# Patient Record
Sex: FEMALE | Race: OTHER | HISPANIC OR LATINO | URBAN - METROPOLITAN AREA
[De-identification: names, ages, dates, MRNs, and addresses within clinical notes are randomized per-mention and may not be internally consistent; named-entity substitution may affect disease eponyms.]

---

## 2021-09-04 VITALS
TEMPERATURE: 98 F | DIASTOLIC BLOOD PRESSURE: 70 MMHG | OXYGEN SATURATION: 99 % | RESPIRATION RATE: 18 BRPM | HEART RATE: 81 BPM | WEIGHT: 153 LBS | HEIGHT: 64 IN | SYSTOLIC BLOOD PRESSURE: 155 MMHG

## 2021-09-04 PROCEDURE — 99292 CRITICAL CARE ADDL 30 MIN: CPT | Mod: CS,25

## 2021-09-04 PROCEDURE — 99291 CRITICAL CARE FIRST HOUR: CPT | Mod: CS,25

## 2021-09-04 PROCEDURE — 31500 INSERT EMERGENCY AIRWAY: CPT

## 2021-09-04 NOTE — ED ADULT TRIAGE NOTE - OTHER COMPLAINTS
[facility] called 911 as per NH patient was more confused than usual. Receiving Ceftriaxone IV for possible infection. Upon arrival to ED, A+O x 2, Sinhala speaking

## 2021-09-05 ENCOUNTER — INPATIENT (INPATIENT)
Facility: HOSPITAL | Age: 86
LOS: 9 days | Discharge: NURSING, REHAB OR LONG-TERM CARE | DRG: 208 | End: 2021-09-15
Attending: STUDENT IN AN ORGANIZED HEALTH CARE EDUCATION/TRAINING PROGRAM | Admitting: STUDENT IN AN ORGANIZED HEALTH CARE EDUCATION/TRAINING PROGRAM
Payer: MEDICARE

## 2021-09-05 LAB
A1C WITH ESTIMATED AVERAGE GLUCOSE RESULT: 5.6 % — SIGNIFICANT CHANGE UP (ref 4–5.6)
ALBUMIN SERPL ELPH-MCNC: 2.8 G/DL — LOW (ref 3.3–5)
ALBUMIN SERPL ELPH-MCNC: 3.3 G/DL — SIGNIFICANT CHANGE UP (ref 3.3–5)
ALP SERPL-CCNC: 82 U/L — SIGNIFICANT CHANGE UP (ref 40–120)
ALP SERPL-CCNC: 92 U/L — SIGNIFICANT CHANGE UP (ref 40–120)
ALT FLD-CCNC: 10 U/L — SIGNIFICANT CHANGE UP (ref 10–45)
ALT FLD-CCNC: 12 U/L — SIGNIFICANT CHANGE UP (ref 10–45)
ANION GAP SERPL CALC-SCNC: 6 MMOL/L — SIGNIFICANT CHANGE UP (ref 5–17)
ANION GAP SERPL CALC-SCNC: 7 MMOL/L — SIGNIFICANT CHANGE UP (ref 5–17)
ANION GAP SERPL CALC-SCNC: 7 MMOL/L — SIGNIFICANT CHANGE UP (ref 5–17)
ANION GAP SERPL CALC-SCNC: 8 MMOL/L — SIGNIFICANT CHANGE UP (ref 5–17)
APPEARANCE UR: CLEAR — SIGNIFICANT CHANGE UP
APTT BLD: 32.6 SEC — SIGNIFICANT CHANGE UP (ref 27.5–35.5)
AST SERPL-CCNC: 15 U/L — SIGNIFICANT CHANGE UP (ref 10–40)
AST SERPL-CCNC: 16 U/L — SIGNIFICANT CHANGE UP (ref 10–40)
BACTERIA # UR AUTO: PRESENT /HPF
BASE EXCESS BLDV CALC-SCNC: 6.3 MMOL/L — HIGH (ref -2–3)
BASOPHILS # BLD AUTO: 0.02 K/UL — SIGNIFICANT CHANGE UP (ref 0–0.2)
BASOPHILS NFR BLD AUTO: 0.3 % — SIGNIFICANT CHANGE UP (ref 0–2)
BILIRUB SERPL-MCNC: 0.2 MG/DL — SIGNIFICANT CHANGE UP (ref 0.2–1.2)
BILIRUB SERPL-MCNC: 0.3 MG/DL — SIGNIFICANT CHANGE UP (ref 0.2–1.2)
BILIRUB UR-MCNC: NEGATIVE — SIGNIFICANT CHANGE UP
BLD GP AB SCN SERPL QL: NEGATIVE — SIGNIFICANT CHANGE UP
BUN SERPL-MCNC: 15 MG/DL — SIGNIFICANT CHANGE UP (ref 7–23)
BUN SERPL-MCNC: 17 MG/DL — SIGNIFICANT CHANGE UP (ref 7–23)
CA-I SERPL-SCNC: 1.28 MMOL/L — SIGNIFICANT CHANGE UP (ref 1.15–1.33)
CALCIUM SERPL-MCNC: 8.8 MG/DL — SIGNIFICANT CHANGE UP (ref 8.4–10.5)
CALCIUM SERPL-MCNC: 8.9 MG/DL — SIGNIFICANT CHANGE UP (ref 8.4–10.5)
CALCIUM SERPL-MCNC: 9 MG/DL — SIGNIFICANT CHANGE UP (ref 8.4–10.5)
CALCIUM SERPL-MCNC: 9.3 MG/DL — SIGNIFICANT CHANGE UP (ref 8.4–10.5)
CHLORIDE SERPL-SCNC: 75 MMOL/L — LOW (ref 96–108)
CHLORIDE SERPL-SCNC: 81 MMOL/L — LOW (ref 96–108)
CHLORIDE SERPL-SCNC: 82 MMOL/L — LOW (ref 96–108)
CHLORIDE SERPL-SCNC: 83 MMOL/L — LOW (ref 96–108)
CO2 BLDV-SCNC: 39.5 MMOL/L — HIGH (ref 22–26)
CO2 SERPL-SCNC: 32 MMOL/L — HIGH (ref 22–31)
CO2 SERPL-SCNC: 34 MMOL/L — HIGH (ref 22–31)
COLOR SPEC: YELLOW — SIGNIFICANT CHANGE UP
CREAT ?TM UR-MCNC: 51 MG/DL — SIGNIFICANT CHANGE UP
CREAT ?TM UR-MCNC: 67 MG/DL — SIGNIFICANT CHANGE UP
CREAT SERPL-MCNC: 0.46 MG/DL — LOW (ref 0.5–1.3)
CREAT SERPL-MCNC: 0.46 MG/DL — LOW (ref 0.5–1.3)
CREAT SERPL-MCNC: 0.49 MG/DL — LOW (ref 0.5–1.3)
CREAT SERPL-MCNC: 0.61 MG/DL — SIGNIFICANT CHANGE UP (ref 0.5–1.3)
DIFF PNL FLD: ABNORMAL
EOSINOPHIL # BLD AUTO: 0.01 K/UL — SIGNIFICANT CHANGE UP (ref 0–0.5)
EOSINOPHIL NFR BLD AUTO: 0.2 % — SIGNIFICANT CHANGE UP (ref 0–6)
EPI CELLS # UR: SIGNIFICANT CHANGE UP /HPF (ref 0–5)
ESTIMATED AVERAGE GLUCOSE: 114 MG/DL — SIGNIFICANT CHANGE UP (ref 68–114)
FERRITIN SERPL-MCNC: 60 NG/ML — SIGNIFICANT CHANGE UP (ref 15–150)
FOLATE SERPL-MCNC: >20 NG/ML — SIGNIFICANT CHANGE UP
GAS PNL BLDA: SIGNIFICANT CHANGE UP
GAS PNL BLDA: SIGNIFICANT CHANGE UP
GAS PNL BLDV: 115 MMOL/L — CRITICAL LOW (ref 136–145)
GAS PNL BLDV: SIGNIFICANT CHANGE UP
GLUCOSE BLDC GLUCOMTR-MCNC: 123 MG/DL — HIGH (ref 70–99)
GLUCOSE BLDC GLUCOMTR-MCNC: 124 MG/DL — HIGH (ref 70–99)
GLUCOSE BLDC GLUCOMTR-MCNC: 129 MG/DL — HIGH (ref 70–99)
GLUCOSE BLDC GLUCOMTR-MCNC: 98 MG/DL — SIGNIFICANT CHANGE UP (ref 70–99)
GLUCOSE SERPL-MCNC: 116 MG/DL — HIGH (ref 70–99)
GLUCOSE SERPL-MCNC: 121 MG/DL — HIGH (ref 70–99)
GLUCOSE SERPL-MCNC: 127 MG/DL — HIGH (ref 70–99)
GLUCOSE SERPL-MCNC: 134 MG/DL — HIGH (ref 70–99)
GLUCOSE UR QL: NEGATIVE — SIGNIFICANT CHANGE UP
HCO3 BLDV-SCNC: 37 MMOL/L — HIGH (ref 22–29)
HCT VFR BLD CALC: 26.9 % — LOW (ref 34.5–45)
HCT VFR BLD CALC: 29.8 % — LOW (ref 34.5–45)
HGB BLD-MCNC: 10 G/DL — LOW (ref 11.5–15.5)
HGB BLD-MCNC: 8.7 G/DL — LOW (ref 11.5–15.5)
IMM GRANULOCYTES NFR BLD AUTO: 0.6 % — SIGNIFICANT CHANGE UP (ref 0–1.5)
INR BLD: 0.94 — SIGNIFICANT CHANGE UP (ref 0.88–1.16)
IRON SATN MFR SERPL: 20 % — SIGNIFICANT CHANGE UP (ref 14–50)
IRON SATN MFR SERPL: 41 UG/DL — SIGNIFICANT CHANGE UP (ref 30–160)
KETONES UR-MCNC: NEGATIVE — SIGNIFICANT CHANGE UP
LACTATE SERPL-SCNC: 0.7 MMOL/L — SIGNIFICANT CHANGE UP (ref 0.5–2)
LEGIONELLA AG UR QL: NEGATIVE — SIGNIFICANT CHANGE UP
LEUKOCYTE ESTERASE UR-ACNC: ABNORMAL
LYMPHOCYTES # BLD AUTO: 0.88 K/UL — LOW (ref 1–3.3)
LYMPHOCYTES # BLD AUTO: 13.7 % — SIGNIFICANT CHANGE UP (ref 13–44)
MAGNESIUM SERPL-MCNC: 1.5 MG/DL — LOW (ref 1.6–2.6)
MAGNESIUM SERPL-MCNC: 1.5 MG/DL — LOW (ref 1.6–2.6)
MAGNESIUM SERPL-MCNC: 1.6 MG/DL — SIGNIFICANT CHANGE UP (ref 1.6–2.6)
MCHC RBC-ENTMCNC: 28.2 PG — SIGNIFICANT CHANGE UP (ref 27–34)
MCHC RBC-ENTMCNC: 29.3 PG — SIGNIFICANT CHANGE UP (ref 27–34)
MCHC RBC-ENTMCNC: 32.3 GM/DL — SIGNIFICANT CHANGE UP (ref 32–36)
MCHC RBC-ENTMCNC: 33.6 GM/DL — SIGNIFICANT CHANGE UP (ref 32–36)
MCV RBC AUTO: 87.3 FL — SIGNIFICANT CHANGE UP (ref 80–100)
MCV RBC AUTO: 87.4 FL — SIGNIFICANT CHANGE UP (ref 80–100)
MONOCYTES # BLD AUTO: 0.91 K/UL — HIGH (ref 0–0.9)
MONOCYTES NFR BLD AUTO: 14.2 % — HIGH (ref 2–14)
MRSA PCR RESULT.: NEGATIVE — SIGNIFICANT CHANGE UP
NEUTROPHILS # BLD AUTO: 4.55 K/UL — SIGNIFICANT CHANGE UP (ref 1.8–7.4)
NEUTROPHILS NFR BLD AUTO: 71 % — SIGNIFICANT CHANGE UP (ref 43–77)
NITRITE UR-MCNC: NEGATIVE — SIGNIFICANT CHANGE UP
NRBC # BLD: 0 /100 WBCS — SIGNIFICANT CHANGE UP (ref 0–0)
NRBC # BLD: 0 /100 WBCS — SIGNIFICANT CHANGE UP (ref 0–0)
OSMOLALITY SERPL: 258 MOSM/KG — LOW (ref 280–301)
PCO2 BLDV: 86 MMHG — HIGH (ref 39–42)
PH BLDV: 7.24 — LOW (ref 7.32–7.43)
PH UR: 6 — SIGNIFICANT CHANGE UP (ref 5–8)
PHOSPHATE SERPL-MCNC: 2.6 MG/DL — SIGNIFICANT CHANGE UP (ref 2.5–4.5)
PHOSPHATE SERPL-MCNC: 3.7 MG/DL — SIGNIFICANT CHANGE UP (ref 2.5–4.5)
PLATELET # BLD AUTO: 250 K/UL — SIGNIFICANT CHANGE UP (ref 150–400)
PLATELET # BLD AUTO: 299 K/UL — SIGNIFICANT CHANGE UP (ref 150–400)
PO2 BLDV: 59 MMHG — HIGH (ref 25–45)
POTASSIUM BLDV-SCNC: 5.6 MMOL/L — HIGH (ref 3.5–5.1)
POTASSIUM SERPL-MCNC: 4.4 MMOL/L — SIGNIFICANT CHANGE UP (ref 3.5–5.3)
POTASSIUM SERPL-MCNC: 5.1 MMOL/L — SIGNIFICANT CHANGE UP (ref 3.5–5.3)
POTASSIUM SERPL-MCNC: 5.2 MMOL/L — SIGNIFICANT CHANGE UP (ref 3.5–5.3)
POTASSIUM SERPL-MCNC: 5.3 MMOL/L — SIGNIFICANT CHANGE UP (ref 3.5–5.3)
POTASSIUM SERPL-SCNC: 4.4 MMOL/L — SIGNIFICANT CHANGE UP (ref 3.5–5.3)
POTASSIUM SERPL-SCNC: 5.1 MMOL/L — SIGNIFICANT CHANGE UP (ref 3.5–5.3)
POTASSIUM SERPL-SCNC: 5.2 MMOL/L — SIGNIFICANT CHANGE UP (ref 3.5–5.3)
POTASSIUM SERPL-SCNC: 5.3 MMOL/L — SIGNIFICANT CHANGE UP (ref 3.5–5.3)
PROCALCITONIN SERPL-MCNC: 0.07 NG/ML — SIGNIFICANT CHANGE UP (ref 0.02–0.1)
PROT SERPL-MCNC: 6.9 G/DL — SIGNIFICANT CHANGE UP (ref 6–8.3)
PROT SERPL-MCNC: 7.9 G/DL — SIGNIFICANT CHANGE UP (ref 6–8.3)
PROT UR-MCNC: 100 MG/DL
PROTHROM AB SERPL-ACNC: 11.3 SEC — SIGNIFICANT CHANGE UP (ref 10.6–13.6)
RBC # BLD: 3.08 M/UL — LOW (ref 3.8–5.2)
RBC # BLD: 3.41 M/UL — LOW (ref 3.8–5.2)
RBC # FLD: 13.2 % — SIGNIFICANT CHANGE UP (ref 10.3–14.5)
RBC # FLD: 13.5 % — SIGNIFICANT CHANGE UP (ref 10.3–14.5)
RBC CASTS # UR COMP ASSIST: < 5 /HPF — SIGNIFICANT CHANGE UP
RH IG SCN BLD-IMP: POSITIVE — SIGNIFICANT CHANGE UP
S AUREUS DNA NOSE QL NAA+PROBE: POSITIVE
S PNEUM AG UR QL: NEGATIVE — SIGNIFICANT CHANGE UP
SAO2 % BLDV: 89.9 % — HIGH (ref 67–88)
SARS-COV-2 RNA SPEC QL NAA+PROBE: NEGATIVE — SIGNIFICANT CHANGE UP
SODIUM SERPL-SCNC: 115 MMOL/L — CRITICAL LOW (ref 135–145)
SODIUM SERPL-SCNC: 120 MMOL/L — CRITICAL LOW (ref 135–145)
SODIUM SERPL-SCNC: 121 MMOL/L — LOW (ref 135–145)
SODIUM SERPL-SCNC: 123 MMOL/L — LOW (ref 135–145)
SODIUM UR-SCNC: 23 MMOL/L — SIGNIFICANT CHANGE UP
SODIUM UR-SCNC: 33 MMOL/L — SIGNIFICANT CHANGE UP
SP GR SPEC: >=1.03 — SIGNIFICANT CHANGE UP (ref 1–1.03)
TIBC SERPL-MCNC: 201 UG/DL — LOW (ref 220–430)
TSH SERPL-MCNC: 0.65 UIU/ML — SIGNIFICANT CHANGE UP (ref 0.27–4.2)
TSH SERPL-MCNC: 0.79 UIU/ML — SIGNIFICANT CHANGE UP (ref 0.27–4.2)
UIBC SERPL-MCNC: 160 UG/DL — SIGNIFICANT CHANGE UP (ref 110–370)
UROBILINOGEN FLD QL: 0.2 E.U./DL — SIGNIFICANT CHANGE UP
VIT B12 SERPL-MCNC: 458 PG/ML — SIGNIFICANT CHANGE UP (ref 232–1245)
WBC # BLD: 5.28 K/UL — SIGNIFICANT CHANGE UP (ref 3.8–10.5)
WBC # BLD: 6.41 K/UL — SIGNIFICANT CHANGE UP (ref 3.8–10.5)
WBC # FLD AUTO: 5.28 K/UL — SIGNIFICANT CHANGE UP (ref 3.8–10.5)
WBC # FLD AUTO: 6.41 K/UL — SIGNIFICANT CHANGE UP (ref 3.8–10.5)
WBC UR QL: ABNORMAL /HPF

## 2021-09-05 PROCEDURE — 71250 CT THORAX DX C-: CPT | Mod: 26,MA

## 2021-09-05 PROCEDURE — 70450 CT HEAD/BRAIN W/O DYE: CPT | Mod: 26,MA

## 2021-09-05 PROCEDURE — 99291 CRITICAL CARE FIRST HOUR: CPT

## 2021-09-05 PROCEDURE — 71045 X-RAY EXAM CHEST 1 VIEW: CPT | Mod: 26,77

## 2021-09-05 PROCEDURE — 71045 X-RAY EXAM CHEST 1 VIEW: CPT | Mod: 26

## 2021-09-05 PROCEDURE — 99292 CRITICAL CARE ADDL 30 MIN: CPT

## 2021-09-05 RX ORDER — IPRATROPIUM/ALBUTEROL SULFATE 18-103MCG
3 AEROSOL WITH ADAPTER (GRAM) INHALATION EVERY 6 HOURS
Refills: 0 | Status: DISCONTINUED | OUTPATIENT
Start: 2021-09-05 | End: 2021-09-15

## 2021-09-05 RX ORDER — SODIUM CHLORIDE 9 MG/ML
1000 INJECTION, SOLUTION INTRAVENOUS
Refills: 0 | Status: DISCONTINUED | OUTPATIENT
Start: 2021-09-05 | End: 2021-09-15

## 2021-09-05 RX ORDER — FENTANYL CITRATE 50 UG/ML
50 INJECTION INTRAVENOUS ONCE
Refills: 0 | Status: DISCONTINUED | OUTPATIENT
Start: 2021-09-05 | End: 2021-09-05

## 2021-09-05 RX ORDER — NOREPINEPHRINE BITARTRATE/D5W 8 MG/250ML
0.05 PLASTIC BAG, INJECTION (ML) INTRAVENOUS
Qty: 8 | Refills: 0 | Status: DISCONTINUED | OUTPATIENT
Start: 2021-09-05 | End: 2021-09-05

## 2021-09-05 RX ORDER — MIDAZOLAM HYDROCHLORIDE 1 MG/ML
1 INJECTION, SOLUTION INTRAMUSCULAR; INTRAVENOUS ONCE
Refills: 0 | Status: DISCONTINUED | OUTPATIENT
Start: 2021-09-05 | End: 2021-09-05

## 2021-09-05 RX ORDER — MIDAZOLAM HYDROCHLORIDE 1 MG/ML
2 INJECTION, SOLUTION INTRAMUSCULAR; INTRAVENOUS ONCE
Refills: 0 | Status: DISCONTINUED | OUTPATIENT
Start: 2021-09-05 | End: 2021-09-05

## 2021-09-05 RX ORDER — IPRATROPIUM/ALBUTEROL SULFATE 18-103MCG
3 AEROSOL WITH ADAPTER (GRAM) INHALATION ONCE
Refills: 0 | Status: COMPLETED | OUTPATIENT
Start: 2021-09-05 | End: 2021-09-05

## 2021-09-05 RX ORDER — MAGNESIUM SULFATE 500 MG/ML
4 VIAL (ML) INJECTION ONCE
Refills: 0 | Status: COMPLETED | OUTPATIENT
Start: 2021-09-05 | End: 2021-09-05

## 2021-09-05 RX ORDER — MAGNESIUM SULFATE 500 MG/ML
1 VIAL (ML) INJECTION ONCE
Refills: 0 | Status: COMPLETED | OUTPATIENT
Start: 2021-09-05 | End: 2021-09-05

## 2021-09-05 RX ORDER — INSULIN LISPRO 100/ML
VIAL (ML) SUBCUTANEOUS
Refills: 0 | Status: DISCONTINUED | OUTPATIENT
Start: 2021-09-05 | End: 2021-09-15

## 2021-09-05 RX ORDER — ETOMIDATE 2 MG/ML
20 INJECTION INTRAVENOUS ONCE
Refills: 0 | Status: COMPLETED | OUTPATIENT
Start: 2021-09-05 | End: 2021-09-05

## 2021-09-05 RX ORDER — DEXTROSE 50 % IN WATER 50 %
25 SYRINGE (ML) INTRAVENOUS ONCE
Refills: 0 | Status: DISCONTINUED | OUTPATIENT
Start: 2021-09-05 | End: 2021-09-15

## 2021-09-05 RX ORDER — CHLORHEXIDINE GLUCONATE 213 G/1000ML
15 SOLUTION TOPICAL EVERY 12 HOURS
Refills: 0 | Status: DISCONTINUED | OUTPATIENT
Start: 2021-09-05 | End: 2021-09-06

## 2021-09-05 RX ORDER — ENOXAPARIN SODIUM 100 MG/ML
40 INJECTION SUBCUTANEOUS EVERY 24 HOURS
Refills: 0 | Status: DISCONTINUED | OUTPATIENT
Start: 2021-09-05 | End: 2021-09-15

## 2021-09-05 RX ORDER — PANTOPRAZOLE SODIUM 20 MG/1
40 TABLET, DELAYED RELEASE ORAL DAILY
Refills: 0 | Status: DISCONTINUED | OUTPATIENT
Start: 2021-09-05 | End: 2021-09-07

## 2021-09-05 RX ORDER — GLUCAGON INJECTION, SOLUTION 0.5 MG/.1ML
1 INJECTION, SOLUTION SUBCUTANEOUS ONCE
Refills: 0 | Status: DISCONTINUED | OUTPATIENT
Start: 2021-09-05 | End: 2021-09-15

## 2021-09-05 RX ORDER — SODIUM CHLORIDE 9 MG/ML
1000 INJECTION INTRAMUSCULAR; INTRAVENOUS; SUBCUTANEOUS ONCE
Refills: 0 | Status: COMPLETED | OUTPATIENT
Start: 2021-09-05 | End: 2021-09-05

## 2021-09-05 RX ORDER — MIRTAZAPINE 45 MG/1
15 TABLET, ORALLY DISINTEGRATING ORAL DAILY
Refills: 0 | Status: DISCONTINUED | OUTPATIENT
Start: 2021-09-05 | End: 2021-09-15

## 2021-09-05 RX ORDER — FENTANYL CITRATE 50 UG/ML
0.5 INJECTION INTRAVENOUS
Qty: 2500 | Refills: 0 | Status: DISCONTINUED | OUTPATIENT
Start: 2021-09-05 | End: 2021-09-07

## 2021-09-05 RX ORDER — PIPERACILLIN AND TAZOBACTAM 4; .5 G/20ML; G/20ML
3.38 INJECTION, POWDER, LYOPHILIZED, FOR SOLUTION INTRAVENOUS ONCE
Refills: 0 | Status: DISCONTINUED | OUTPATIENT
Start: 2021-09-05 | End: 2021-09-05

## 2021-09-05 RX ORDER — SODIUM CHLORIDE 9 MG/ML
2200 INJECTION INTRAMUSCULAR; INTRAVENOUS; SUBCUTANEOUS ONCE
Refills: 0 | Status: DISCONTINUED | OUTPATIENT
Start: 2021-09-05 | End: 2021-09-05

## 2021-09-05 RX ORDER — FUROSEMIDE 40 MG
20 TABLET ORAL ONCE
Refills: 0 | Status: COMPLETED | OUTPATIENT
Start: 2021-09-05 | End: 2021-09-05

## 2021-09-05 RX ORDER — DEXMEDETOMIDINE HYDROCHLORIDE IN 0.9% SODIUM CHLORIDE 4 UG/ML
0.2 INJECTION INTRAVENOUS
Qty: 200 | Refills: 0 | Status: DISCONTINUED | OUTPATIENT
Start: 2021-09-05 | End: 2021-09-07

## 2021-09-05 RX ORDER — ACETAMINOPHEN 500 MG
650 TABLET ORAL ONCE
Refills: 0 | Status: COMPLETED | OUTPATIENT
Start: 2021-09-05 | End: 2021-09-05

## 2021-09-05 RX ORDER — CHLORHEXIDINE GLUCONATE 213 G/1000ML
1 SOLUTION TOPICAL
Refills: 0 | Status: DISCONTINUED | OUTPATIENT
Start: 2021-09-05 | End: 2021-09-15

## 2021-09-05 RX ORDER — AZITHROMYCIN 500 MG/1
500 TABLET, FILM COATED ORAL ONCE
Refills: 0 | Status: COMPLETED | OUTPATIENT
Start: 2021-09-05 | End: 2021-09-05

## 2021-09-05 RX ORDER — HEPARIN SODIUM 5000 [USP'U]/ML
5000 INJECTION INTRAVENOUS; SUBCUTANEOUS EVERY 8 HOURS
Refills: 0 | Status: DISCONTINUED | OUTPATIENT
Start: 2021-09-05 | End: 2021-09-05

## 2021-09-05 RX ORDER — DEXTROSE 50 % IN WATER 50 %
12.5 SYRINGE (ML) INTRAVENOUS ONCE
Refills: 0 | Status: DISCONTINUED | OUTPATIENT
Start: 2021-09-05 | End: 2021-09-15

## 2021-09-05 RX ORDER — DEXTROSE 50 % IN WATER 50 %
15 SYRINGE (ML) INTRAVENOUS ONCE
Refills: 0 | Status: DISCONTINUED | OUTPATIENT
Start: 2021-09-05 | End: 2021-09-15

## 2021-09-05 RX ORDER — CEFTRIAXONE 500 MG/1
1000 INJECTION, POWDER, FOR SOLUTION INTRAMUSCULAR; INTRAVENOUS EVERY 24 HOURS
Refills: 0 | Status: DISCONTINUED | OUTPATIENT
Start: 2021-09-05 | End: 2021-09-06

## 2021-09-05 RX ORDER — ATORVASTATIN CALCIUM 80 MG/1
20 TABLET, FILM COATED ORAL AT BEDTIME
Refills: 0 | Status: DISCONTINUED | OUTPATIENT
Start: 2021-09-05 | End: 2021-09-15

## 2021-09-05 RX ADMIN — CHLORHEXIDINE GLUCONATE 15 MILLILITER(S): 213 SOLUTION TOPICAL at 17:34

## 2021-09-05 RX ADMIN — Medication 3 MILLILITER(S): at 09:48

## 2021-09-05 RX ADMIN — Medication 6.51 MICROGRAM(S)/KG/MIN: at 04:05

## 2021-09-05 RX ADMIN — PANTOPRAZOLE SODIUM 40 MILLIGRAM(S): 20 TABLET, DELAYED RELEASE ORAL at 12:51

## 2021-09-05 RX ADMIN — CEFTRIAXONE 100 MILLIGRAM(S): 500 INJECTION, POWDER, FOR SOLUTION INTRAMUSCULAR; INTRAVENOUS at 05:16

## 2021-09-05 RX ADMIN — Medication 20 MILLIGRAM(S): at 18:14

## 2021-09-05 RX ADMIN — HEPARIN SODIUM 5000 UNIT(S): 5000 INJECTION INTRAVENOUS; SUBCUTANEOUS at 05:17

## 2021-09-05 RX ADMIN — DEXMEDETOMIDINE HYDROCHLORIDE IN 0.9% SODIUM CHLORIDE 3.47 MICROGRAM(S)/KG/HR: 4 INJECTION INTRAVENOUS at 23:36

## 2021-09-05 RX ADMIN — CHLORHEXIDINE GLUCONATE 15 MILLILITER(S): 213 SOLUTION TOPICAL at 05:17

## 2021-09-05 RX ADMIN — DEXMEDETOMIDINE HYDROCHLORIDE IN 0.9% SODIUM CHLORIDE 3.47 MICROGRAM(S)/KG/HR: 4 INJECTION INTRAVENOUS at 03:58

## 2021-09-05 RX ADMIN — ENOXAPARIN SODIUM 40 MILLIGRAM(S): 100 INJECTION SUBCUTANEOUS at 22:01

## 2021-09-05 RX ADMIN — ETOMIDATE 20 MILLIGRAM(S): 2 INJECTION INTRAVENOUS at 03:14

## 2021-09-05 RX ADMIN — Medication 650 MILLIGRAM(S): at 17:34

## 2021-09-05 RX ADMIN — DEXMEDETOMIDINE HYDROCHLORIDE IN 0.9% SODIUM CHLORIDE 3.47 MICROGRAM(S)/KG/HR: 4 INJECTION INTRAVENOUS at 10:33

## 2021-09-05 RX ADMIN — MIDAZOLAM HYDROCHLORIDE 1 MILLIGRAM(S): 1 INJECTION, SOLUTION INTRAMUSCULAR; INTRAVENOUS at 03:28

## 2021-09-05 RX ADMIN — FENTANYL CITRATE 50 MICROGRAM(S): 50 INJECTION INTRAVENOUS at 03:45

## 2021-09-05 RX ADMIN — Medication 100 GRAM(S): at 10:23

## 2021-09-05 RX ADMIN — DEXMEDETOMIDINE HYDROCHLORIDE IN 0.9% SODIUM CHLORIDE 3.47 MICROGRAM(S)/KG/HR: 4 INJECTION INTRAVENOUS at 17:34

## 2021-09-05 RX ADMIN — FENTANYL CITRATE 3.47 MICROGRAM(S)/KG/HR: 50 INJECTION INTRAVENOUS at 05:18

## 2021-09-05 RX ADMIN — FENTANYL CITRATE 50 MICROGRAM(S): 50 INJECTION INTRAVENOUS at 03:16

## 2021-09-05 RX ADMIN — Medication 3 MILLILITER(S): at 15:41

## 2021-09-05 RX ADMIN — SODIUM CHLORIDE 1000 MILLILITER(S): 9 INJECTION INTRAMUSCULAR; INTRAVENOUS; SUBCUTANEOUS at 01:06

## 2021-09-05 RX ADMIN — Medication 3 MILLILITER(S): at 22:31

## 2021-09-05 RX ADMIN — Medication 3 MILLILITER(S): at 02:57

## 2021-09-05 RX ADMIN — Medication 40 MILLIGRAM(S): at 05:17

## 2021-09-05 RX ADMIN — AZITHROMYCIN 255 MILLIGRAM(S): 500 TABLET, FILM COATED ORAL at 04:19

## 2021-09-05 RX ADMIN — Medication 100 GRAM(S): at 12:50

## 2021-09-05 RX ADMIN — Medication 20 MILLIGRAM(S): at 12:51

## 2021-09-05 RX ADMIN — MIRTAZAPINE 15 MILLIGRAM(S): 45 TABLET, ORALLY DISINTEGRATING ORAL at 17:35

## 2021-09-05 RX ADMIN — ATORVASTATIN CALCIUM 20 MILLIGRAM(S): 80 TABLET, FILM COATED ORAL at 21:26

## 2021-09-05 RX ADMIN — MIDAZOLAM HYDROCHLORIDE 1 MILLIGRAM(S): 1 INJECTION, SOLUTION INTRAMUSCULAR; INTRAVENOUS at 03:34

## 2021-09-05 RX ADMIN — MIDAZOLAM HYDROCHLORIDE 2 MILLIGRAM(S): 1 INJECTION, SOLUTION INTRAMUSCULAR; INTRAVENOUS at 03:30

## 2021-09-05 RX ADMIN — Medication 100 GRAM(S): at 01:10

## 2021-09-05 NOTE — ED ADULT NURSE NOTE - OBJECTIVE STATEMENT
Received via stretcher BIBEMS from [facility] with chief complaints of AMS. Patient on Ceftriaxone IV for pleural effusion per document from NH.     AOX3, speaking full sentences.  +PERRLA.  Patient denies chest pain, shortness of breath, difficulty breathing and any form of distress not noted. Resps even and nonlabored. Moves all extremities. No obvious trauma/injury/deformity noted. Patient oriented to ED area. All needs attended. POC reviewed. Placed on continuos cardiac monitoring. Fall risk precautions maintained. Purposeful proactive hourly rounding in progress.

## 2021-09-05 NOTE — CONSULT NOTE ADULT - SUBJECTIVE AND OBJECTIVE BOX
ICU Consult Note    HPI:      REVIEW OF SYSTEMS // REPLACE    PAST MEDICAL & SURGICAL HISTORY:      FAMILY HISTORY:      SOCIAL HISTORY:  Smoker:  EtOH:  Drug Use:  Living situation:    Home Medications:      MEDICATIONS  (STANDING):    MEDICATIONS  (PRN):      Allergies    iodine containing compounds (Unknown)  lisinopril (Unknown)    Intolerances        PHYSICAL EXAM // REPLACE    LABS:                        10.0   6.41  )-----------( 299      ( 05 Sep 2021 00:30 )             29.8     09-05    115<LL>  |  75<L>  |  15  ----------------------------<  127<H>  5.3   |  34<H>  |  0.46<L>    Ca    9.3      05 Sep 2021 00:30  Mg     1.5     09-05    TPro  7.9  /  Alb  3.3  /  TBili  0.2  /  DBili  x   /  AST  16  /  ALT  12  /  AlkPhos  92  09-05    PT/INR - ( 05 Sep 2021 00:30 )   PT: 11.3 sec;   INR: 0.94          PTT - ( 05 Sep 2021 00:30 )  PTT:32.6 sec      Lactate, Blood: 0.7 mmol/L (09-05-21 @ 00:30)            Urinalysis Basic - ( 05 Sep 2021 01:34 )    Color: Yellow / Appearance: Clear / SG: >=1.030 / pH: x  Gluc: x / Ketone: NEGATIVE  / Bili: Negative / Urobili: 0.2 E.U./dL   Blood: x / Protein: 100 mg/dL / Nitrite: NEGATIVE   Leuk Esterase: Moderate / RBC: < 5 /HPF / WBC Many /HPF   Sq Epi: x / Non Sq Epi: 0-5 /HPF / Bacteria: Present /HPF            EKG:    RADIOLOGY & ADDITIONAL TESTS:   ICU Consult Note    HPI:  Pt is a 89 yo Czech speaking F with PMH bedbound, DNR, HTN, HLD, and OA who p/f NH with AMS x1d. Had been on CTX x1d outpt for PNA. Started becoming hypoxic and less verbal, so was sent to ER. Limited history d/t pt mental status. Per NH, no other changes to daily routine. No changes to appetite, BMs, or urination.     On arrival, T 98, HR 81, /70, RR 18 O2sat 99% 10L NRB--88% 4L--99% BIPAP 40%. Labs with WBC 6.41, 71% neutrophils, Hb 10, MCV 87%, Na 115, Cl 75, lactate neg, TSH neg, Mg 1.5, VBG 7.24/86/59/37. COVID neg. UA +LE, blood, WBCs. CT chest with cardiogenic pulmonary edema, small-mod BL pleural effusions with BL atelectasis vs. PNA. CTH neg. Pt given Mg 1g, NS 1L. ICU consulted for hypoNa.    On ICU eval, pt somnolent and unarousable. ABG collected with 7.18/98/86/37. Family contacted (ivon Bowers [phone]) requesting trial of intubation and confirmed DNR status. Pt intubated with etomidate 20mg, fentanyl 50mg, versed 1mg x2 and 2mg. Biting on tube and given fentanyl 50mg and started on precedex gtt, fentanyl gtt, peripheral levo gtt for MAP 60 after sedation initiated. Urine legionella and strep neg. MRSA swab sent. Started on azithromycin and CTX.    PAST MEDICAL & SURGICAL HISTORY:  HTN  HLD  OA    FAMILY HISTORY:  Unknown    SOCIAL HISTORY:  Smoker: unknown  EtOH: unknown  Drug Use: unknown  Living situation: [facility]    Home Medications:  Unknown    MEDICATIONS  (STANDING):    MEDICATIONS  (PRN):      Allergies  iodine containing compounds (Unknown)  lisinopril (Unknown)    Intolerances    VITAL SIGNS:  T(C): 36.8 (09-05-21 @ 00:22), Max: 36.8 (09-05-21 @ 00:22)  T(F): 98.2 (09-05-21 @ 00:22), Max: 98.2 (09-05-21 @ 00:22)  HR: 73 (09-05-21 @ 04:15) (59 - 81)  BP: 90/57 (09-05-21 @ 03:59) (90/57 - 164/73)  BP(mean): --  RR: 13 (09-05-21 @ 04:15) (12 - 36)  SpO2: 100% (09-05-21 @ 04:15) (84% - 100%)  Wt(kg): --    PHYSICAL EXAM:  Constitutional: elderly F, unarousable, on BIPAP with dentures in place  Head: NC/AT  Eyes: PERRL, anicteric sclera  ENT: no nasal discharge; dry MM  Neck: supple; no JVD  Respiratory: CTA BL, on BIPAP 10/5 O2sat >90%, no acc mm use  Cardiac: +S1/S2; RRR; no M/R/G  Gastrointestinal: soft, NT/ND; no rebound or guarding; +BSx4  Extremities: WWP, no clubbing or cyanosis; no peripheral edema  Musculoskeletal: NROM x4; no joint swelling, tenderness or erythema  Vascular: 2+ radial, DP/PT pulses B/L  Dermatologic: skin warm, dry and intact; no rashes, wounds, or scars  Neurologic: AOx0, no facial asymmetry, does not wake to stimulus, does not withdraw to pain, does not respond to sternal rub    LABS:                        10.0   6.41  )-----------( 299      ( 05 Sep 2021 00:30 )             29.8     09-05    115<LL>  |  75<L>  |  15  ----------------------------<  127<H>  5.3   |  34<H>  |  0.46<L>    Ca    9.3      05 Sep 2021 00:30  Mg     1.5     09-05    TPro  7.9  /  Alb  3.3  /  TBili  0.2  /  DBili  x   /  AST  16  /  ALT  12  /  AlkPhos  92  09-05    PT/INR - ( 05 Sep 2021 00:30 )   PT: 11.3 sec;   INR: 0.94          PTT - ( 05 Sep 2021 00:30 )  PTT:32.6 sec      Lactate, Blood: 0.7 mmol/L (09-05-21 @ 00:30)    Urinalysis Basic - ( 05 Sep 2021 01:34 )    Color: Yellow / Appearance: Clear / SG: >=1.030 / pH: x  Gluc: x / Ketone: NEGATIVE  / Bili: Negative / Urobili: 0.2 E.U./dL   Blood: x / Protein: 100 mg/dL / Nitrite: NEGATIVE   Leuk Esterase: Moderate / RBC: < 5 /HPF / WBC Many /HPF   Sq Epi: x / Non Sq Epi: 0-5 /HPF / Bacteria: Present /HPF    EKG:    RADIOLOGY & ADDITIONAL TESTS:

## 2021-09-05 NOTE — PROGRESS NOTE ADULT - SUBJECTIVE AND OBJECTIVE BOX
INTERVAL HPI/OVERNIGHT EVENTS:  89F, Japanese speaking, NHR, bedbound, DNR, PMHx HTN, HLD, and OA. Presents to ED from NH with AMS x1d. She was found altered at NH yesterday and started empirically on Ceftriaxone. Today pt noted to be Started hypoxic and less verbal, so was brought to ER. In the ED pt afebrile, HR 81, /70, O2 sat 99% on 10L NRB. She was placed on 4L NC with O2 sat 88%. Labs notable for WBC 6.4, Na 115, Cl 75, Mg 1.5. VBG 7.24/59/37. CT chest significant for pulmonary edema, small- moderate b/l pleural effusions with b/l atelectasis vs PNA. In the ED pt received 1L NS for hyponatremia, Mg 1g x 1. ICU consulted for hyponatremia. On evaluation by ICU team pt somnolent and unarousable. Repeat VBG  7.18/98/86, family requesting trial of intubation but pt remains DNR. Pt intubated, required Levo gtt briefly after initiation of sedation. Received Ceftriaxone/Azithro, steroids. Tx MICU for further workup and management.     SUBJECTIVE: Patient seen and examined at bedside. She is intubated and sedated with fent/precedex, moving all extremities spontaneously.      OBJECTIVE:    VITAL SIGNS:  ICU Vital Signs Last 24 Hrs  T(C): 37.3 (05 Sep 2021 13:00), Max: 37.3 (05 Sep 2021 08:57)  T(F): 99.2 (05 Sep 2021 13:00), Max: 99.2 (05 Sep 2021 13:00)  HR: 62 (05 Sep 2021 13:00) (57 - 81)  BP: 144/66 (05 Sep 2021 13:00) (90/57 - 164/73)  BP(mean): 95 (05 Sep 2021 13:00) (77 - 106)  ABP: --  ABP(mean): --  RR: 12 (05 Sep 2021 13:00) (12 - 36)  SpO2: 100% (05 Sep 2021 13:00) (84% - 100%)    Mode: AC/ CMV (Assist Control/ Continuous Mandatory Ventilation), RR (machine): 12, TV (machine): 400, FiO2: 50, PEEP: 5, ITime: 1, MAP: 7, PIP: 19    09-04 @ 07:01 - 09-05 @ 07:00  --------------------------------------------------------  IN: 79.7 mL / OUT: 275 mL / NET: -195.3 mL    09-05 @ 07:01 - 09-05 @ 14:00  --------------------------------------------------------  IN: 50.4 mL / OUT: 2020 mL / NET: -1969.6 mL      CAPILLARY BLOOD GLUCOSE      POCT Blood Glucose.: 129 mg/dL (05 Sep 2021 11:33)      PHYSICAL EXAM:    General: Elderly female laying comfortably in bed, NAD  Neuro: Arouses to noxious stimuli and when suctioned, does not follow commands, spontaneously moves all extremities, no focal deficits   HEENT: NC/AT; PERRL, clear conjunctiva  Neck: supple, trachea midline, +ETT  Respiratory: Coarse breath sounds b/l, decreased breath sounds b/l bases, no w/r/r, secretions purulent and bloody  Cardiovascular: +S1/S2; RRR  Abdomen: soft, NT/ND; +BS x4, small umbilical hernia  Extremities: WWP, 2+ peripheral pulses b/l; no LE edema  Skin: normal color and turgor; no rash    MEDICATIONS:  MEDICATIONS  (STANDING):  albuterol/ipratropium for Nebulization 3 milliLiter(s) Nebulizer every 6 hours  cefTRIAXone   IVPB 1000 milliGRAM(s) IV Intermittent every 24 hours  chlorhexidine 0.12% Liquid 15 milliLiter(s) Oral Mucosa every 12 hours  chlorhexidine 4% Liquid 1 Application(s) Topical <User Schedule>  dexMEDEtomidine Infusion 0.2 MICROgram(s)/kG/Hr (3.47 mL/Hr) IV Continuous <Continuous>  dextrose 40% Gel 15 Gram(s) Oral once  dextrose 5%. 1000 milliLiter(s) (50 mL/Hr) IV Continuous <Continuous>  dextrose 5%. 1000 milliLiter(s) (100 mL/Hr) IV Continuous <Continuous>  dextrose 50% Injectable 25 Gram(s) IV Push once  dextrose 50% Injectable 12.5 Gram(s) IV Push once  dextrose 50% Injectable 25 Gram(s) IV Push once  enoxaparin Injectable 40 milliGRAM(s) SubCutaneous every 24 hours  fentaNYL   Infusion. 0.5 MICROgram(s)/kG/Hr (3.47 mL/Hr) IV Continuous <Continuous>  glucagon  Injectable 1 milliGRAM(s) IntraMuscular once  insulin lispro (ADMELOG) corrective regimen sliding scale   SubCutaneous three times a day before meals  pantoprazole  Injectable 40 milliGRAM(s) IV Push daily    MEDICATIONS  (PRN):      ALLERGIES:  Allergies    iodine containing compounds (Unknown)  lisinopril (Unknown)    Intolerances        LABS:                        8.7    5.28  )-----------( 250      ( 05 Sep 2021 05:30 )             26.9     09-05    121<L>  |  82<L>  |  15  ----------------------------<  121<H>  5.2   |  32<H>  |  0.46<L>    Ca    8.9      05 Sep 2021 11:07  Phos  2.6     09-05  Mg     1.5     09-05    TPro  6.9  /  Alb  2.8<L>  /  TBili  0.3  /  DBili  x   /  AST  15  /  ALT  10  /  AlkPhos  82  09-05    PT/INR - ( 05 Sep 2021 00:30 )   PT: 11.3 sec;   INR: 0.94          PTT - ( 05 Sep 2021 00:30 )  PTT:32.6 sec  Urinalysis Basic - ( 05 Sep 2021 01:34 )    Color: Yellow / Appearance: Clear / SG: >=1.030 / pH: x  Gluc: x / Ketone: NEGATIVE  / Bili: Negative / Urobili: 0.2 E.U./dL   Blood: x / Protein: 100 mg/dL / Nitrite: NEGATIVE   Leuk Esterase: Moderate / RBC: < 5 /HPF / WBC Many /HPF   Sq Epi: x / Non Sq Epi: 0-5 /HPF / Bacteria: Present /HPF        RADIOLOGY & ADDITIONAL TESTS: Reviewed.

## 2021-09-05 NOTE — ED PROVIDER NOTE - CARE PLAN
1 Principal Discharge DX:	Altered mental status  Secondary Diagnosis:	Hyponatremia   Principal Discharge DX:	Altered mental status  Secondary Diagnosis:	Hyponatremia  Secondary Diagnosis:	Acute respiratory failure with hypoxia

## 2021-09-05 NOTE — PROGRESS NOTE ADULT - ATTENDING COMMENTS
Acute hypoxic resp failure (on ventilatory support) with UTI with acute encephalopathy.  Plan:  - Sedation vacation and CPAP trial  - Ceftriaxone for UTI  - Extubate if encephalopathy resolves.  - Rest as above.

## 2021-09-05 NOTE — CONSULT NOTE ADULT - ASSESSMENT
Cardiovascular:    Pulmonary:    Neurology:    Gastrointestinal:    Genitourinary:    Endocrine:    ID:    Renal:    Heme:    Dispo: to be discussed with attending and fellow   Pt is a 89 yo Khmer speaking F with PMH bedbound, DNR, HTN, HLD, and OA who p/f NH with AMS x1d. Found to be hyponatremic and hypercarbic, for which ICU is consulted.    Cardiovascular:  #HTN    #HLD    #R/O congestive heart failure    Pulmonary:  #Acute hypercarbic respiratory failure    #R/O PNA     #R/O COPD exacerbation    #BL pleural effusions    Neurology:  #Acute toxic metabolic encephalopathy    Gastrointestinal:  - no acute issues    Genitourinary:  #UTI    Endocrine:  - no acute issues    ID:  #Sepsis    Renal:  #Hyponatremia    #Hypochloremia    Heme:  #Normocytic anemia    Dispo: Admit to MICU. Discussed with intensivist and ER attending.   Pt is a 89 yo Mexican speaking F with PMH bedbound, DNR, HTN, HLD, and OA who p/f NH with AMS x1d. Found to be hyponatremic and hypercarbic, for which ICU is consulted.    Cardiovascular:  #HTN  - pt with hx HTN, unknown meds  - on arrival, /70  - now hypotensive in setting of sedation  - hold anti-HTN; will need med rec  - peripheral levophed gtt for MAP<65 (likely 2/2 iatrogenic sedation)    #HLD  - pt with hx HLD, unknown meds  - med rec    #R/O congestive heart failure  - unknown if any hx of CHF  - CT chest with cardiogenic pulmonary edema, small-mod BL pleural effusions  - f/u TTE to evaluate EF    Pulmonary:  #Acute hypercarbic respiratory failure    #R/O PNA     #R/O COPD exacerbation    #BL pleural effusions    Neurology:  #Acute toxic metabolic encephalopathy    Gastrointestinal:  - no acute issues    Genitourinary:  #UTI  - UA +LE, f/u UCx  - c/w CTX 1g q24h (s/p 1g CTX at NH 9/4)  - bahena placed in ER -- monitor I&O    Endocrine:  - no acute issues    ID:  #UTI  - as above  - not meeting SIRS   - lactate neg    Renal:  #Hyponatremia  - on arrival, Na 115  - serum osm 258 with urine Na 23 -- after 1L NS in ER  - euvolemic on exam with pleural effusions as above c/f CHF  - likely SIADH  - fluid restriction  - trend BMP q4h    Heme:  #Normocytic anemia    Dispo: Admit to MICU. Discussed with intensivist and ER attending.   Pt is a 89 yo Nigerian speaking F with PMH bedbound, DNR, HTN, HLD, and OA who p/f NH with AMS x1d. Found to be hyponatremic and hypercarbic, for which ICU is consulted.    Cardiovascular:  #HTN  - pt with hx HTN, unknown meds  - on arrival, /70  - now hypotensive in setting of sedation  - hold anti-HTN; will need med rec  - peripheral levophed gtt for MAP<65 (likely 2/2 iatrogenic sedation)    #HLD  - pt with hx HLD, unknown meds  - med rec    #R/O congestive heart failure  - unknown if any hx of CHF  - CT chest with cardiogenic pulmonary edema, small-mod BL pleural effusions  - f/u TTE to evaluate EF    Pulmonary:  #Acute hypercarbic respiratory failure    #R/O PNA   - CT chest with cardiogenic pulmonary edema    #R/O COPD exacerbation  - CT chest with mild emphysematous changes  - start methylpred 40mg daily x5d  - start azithromycin 500mg x3d (anti-inflammatory)  - duonebs q6h    #BL pleural effusions  - CT chest as above  - c/f CHF -- f/u TTE  - pulm consult to consider therapeutic tap    Neurology:  #Acute toxic metabolic encephalopathy  - pt p/w 1d AMS  - found to have Na 115  - VBG 7.24/86/59/37 -- ABG 7.18/98/86/37 on BIPAP  - UA+  - CTH neg  - likely in the setting of hypoNa vs. hypercarbia vs. infection  - management as above  - monitor mental status     Gastrointestinal:  - no acute issues    Genitourinary:  #UTI  - UA +LE, f/u UCx  - c/w CTX 1g q24h (s/p 1g CTX at NH 9/4)  - bahena placed in ER -- monitor I&O    Endocrine:  - no acute issues    ID:  #UTI  - as above  - not meeting SIRS   - lactate neg    Renal:  #Hyponatremia  - on arrival, Na 115  - serum osm 258 with urine Na 23 -- after 1L NS in ER  - euvolemic on exam with pleural effusions as above c/f CHF  - likely SIADH  - fluid restriction  - trend BMP q4h    Heme:  #Normocytic anemia  - on arrival, Hb 10 with MCV 87  - no active s/s bleeding, unknown baseline Hb  - f/u iron studies, folate, B12  - keep active T&S, transfuse Hb<7    Dispo: Admit to MICU. Discussed with intensivist and ER attending.   Pt is a 89 yo [place] citizen speaking F with PMH bedbound, DNR, HTN, HLD, and OA who p/f NH with AMS x1d. Found to be hyponatremic and hypercarbic, for which ICU is consulted.    Cardiovascular:  #HTN  - pt with hx HTN, unknown meds  - on arrival, /70  - now hypotensive in setting of sedation  - hold anti-HTN; will need med rec  - peripheral levophed gtt for MAP<65 (likely 2/2 iatrogenic sedation)    #HLD  - pt with hx HLD, unknown meds  - med rec    #R/O congestive heart failure  - unknown if any hx of CHF  - CT chest with cardiogenic pulmonary edema, small-mod BL pleural effusions  - f/u TTE to evaluate EF    Pulmonary:  #Acute hypercarbic respiratory failure  - on arrival, altered and requiring 10L NRB for O2sat >90% transitioned to BIPAP  - VBG 7.24/86/59/37 -- ABG 7.18/98/86/37 on BIPAP  - given somnolence, intubated and sedated for hypercarbia  - f/u ABG post-intubation  - wean sedation and CPAP trial in AM    #R/O PNA   - CT chest with cardiogenic pulmonary edema, small-mod BL pleural effusions, BL atelectasis vs. PNA  - more likely atelectasis as procal neg  - urine legionella/strep negative  - f/u MRSA swab    #R/O COPD exacerbation  - CT chest with mild emphysematous changes  - start methylpred 40mg daily x5d  - start azithromycin 500mg x3d (anti-inflammatory)  - duonebs q6h    #BL pleural effusions  - CT chest as above  - c/f CHF -- f/u TTE  - pulm consult to consider therapeutic tap    Neurology:  #Acute toxic metabolic encephalopathy  - pt p/w 1d AMS  - found to have Na 115  - VBG 7.24/86/59/37 -- ABG 7.18/98/86/37 on BIPAP  - UA+  - CTH neg  - likely in the setting of hypoNa vs. hypercarbia vs. infection  - management as above  - monitor mental status     Gastrointestinal:  - no acute issues    Genitourinary:  #UTI  - UA +LE, f/u UCx  - c/w CTX 1g q24h (s/p 1g CTX at NH 9/4)  - bahena placed in ER -- monitor I&O    Endocrine:  - no acute issues    ID:  #UTI  - as above  - not meeting SIRS   - lactate neg    Renal:  #Hyponatremia  - on arrival, Na 115  - serum osm 258 with urine Na 23 -- after 1L NS in ER  - euvolemic on exam with pleural effusions as above c/f CHF  - likely SIADH  - fluid restriction  - trend BMP q4h    Heme:  #Normocytic anemia  - on arrival, Hb 10 with MCV 87  - no active s/s bleeding, unknown baseline Hb  - f/u iron studies, folate, B12  - keep active T&S, transfuse Hb<7    Dispo: Admit to MICU. Discussed with intensivist and ER attending.

## 2021-09-05 NOTE — ED ADULT NURSE REASSESSMENT NOTE - NS ED NURSE REASSESS COMMENT FT1
Incontinent care done. Moisture barrier cream applied. Primafit in place. Turned and repositioned q 2 and PRN.

## 2021-09-05 NOTE — ED PROVIDER NOTE - OBJECTIVE STATEMENT
89 year old DNR female with history of HLD, HTN, Osteoarthritis, COVID, ? recently diagnosed PNA (abnormal CXR and on Rocephin per NH paperwork) presents to ED via EMS transport from NH secondary to concern for hypoxia and AMS today.  Per EMS report, patient was not acting herself.  On arrival to ED, patient is noted to be alert and oriented x 3 when spoken to in Turkmen.  She is saturating in 80s on room air, without significant improvement on n/c, and placed on bipap on arrival to ED.  Patient is a poor historian and additional history is limited.

## 2021-09-05 NOTE — ED ADULT NURSE REASSESSMENT NOTE - NS ED NURSE REASSESS COMMENT FT1
pt intubated. 7.5size, 23@ lip, 50 O2, .400 VT, 5PEEP, 1.0Ti. 20mg of etomidate given prior to intubation and 50mcg of fentanyl post intubation. pt tolerated well. vital signs stable. lung sounds auscultated bilaterally, chest xray collected to confirm location

## 2021-09-05 NOTE — H&P ADULT - HISTORY OF PRESENT ILLNESS
Pt is a 89 yo Maldivian speaking F with PMH bedbound, DNR, HTN, HLD, and OA who p/f NH with AMS x1d. Had been on CTX x1d outpt for PNA. Started becoming hypoxic and less verbal, so was sent to ER. Limited history d/t pt mental status. Per NH, no other changes to daily routine. No changes to appetite, BMs, or urination.     On arrival, T 98, HR 81, /70, RR 18 O2sat 99% 10L NRB--88% 4L--99% BIPAP 40%. Labs with WBC 6.41, 71% neutrophils, Hb 10, MCV 87%, Na 115, Cl 75, lactate neg, TSH neg, Mg 1.5, VBG 7.24/86/59/37. COVID neg. UA +LE, blood, WBCs. CT chest with cardiogenic pulmonary edema, small-mod BL pleural effusions with BL atelectasis vs. PNA. CTH neg. Pt given Mg 1g, NS 1L. ICU consulted for hypoNa.    On ICU eval, pt somnolent and unarousable. ABG collected with 7.18/98/86/37. Family contacted (ivon Bowers [phone]) requesting trial of intubation and confirmed DNR status. Pt intubated with etomidate 20mg, fentanyl 50mg, versed 1mg x2 and 2mg. Biting on tube and given fentanyl 50mg and started on precedex gtt, fentanyl gtt, peripheral levo gtt for MAP 60 after sedation initiated. Urine legionella and strep neg. MRSA swab sent. Started on azithromycin, CTX, steroids. Admitted to MICU for further observation and management.

## 2021-09-05 NOTE — ED ADULT NURSE NOTE - NSIMPLEMENTINTERV_GEN_ALL_ED
Implemented All Universal Safety Interventions:  [place] to call system. Call bell, personal items and telephone within reach. Instruct patient to call for assistance. Room bathroom lighting operational. Non-slip footwear when patient is off stretcher. Physically safe environment: no spills, clutter or unnecessary equipment. Stretcher in lowest position, wheels locked, appropriate side rails in place.

## 2021-09-05 NOTE — ED PROVIDER NOTE - PHYSICAL EXAMINATION
VITAL SIGNS: I have reviewed nursing notes and confirm.  CONSTITUTIONAL: Non toxic; in no acute distress.   SKIN:  Warm and dry, no acute rash.   HEAD:  Normocephalic, atraumatic.  EYES: EOM intact; conjunctiva and sclera clear.  ENT: No nasal discharge; airway clear.   NECK: Supple; non tender.  CARD: S1, S2 normal; no murmurs, gallops, or rubs. Regular rate and rhythm.   RESP:  Coarse breath sounds, bilaterally.  No retractions.    ABD: Normal bowel sounds; soft; non-distended; non-tender on palpation throughout; no guarding/rebound.  EXT: No clubbing, cyanosis or edema. 2+ pulses to b/l ue/le.  NEURO: Alert, oriented, grossly unremarkable.  Following simple commands.

## 2021-09-05 NOTE — ED PROVIDER NOTE - CLINICAL SUMMARY MEDICAL DECISION MAKING FREE TEXT BOX
Patient in ED w concern for AMS today at nursing home today.  Patient awake, alert on arrival to ED.  On Rocephin, azithro added to cover for atypical resp sources given chest imaging.  Also concern for UTI, already being treated with rocephin.  MICU team consulted given concern for hyponatremia, hypoxia, etc.  Of not, I spoke with patient's sister (DPOA) and niece who are initially unsure of whether or not they want her intubated. Patient in ED w concern for AMS today at nursing home today.  Patient awake, alert on arrival to ED.  On Rocephin, azithro added to cover for atypical resp sources given chest imaging.  Also concern for UTI, already being treated with rocephin.  MICU team consulted given concern for hyponatremia, hypoxia, etc.  Of not, I spoke with patient's sister (DPRADHA) and niece who are initially unsure of whether or not they want her intubated.  Though, following time to consider, they are again contacted as patient's 2nd blood gas is not showing improvement and decision is made to place patient on ventilator.  [facility] and patient's sister ok with intubation, though do not want CPR at this time.  Patient is intubated in ED and admitted to ICU for continued medical treatment.

## 2021-09-05 NOTE — ED ADULT NURSE NOTE - OTHER COMPLAINTS
[facility] as per NH patient was more confused than usual. Receiving Ceftriaxone IV for possible infection. Upon arrival to ED, A+O x 2, Nepali speaking

## 2021-09-05 NOTE — ED ADULT NURSE REASSESSMENT NOTE - NS ED NURSE REASSESS COMMENT FT1
pt biting down on the tube, ICU team contacted for sedation. verbal order of 5mcg of fentanyl IV push order confirmed by read back method. pt connected to cardic monitor, will continue to assess

## 2021-09-05 NOTE — PROGRESS NOTE ADULT - ASSESSMENT
89F presents with AMS, hypercarbic respiratory failure in setting of ?UTI, volume overload. Found with hyponatremia 115. Intubated in ED for airway protection in setting of AMS and hypercarbic respiratory failure.     Neuro: AMS 2nd to acute toxic metabolic encephalopathy, r/o UTI  -CTH negative for acute pathology   -Sedation with precedex gtt, titrate to RASS 0 to -1  -DC fentanyl gtt  -Restart home Mirtazepine 15mg nightly      CV: PMHx HTN, HLD, sepsis r/o UTI  -Tele monitoring, maintain MAP >65  -Official echo  -Consider restarting home Norvasc (10mg), Ramipril (25mg) tmrw    Resp: Acute hypoxic/hypercarbic respiratory failure 2nd to pulmonary edema, b/l pleural effusions   -Trial of CPAP today  -Maintain O2 sat >92%, supplemental O2 PRN  -DC steroids     Abd: No active issues   -Place NGT and start TFs  -Protonix 40mg daily     Renal: Hypernatremia, r/o SIADH  -Lasix 20 x 1, repeat chem this afternoon   -Close monitoring of electrolytes, replete as needed  -Strict I/Os  -Fluid restrict with BMP q6h   -Will DC bahena this evening      Endo/Metabolic: No active issues   -Monitor FS, target FS <180  -Continue insulin sliding scale    ID: AMS/ r/o UTI  -Continue Ceftriaxone 1g q24h (9/4)  -DC Azithromycin   -f/u pan culture (bcx x 2, Ucx, Rcx)    Dispo: To remain in ICU with trial of intubation. DNR. Sheri Bowers updated [phone].

## 2021-09-05 NOTE — CONSULT NOTE ADULT - ATTENDING COMMENTS
Pt is a 89 yo Cuban speaking F with PMH bedbound, DNR, HTN, HLD, and OA who p/f NH with AMS x1d. Had been on CTX x1d outpt for PNA. Started becoming hypoxic and less verbal, so was sent to ER. Limited history d/t pt mental status. Per NH, no other changes to daily routine. No changes to appetite, BMs, or urination.     patient was hypoxic in the ED and initial blood gas showed significant CO2 retention. ICU was called to evaluate the patient for treatment and disposition. upon our evaluation we found the patient on bipap obtunded saturating around 97%. we lucas a stat ABG and while it was being processed I called the nieces including Doretha who is the stated HCP. The seriousness of her aunts medical condition was explained in detail and how if her aunt was failing bipap then the next step would be intubation and mechanical ventilation. Doretha stated that she understands that her aunt is old and has many comorbidities but she wants to give her a trial of intubation with no long term mechanical ventilation or feeding tubes and no CPR.     ABG came back with worse acidosis and worsening PCO2.  The patient was intubated in the setting of mixed hypercapnic/hypoxic respiratory failure likely secondary to COPD exacerbation.     Additionally the patient was found to be hyponatremic to 115. according to the ED the patient was A&O x3 during the ED visit so its less likely that the hyponatreia was contributing to her encephalopathy. patient was given a L of NS by the ED. Patient appeared to be relatively euvolemic on exam. serum and urine studies seem to point towards this being a SIADH picture and we will refrain from giving any more volume at this time now that the repeat sodium is 120.     We will treat as AECOPD with CAP coverage with ceftriaxone and azithromycin. mrsa swab, urine strep and legionella. pan culture.  continue lung protective mechanical ventilation. avoid overcorrecting the PCO2. patient is a chronic retainer based on serum bicarb. aim for a PCO@ around 60 with a PH >7.3. assess for SBT daily with sedation vacation. GI and DVT ppx. if not a candidate for early extubation she will need a OGT to start feeds.  Palliative care consult would be appropriate Pt is a 89 yo Libyan speaking F with PMH bedbound, DNR, HTN, HLD, and OA who p/f NH with AMS x1d. Had been on CTX x1d outpt for PNA. Started becoming hypoxic and less verbal, so was sent to ER. Limited history d/t pt mental status. Per NH, no other changes to daily routine. No changes to appetite, BMs, or urination.     patient was hypoxic in the ED and initial blood gas showed significant CO2 retention. ICU was called to evaluate the patient for treatment and disposition. upon our evaluation we found the patient on bipap obtunded saturating around 97%. we lucas a stat ABG and while it was being processed I called the nieces including Doretha who is the stated HCP. The seriousness of her aunts medical condition was explained in detail and how if her aunt was failing bipap then the next step would be intubation and mechanical ventilation. Doretha stated that she understands that her aunt is old and has many comorbidities but she wants to give her a trial of intubation with no long term mechanical ventilation or feeding tubes and no CPR.     ABG came back with worse acidosis and worsening PCO2.  The patient was intubated in the setting of mixed hypercapnic/hypoxic respiratory failure likely secondary to COPD exacerbation.     Additionally the patient was found to be hyponatremic to 115. according to the ED the patient was A&O x3 during the ED visit so its less likely that the hyponatreia was contributing to her encephalopathy. patient was given a L of NS by the ED. Patient appeared to be relatively euvolemic on exam. serum and urine studies seem to point towards this being a SIADH picture and we will refrain from giving any more volume at this time now that the repeat sodium is 120.     We will treat as AECOPD with CAP coverage with ceftriaxone and azithromycin. mrsa swab, urine strep and legionella. pan culture.  continue lung protective mechanical ventilation. avoid overcorrecting the PCO2. steroids and nebs. patient is a chronic retainer based on serum bicarb. aim for a PCO@ around 60 with a PH >7.3. assess for SBT daily with sedation vacation. GI and DVT ppx. if not a candidate for early extubation she will need a OGT to start feeds.  Palliative care consult would be appropriate

## 2021-09-05 NOTE — ED ADULT NURSE REASSESSMENT NOTE - NS ED NURSE REASSESS COMMENT FT1
pt is not responsive as before. ABG performed, pH is decreasing. ICU and ED attending deciding to intubate the pt

## 2021-09-05 NOTE — H&P ADULT - NSHPLABSRESULTS_GEN_ALL_CORE
LABS:                        8.7    5.28  )-----------( 250      ( 05 Sep 2021 05:30 )             26.9     09-05    120<LL>  |  81<L>  |  15  ----------------------------<  134<H>  5.1   |  32<H>  |  0.49<L>    Ca    8.8      05 Sep 2021 05:32  Phos  3.7     09-05  Mg     1.6     09-05    TPro  6.9  /  Alb  2.8<L>  /  TBili  0.3  /  DBili  x   /  AST  15  /  ALT  10  /  AlkPhos  82  09-05    PT/INR - ( 05 Sep 2021 00:30 )   PT: 11.3 sec;   INR: 0.94          PTT - ( 05 Sep 2021 00:30 )  PTT:32.6 sec  Urinalysis Basic - ( 05 Sep 2021 01:34 )    Color: Yellow / Appearance: Clear / SG: >=1.030 / pH: x  Gluc: x / Ketone: NEGATIVE  / Bili: Negative / Urobili: 0.2 E.U./dL   Blood: x / Protein: 100 mg/dL / Nitrite: NEGATIVE   Leuk Esterase: Moderate / RBC: < 5 /HPF / WBC Many /HPF   Sq Epi: x / Non Sq Epi: 0-5 /HPF / Bacteria: Present /HPF      CAPILLARY BLOOD GLUCOSE      POCT Blood Glucose.: 132 mg/dL (04 Sep 2021 23:51)      RADIOLOGY & ADDITIONAL TESTS: Reviewed. none

## 2021-09-05 NOTE — H&P ADULT - NSHPPHYSICALEXAM_GEN_ALL_CORE
VITAL SIGNS:  T(C): 36.8 (09-05-21 @ 00:22), Max: 36.8 (09-05-21 @ 00:22)  T(F): 98.2 (09-05-21 @ 00:22), Max: 98.2 (09-05-21 @ 00:22)  HR: 73 (09-05-21 @ 04:15) (59 - 81)  BP: 90/57 (09-05-21 @ 03:59) (90/57 - 164/73)  BP(mean): --  RR: 13 (09-05-21 @ 04:15) (12 - 36)  SpO2: 100% (09-05-21 @ 04:15) (84% - 100%)  Wt(kg): --    PHYSICAL EXAM:  Constitutional: elderly F, unarousable, on BIPAP with dentures in place  Head: NC/AT  Eyes: PERRL, anicteric sclera  ENT: no nasal discharge; dry MM  Neck: supple; no JVD  Respiratory: CTA BL, on BIPAP 10/5 O2sat >90%, no acc mm use  Cardiac: +S1/S2; RRR; no M/R/G  Gastrointestinal: soft, NT/ND; no rebound or guarding; +BSx4  Extremities: WWP, no clubbing or cyanosis; no peripheral edema  Musculoskeletal: NROM x4; no joint swelling, tenderness or erythema  Vascular: 2+ radial, DP/PT pulses B/L  Dermatologic: skin warm, dry and intact; no rashes, wounds, or scars  Neurologic: AOx0, no facial asymmetry, does not wake to stimulus, does not withdraw to pain, does not respond to sternal rub

## 2021-09-05 NOTE — H&P ADULT - ASSESSMENT
Pt is a 89 yo Fijian speaking F with PMH bedbound, DNR, HTN, HLD, and OA who p/f NH with AMS x1d. Found to be hyponatremic and hypercarbic, intubated and sedated. Admitted to MICU for further observation and management.    Cardiovascular:  #HTN  - pt with hx HTN, unknown meds  - on arrival, /70  - now hypotensive in setting of sedation  - hold anti-HTN; will need med rec  - peripheral levophed gtt for MAP<65 (likely 2/2 iatrogenic sedation)    #HLD  - pt with hx HLD, unknown meds  - med rec    #R/O congestive heart failure  - unknown if any hx of CHF  - CT chest with cardiogenic pulmonary edema, small-mod BL pleural effusions  - f/u TTE to evaluate EF    Pulmonary:  #Acute hypercarbic respiratory failure  - on arrival, altered and requiring 10L NRB for O2sat >90% transitioned to BIPAP  - VBG 7.24/86/59/37 -- ABG 7.18/98/86/37 on BIPAP  - given somnolence, intubated and sedated for hypercarbia  - f/u ABG post-intubation  - wean sedation and CPAP trial in AM    #R/O PNA   - CT chest with cardiogenic pulmonary edema, small-mod BL pleural effusions, BL atelectasis vs. PNA  - more likely atelectasis as procal neg  - urine legionella/strep negative  - f/u MRSA swab    #R/O COPD exacerbation  - CT chest with mild emphysematous changes  - start methylpred 40mg daily x5d  - start azithromycin 500mg x3d (anti-inflammatory)  - duonebs q6h    #BL pleural effusions  - CT chest as above  - c/f CHF -- f/u TTE  - pulm consult to consider therapeutic tap    Neurology:  #Acute toxic metabolic encephalopathy  - pt p/w 1d AMS  - found to have Na 115  - VBG 7.24/86/59/37 -- ABG 7.18/98/86/37 on BIPAP  - UA+  - CTH neg  - likely in the setting of hypoNa vs. hypercarbia vs. infection  - management as above  - monitor mental status     Gastrointestinal:  - no acute issues    Genitourinary:  #UTI  - UA +LE, f/u UCx  - c/w CTX 1g q24h (s/p 1g CTX at NH 9/4)  - bahena placed in ER -- monitor I&O    Endocrine:  - no acute issues    ID:  #UTI  - as above  - not meeting SIRS   - lactate neg    Renal:  #Hyponatremia  - on arrival, Na 115  - serum osm 258 with urine Na 23 -- after 1L NS in ER  - euvolemic on exam with pleural effusions as above c/f CHF  - likely SIADH  - fluid restriction  - trend BMP q4h    Heme:  #Normocytic anemia  - on arrival, Hb 10 with MCV 87  - no active s/s bleeding, unknown baseline Hb  - f/u iron studies, folate, B12  - keep active T&S, transfuse Hb<7    F: s/p 1L NS in ER, fluid restrict  E: replete K<4, Mg<2  N: NPO for now  D: hep sq 5000 TID  G: protonix 40mg daily while intubated    Code: DNR, trial of intubation  Dispo: MICU

## 2021-09-05 NOTE — ED PROCEDURE NOTE - CPROC ED INFORMED CONSENT1
consent from patient's sister and niece obtained/Benefits, risks, and possible complications of procedure explained to patient/caregiver who verbalized understanding and gave verbal consent.

## 2021-09-06 LAB
ALBUMIN SERPL ELPH-MCNC: 2.6 G/DL — LOW (ref 3.3–5)
ALP SERPL-CCNC: 77 U/L — SIGNIFICANT CHANGE UP (ref 40–120)
ALT FLD-CCNC: 11 U/L — SIGNIFICANT CHANGE UP (ref 10–45)
ANION GAP SERPL CALC-SCNC: 9 MMOL/L — SIGNIFICANT CHANGE UP (ref 5–17)
AST SERPL-CCNC: 14 U/L — SIGNIFICANT CHANGE UP (ref 10–40)
BASOPHILS # BLD AUTO: 0.01 K/UL — SIGNIFICANT CHANGE UP (ref 0–0.2)
BASOPHILS NFR BLD AUTO: 0.2 % — SIGNIFICANT CHANGE UP (ref 0–2)
BILIRUB SERPL-MCNC: 0.3 MG/DL — SIGNIFICANT CHANGE UP (ref 0.2–1.2)
BUN SERPL-MCNC: 19 MG/DL — SIGNIFICANT CHANGE UP (ref 7–23)
CALCIUM SERPL-MCNC: 9.2 MG/DL — SIGNIFICANT CHANGE UP (ref 8.4–10.5)
CHLORIDE SERPL-SCNC: 84 MMOL/L — LOW (ref 96–108)
CO2 SERPL-SCNC: 33 MMOL/L — HIGH (ref 22–31)
COVID-19 SPIKE DOMAIN AB INTERP: POSITIVE
COVID-19 SPIKE DOMAIN ANTIBODY RESULT: >250 U/ML — HIGH
CREAT SERPL-MCNC: 0.67 MG/DL — SIGNIFICANT CHANGE UP (ref 0.5–1.3)
EOSINOPHIL # BLD AUTO: 0 K/UL — SIGNIFICANT CHANGE UP (ref 0–0.5)
EOSINOPHIL NFR BLD AUTO: 0 % — SIGNIFICANT CHANGE UP (ref 0–6)
GLUCOSE BLDC GLUCOMTR-MCNC: 134 MG/DL — HIGH (ref 70–99)
GLUCOSE BLDC GLUCOMTR-MCNC: 139 MG/DL — HIGH (ref 70–99)
GLUCOSE BLDC GLUCOMTR-MCNC: 149 MG/DL — HIGH (ref 70–99)
GLUCOSE SERPL-MCNC: 139 MG/DL — HIGH (ref 70–99)
GRAM STN FLD: SIGNIFICANT CHANGE UP
HCT VFR BLD CALC: 27.6 % — LOW (ref 34.5–45)
HGB BLD-MCNC: 9.2 G/DL — LOW (ref 11.5–15.5)
IMM GRANULOCYTES NFR BLD AUTO: 0.3 % — SIGNIFICANT CHANGE UP (ref 0–1.5)
LYMPHOCYTES # BLD AUTO: 0.92 K/UL — LOW (ref 1–3.3)
LYMPHOCYTES # BLD AUTO: 16 % — SIGNIFICANT CHANGE UP (ref 13–44)
MAGNESIUM SERPL-MCNC: 3.2 MG/DL — HIGH (ref 1.6–2.6)
MCHC RBC-ENTMCNC: 28 PG — SIGNIFICANT CHANGE UP (ref 27–34)
MCHC RBC-ENTMCNC: 33.3 GM/DL — SIGNIFICANT CHANGE UP (ref 32–36)
MCV RBC AUTO: 84.1 FL — SIGNIFICANT CHANGE UP (ref 80–100)
MONOCYTES # BLD AUTO: 0.76 K/UL — SIGNIFICANT CHANGE UP (ref 0–0.9)
MONOCYTES NFR BLD AUTO: 13.2 % — SIGNIFICANT CHANGE UP (ref 2–14)
NEUTROPHILS # BLD AUTO: 4.04 K/UL — SIGNIFICANT CHANGE UP (ref 1.8–7.4)
NEUTROPHILS NFR BLD AUTO: 70.3 % — SIGNIFICANT CHANGE UP (ref 43–77)
NRBC # BLD: 0 /100 WBCS — SIGNIFICANT CHANGE UP (ref 0–0)
PHOSPHATE SERPL-MCNC: 3.2 MG/DL — SIGNIFICANT CHANGE UP (ref 2.5–4.5)
PLATELET # BLD AUTO: 254 K/UL — SIGNIFICANT CHANGE UP (ref 150–400)
POTASSIUM SERPL-MCNC: 3.7 MMOL/L — SIGNIFICANT CHANGE UP (ref 3.5–5.3)
POTASSIUM SERPL-SCNC: 3.7 MMOL/L — SIGNIFICANT CHANGE UP (ref 3.5–5.3)
PROT SERPL-MCNC: 6.6 G/DL — SIGNIFICANT CHANGE UP (ref 6–8.3)
RBC # BLD: 3.28 M/UL — LOW (ref 3.8–5.2)
RBC # FLD: 13.7 % — SIGNIFICANT CHANGE UP (ref 10.3–14.5)
SARS-COV-2 IGG+IGM SERPL QL IA: >250 U/ML — HIGH
SARS-COV-2 IGG+IGM SERPL QL IA: POSITIVE
SODIUM SERPL-SCNC: 126 MMOL/L — LOW (ref 135–145)
SPECIMEN SOURCE: SIGNIFICANT CHANGE UP
WBC # BLD: 5.75 K/UL — SIGNIFICANT CHANGE UP (ref 3.8–10.5)
WBC # FLD AUTO: 5.75 K/UL — SIGNIFICANT CHANGE UP (ref 3.8–10.5)

## 2021-09-06 PROCEDURE — 99291 CRITICAL CARE FIRST HOUR: CPT

## 2021-09-06 PROCEDURE — 93010 ELECTROCARDIOGRAM REPORT: CPT

## 2021-09-06 RX ORDER — POTASSIUM CHLORIDE 20 MEQ
40 PACKET (EA) ORAL ONCE
Refills: 0 | Status: COMPLETED | OUTPATIENT
Start: 2021-09-06 | End: 2021-09-06

## 2021-09-06 RX ORDER — AMPICILLIN TRIHYDRATE 250 MG
1 CAPSULE ORAL EVERY 6 HOURS
Refills: 0 | Status: COMPLETED | OUTPATIENT
Start: 2021-09-06 | End: 2021-09-09

## 2021-09-06 RX ORDER — FENTANYL CITRATE 50 UG/ML
50 INJECTION INTRAVENOUS ONCE
Refills: 0 | Status: DISCONTINUED | OUTPATIENT
Start: 2021-09-06 | End: 2021-09-07

## 2021-09-06 RX ORDER — SODIUM CHLORIDE 9 MG/ML
500 INJECTION, SOLUTION INTRAVENOUS ONCE
Refills: 0 | Status: COMPLETED | OUTPATIENT
Start: 2021-09-06 | End: 2021-09-06

## 2021-09-06 RX ADMIN — Medication 3 MILLILITER(S): at 15:40

## 2021-09-06 RX ADMIN — SODIUM CHLORIDE 250 MILLILITER(S): 9 INJECTION, SOLUTION INTRAVENOUS at 13:15

## 2021-09-06 RX ADMIN — Medication 108 GRAM(S): at 17:18

## 2021-09-06 RX ADMIN — CHLORHEXIDINE GLUCONATE 15 MILLILITER(S): 213 SOLUTION TOPICAL at 05:26

## 2021-09-06 RX ADMIN — PANTOPRAZOLE SODIUM 40 MILLIGRAM(S): 20 TABLET, DELAYED RELEASE ORAL at 12:16

## 2021-09-06 RX ADMIN — Medication 3 MILLILITER(S): at 21:04

## 2021-09-06 RX ADMIN — Medication 3 MILLILITER(S): at 10:48

## 2021-09-06 RX ADMIN — Medication 40 MILLIEQUIVALENT(S): at 06:09

## 2021-09-06 RX ADMIN — ATORVASTATIN CALCIUM 20 MILLIGRAM(S): 80 TABLET, FILM COATED ORAL at 21:11

## 2021-09-06 RX ADMIN — MIRTAZAPINE 15 MILLIGRAM(S): 45 TABLET, ORALLY DISINTEGRATING ORAL at 12:15

## 2021-09-06 RX ADMIN — CEFTRIAXONE 100 MILLIGRAM(S): 500 INJECTION, POWDER, FOR SOLUTION INTRAMUSCULAR; INTRAVENOUS at 05:26

## 2021-09-06 RX ADMIN — Medication 108 GRAM(S): at 22:13

## 2021-09-06 RX ADMIN — ENOXAPARIN SODIUM 40 MILLIGRAM(S): 100 INJECTION SUBCUTANEOUS at 21:11

## 2021-09-06 RX ADMIN — Medication 3 MILLILITER(S): at 05:08

## 2021-09-06 NOTE — PROGRESS NOTE ADULT - ATTENDING COMMENTS
Acute hypoxic resp failure (on ventilatory support) with UTI with acute encephalopathy (improved)  Plan:  - Plan for extubation. Encephalopathy improved.   - Ceftriaxone for UTI  - Rest as above.

## 2021-09-06 NOTE — PROGRESS NOTE ADULT - SUBJECTIVE AND OBJECTIVE BOX
INTERVAL HPI/OVERNIGHT EVENTS: precedex was increased by 1 mcg due to agitation by trying to pull tube.     SUBJECTIVE: Patient seen and examined at bedside. She is intubated and sedated with fent/precedex, moving all extremities spontaneously.      OBJECTIVE:  ICU Vital Signs Last 24 Hrs  T(C): 36.1 (06 Sep 2021 09:18), Max: 37.8 (05 Sep 2021 16:38)  T(F): 97 (06 Sep 2021 09:18), Max: 100 (05 Sep 2021 16:38)  HR: 64 (06 Sep 2021 12:00) (45 - 64)  BP: 120/58 (06 Sep 2021 12:00) (82/45 - 144/66)  BP(mean): 83 (06 Sep 2021 12:00) (61 - 95)  ABP: --  ABP(mean): --  RR: 28 (06 Sep 2021 12:00) (12 - 33)  SpO2: 100% (06 Sep 2021 12:00) (98% - 100%)    Mode: AC/ CMV (Assist Control/ Continuous Mandatory Ventilation)  RR (machine): 12  TV (machine): 400  FiO2: 40  PEEP: 5  ITime: 1  MAP: 7  PIP: 17    I&O's Summary    05 Sep 2021 07:01  -  06 Sep 2021 07:00  --------------------------------------------------------  IN: 584 mL / OUT: 3430 mL / NET: -2846 mL    06 Sep 2021 07:01  -  06 Sep 2021 12:33  --------------------------------------------------------  IN: 15.6 mL / OUT: 45 mL / NET: -29.4 mL      PHYSICAL EXAM:  General: Elderly female laying comfortably in bed, NAD  Neuro: Arouses to noxious stimuli and when suctioned, does not follow commands, spontaneously moves all extremities, no focal deficits   HEENT: NC/AT; PERRL, clear conjunctiva  Neck: supple, trachea midline  Respiratory: Coarse breath sounds b/l, decreased breath sounds b/l bases, no w/r/r, secretions purulent and bloody  Cardiovascular: +S1/S2; RRR  Abdomen: soft, NT/ND; +BS x4, small umbilical hernia  Extremities: WWP, 2+ peripheral pulses b/l; no LE edema  Skin: normal color and turgor; no rash    MEDICATIONS  (STANDING):  albuterol/ipratropium for Nebulization 3 milliLiter(s) Nebulizer every 6 hours  atorvastatin 20 milliGRAM(s) Oral at bedtime  cefTRIAXone   IVPB 1000 milliGRAM(s) IV Intermittent every 24 hours  chlorhexidine 4% Liquid 1 Application(s) Topical <User Schedule>  dexMEDEtomidine Infusion 0.2 MICROgram(s)/kG/Hr (3.47 mL/Hr) IV Continuous <Continuous>  dextrose 40% Gel 15 Gram(s) Oral once  dextrose 5%. 1000 milliLiter(s) (50 mL/Hr) IV Continuous <Continuous>  dextrose 5%. 1000 milliLiter(s) (100 mL/Hr) IV Continuous <Continuous>  dextrose 50% Injectable 25 Gram(s) IV Push once  dextrose 50% Injectable 12.5 Gram(s) IV Push once  dextrose 50% Injectable 25 Gram(s) IV Push once  enoxaparin Injectable 40 milliGRAM(s) SubCutaneous every 24 hours  fentaNYL   Infusion. 0.5 MICROgram(s)/kG/Hr (3.47 mL/Hr) IV Continuous <Continuous>  glucagon  Injectable 1 milliGRAM(s) IntraMuscular once  insulin lispro (ADMELOG) corrective regimen sliding scale   SubCutaneous three times a day before meals  mirtazapine 15 milliGRAM(s) Oral daily  pantoprazole  Injectable 40 milliGRAM(s) IV Push daily    MEDICATIONS  (PRN):  fentaNYL    Injectable 50 MICROGram(s) IV Push once PRN Severe Pain (7 - 10)      ALLERGIES:  Allergies    iodine containing compounds (Unknown)  lisinopril (Unknown)    Intolerances        LABS:                                   9.2    5.75  )-----------( 254      ( 06 Sep 2021 04:45 )             27.6   09-06    126<L>  |  84<L>  |  19  ----------------------------<  139<H>  3.7   |  33<H>  |  0.67    Ca    9.2      06 Sep 2021 04:44  Phos  3.2     09-06  Mg     3.2     09-06    TPro  6.6  /  Alb  2.6<L>  /  TBili  0.3  /  DBili  x   /  AST  14  /  ALT  11  /  AlkPhos  77  09-06      RADIOLOGY & ADDITIONAL TESTS: Reviewed.

## 2021-09-06 NOTE — PROGRESS NOTE ADULT - ASSESSMENT
89F presents with AMS, hypercarbic respiratory failure in setting of ?UTI, volume overload. Found with hyponatremia 115. Intubated in ED for airway protection in setting of AMS and hypercarbic respiratory failure.     NEUROLOGY  # AMS 2nd to acute toxic metabolic encephalopathy, r/o UTI  -CTH negative for acute pathology   -Sedation with precedex gtt, titrate to RASS 0 to -1  -Restart home Mirtazepine 15mg nightly      CARDIOVASCULAR   #PMHx HTN, HLD, sepsis r/o UTI  -Tele monitoring, maintain MAP >65  -Official echo  -Consider restarting home Norvasc (10mg), Ramipril (25mg) tmrw if BP is hypertensive   - Restarted atorvastatin home dose     RESPIRATORY  #Acute hypoxic/hypercarbic respiratory failure 2nd to pulmonary edema, b/l pleural effusions   -Trial of CPAP today, possibly extubate if tolerates CPAP well  -Maintain O2 sat >92%, supplemental O2 PRN    ABDOMEN  #No active issues  - If pt gets extubated, may have to insert NG tube; bedside dysphagia, if fails then order speech and swallow  - Protonix 40mg daily     RENAL  Hypernatremia, r/o SIADH  -Hold diuresis for today  -Close monitoring of electrolytes, replete as needed  -Strict I/Os  -Fluid restrict with BMP q6h     Endo/Metabolic: No active issues   -Monitor FS, target FS <180  -Continue insulin sliding scale    ID: AMS/ r/o UTI  -Continue Ceftriaxone 1g q24h (9/4)  -DC Azithromycin   -f/u pan culture (bcx x 2, Ucx, Rcx)      FLUIDS/ELECTROLYTES/NUTRITION  F: none  E: monitor, replete to K>4 and Mg>2  D: NPO via NGT  PROPHYLAXIS  -DVT: lovenox  -GI: PPI   DISPO: CCU  CODE STATUS: Full    89F presents with AMS, hypercarbic respiratory failure in setting of ?UTI, volume overload. Found with hyponatremia 115. Intubated in ED for airway protection in setting of AMS and hypercarbic respiratory failure.     NEUROLOGY  # AMS 2nd to acute toxic metabolic encephalopathy, r/o UTI  -CTH negative for acute pathology   -Sedation with precedex gtt, titrate to RASS 0 to -1  -Restart home Mirtazepine 15mg nightly      CARDIOVASCULAR   #PMHx HTN, HLD, sepsis r/o UTI  -Tele monitoring, maintain MAP >65  -Official echo  -Consider restarting home Norvasc (10mg), Ramipril (25mg) tmrw if BP is hypertensive   - Restarted atorvastatin home dose     RESPIRATORY  #Acute hypoxic/hypercarbic respiratory failure 2nd to pulmonary edema, b/l pleural effusions   -Trial of CPAP today, possibly extubate if tolerates CPAP well  -Maintain O2 sat >92%, supplemental O2 PRN    ABDOMEN  #No active issues  - If pt gets extubated, may have to insert NG tube; bedside dysphagia, if fails then order speech and swallow  - Protonix 40mg daily     RENAL  Hyponatremia, r/o SIADH  -Hold diuresis for today  -Close monitoring of electrolytes, replete as needed  -Strict I/Os  -Fluid restrict with BMP q6h     Endo/Metabolic: No active issues   -Monitor FS, target FS <180  -Continue insulin sliding scale    ID: AMS/ r/o UTI  -Continue Ceftriaxone 1g q24h (9/4)  -DC Azithromycin   -f/u pan culture (bcx x 2, Ucx, Rcx)      FLUIDS/ELECTROLYTES/NUTRITION  F: none  E: monitor, replete to K>4 and Mg>2  D: NPO via NGT  PROPHYLAXIS  -DVT: lovenox  -GI: PPI   DISPO: CCU  CODE STATUS: Full

## 2021-09-07 LAB
ALBUMIN SERPL ELPH-MCNC: 2.6 G/DL — LOW (ref 3.3–5)
ALBUMIN SERPL ELPH-MCNC: 2.7 G/DL — LOW (ref 3.3–5)
ALP SERPL-CCNC: 82 U/L — SIGNIFICANT CHANGE UP (ref 40–120)
ALP SERPL-CCNC: SIGNIFICANT CHANGE UP (ref 40–120)
ALT FLD-CCNC: 13 U/L — SIGNIFICANT CHANGE UP (ref 10–45)
ALT FLD-CCNC: SIGNIFICANT CHANGE UP (ref 10–45)
ANION GAP SERPL CALC-SCNC: 5 MMOL/L — SIGNIFICANT CHANGE UP (ref 5–17)
ANION GAP SERPL CALC-SCNC: 8 MMOL/L — SIGNIFICANT CHANGE UP (ref 5–17)
AST SERPL-CCNC: 17 U/L — SIGNIFICANT CHANGE UP (ref 10–40)
AST SERPL-CCNC: SIGNIFICANT CHANGE UP (ref 10–40)
BASOPHILS # BLD AUTO: 0.02 K/UL — SIGNIFICANT CHANGE UP (ref 0–0.2)
BASOPHILS NFR BLD AUTO: 0.3 % — SIGNIFICANT CHANGE UP (ref 0–2)
BILIRUB SERPL-MCNC: 0.2 MG/DL — SIGNIFICANT CHANGE UP (ref 0.2–1.2)
BILIRUB SERPL-MCNC: 0.2 MG/DL — SIGNIFICANT CHANGE UP (ref 0.2–1.2)
BUN SERPL-MCNC: 22 MG/DL — SIGNIFICANT CHANGE UP (ref 7–23)
BUN SERPL-MCNC: 24 MG/DL — HIGH (ref 7–23)
CALCIUM SERPL-MCNC: 8.7 MG/DL — SIGNIFICANT CHANGE UP (ref 8.4–10.5)
CALCIUM SERPL-MCNC: 9 MG/DL — SIGNIFICANT CHANGE UP (ref 8.4–10.5)
CHLORIDE SERPL-SCNC: 86 MMOL/L — LOW (ref 96–108)
CHLORIDE SERPL-SCNC: 87 MMOL/L — LOW (ref 96–108)
CO2 SERPL-SCNC: 32 MMOL/L — HIGH (ref 22–31)
CO2 SERPL-SCNC: 35 MMOL/L — HIGH (ref 22–31)
CREAT SERPL-MCNC: 0.53 MG/DL — SIGNIFICANT CHANGE UP (ref 0.5–1.3)
CREAT SERPL-MCNC: 0.64 MG/DL — SIGNIFICANT CHANGE UP (ref 0.5–1.3)
CULTURE RESULTS: SIGNIFICANT CHANGE UP
CULTURE RESULTS: SIGNIFICANT CHANGE UP
EOSINOPHIL # BLD AUTO: 0.04 K/UL — SIGNIFICANT CHANGE UP (ref 0–0.5)
EOSINOPHIL NFR BLD AUTO: 0.5 % — SIGNIFICANT CHANGE UP (ref 0–6)
GLUCOSE BLDC GLUCOMTR-MCNC: 106 MG/DL — HIGH (ref 70–99)
GLUCOSE BLDC GLUCOMTR-MCNC: 119 MG/DL — HIGH (ref 70–99)
GLUCOSE BLDC GLUCOMTR-MCNC: 161 MG/DL — HIGH (ref 70–99)
GLUCOSE BLDC GLUCOMTR-MCNC: 96 MG/DL — SIGNIFICANT CHANGE UP (ref 70–99)
GLUCOSE SERPL-MCNC: 112 MG/DL — HIGH (ref 70–99)
GLUCOSE SERPL-MCNC: 115 MG/DL — HIGH (ref 70–99)
HCT VFR BLD CALC: 29.2 % — LOW (ref 34.5–45)
HGB BLD-MCNC: 9.2 G/DL — LOW (ref 11.5–15.5)
IMM GRANULOCYTES NFR BLD AUTO: 0.3 % — SIGNIFICANT CHANGE UP (ref 0–1.5)
LYMPHOCYTES # BLD AUTO: 2 K/UL — SIGNIFICANT CHANGE UP (ref 1–3.3)
LYMPHOCYTES # BLD AUTO: 25.1 % — SIGNIFICANT CHANGE UP (ref 13–44)
MAGNESIUM SERPL-MCNC: 2.3 MG/DL — SIGNIFICANT CHANGE UP (ref 1.6–2.6)
MCHC RBC-ENTMCNC: 28.3 PG — SIGNIFICANT CHANGE UP (ref 27–34)
MCHC RBC-ENTMCNC: 31.5 GM/DL — LOW (ref 32–36)
MCV RBC AUTO: 89.8 FL — SIGNIFICANT CHANGE UP (ref 80–100)
MONOCYTES # BLD AUTO: 1.1 K/UL — HIGH (ref 0–0.9)
MONOCYTES NFR BLD AUTO: 13.8 % — SIGNIFICANT CHANGE UP (ref 2–14)
NEUTROPHILS # BLD AUTO: 4.78 K/UL — SIGNIFICANT CHANGE UP (ref 1.8–7.4)
NEUTROPHILS NFR BLD AUTO: 60 % — SIGNIFICANT CHANGE UP (ref 43–77)
NRBC # BLD: 0 /100 WBCS — SIGNIFICANT CHANGE UP (ref 0–0)
PHOSPHATE SERPL-MCNC: 5.1 MG/DL — HIGH (ref 2.5–4.5)
PLATELET # BLD AUTO: 231 K/UL — SIGNIFICANT CHANGE UP (ref 150–400)
POTASSIUM SERPL-MCNC: 4.3 MMOL/L — SIGNIFICANT CHANGE UP (ref 3.5–5.3)
POTASSIUM SERPL-MCNC: SIGNIFICANT CHANGE UP (ref 3.5–5.3)
POTASSIUM SERPL-SCNC: 4.3 MMOL/L — SIGNIFICANT CHANGE UP (ref 3.5–5.3)
POTASSIUM SERPL-SCNC: SIGNIFICANT CHANGE UP (ref 3.5–5.3)
PROT SERPL-MCNC: 7.1 G/DL — SIGNIFICANT CHANGE UP (ref 6–8.3)
PROT SERPL-MCNC: 7.5 G/DL — SIGNIFICANT CHANGE UP (ref 6–8.3)
RBC # BLD: 3.25 M/UL — LOW (ref 3.8–5.2)
RBC # FLD: 14.6 % — HIGH (ref 10.3–14.5)
SODIUM SERPL-SCNC: 126 MMOL/L — LOW (ref 135–145)
SODIUM SERPL-SCNC: 127 MMOL/L — LOW (ref 135–145)
SPECIMEN SOURCE: SIGNIFICANT CHANGE UP
SPECIMEN SOURCE: SIGNIFICANT CHANGE UP
WBC # BLD: 7.96 K/UL — SIGNIFICANT CHANGE UP (ref 3.8–10.5)
WBC # FLD AUTO: 7.96 K/UL — SIGNIFICANT CHANGE UP (ref 3.8–10.5)

## 2021-09-07 PROCEDURE — 71045 X-RAY EXAM CHEST 1 VIEW: CPT | Mod: 26

## 2021-09-07 PROCEDURE — 93306 TTE W/DOPPLER COMPLETE: CPT | Mod: 26

## 2021-09-07 PROCEDURE — 99291 CRITICAL CARE FIRST HOUR: CPT

## 2021-09-07 RX ORDER — RAMIPRIL 5 MG
25 CAPSULE ORAL
Qty: 0 | Refills: 0 | DISCHARGE

## 2021-09-07 RX ORDER — SENNA PLUS 8.6 MG/1
1 TABLET ORAL
Qty: 0 | Refills: 0 | DISCHARGE

## 2021-09-07 RX ORDER — FENTANYL CITRATE 50 UG/ML
50 INJECTION INTRAVENOUS ONCE
Refills: 0 | Status: DISCONTINUED | OUTPATIENT
Start: 2021-09-07 | End: 2021-09-07

## 2021-09-07 RX ORDER — AMLODIPINE BESYLATE 2.5 MG/1
10 TABLET ORAL DAILY
Refills: 0 | Status: DISCONTINUED | OUTPATIENT
Start: 2021-09-07 | End: 2021-09-15

## 2021-09-07 RX ORDER — ASPIRIN/CALCIUM CARB/MAGNESIUM 324 MG
0 TABLET ORAL
Qty: 0 | Refills: 0 | DISCHARGE

## 2021-09-07 RX ADMIN — Medication 3 MILLILITER(S): at 04:58

## 2021-09-07 RX ADMIN — Medication 3 MILLILITER(S): at 09:18

## 2021-09-07 RX ADMIN — Medication 3 MILLILITER(S): at 16:47

## 2021-09-07 RX ADMIN — MIRTAZAPINE 15 MILLIGRAM(S): 45 TABLET, ORALLY DISINTEGRATING ORAL at 11:19

## 2021-09-07 RX ADMIN — Medication 108 GRAM(S): at 16:47

## 2021-09-07 RX ADMIN — ATORVASTATIN CALCIUM 20 MILLIGRAM(S): 80 TABLET, FILM COATED ORAL at 21:34

## 2021-09-07 RX ADMIN — CHLORHEXIDINE GLUCONATE 1 APPLICATION(S): 213 SOLUTION TOPICAL at 05:44

## 2021-09-07 RX ADMIN — Medication 108 GRAM(S): at 05:20

## 2021-09-07 RX ADMIN — Medication 108 GRAM(S): at 10:57

## 2021-09-07 RX ADMIN — ENOXAPARIN SODIUM 40 MILLIGRAM(S): 100 INJECTION SUBCUTANEOUS at 21:34

## 2021-09-07 RX ADMIN — Medication 3 MILLILITER(S): at 21:33

## 2021-09-07 RX ADMIN — AMLODIPINE BESYLATE 10 MILLIGRAM(S): 2.5 TABLET ORAL at 17:50

## 2021-09-07 RX ADMIN — Medication 2: at 13:15

## 2021-09-07 NOTE — PROGRESS NOTE ADULT - ASSESSMENT
89 year old F presents with AMS, hypercarbic respiratory failure in setting of UTI, volume overload. Found with hyponatremia 115. Intubated in ED for airway protection in setting of AMS and hypercarbic respiratory failure.     NEUROLOGY  #AMS 2/2 to hyponatremia and UTI   - CTH negative for acute pathology   - Pt is alert and oriented x2  - Restart home Mirtazepine 15mg nightly      CARDIOVASCULAR   #PMHx HTN & HLD  -Tele monitoring, maintain MAP >65  - F/u on official TTE   - Consider restarting home Norvasc (10mg), Ramipril (25mg) tmrw if BP is hypertensive   - Restarted atorvastatin home dose     RESPIRATORY  #Acute hypoxic/hypercarbic respiratory failure 2nd to pulmonary edema, b/l pleural effusions   - Resolved, pt was extubated 9/6 and currently on RA   - Maintain O2 sat >92%, supplemental O2 PRN  - CXR ordered to see if diuresis is necessary, increased sputum and help w/ electrolytes     ABDOMEN  #No active issues  - Pt has a NG tube  - Protonix 40mg daily   - F/u on speech and swallow study to advance feeds    RENAL  Hyponatremia, r/o SIADH  - Consider diuresis based on CXR   - Close monitoring of electrolytes, replete as needed  - F/u urine lytes   - Strict I/Os  - Fluid restrict with BMP q6h     Endo/Metabolic: No active issues   - Monitor FS, target FS <180  - Continue insulin sliding scale    ID:   #UTI  -Urine cx contaminated, will reorder   -f/u pan culture blood cx x 2 - neg, respiratory cx - neg      FLUIDS/ELECTROLYTES/NUTRITION  F: none  E: monitor, replete to K>4 and Mg>2  D: NPO via NGT  PROPHYLAXIS  -DVT: lovenox  -GI: PPI   DISPO: MICU  CODE STATUS: DNR   89 year old F presents with AMS, hypercarbic respiratory failure in setting of UTI, volume overload. Found with hyponatremia 115. Intubated in ED for airway protection in setting of AMS and hypercarbic respiratory failure.     NEUROLOGY  #AMS 2/2 to hyponatremia and UTI   - CTH negative for acute pathology   - Pt is alert and oriented x2  - Restart home Mirtazepine 15mg nightly      CARDIOVASCULAR   #PMHx HTN & HLD  -Tele monitoring, maintain MAP >65  - F/u on official TTE   - Consider restarting home Norvasc (10mg), Ramipril (25mg) tmrw if BP is hypertensive   - Restarted atorvastatin home dose     RESPIRATORY  #Acute hypoxic/hypercarbic respiratory failure 2nd to pulmonary edema, b/l pleural effusions   - Resolved, pt was extubated 9/6 and currently on RA   - Maintain O2 sat >92%, supplemental O2 PRN  - CXR ordered to see if diuresis is necessary, increased sputum and help w/ electrolytes     ABDOMEN  #No active issues  - Pt has a NG tube  - Protonix 40mg daily   - F/u on speech and swallow study to advance feeds    RENAL  Hyponatremia, r/o SIADH  - Consider diuresis based on CXR   - Close monitoring of electrolytes, replete as needed  - F/u urine lytes   - Strict I/Os  - Fluid restrict with BMP q6h     Endo/Metabolic: No active issues   - Monitor FS, target FS <180  - Continue insulin sliding scale    ID:   #UTI  -Urine cx contaminated, will reorder   -f/u pan culture blood cx x 2 - neg, respiratory cx - neg      FLUIDS/ELECTROLYTES/NUTRITION  F: none  E: monitor, replete to K>4 and Mg>2  D: NPO via NGT  PROPHYLAXIS  -DVT: lovenox  -GI: PPI   DISPO: tele  CODE STATUS: DNR

## 2021-09-07 NOTE — PROGRESS NOTE ADULT - SUBJECTIVE AND OBJECTIVE BOX
HOSPITAL COURSE  Pt is a 89 yo [place]ore[place] speaking F who is bedbound with PMHx of DNR, HTN, HLD, and OA who presenting from NH with AMS for 1 day. She was on ceftriaxone for PNA for 1 day. She became hypoxic and less verbal, so was sent to ER. Per NH, no other changes to daily routine. No changes to appetite, BMs, or urination. On arrival, T 98, HR 81, /70, RR 18 O2sat 99% 10L NRB--88% 4L--99% BIPAP 40%. Labs with WBC 6.41, 71% neutrophils,Na 115, Cl 75, lactate neg, UA +LE, blood, WBCs. CT chest with cardiogenic pulmonary edema, small-mod BL pleural effusions with BL atelectasis vs. PNA. CTH neg. Pt given Mg 1g, NS 1L. ICU consulted for hypoNa. Family (niece) requested trial of intubation and confirmed DNR status. She was placed on 1 day of CPAP and was extubated on 9/7. She has been hemodynamically stable and was transferred to [facility].    INTERVAL HPI/OVERNIGHT EVENTS: no acute events     SUBJECTIVE: Patient seen and examined at bedside. She was on RA and was able to answer some questions.     OBJECTIVE:  ICU Vital Signs Last 24 Hrs  T(C): 36.4 (07 Sep 2021 09:00), Max: 37.6 (06 Sep 2021 16:51)  T(F): 97.6 (07 Sep 2021 09:00), Max: 99.6 (06 Sep 2021 16:51)  HR: 76 (07 Sep 2021 10:00) (61 - 77)  BP: 134/59 (07 Sep 2021 10:00) (101/50 - 137/64)  BP(mean): 85 (07 Sep 2021 10:00) (72 - 92)  ABP: --  ABP(mean): --  RR: 41 (07 Sep 2021 10:00) (15 - 44)  SpO2: 98% (07 Sep 2021 10:00) (89% - 100%) on RA     I&O's Summary    06 Sep 2021 07:01  -  07 Sep 2021 07:00  --------------------------------------------------------  IN: 1457.6 mL / OUT: 665 mL / NET: 792.6 mL    07 Sep 2021 07:01  -  07 Sep 2021 11:12  --------------------------------------------------------  IN: 41 mL / OUT: 0 mL / NET: 41 mL      PHYSICAL EXAM:  General: Elderly female laying comfortably in bed, NAD  Neuro: Arouses to when stimulated by noise, follows commands, spontaneously moves all extremities, no focal deficits   HEENT: NC/AT; PERRL, clear conjunctiva, dentures   Neck: supple, trachea midline  Respiratory: Coarse breath sounds b/l, no w/r/r  Cardiovascular: +S1/S2; RRR  Abdomen: soft, NT/ND; +BS x4, small umbilical hernia  Extremities: WWP, 2+ peripheral pulses b/l; no LE edema  Skin: normal color and turgor; no rash    MEDICATIONS  (STANDING):  albuterol/ipratropium for Nebulization 3 milliLiter(s) Nebulizer every 6 hours  ampicillin  IVPB 1 Gram(s) IV Intermittent every 6 hours  atorvastatin 20 milliGRAM(s) Oral at bedtime  chlorhexidine 4% Liquid 1 Application(s) Topical <User Schedule>  dextrose 40% Gel 15 Gram(s) Oral once  dextrose 5%. 1000 milliLiter(s) (50 mL/Hr) IV Continuous <Continuous>  dextrose 5%. 1000 milliLiter(s) (100 mL/Hr) IV Continuous <Continuous>  dextrose 50% Injectable 25 Gram(s) IV Push once  dextrose 50% Injectable 12.5 Gram(s) IV Push once  dextrose 50% Injectable 25 Gram(s) IV Push once  enoxaparin Injectable 40 milliGRAM(s) SubCutaneous every 24 hours  glucagon  Injectable 1 milliGRAM(s) IntraMuscular once  insulin lispro (ADMELOG) corrective regimen sliding scale   SubCutaneous every 6 hours  mirtazapine 15 milliGRAM(s) Oral daily    MEDICATIONS  (PRN):      ALLERGIES:  Allergies    iodine containing compounds (Unknown)  lisinopril (Unknown)    Intolerances      LABS:                                   9.2    7.96  )-----------( 231      ( 07 Sep 2021 05:45 )             29.2   09-07    127<L>  |  87<L>  |  22  ----------------------------<  115<H>  4.3   |  35<H>  |  0.64    Ca    9.0      07 Sep 2021 07:03  Phos  5.1     09-07  Mg     2.3     09-07    TPro  7.1  /  Alb  2.6<L>  /  TBili  0.2  /  DBili  x   /  AST  17  /  ALT  13  /  AlkPhos  82  09-07      RADIOLOGY & ADDITIONAL TESTS: Reviewed.

## 2021-09-07 NOTE — SWALLOW BEDSIDE ASSESSMENT ADULT - SLP PERTINENT HISTORY OF CURRENT PROBLEM
89 yo F bedbound at baseline with PMH of HTN, HLD, OA admitted from NH with AMS and hypercarbic resp failure with intubation on 9/5 and extubation on 9/6/21.  Pt generally stable since extubation, satting well with NC for O2 supplementation.  Pt requiring suctioning q3-4 hours.

## 2021-09-07 NOTE — SWALLOW BEDSIDE ASSESSMENT ADULT - SLP GENERAL OBSERVATIONS
Pt awake and alert, pleasant, c/o some pain in the lower back.  (+) NC for O2 supplementation.  (+) mild increased WOB.  Pt followed commands and responded to Qs appropriately in [place]ian.

## 2021-09-07 NOTE — DIETITIAN INITIAL EVALUATION ADULT. - ADD RECOMMEND
Recommend NPO Pending SLP. Consider keeping NGT in place until pt able to pass SLP. With passing results, recommend regular vs low sodium diet with PO consistency per SLP Recs. Should pt Fail SLP and TF indicated and consistent with GOC, cont with order for  jevity 1.5 via NGT, 41ml/hr x24hrs provides ~984ml, 1476kcal, 63gm prot, 748ml water. Recommend to d/c LPS order. Monitor for tolerance, Gi distress. Pain per team. Monitor for GOC- Honor at all times. RD to remain available for additional nutrition interventions as needed.

## 2021-09-07 NOTE — PROGRESS NOTE ADULT - SUBJECTIVE AND OBJECTIVE BOX
********************************[facility]*****  HOSPITAL COURSE: Pt is a 89 yo Finnish speaking F who is bedbound with PMHx of DNR, HTN, HLD, and OA who presenting from NH with AMS for 1 day. She was on ceftriaxone for PNA for 1 day. She became hypoxic and less verbal, so was sent to ER. Per NH, no other changes to daily routine. No changes to appetite, BMs, or urination. On arrival, T 98, HR 81, /70, RR 18 O2sat 99% 10L NRB--88% 4L--99% BIPAP 40%. Labs with WBC 6.41, 71% neutrophils,Na 115, Cl 75, lactate neg, UA +LE, blood, WBCs. CT chest with cardiogenic pulmonary edema, small-mod BL pleural effusions with BL atelectasis vs. PNA. CTH neg. Pt given Mg 1g, NS 1L. ICU consulted for hypoNa. Family (niece) requested trial of intubation and confirmed DNR status. She was placed on 1 day of CPAP and was extubated on 9/7. She has been hemodynamically stable and was transferred to [facility].    INTERVAL EVENTS: Patient seen and examined at bedside. AAOx3, no acute complaints. Patient just notes diffuse pain on being moved. NG tube in place.     Vital Signs Last 12 Hrs  T(F): 97.8 (09-07-21 @ 12:00), Max: 98 (09-07-21 @ 05:00)  HR: 73 (09-07-21 @ 14:00) (70 - 78)  BP: 132/60 (09-07-21 @ 14:00) (111/56 - 137/64)  BP(mean): 87 (09-07-21 @ 14:00) (80 - 92)  RR: 46 (09-07-21 @ 14:00) (36 - 46)  SpO2: 99% (09-07-21 @ 14:00) (93% - 100%)  I&O's Summary    06 Sep 2021 07:01  -  07 Sep 2021 07:00  --------------------------------------------------------  IN: 1457.6 mL / OUT: 665 mL / NET: 792.6 mL    07 Sep 2021 07:01  -  07 Sep 2021 15:38  --------------------------------------------------------  IN: 387 mL / OUT: 0 mL / NET: 387 mL    PHYSICAL EXAM:  General: Elderly female laying comfortably in bed, NAD  Neuro: AAOx3, follows commands, 3-4/5 motor and sensation intact.  HEENT: NC/AT; PERRL, clear conjunctiva, dentures   Neck: supple, trachea midline  Respiratory: Coarse breath sounds b/l, no w/r/r  Cardiovascular: +S1/S2; RRR  Abdomen: soft, NT/ND; +BS x4, small umbilical hernia  Extremities: WWP, 2+ peripheral pulses b/l; no LE edema  Skin: normal color and turgor; no rashes    LABS:                        9.2    7.96  )-----------( 231      ( 07 Sep 2021 05:45 )             29.2     09-07    127<L>  |  87<L>  |  22  ----------------------------<  115<H>  4.3   |  35<H>  |  0.64    Ca    9.0      07 Sep 2021 07:03  Phos  5.1     09-07  Mg     2.3     09-07    TPro  7.1  /  Alb  2.6<L>  /  TBili  0.2  /  DBili  x   /  AST  17  /  ALT  13  /  AlkPhos  82  09-07    RADIOLOGY & ADDITIONAL TESTS:    MEDICATIONS  (STANDING):  albuterol/ipratropium for Nebulization 3 milliLiter(s) Nebulizer every 6 hours  ampicillin  IVPB 1 Gram(s) IV Intermittent every 6 hours  atorvastatin 20 milliGRAM(s) Oral at bedtime  chlorhexidine 4% Liquid 1 Application(s) Topical <User Schedule>  dextrose 40% Gel 15 Gram(s) Oral once  dextrose 5%. 1000 milliLiter(s) (50 mL/Hr) IV Continuous <Continuous>  dextrose 5%. 1000 milliLiter(s) (100 mL/Hr) IV Continuous <Continuous>  dextrose 50% Injectable 25 Gram(s) IV Push once  dextrose 50% Injectable 12.5 Gram(s) IV Push once  dextrose 50% Injectable 25 Gram(s) IV Push once  enoxaparin Injectable 40 milliGRAM(s) SubCutaneous every 24 hours  glucagon  Injectable 1 milliGRAM(s) IntraMuscular once  insulin lispro (ADMELOG) corrective regimen sliding scale   SubCutaneous every 6 hours  mirtazapine 15 milliGRAM(s) Oral daily    MEDICATIONS  (PRN):   [facility]*****  HOSPITAL COURSE: Pt is a 89 yo Monegasque speaking F who is bedbound with PMHx of DNR, HTN, HLD, and OA who presenting from NH with AMS for 1 day. She was on ceftriaxone for PNA for 1 day. She became hypoxic and less verbal, so was sent to ER. Per NH, no other changes to daily routine. No changes to appetite, BMs, or urination. On arrival, T 98, HR 81, /70, RR 18 O2sat 99% 10L NRB--88% 4L--99% BIPAP 40%. Labs with WBC 6.41, 71% neutrophils,Na 115, Cl 75, lactate neg, UA +LE, blood, WBCs. CT chest with cardiogenic pulmonary edema, small-mod BL pleural effusions with BL atelectasis vs. PNA. CTH neg. Pt given Mg 1g, NS 1L. ICU consulted for hypoNa. Family (niece) requested trial of intubation and confirmed DNR status. She was placed on 1 day of CPAP and was extubated on 9/7. She has been hemodynamically stable and was transferred to [facility].    INTERVAL EVENTS: Patient seen and examined at bedside. AAOx3, no acute complaints. Patient just notes diffuse pain on being moved. NG tube in place.     Vital Signs Last 12 Hrs  T(F): 97.8 (09-07-21 @ 12:00), Max: 98 (09-07-21 @ 05:00)  HR: 73 (09-07-21 @ 14:00) (70 - 78)  BP: 132/60 (09-07-21 @ 14:00) (111/56 - 137/64)  BP(mean): 87 (09-07-21 @ 14:00) (80 - 92)  RR: 46 (09-07-21 @ 14:00) (36 - 46)  SpO2: 99% (09-07-21 @ 14:00) (93% - 100%)  I&O's Summary    06 Sep 2021 07:01  -  07 Sep 2021 07:00  --------------------------------------------------------  IN: 1457.6 mL / OUT: 665 mL / NET: 792.6 mL    07 Sep 2021 07:01  -  07 Sep 2021 15:38  --------------------------------------------------------  IN: 387 mL / OUT: 0 mL / NET: 387 mL    PHYSICAL EXAM:  General: Elderly female laying comfortably in bed, NAD  Neuro: AAOx3, follows commands, 3-4/5 motor and sensation intact.  HEENT: NC/AT; PERRL, clear conjunctiva, dentures   Neck: supple, trachea midline  Respiratory: Coarse breath sounds b/l, no w/r/r  Cardiovascular: +S1/S2; RRR  Abdomen: soft, NT/ND; +BS x4, small umbilical hernia  Extremities: WWP, 2+ peripheral pulses b/l; no LE edema  Skin: +Stage 2 sacral decubitus ulcer dressing c/d/i, +healed ulcer on upper thoracic, normal color and turgor; no rashes    LABS:                        9.2    7.96  )-----------( 231      ( 07 Sep 2021 05:45 )             29.2     09-07    127<L>  |  87<L>  |  22  ----------------------------<  115<H>  4.3   |  35<H>  |  0.64    Ca    9.0      07 Sep 2021 07:03  Phos  5.1     09-07  Mg     2.3     09-07    TPro  7.1  /  Alb  2.6<L>  /  TBili  0.2  /  DBili  x   /  AST  17  /  ALT  13  /  AlkPhos  82  09-07    RADIOLOGY & ADDITIONAL TESTS:    MEDICATIONS  (STANDING):  albuterol/ipratropium for Nebulization 3 milliLiter(s) Nebulizer every 6 hours  ampicillin  IVPB 1 Gram(s) IV Intermittent every 6 hours  atorvastatin 20 milliGRAM(s) Oral at bedtime  chlorhexidine 4% Liquid 1 Application(s) Topical <User Schedule>  dextrose 40% Gel 15 Gram(s) Oral once  dextrose 5%. 1000 milliLiter(s) (50 mL/Hr) IV Continuous <Continuous>  dextrose 5%. 1000 milliLiter(s) (100 mL/Hr) IV Continuous <Continuous>  dextrose 50% Injectable 25 Gram(s) IV Push once  dextrose 50% Injectable 12.5 Gram(s) IV Push once  dextrose 50% Injectable 25 Gram(s) IV Push once  enoxaparin Injectable 40 milliGRAM(s) SubCutaneous every 24 hours  glucagon  Injectable 1 milliGRAM(s) IntraMuscular once  insulin lispro (ADMELOG) corrective regimen sliding scale   SubCutaneous every 6 hours  mirtazapine 15 milliGRAM(s) Oral daily    MEDICATIONS  (PRN):

## 2021-09-07 NOTE — DIETITIAN INITIAL EVALUATION ADULT. - OTHER CALCULATIONS
%LFW=755; IBW used to calculate energy needs due to pt's current body weight exceeding 120% of IBW; adjusted for age, possible CHF/COPD(?), Fluids per team

## 2021-09-07 NOTE — PROGRESS NOTE ADULT - ASSESSMENT
89 year old F presents with AMS, admitted for hypercarbic respiratory failure in setting of UTI and hyponatremia, intubated for airway protection now s/p extubation 9/6, transferred from MICU to [facility] for further management.    NEUROLOGY  #AMS 2/2 to hyponatremia and UTI - RESOLVING  - CTH negative for acute pathology   - Pt is alert and oriented x2-3 (self, place, president)  -C/w home Mirtazepine 15mg nightly      CARDIOVASCULAR   #HTN  -Tele monitoring, maintain MAP >65  -TTE: 65-70%, Aortic valve is probably tricuspid with focal thickening of the non coronary cusp. The aortic valve appears sclerotic/mildly stenotic.  - Consider restarting home Norvasc (10mg), Ramipril (25mg) tmrw if BP is hypertensive     #HLD  C/w Atorva 20    RESPIRATORY  #Acute hypoxic/hypercarbic respiratory failure 2nd to pulmonary edema, b/l pleural effusions   - Resolved, pt was extubated 9/6 and currently on RA   - Maintain O2 sat >92%, supplemental O2 PRN  - CXR ordered to see if diuresis is necessary, increased sputum and help w/ electrolytes     ABDOMEN  #No active issues  - Pt has a NG tube, jevity feeds at 20ml/hr  - Protonix 40mg daily   - S+S eval pending, recommend NPO until then    RENAL  #Hyponatremia, r/o SIADH  - Consider diuresis based on CXR   - Close monitoring of electrolytes, replete as needed  - F/u urine lytes   - Strict I/Os  - Fluid restrict with BMP q6h     Endo/Metabolic: No active issues   - Monitor FS, target FS <180  - Continue insulin sliding scale    ID:   #[id number]  -Urine cx contaminated, will reorder   -f/u pan culture blood cx x 2 - neg, respiratory cx - neg    FLUIDS/ELECTROLYTES/NUTRITION  F: none  E: monitor, replete to K>4 and Mg>2  D: NPO via NGT  PROPHYLAXIS  -DVT: lovenox  -GI: PPI   DISPO: [facility]  CODE STATUS: DNR   89 year old F presents with AMS, admitted for hypercarbic respiratory failure in setting of UTI and hyponatremia, intubated for airway protection now s/p extubation 9/6, transferred from MICU to [facility] for further management.    NEUROLOGY  #AMS 2/2 to hyponatremia and UTI - RESOLVING  - CTH negative for acute pathology   - Pt is alert and oriented x2-3 (self, place, president)  -C/w home Mirtazepine 15mg nightly      CARDIOVASCULAR   #HTN  -Tele monitoring, maintain MAP >65  -TTE: 65-70%, Aortic valve is probably tricuspid with focal thickening of the non coronary cusp. The aortic valve appears sclerotic/mildly stenotic.  - Consider restarting home Norvasc (10mg), Ramipril (2.5mg) tmrw if BP is hypertensive     #HLD  C/w Atorva 20    RESPIRATORY  #Acute hypoxic/hypercarbic respiratory failure 2nd to pulmonary edema, b/l pleural effusions   - Resolved, pt was extubated 9/6 and currently on RA   - Maintain O2 sat >92%, supplemental O2 PRN  - CXR ordered to see if diuresis is necessary, increased sputum and help w/ electrolytes     ABDOMEN  #No active issues  - Pt has a NG tube, jevity feeds at 20ml/hr  - Protonix 40mg daily   - S+S eval pending, recommend NPO until then    RENAL  #Hyponatremia, r/o SIADH  - Consider diuresis based on CXR   - Close monitoring of electrolytes, replete as needed  - F/u urine lytes   - Strict I/Os  - Fluid restrict with BMP q6h     Endo/Metabolic: No active issues   - Monitor FS, target FS <180  - Continue insulin sliding scale    ID:   #[id number]  -Urine cx contaminated, will reorder   -f/u pan culture blood cx x 2 - neg, respiratory cx - neg    FLUIDS/ELECTROLYTES/NUTRITION  F: none  E: monitor, replete to K>4 and Mg>2  D: NPO via NGT  PROPHYLAXIS  -DVT: lovenox  -GI: PPI   DISPO: [facility]  CODE STATUS: DNR

## 2021-09-07 NOTE — SWALLOW BEDSIDE ASSESSMENT ADULT - CONSISTENCIES ADMINISTERED
ice chips x 2, 2 oz water via tsp and straw ( no cup drinking 2/2 pt on wrist restriants), puree via tsp x 3, and moist cracker x 2

## 2021-09-08 DIAGNOSIS — Z29.9 ENCOUNTER FOR PROPHYLACTIC MEASURES, UNSPECIFIED: ICD-10-CM

## 2021-09-08 DIAGNOSIS — E87.1 HYPO-OSMOLALITY AND HYPONATREMIA: ICD-10-CM

## 2021-09-08 DIAGNOSIS — N39.0 URINARY TRACT INFECTION, SITE NOT SPECIFIED: ICD-10-CM

## 2021-09-08 DIAGNOSIS — J96.01 ACUTE RESPIRATORY FAILURE WITH HYPOXIA: ICD-10-CM

## 2021-09-08 DIAGNOSIS — I10 ESSENTIAL (PRIMARY) HYPERTENSION: ICD-10-CM

## 2021-09-08 DIAGNOSIS — E78.5 HYPERLIPIDEMIA, UNSPECIFIED: ICD-10-CM

## 2021-09-08 DIAGNOSIS — G93.41 METABOLIC ENCEPHALOPATHY: ICD-10-CM

## 2021-09-08 DIAGNOSIS — J96.02 ACUTE RESPIRATORY FAILURE WITH HYPERCAPNIA: ICD-10-CM

## 2021-09-08 LAB
ALBUMIN SERPL ELPH-MCNC: 2.7 G/DL — LOW (ref 3.3–5)
ALP SERPL-CCNC: 73 U/L — SIGNIFICANT CHANGE UP (ref 40–120)
ALT FLD-CCNC: 10 U/L — SIGNIFICANT CHANGE UP (ref 10–45)
ANION GAP SERPL CALC-SCNC: 8 MMOL/L — SIGNIFICANT CHANGE UP (ref 5–17)
AST SERPL-CCNC: 14 U/L — SIGNIFICANT CHANGE UP (ref 10–40)
BILIRUB SERPL-MCNC: 0.2 MG/DL — SIGNIFICANT CHANGE UP (ref 0.2–1.2)
BUN SERPL-MCNC: 19 MG/DL — SIGNIFICANT CHANGE UP (ref 7–23)
CALCIUM SERPL-MCNC: 9 MG/DL — SIGNIFICANT CHANGE UP (ref 8.4–10.5)
CHLORIDE SERPL-SCNC: 94 MMOL/L — LOW (ref 96–108)
CO2 SERPL-SCNC: 33 MMOL/L — HIGH (ref 22–31)
CREAT SERPL-MCNC: 0.57 MG/DL — SIGNIFICANT CHANGE UP (ref 0.5–1.3)
GLUCOSE BLDC GLUCOMTR-MCNC: 102 MG/DL — HIGH (ref 70–99)
GLUCOSE BLDC GLUCOMTR-MCNC: 103 MG/DL — HIGH (ref 70–99)
GLUCOSE BLDC GLUCOMTR-MCNC: 160 MG/DL — HIGH (ref 70–99)
GLUCOSE BLDC GLUCOMTR-MCNC: 89 MG/DL — SIGNIFICANT CHANGE UP (ref 70–99)
GLUCOSE SERPL-MCNC: 103 MG/DL — HIGH (ref 70–99)
HCT VFR BLD CALC: 26.5 % — LOW (ref 34.5–45)
HCT VFR BLD CALC: 28.3 % — LOW (ref 34.5–45)
HGB BLD-MCNC: 8 G/DL — LOW (ref 11.5–15.5)
HGB BLD-MCNC: 8.2 G/DL — LOW (ref 11.5–15.5)
MAGNESIUM SERPL-MCNC: 2 MG/DL — SIGNIFICANT CHANGE UP (ref 1.6–2.6)
MCHC RBC-ENTMCNC: 28 PG — SIGNIFICANT CHANGE UP (ref 27–34)
MCHC RBC-ENTMCNC: 28.2 PG — SIGNIFICANT CHANGE UP (ref 27–34)
MCHC RBC-ENTMCNC: 29 GM/DL — LOW (ref 32–36)
MCHC RBC-ENTMCNC: 30.2 GM/DL — LOW (ref 32–36)
MCV RBC AUTO: 92.7 FL — SIGNIFICANT CHANGE UP (ref 80–100)
MCV RBC AUTO: 97.3 FL — SIGNIFICANT CHANGE UP (ref 80–100)
NRBC # BLD: 0 /100 WBCS — SIGNIFICANT CHANGE UP (ref 0–0)
NRBC # BLD: 0 /100 WBCS — SIGNIFICANT CHANGE UP (ref 0–0)
PHOSPHATE SERPL-MCNC: 4 MG/DL — SIGNIFICANT CHANGE UP (ref 2.5–4.5)
PLATELET # BLD AUTO: 198 K/UL — SIGNIFICANT CHANGE UP (ref 150–400)
PLATELET # BLD AUTO: 216 K/UL — SIGNIFICANT CHANGE UP (ref 150–400)
POTASSIUM SERPL-MCNC: 4.6 MMOL/L — SIGNIFICANT CHANGE UP (ref 3.5–5.3)
POTASSIUM SERPL-SCNC: 4.6 MMOL/L — SIGNIFICANT CHANGE UP (ref 3.5–5.3)
PROT SERPL-MCNC: 6.9 G/DL — SIGNIFICANT CHANGE UP (ref 6–8.3)
RBC # BLD: 2.86 M/UL — LOW (ref 3.8–5.2)
RBC # BLD: 2.91 M/UL — LOW (ref 3.8–5.2)
RBC # FLD: 14.7 % — HIGH (ref 10.3–14.5)
RBC # FLD: 14.8 % — HIGH (ref 10.3–14.5)
SODIUM SERPL-SCNC: 135 MMOL/L — SIGNIFICANT CHANGE UP (ref 135–145)
WBC # BLD: 4.39 K/UL — SIGNIFICANT CHANGE UP (ref 3.8–10.5)
WBC # BLD: 4.92 K/UL — SIGNIFICANT CHANGE UP (ref 3.8–10.5)
WBC # FLD AUTO: 4.39 K/UL — SIGNIFICANT CHANGE UP (ref 3.8–10.5)
WBC # FLD AUTO: 4.92 K/UL — SIGNIFICANT CHANGE UP (ref 3.8–10.5)

## 2021-09-08 PROCEDURE — 99233 SBSQ HOSP IP/OBS HIGH 50: CPT | Mod: GC

## 2021-09-08 PROCEDURE — 99232 SBSQ HOSP IP/OBS MODERATE 35: CPT | Mod: GC

## 2021-09-08 PROCEDURE — 71045 X-RAY EXAM CHEST 1 VIEW: CPT | Mod: 26

## 2021-09-08 RX ORDER — NYSTATIN CREAM 100000 [USP'U]/G
1 CREAM TOPICAL
Refills: 0 | Status: DISCONTINUED | OUTPATIENT
Start: 2021-09-08 | End: 2021-09-15

## 2021-09-08 RX ADMIN — Medication 108 GRAM(S): at 07:15

## 2021-09-08 RX ADMIN — MIRTAZAPINE 15 MILLIGRAM(S): 45 TABLET, ORALLY DISINTEGRATING ORAL at 12:23

## 2021-09-08 RX ADMIN — Medication 2: at 12:23

## 2021-09-08 RX ADMIN — NYSTATIN CREAM 1 APPLICATION(S): 100000 CREAM TOPICAL at 05:31

## 2021-09-08 RX ADMIN — AMLODIPINE BESYLATE 10 MILLIGRAM(S): 2.5 TABLET ORAL at 09:33

## 2021-09-08 RX ADMIN — Medication 3 MILLILITER(S): at 09:30

## 2021-09-08 RX ADMIN — Medication 3 MILLILITER(S): at 05:31

## 2021-09-08 RX ADMIN — Medication 3 MILLILITER(S): at 21:29

## 2021-09-08 RX ADMIN — ENOXAPARIN SODIUM 40 MILLIGRAM(S): 100 INJECTION SUBCUTANEOUS at 21:29

## 2021-09-08 RX ADMIN — Medication 108 GRAM(S): at 12:22

## 2021-09-08 RX ADMIN — ATORVASTATIN CALCIUM 20 MILLIGRAM(S): 80 TABLET, FILM COATED ORAL at 21:29

## 2021-09-08 RX ADMIN — Medication 108 GRAM(S): at 19:55

## 2021-09-08 RX ADMIN — Medication 108 GRAM(S): at 00:17

## 2021-09-08 RX ADMIN — NYSTATIN CREAM 1 APPLICATION(S): 100000 CREAM TOPICAL at 16:54

## 2021-09-08 RX ADMIN — Medication 3 MILLILITER(S): at 16:56

## 2021-09-08 NOTE — PROGRESS NOTE ADULT - PROBLEM SELECTOR PLAN 1
2/2 to hyponatremia and UTI. AMS resolved. Now AAOx2-3.  - CTH negative for acute pathology   - Pt is alert and oriented x2-3 (self, year, president)  - C/w home Mirtazepine 15mg nightly  - Bedbound at baseline  - Lives in [facility] 2/2 to hyponatremia and UTI. AMS resolved. Now AAOx2-3.  - CTH negative for acute pathology   - Pt is alert and oriented x2-3 (self, year, president)  - C/w home Mirtazepine 15mg nightly  - Bedbound at baseline, lives at [facility]

## 2021-09-08 NOTE — PROGRESS NOTE ADULT - SUBJECTIVE AND OBJECTIVE BOX
Transfer Note: [facility] to [facility]    Hospital Course    Pt is a 89 yo Yakut speaking F who is bedbound with PMHx of DNR, HTN, HLD, and OA who presenting from NH with AMS for 1 day. She was on ceftriaxone for PNA for 1 day. She became hypoxic and less verbal, so was sent to ER. Per NH, no other changes to daily routine. No changes to appetite, BMs, or urination. On arrival, T 98, HR 81, /70, RR 18 O2sat 99% 10L NRB--88% 4L--99% BIPAP 40%. Labs with WBC 6.41, 71% neutrophils,Na 115, Cl 75, lactate neg, UA +LE, blood, WBCs. CT chest with cardiogenic pulmonary edema, small-mod BL pleural effusions with BL atelectasis vs. PNA. CTH neg. Pt given Mg 1g, NS 1L. ICU consulted for hypoNa. Family (niece) requested trial of intubation and confirmed DNR status. She was placed on 1 day of CPAP and was extubated on 9/7. Patient was given 500cc bolus of LR given decreased UOP. Patient ABx was switched from ceftraixone to ampicilin given E. Faecalis in the urine. Patient echo demonstrated normal LV function, normal RV function, mild-to-moderate TR, pulm HTN (PASP 64). Patient restarted home amlodipine 10mg. Patient NG tube was removed on 9/7. Patient tolerating PO intake. Repeat CBC was order today to determine if Hb is stable for transfer. Patient stable for transfer to regional medical floor.      SUBJECTIVE / INTERVAL HPI: Patient seen and examined at bedside. AAOx2-3. No complaints. No headaches, dizziness, CP, SOB, abd pain, n/v/d/c.    VITAL SIGNS:  Vital Signs Last 24 Hrs  T(C): 37.2 (08 Sep 2021 13:43), Max: 37.6 (08 Sep 2021 10:28)  T(F): 98.9 (08 Sep 2021 13:43), Max: 99.6 (08 Sep 2021 10:28)  HR: 68 (08 Sep 2021 17:00) (68 - 82)  BP: 102/51 (08 Sep 2021 17:00) (101/69 - 115/55)  BP(mean): 73 (08 Sep 2021 17:00) (73 - 83)  RR: 17 (08 Sep 2021 17:00) (16 - 19)  SpO2: 100% (08 Sep 2021 17:00) (95% - 100%)  I&O's Summary    07 Sep 2021 07:01  -  08 Sep 2021 07:00  --------------------------------------------------------  IN: 387 mL / OUT: 500 mL / NET: -113 mL        PHYSICAL EXAM:    General: Elderly female laying comfortably in bed, NAD  Neuro: AAOx3, follows commands, 4/5 motor and sensation intact.  HEENT: NC/AT; PERRL, clear conjunctiva, dentures   Neck: supple, trachea midline  Respiratory: Coarse breath sounds b/l, no w/r/r  Cardiovascular: +S1/S2; RRR  Abdomen: soft, NT/ND; +BS x4, small umbilical hernia  Extremities: WWP, 2+ peripheral pulses b/l; no LE edema  Skin: +Stage 2 sacral decubitus ulcer dressing c/d/i, +healed ulcer on upper thoracic, normal color and turgor; no rashes    MEDICATIONS:  MEDICATIONS  (STANDING):  albuterol/ipratropium for Nebulization 3 milliLiter(s) Nebulizer every 6 hours  amLODIPine   Tablet 10 milliGRAM(s) Oral daily  ampicillin  IVPB 1 Gram(s) IV Intermittent every 6 hours  atorvastatin 20 milliGRAM(s) Oral at bedtime  chlorhexidine 4% Liquid 1 Application(s) Topical <User Schedule>  dextrose 40% Gel 15 Gram(s) Oral once  dextrose 5%. 1000 milliLiter(s) (50 mL/Hr) IV Continuous <Continuous>  dextrose 5%. 1000 milliLiter(s) (100 mL/Hr) IV Continuous <Continuous>  dextrose 50% Injectable 25 Gram(s) IV Push once  dextrose 50% Injectable 12.5 Gram(s) IV Push once  dextrose 50% Injectable 25 Gram(s) IV Push once  enoxaparin Injectable 40 milliGRAM(s) SubCutaneous every 24 hours  glucagon  Injectable 1 milliGRAM(s) IntraMuscular once  insulin lispro (ADMELOG) corrective regimen sliding scale   SubCutaneous every 6 hours  mirtazapine 15 milliGRAM(s) Oral daily  nystatin Cream 1 Application(s) Topical two times a day    MEDICATIONS  (PRN):      ALLERGIES:  Allergies    iodine containing compounds (Unknown)  lisinopril (Unknown)    Intolerances        LABS:                        8.0    4.92  )-----------( 216      ( 08 Sep 2021 13:08 )             26.5     09-08    135  |  94<L>  |  19  ----------------------------<  103<H>  4.6   |  33<H>  |  0.57    Ca    9.0      08 Sep 2021 07:26  Phos  4.0     09-08  Mg     2.0     09-08    TPro  6.9  /  Alb  2.7<L>  /  TBili  0.2  /  DBili  x   /  AST  14  /  ALT  10  /  AlkPhos  73  09-08        CAPILLARY BLOOD GLUCOSE      POCT Blood Glucose.: 89 mg/dL (08 Sep 2021 16:50)      RADIOLOGY & ADDITIONAL TESTS: Reviewed.     Transfer Note: [facility] to [facility]    Hospital Course    Pt is a 89 yo Divehi speaking F who is bedbound with PMHx of DNR, HTN, HLD, and OA who presenting from NH with AMS for 1 day. She was on ceftriaxone for PNA for 1 day. She became hypoxic and less verbal, so was sent to ER. Per NH, no other changes to daily routine. No changes to appetite, BMs, or urination. On arrival, T 98, HR 81, /70, RR 18 O2sat 99% 10L NRB--88% 4L--99% BIPAP 40%. Labs with WBC 6.41, 71% neutrophils,Na 115, Cl 75, lactate neg, UA +LE, blood, WBCs. CT chest with cardiogenic pulmonary edema, small-mod BL pleural effusions with BL atelectasis vs. PNA. CTH neg. Pt given Mg 1g, NS 1L. ICU consulted for hypoNa. Family (niece) requested trial of intubation and confirmed DNR status. Treated with IV lasix 40 x2 for fluid overload of unclear origin. She was placed on 1 day of CPAP and was extubated on 9/7. Patient was given 500cc bolus of LR given decreased UOP. Patient ABx was switched from ceftriaxone to ampicilin given E. Faecalis in the urine. Patient echo demonstrated normal LV function, normal RV function, mild-to-moderate TR, pulm HTN (PASP 64). Patient restarted home amlodipine 10mg. Patient NG tube was removed on 9/7. Patient tolerating PO intake. Repeat CBC was order today to determine if Hb is stable for transfer. Patient stable for transfer to regional medical floor.      SUBJECTIVE / INTERVAL HPI: Patient seen and examined at bedside. AAOx2-3. No complaints. No headaches, dizziness, CP, SOB, abd pain, n/v/d/c.    VITAL SIGNS:  Vital Signs Last 24 Hrs  T(C): 37.2 (08 Sep 2021 13:43), Max: 37.6 (08 Sep 2021 10:28)  T(F): 98.9 (08 Sep 2021 13:43), Max: 99.6 (08 Sep 2021 10:28)  HR: 68 (08 Sep 2021 17:00) (68 - 82)  BP: 102/51 (08 Sep 2021 17:00) (101/69 - 115/55)  BP(mean): 73 (08 Sep 2021 17:00) (73 - 83)  RR: 17 (08 Sep 2021 17:00) (16 - 19)  SpO2: 100% (08 Sep 2021 17:00) (95% - 100%)  I&O's Summary    07 Sep 2021 07:01  -  08 Sep 2021 07:00  --------------------------------------------------------  IN: 387 mL / OUT: 500 mL / NET: -113 mL        PHYSICAL EXAM:    General: Elderly female laying comfortably in bed, NAD  Neuro: AAOx3, follows commands, 4/5 motor and sensation intact.  HEENT: NC/AT; PERRL, clear conjunctiva, dentures   Neck: supple, trachea midline  Respiratory: Coarse breath sounds b/l, no w/r/r  Cardiovascular: +S1/S2; RRR  Abdomen: soft, NT/ND; +BS x4, small umbilical hernia  Extremities: WWP, 2+ peripheral pulses b/l; no LE edema  Skin: +Stage 2 sacral decubitus ulcer dressing c/d/i, +healed ulcer on upper thoracic, normal color and turgor; no rashes    MEDICATIONS:  MEDICATIONS  (STANDING):  albuterol/ipratropium for Nebulization 3 milliLiter(s) Nebulizer every 6 hours  amLODIPine   Tablet 10 milliGRAM(s) Oral daily  ampicillin  IVPB 1 Gram(s) IV Intermittent every 6 hours  atorvastatin 20 milliGRAM(s) Oral at bedtime  chlorhexidine 4% Liquid 1 Application(s) Topical <User Schedule>  dextrose 40% Gel 15 Gram(s) Oral once  dextrose 5%. 1000 milliLiter(s) (50 mL/Hr) IV Continuous <Continuous>  dextrose 5%. 1000 milliLiter(s) (100 mL/Hr) IV Continuous <Continuous>  dextrose 50% Injectable 25 Gram(s) IV Push once  dextrose 50% Injectable 12.5 Gram(s) IV Push once  dextrose 50% Injectable 25 Gram(s) IV Push once  enoxaparin Injectable 40 milliGRAM(s) SubCutaneous every 24 hours  glucagon  Injectable 1 milliGRAM(s) IntraMuscular once  insulin lispro (ADMELOG) corrective regimen sliding scale   SubCutaneous every 6 hours  mirtazapine 15 milliGRAM(s) Oral daily  nystatin Cream 1 Application(s) Topical two times a day    MEDICATIONS  (PRN):      ALLERGIES:  Allergies    iodine containing compounds (Unknown)  lisinopril (Unknown)    Intolerances        LABS:                        8.0    4.92  )-----------( 216      ( 08 Sep 2021 13:08 )             26.5     09-08    135  |  94<L>  |  19  ----------------------------<  103<H>  4.6   |  33<H>  |  0.57    Ca    9.0      08 Sep 2021 07:26  Phos  4.0     09-08  Mg     2.0     09-08    TPro  6.9  /  Alb  2.7<L>  /  TBili  0.2  /  DBili  x   /  AST  14  /  ALT  10  /  AlkPhos  73  09-08        CAPILLARY BLOOD GLUCOSE      POCT Blood Glucose.: 89 mg/dL (08 Sep 2021 16:50)      RADIOLOGY & ADDITIONAL TESTS: Reviewed.

## 2021-09-08 NOTE — PROGRESS NOTE ADULT - PROBLEM SELECTOR PLAN 6
RESOLVED  -Urine cx contaminated, will reorder   -f/u pan culture blood cx x 2 - neg, respiratory cx - neg - UCx initially showed VRE, but then determined to be contaminated  - Patient started on ampicillin for E. faecalis, and pt improving  - C/w ampicillin 1g q6 through 9/9

## 2021-09-08 NOTE — PROGRESS NOTE ADULT - ATTENDING COMMENTS
89F Estonian-speaking, bedbound, DNR w/ trial intubation w/ PMHx HTN, HLD, and OA who p/f NH with AMS x1d w/ hypercapnic/hypoxic resp failure s/p intubation- now extubated to RA; S/d to [facility] as mental status improved.      #ME: 2/2 hyponatremia and UTI- now resolved, currently AO x2-3. Hypervolemic hyponatremia resolved. c/w UTI managmnet. Monitor mental/resp status    #AHRF: 2/2 fluid overload, etiology unclear. Maybe 2/2 diastolic dysfunction Echo w/ normal EF, unable to analyze diastolic function; w/ mild-mod tricuspic/mitral regurg. Pt with also w/ evidence of chronic hypercapnea, possibly 2/2 OHS/KASSIDY. Overall improved s/p diuresis. Monitor fluid status, I/Os, resp status. 89F Hebrew-speaking, bedbound, DNR w/ trial intubation w/ PMHx HTN, HLD, and OA who p/f NH with AMS x1d w/ hypercapnic/hypoxic resp failure s/p intubation- now extubated to RA; S/d to [facility] as mental status improved.      #ME: 2/2 hyponatremia and UTI- now resolved, currently AO x2-3. Hypervolemic hyponatremia resolved. c/w UTI management. Monitor mental/resp status    #AHRF: 2/2 fluid overload, etiology unclear. Maybe 2/2 diastolic dysfunction Echo w/ normal EF, unable to analyze diastolic function; w/ mild-mod tricuspid/mitral regurg. Pt with also w/ evidence of chronic hypercapnia, possibly 2/2 OHS/KASSIDY. Overall improved s/p diuresis. Monitor fluid status, I/Os, resp status.

## 2021-09-08 NOTE — PROGRESS NOTE ADULT - ASSESSMENT
89 year old F presents with AMS, admitted for hypercarbic respiratory failure in setting of UTI and hyponatremia, intubated for airway protection now s/p extubation 9/6, transferred from [facility]U to [facility] for further management.    NEUROLOGY  #AMS 2/2 to hyponatremia and UTI - RESOLVING  - CTH negative for acute pathology   - Pt is alert and oriented x2-3 (self, place, president)  -C/w home Mirtazepine 15mg nightly      CARDIOVASCULAR   #HTN  -TTE: 65-70%, Aortic valve is probably tricuspid with focal thickening of the non coronary cusp. The aortic valve appears sclerotic/mildly stenotic.  - Consider restarting home Norvasc (10mg), Ramipril (2.5mg) tmrw if BP is hypertensive     #HLD  C/w Atorva 20    RESPIRATORY  #Acute hypoxic/hypercarbic respiratory failure 2nd to pulmonary edema, b/l pleural effusions   - Resolved, pt was extubated 9/6 and currently on RA   - Maintain O2 sat >92%, supplemental O2 PRN  - CXR ordered to see if diuresis is necessary, increased sputum and help w/ electrolytes     RENAL  #Hyponatremia, r/o SIADH  - Consider diuresis based on CXR   - Close monitoring of electrolytes, replete as needed  - F/u urine lytes   - Strict I/Os  - Fluid restrict with BMP q6h     Endo/Metabolic: No active issues   - Monitor FS, target FS <180  - Continue insulin sliding scale    ID:   #UTI  -Urine cx contaminated, will reorder   -f/u pan culture blood cx x 2 - neg, respiratory cx - neg    FLUIDS/ELECTROLYTES/NUTRITION  F: none  E: monitor, replete to K>4 and Mg>2  D: Normal  PROPHYLAXIS  -DVT: Lovenox  -GI: PPI   DISPO: [facility]  CODE STATUS: DNR   89 year old F presents with AMS, admitted for hypercarbic respiratory failure in setting of UTI and hyponatremia.    NEUROLOGY  #AMS 2/2 to hyponatremia and UTI. AMS resolved.  - CTH negative for acute pathology   - Pt is alert and oriented x2-3 (self, place, president)  - C/w home Mirtazepine 15mg nightly      CARDIOVASCULAR   #HTN  -TTE: 65-70%, Aortic valve is probably tricuspid with focal thickening of the non coronary cusp. The aortic valve appears sclerotic/mildly stenotic.  - Restarted home amlodipine 10mg    #HLD  C/w Atorva 20    RESPIRATORY  #Acute hypoxic/hypercarbic respiratory failure 2nd to pulmonary edema, b/l pleural effusions   - Resolved, pt was extubated 9/6 and currently on RA   - Maintain O2 sat >92%, supplemental O2 PRN  - CXR ordered to see if diuresis is necessary, increased sputum and help w/ electrolytes     RENAL  #Hyponatremia, r/o SIADH  - Consider diuresis based on CXR   - Close monitoring of electrolytes, replete as needed  - F/u urine lytes   - Strict I/Os  - Fluid restrict with BMP q6h     Endo/Metabolic: No active issues   - Monitor FS, target FS <180  - Continue insulin sliding scale    ID:   #UTI  -Urine cx contaminated, will reorder   -f/u pan culture blood cx x 2 - neg, respiratory cx - neg    FLUIDS/ELECTROLYTES/NUTRITION  F: none  E: monitor, replete to K>4 and Mg>2  D: Normal  PROPHYLAXIS  -DVT: Lovenox  -GI: PPI   DISPO: [facility]  CODE STATUS: DNR

## 2021-09-08 NOTE — PROGRESS NOTE ADULT - SUBJECTIVE AND OBJECTIVE BOX
Incomplete Hospital Course        SUBJECTIVE/OVERNIGHT EVENTS: No acute overnight events. Pt seen in AM at bedside, resting comfortably in bed, and does not appear to be in any acute distress. When asked, pt denies any recent or active fever, chills, nausea, vomiting, headache, acute sob, chest pain, abdominal pain, genitourinary sx, extremity pain or swelling.    VITAL SIGNS:  Vital Signs Last 24 Hrs  T(C): 37.6 (08 Sep 2021 10:28), Max: 37.6 (08 Sep 2021 10:28)  T(F): 99.6 (08 Sep 2021 10:28), Max: 99.6 (08 Sep 2021 10:28)  HR: 68 (08 Sep 2021 08:30) (68 - 82)  BP: 110/53 (08 Sep 2021 08:30) (101/69 - 164/72)  BP(mean): 77 (08 Sep 2021 08:30) (77 - 97)  RR: 16 (08 Sep 2021 08:30) (16 - 46)  SpO2: 99% (08 Sep 2021 08:30) (95% - 99%)    PHYSICAL EXAM:  General: NAD; speaking in full sentences  HEENT: NC/AT; PERRL; EOMI; MMM  Neck: supple; no JVD  Cardiac: RRR; +S1/S2  Pulm: CTA B/L; no W/R/R  GI: soft, NT/ND, +BS  Extremities: WWP; no edema, clubbing or cyanosis  Vasc: 2+ radial, DP pulses B/L  Neuro: AAOx3; no focal deficits    MEDICATIONS:  MEDICATIONS  (STANDING):  albuterol/ipratropium for Nebulization 3 milliLiter(s) Nebulizer every 6 hours  amLODIPine   Tablet 10 milliGRAM(s) Oral daily  ampicillin  IVPB 1 Gram(s) IV Intermittent every 6 hours  atorvastatin 20 milliGRAM(s) Oral at bedtime  chlorhexidine 4% Liquid 1 Application(s) Topical <User Schedule>  dextrose 40% Gel 15 Gram(s) Oral once  dextrose 5%. 1000 milliLiter(s) (50 mL/Hr) IV Continuous <Continuous>  dextrose 5%. 1000 milliLiter(s) (100 mL/Hr) IV Continuous <Continuous>  dextrose 50% Injectable 25 Gram(s) IV Push once  dextrose 50% Injectable 12.5 Gram(s) IV Push once  dextrose 50% Injectable 25 Gram(s) IV Push once  enoxaparin Injectable 40 milliGRAM(s) SubCutaneous every 24 hours  glucagon  Injectable 1 milliGRAM(s) IntraMuscular once  insulin lispro (ADMELOG) corrective regimen sliding scale   SubCutaneous every 6 hours  mirtazapine 15 milliGRAM(s) Oral daily  nystatin Cream 1 Application(s) Topical two times a day    MEDICATIONS  (PRN):      ALLERGIES:  Allergies    iodine containing compounds (Unknown)  lisinopril (Unknown)    Intolerances        LABS:                        8.2    4.39  )-----------( 198      ( 08 Sep 2021 07:26 )             28.3     09-08    135  |  94<L>  |  19  ----------------------------<  103<H>  4.6   |  33<H>  |  0.57    Ca    9.0      08 Sep 2021 07:26  Phos  4.0     09-08  Mg     2.0     09-08    TPro  6.9  /  Alb  2.7<L>  /  TBili  0.2  /  DBili  x   /  AST  14  /  ALT  10  /  AlkPhos  73  09-08        RADIOLOGY & ADDITIONAL TESTS: Reviewed. Hospital Course    Pt is a 89 yo Amharic speaking F who is bedbound with PMHx of DNR, HTN, HLD, and OA who presenting from NH with AMS for 1 day. She was on ceftriaxone for PNA for 1 day. She became hypoxic and less verbal, so was sent to ER. Per NH, no other changes to daily routine. No changes to appetite, BMs, or urination. On arrival, T 98, HR 81, /70, RR 18 O2sat 99% 10L NRB--88% 4L--99% BIPAP 40%. Labs with WBC 6.41, 71% neutrophils,Na 115, Cl 75, lactate neg, UA +LE, blood, WBCs. CT chest with cardiogenic pulmonary edema, small-mod BL pleural effusions with BL atelectasis vs. PNA. CTH neg. Pt given Mg 1g, NS 1L. ICU consulted for hypoNa. Family (niece) requested trial of intubation and confirmed DNR status. She was placed on 1 day of CPAP and was extubated on 9/7. Patient was given 500cc bolus of LR given decreased UOP. Patient ABx was switched from ceftraixone to ampicilin given E. Faecalis in the urine. Patient echo demonstrated normal LV function, normal RV function, mild-to-moderate TR, pulm HTN (PASP 64). Patient restarted home amlodipine 10mg. Patient NG tube was removed on 9/7. Patient tolerating PO intake. Repeat CBC was order today to determine if Hb is stable for transfer. Patient stable for transfer to regional medical floor    SUBJECTIVE/OVERNIGHT EVENTS: No acute overnight events. Pt seen in AM at bedside, resting comfortably in bed, and does not appear to be in any acute distress. When asked, pt denies any recent or active fever, chills, nausea, vomiting, headache, acute sob, chest pain, abdominal pain, genitourinary sx, extremity pain or swelling.    VITAL SIGNS:  Vital Signs Last 24 Hrs  T(C): 37.6 (08 Sep 2021 10:28), Max: 37.6 (08 Sep 2021 10:28)  T(F): 99.6 (08 Sep 2021 10:28), Max: 99.6 (08 Sep 2021 10:28)  HR: 68 (08 Sep 2021 08:30) (68 - 82)  BP: 110/53 (08 Sep 2021 08:30) (101/69 - 164/72)  BP(mean): 77 (08 Sep 2021 08:30) (77 - 97)  RR: 16 (08 Sep 2021 08:30) (16 - 46)  SpO2: 99% (08 Sep 2021 08:30) (95% - 99%)    PHYSICAL EXAM:  General: NAD; speaking in full sentences  HEENT: NC/AT; PERRL; EOMI; MMM  Neck: supple; no JVD  Cardiac: RRR; +S1/S2  Pulm: CTA B/L; no W/R/R  GI: soft, NT/ND, +BS  Extremities: WWP; no edema, clubbing or cyanosis  Vasc: 2+ radial, DP pulses B/L  Neuro: AAOx3; no focal deficits    MEDICATIONS:  MEDICATIONS  (STANDING):  albuterol/ipratropium for Nebulization 3 milliLiter(s) Nebulizer every 6 hours  amLODIPine   Tablet 10 milliGRAM(s) Oral daily  ampicillin  IVPB 1 Gram(s) IV Intermittent every 6 hours  atorvastatin 20 milliGRAM(s) Oral at bedtime  chlorhexidine 4% Liquid 1 Application(s) Topical <User Schedule>  dextrose 40% Gel 15 Gram(s) Oral once  dextrose 5%. 1000 milliLiter(s) (50 mL/Hr) IV Continuous <Continuous>  dextrose 5%. 1000 milliLiter(s) (100 mL/Hr) IV Continuous <Continuous>  dextrose 50% Injectable 25 Gram(s) IV Push once  dextrose 50% Injectable 12.5 Gram(s) IV Push once  dextrose 50% Injectable 25 Gram(s) IV Push once  enoxaparin Injectable 40 milliGRAM(s) SubCutaneous every 24 hours  glucagon  Injectable 1 milliGRAM(s) IntraMuscular once  insulin lispro (ADMELOG) corrective regimen sliding scale   SubCutaneous every 6 hours  mirtazapine 15 milliGRAM(s) Oral daily  nystatin Cream 1 Application(s) Topical two times a day    MEDICATIONS  (PRN):      ALLERGIES:  Allergies    iodine containing compounds (Unknown)  lisinopril (Unknown)    Intolerances        LABS:                        8.2    4.39  )-----------( 198      ( 08 Sep 2021 07:26 )             28.3     09-08    135  |  94<L>  |  19  ----------------------------<  103<H>  4.6   |  33<H>  |  0.57    Ca    9.0      08 Sep 2021 07:26  Phos  4.0     09-08  Mg     2.0     09-08    TPro  6.9  /  Alb  2.7<L>  /  TBili  0.2  /  DBili  x   /  AST  14  /  ALT  10  /  AlkPhos  73  09-08        RADIOLOGY & ADDITIONAL TESTS: Reviewed.

## 2021-09-08 NOTE — PROGRESS NOTE ADULT - ASSESSMENT
89F Sami-speaking, bedbound, DNR w/ trial intubation w/ PMHx HTN, HLD, and OA who p/f NH with AMS x1d. Found to be hyponatremic and hypercarbic, intubated and sedated. Now s/p extubation.

## 2021-09-08 NOTE — PROGRESS NOTE ADULT - PROBLEM SELECTOR PLAN 3
- C/w home atorvastatin 20 - C/w home amlodipine 10mg  - TTE: 65-70%, Aortic valve is probably tricuspid with focal thickening of the non coronary cusp. The aortic valve appears sclerotic/mildly stenotic.

## 2021-09-08 NOTE — PROGRESS NOTE ADULT - PROBLEM SELECTOR PLAN 5
Likely due to SIADH  - Now normal Na level  - Close monitoring of electrolytes, replete as needed  - F/u urine lytes   - Strict I/Os  - Fluid restrict with BMP q6h Likely 2/2 hypervolemic hyponatremia  - Improved with diuresis and fluid restriction  - Now normal Na level  - F/u 10pm BMP  - F/u urine lytes  - Encourage water intake

## 2021-09-08 NOTE — PROGRESS NOTE ADULT - PROBLEM SELECTOR PLAN 4
2nd to pulmonary edema, b/l pleural effusions   - Resolved, pt was extubated 9/6 and currently on RA   - Maintain O2 sat >92%, supplemental O2 PRN  - CXR ordered to see if diuresis is necessary, increased sputum and help w/ electrolytes - C/w home atorvastatin 20

## 2021-09-08 NOTE — PROGRESS NOTE ADULT - ATTENDING COMMENTS
Patient seen and examined with house-staff during bedside rounds.  Resident note read, including vitals, physical findings, laboratory data, and radiological reports.   Revisions included below.  Direct personal management at bed side and extensive interpretation of the data.  Plan was outlined and discussed in details with the housestaff.  Decision making of high complexity  Action taken for acute disease activity to reflect the level of care provided:  - medication reconciliation  - review laboratory data  The patient is clinically stable.  Patient is awake alert and communicating.  The patient is tolerating p.o. with no evidence of aspiration.  Start advancing diet.  Out of bed in chair.  Physical therapy.  ERIS Liriano.  The patient is afebrile on antibiotic.  Hyponatremia resolved.  Sepsis picture resolved.    Toxic metabolic encephalopathy resolved.  The patient does not have history of COPD and there is no evidence of acute exacerbation of COPD.    Hemoglobin decreased anticoagulated took obliteration no evidence of acute blood loss.  We will repeat the CBC and if stable will transfer the patient to regular floor.  Start physical therapy

## 2021-09-08 NOTE — PROGRESS NOTE ADULT - PROBLEM SELECTOR PLAN 2
- C/w home amlodipine 10mg  - TTE: 65-70%, Aortic valve is probably tricuspid with focal thickening of the non coronary cusp. The aortic valve appears sclerotic/mildly stenotic. 2/2 pulmonary edema and b/l pleural effusions of unclear origin. S/p extubation 9/6.    - Received diuresis lasix 40 IV x2, pulmonary edema improved  - Currently euvolemic, on RA  - Patient still retaining CO2, with elevated bicarb  - Unclear cause of overload: No history of smoking, HF, COPD, asthma

## 2021-09-09 DIAGNOSIS — N30.00 ACUTE CYSTITIS WITHOUT HEMATURIA: ICD-10-CM

## 2021-09-09 DIAGNOSIS — B37.89 OTHER SITES OF CANDIDIASIS: ICD-10-CM

## 2021-09-09 DIAGNOSIS — D64.9 ANEMIA, UNSPECIFIED: ICD-10-CM

## 2021-09-09 DIAGNOSIS — I10 ESSENTIAL (PRIMARY) HYPERTENSION: ICD-10-CM

## 2021-09-09 LAB
ANION GAP SERPL CALC-SCNC: 6 MMOL/L — SIGNIFICANT CHANGE UP (ref 5–17)
ANION GAP SERPL CALC-SCNC: 7 MMOL/L — SIGNIFICANT CHANGE UP (ref 5–17)
BASOPHILS # BLD AUTO: 0.02 K/UL — SIGNIFICANT CHANGE UP (ref 0–0.2)
BASOPHILS NFR BLD AUTO: 0.5 % — SIGNIFICANT CHANGE UP (ref 0–2)
BUN SERPL-MCNC: 20 MG/DL — SIGNIFICANT CHANGE UP (ref 7–23)
BUN SERPL-MCNC: 22 MG/DL — SIGNIFICANT CHANGE UP (ref 7–23)
CALCIUM SERPL-MCNC: 9 MG/DL — SIGNIFICANT CHANGE UP (ref 8.4–10.5)
CALCIUM SERPL-MCNC: 9.3 MG/DL — SIGNIFICANT CHANGE UP (ref 8.4–10.5)
CHLORIDE SERPL-SCNC: 96 MMOL/L — SIGNIFICANT CHANGE UP (ref 96–108)
CHLORIDE SERPL-SCNC: 96 MMOL/L — SIGNIFICANT CHANGE UP (ref 96–108)
CO2 SERPL-SCNC: 35 MMOL/L — HIGH (ref 22–31)
CO2 SERPL-SCNC: 36 MMOL/L — HIGH (ref 22–31)
CREAT ?TM UR-MCNC: 95 MG/DL — SIGNIFICANT CHANGE UP
CREAT SERPL-MCNC: 0.55 MG/DL — SIGNIFICANT CHANGE UP (ref 0.5–1.3)
CREAT SERPL-MCNC: 0.59 MG/DL — SIGNIFICANT CHANGE UP (ref 0.5–1.3)
EOSINOPHIL # BLD AUTO: 0.06 K/UL — SIGNIFICANT CHANGE UP (ref 0–0.5)
EOSINOPHIL NFR BLD AUTO: 1.4 % — SIGNIFICANT CHANGE UP (ref 0–6)
GLUCOSE BLDC GLUCOMTR-MCNC: 109 MG/DL — HIGH (ref 70–99)
GLUCOSE BLDC GLUCOMTR-MCNC: 168 MG/DL — HIGH (ref 70–99)
GLUCOSE BLDC GLUCOMTR-MCNC: 81 MG/DL — SIGNIFICANT CHANGE UP (ref 70–99)
GLUCOSE BLDC GLUCOMTR-MCNC: 92 MG/DL — SIGNIFICANT CHANGE UP (ref 70–99)
GLUCOSE SERPL-MCNC: 96 MG/DL — SIGNIFICANT CHANGE UP (ref 70–99)
GLUCOSE SERPL-MCNC: 98 MG/DL — SIGNIFICANT CHANGE UP (ref 70–99)
HCT VFR BLD CALC: 27 % — LOW (ref 34.5–45)
HGB BLD-MCNC: 8.1 G/DL — LOW (ref 11.5–15.5)
IMM GRANULOCYTES NFR BLD AUTO: 0 % — SIGNIFICANT CHANGE UP (ref 0–1.5)
LYMPHOCYTES # BLD AUTO: 1.17 K/UL — SIGNIFICANT CHANGE UP (ref 1–3.3)
LYMPHOCYTES # BLD AUTO: 27.5 % — SIGNIFICANT CHANGE UP (ref 13–44)
MAGNESIUM SERPL-MCNC: 2 MG/DL — SIGNIFICANT CHANGE UP (ref 1.6–2.6)
MCHC RBC-ENTMCNC: 28.8 PG — SIGNIFICANT CHANGE UP (ref 27–34)
MCHC RBC-ENTMCNC: 30 GM/DL — LOW (ref 32–36)
MCV RBC AUTO: 96.1 FL — SIGNIFICANT CHANGE UP (ref 80–100)
MONOCYTES # BLD AUTO: 0.66 K/UL — SIGNIFICANT CHANGE UP (ref 0–0.9)
MONOCYTES NFR BLD AUTO: 15.5 % — HIGH (ref 2–14)
NEUTROPHILS # BLD AUTO: 2.34 K/UL — SIGNIFICANT CHANGE UP (ref 1.8–7.4)
NEUTROPHILS NFR BLD AUTO: 55.1 % — SIGNIFICANT CHANGE UP (ref 43–77)
NRBC # BLD: 0 /100 WBCS — SIGNIFICANT CHANGE UP (ref 0–0)
OSMOLALITY UR: 518 MOSM/KG — SIGNIFICANT CHANGE UP (ref 300–900)
PHOSPHATE SERPL-MCNC: 3.2 MG/DL — SIGNIFICANT CHANGE UP (ref 2.5–4.5)
PLATELET # BLD AUTO: 211 K/UL — SIGNIFICANT CHANGE UP (ref 150–400)
POTASSIUM SERPL-MCNC: 4.4 MMOL/L — SIGNIFICANT CHANGE UP (ref 3.5–5.3)
POTASSIUM SERPL-MCNC: 4.6 MMOL/L — SIGNIFICANT CHANGE UP (ref 3.5–5.3)
POTASSIUM SERPL-SCNC: 4.4 MMOL/L — SIGNIFICANT CHANGE UP (ref 3.5–5.3)
POTASSIUM SERPL-SCNC: 4.6 MMOL/L — SIGNIFICANT CHANGE UP (ref 3.5–5.3)
RBC # BLD: 2.81 M/UL — LOW (ref 3.8–5.2)
RBC # FLD: 14.6 % — HIGH (ref 10.3–14.5)
SODIUM SERPL-SCNC: 138 MMOL/L — SIGNIFICANT CHANGE UP (ref 135–145)
SODIUM SERPL-SCNC: 138 MMOL/L — SIGNIFICANT CHANGE UP (ref 135–145)
SODIUM UR-SCNC: 27 MMOL/L — SIGNIFICANT CHANGE UP
WBC # BLD: 4.25 K/UL — SIGNIFICANT CHANGE UP (ref 3.8–10.5)
WBC # FLD AUTO: 4.25 K/UL — SIGNIFICANT CHANGE UP (ref 3.8–10.5)

## 2021-09-09 PROCEDURE — 99233 SBSQ HOSP IP/OBS HIGH 50: CPT | Mod: GC

## 2021-09-09 RX ORDER — ACETAMINOPHEN 500 MG
1000 TABLET ORAL ONCE
Refills: 0 | Status: COMPLETED | OUTPATIENT
Start: 2021-09-09 | End: 2021-09-09

## 2021-09-09 RX ADMIN — Medication 3 MILLILITER(S): at 10:28

## 2021-09-09 RX ADMIN — Medication 108 GRAM(S): at 11:37

## 2021-09-09 RX ADMIN — Medication 3 MILLILITER(S): at 03:21

## 2021-09-09 RX ADMIN — MIRTAZAPINE 15 MILLIGRAM(S): 45 TABLET, ORALLY DISINTEGRATING ORAL at 11:38

## 2021-09-09 RX ADMIN — Medication 400 MILLIGRAM(S): at 22:25

## 2021-09-09 RX ADMIN — Medication 3 MILLILITER(S): at 18:03

## 2021-09-09 RX ADMIN — Medication 1000 MILLIGRAM(S): at 22:56

## 2021-09-09 RX ADMIN — Medication 108 GRAM(S): at 06:59

## 2021-09-09 RX ADMIN — AMLODIPINE BESYLATE 10 MILLIGRAM(S): 2.5 TABLET ORAL at 06:59

## 2021-09-09 RX ADMIN — NYSTATIN CREAM 1 APPLICATION(S): 100000 CREAM TOPICAL at 18:03

## 2021-09-09 RX ADMIN — Medication 108 GRAM(S): at 00:28

## 2021-09-09 RX ADMIN — ENOXAPARIN SODIUM 40 MILLIGRAM(S): 100 INJECTION SUBCUTANEOUS at 22:26

## 2021-09-09 RX ADMIN — NYSTATIN CREAM 1 APPLICATION(S): 100000 CREAM TOPICAL at 07:00

## 2021-09-09 RX ADMIN — Medication 1000 MILLIGRAM(S): at 13:02

## 2021-09-09 RX ADMIN — Medication 2: at 12:33

## 2021-09-09 RX ADMIN — Medication 3 MILLILITER(S): at 23:13

## 2021-09-09 RX ADMIN — Medication 400 MILLIGRAM(S): at 12:33

## 2021-09-09 NOTE — PROGRESS NOTE ADULT - PROBLEM SELECTOR PLAN 6
F: none  E: monitor, replete to K>4 and Mg>2  N: Puree diet with thin liquids  A: Lovenox  GI ppx: protonix   DISPO: NH  CODE STATUS: DNR with trial intubation. F: none  E: monitor, replete to K>4 and Mg>2  N: Puree diet with thin liquids  A: Lovenox  GI ppx: protonix   DISPO: Home  CODE STATUS: DNR with trial intubation.

## 2021-09-09 NOTE — PROGRESS NOTE ADULT - PROBLEM SELECTOR PLAN 1
2/2 to hyponatremia and UTI. AMS resolved. Now AAOx2-3.  - CTH negative for acute pathology   - Pt is alert and oriented x2-3 (self, year, president)  - C/w home Mirtazepine 15mg nightly  - Bedbound at baseline, lives at [facility]. 2/2 to hyponatremia in the setting of fluid overload and PNA. Per niece, patient has been acting "weird" 1 week prior to hospital admission, and has baseline dementia. Now AAOx1  CTH negative for acute pathology     - c/w home Mirtazepine 15mg nightly  - Monitor for pain control, s/p 1g ofirmev on 9/9/21  - f/u PT recs (since pt will be going back at HCP's home, and not NH)

## 2021-09-09 NOTE — PROGRESS NOTE ADULT - PROBLEM SELECTOR PLAN 3
- C/w home amlodipine 10mg  - TTE: 65-70%, Aortic valve is probably tricuspid with focal thickening of the non coronary cusp. The aortic valve appears sclerotic/mildly stenotic. - C/w home amlodipine 10mg

## 2021-09-09 NOTE — PROGRESS NOTE ADULT - PROBLEM SELECTOR PLAN 4
multifactorial, d/t hyponatremia, UTI, and hypercapnia (all POA), in setting of likely mild dementia; mental status has improved, cont. mgmt as above; frequent re-orientation; dysphagia diet w/ aspiration precautions, per SLP recs

## 2021-09-09 NOTE — PROGRESS NOTE ADULT - SUBJECTIVE AND OBJECTIVE BOX
Patient is a 89y old  Female who presents with a chief complaint of Hypercarbic respiratory failure, Hyponatremia (09 Sep 2021 06:18)      INTERVAL HPI/OVERNIGHT EVENTS:    Pt. seen and examined earlier today  Pt. mildly confused, but able to be re-oriented; conversant in English  Pt. says she feels well, has no complaints  Pt. denies F/C, CP, SOB, cough  Pt. does not recall events leading to hospitalization, says she was in good health    Review of Systems: 12 point review of systems otherwise negative    MEDICATIONS  (STANDING):  albuterol/ipratropium for Nebulization 3 milliLiter(s) Nebulizer every 6 hours  amLODIPine   Tablet 10 milliGRAM(s) Oral daily  atorvastatin 20 milliGRAM(s) Oral at bedtime  chlorhexidine 4% Liquid 1 Application(s) Topical <User Schedule>  dextrose 40% Gel 15 Gram(s) Oral once  dextrose 5%. 1000 milliLiter(s) (50 mL/Hr) IV Continuous <Continuous>  dextrose 5%. 1000 milliLiter(s) (100 mL/Hr) IV Continuous <Continuous>  dextrose 50% Injectable 25 Gram(s) IV Push once  dextrose 50% Injectable 12.5 Gram(s) IV Push once  dextrose 50% Injectable 25 Gram(s) IV Push once  enoxaparin Injectable 40 milliGRAM(s) SubCutaneous every 24 hours  glucagon  Injectable 1 milliGRAM(s) IntraMuscular once  insulin lispro (ADMELOG) corrective regimen sliding scale   SubCutaneous every 6 hours  mirtazapine 15 milliGRAM(s) Oral daily  nystatin Cream 1 Application(s) Topical two times a day    MEDICATIONS  (PRN):      Allergies    iodine containing compounds (Unknown)  lisinopril (Unknown)    Intolerances          Vital Signs Last 24 Hrs  T(C): 37 (09 Sep 2021 10:33), Max: 37.6 (08 Sep 2021 22:15)  T(F): 98.6 (09 Sep 2021 10:33), Max: 99.7 (08 Sep 2021 22:15)  HR: 71 (09 Sep 2021 06:14) (68 - 80)  BP: 130/80 (09 Sep 2021 10:33) (98/54 - 130/80)  BP(mean): 85 (08 Sep 2021 21:25) (72 - 85)  RR: 16 (09 Sep 2021 10:33) (16 - 18)  SpO2: 94% (09 Sep 2021 10:33) (94% - 100%)  CAPILLARY BLOOD GLUCOSE      POCT Blood Glucose.: 168 mg/dL (09 Sep 2021 12:05)  POCT Blood Glucose.: 109 mg/dL (09 Sep 2021 06:06)  POCT Blood Glucose.: 102 mg/dL (08 Sep 2021 21:49)  POCT Blood Glucose.: 89 mg/dL (08 Sep 2021 16:50)      09-08 @ 07:01  -  09-09 @ 07:00  --------------------------------------------------------  IN: 100 mL / OUT: 400 mL / NET: -300 mL        Physical Exam:  (earlier today)  Daily     Daily   General:  non-toxic and comfortable-appearing in NAD  HEENT:  MMM  CV:  RRR, no JVD, no S3  Lungs:  CTA B/L anteriorly; normal WOB on 2L NC  Abdomen:  soft NT ND  Extremities:  no edema B/L LE  Skin:  WWP  :  +PrimaFit  Neuro:  AAOx2-3 ("hospital;" easily oriented to day/time)    LABS:                        8.1    4.25  )-----------( 211      ( 09 Sep 2021 07:06 )             27.0     09-09    138  |  96  |  20  ----------------------------<  96  4.4   |  36<H>  |  0.55    Ca    9.0      09 Sep 2021 07:06  Phos  3.2     09-09  Mg     2.0     09-09    TPro  6.9  /  Alb  2.7<L>  /  TBili  0.2  /  DBili  x   /  AST  14  /  ALT  10  /  AlkPhos  73  09-08

## 2021-09-09 NOTE — PROGRESS NOTE ADULT - PROBLEM SELECTOR PLAN 4
- C/w home atorvastatin 20.        #Hyponatremia  Likely 2/2 hypervolemic hyponatremia  - Improved with diuresis and fluid restriction  - Now normal Na level  - F/u 10pm BMP  - F/u urine lytes  - Encourage water intake. - C/w home atorvastatin 20.

## 2021-09-09 NOTE — PROGRESS NOTE ADULT - SUBJECTIVE AND OBJECTIVE BOX
Hospital Course    Pt is a 89 yo Thai speaking F who is bedbound with PMHx of DNR, HTN, HLD, and OA who presenting from NH with AMS for 1 day. She was on ceftriaxone for PNA for 1 day. She became hypoxic and less verbal, so was sent to ER. Per NH, no other changes to daily routine. No changes to appetite, BMs, or urination.     ED course:   On arrival, T 98, HR 81, /70, RR 18 O2sat 99% on 10L non-rebreather > 88% 4L > 99% BIPAP 40%.   Labs were remarkable for WBC 6.41, 71% neutrophils, Na 115, Cl 75, lactate neg, UA +LE, blood, WBCs.   CT chest with cardiogenic pulmonary edema, small-mod BL pleural effusions with BL atelectasis vs. PNA. CTH neg.   Patient echo demonstrated normal LV function, normal RV function, mild-to-moderate TR, pulm HTN (PASP 64).  Pt given Mg 1g, NS 1L.     ICU consulted for hypoNa. Family requested trial of intubation and confirmed DNR status. Treated with IV lasix 40 x2 for fluid overload of unclear origin.   She was placed on 1 day of CPAP and was extubated on 9/7. Patient was given 500cc bolus of LR given decreased UOP. Patient ABx was switched from ceftriaxone to ampicilin given E. Faecalis in the urine but later confirmed to be a contaminant. Patient continued on Abx since improvement in clinical status.     Patient NG tube was removed on 9/7. She tolerated PO intake. Hemodynamically stable and oxygenating 98% on RA yesterday, was transferred to [facility] for further monitoring/management.     OVERNIGHT EVENTS: NAOE    SUBJECTIVE / INTERVAL HPI: Patient seen and examined at bedside. AAOx2-3. No complaints. No headaches, dizziness, CP, SOB, abd pain, N/V/D/C.     MEDICATIONS  (STANDING):  albuterol/ipratropium for Nebulization 3 milliLiter(s) Nebulizer every 6 hours  amLODIPine   Tablet 10 milliGRAM(s) Oral daily  ampicillin  IVPB 1 Gram(s) IV Intermittent every 6 hours  atorvastatin 20 milliGRAM(s) Oral at bedtime  chlorhexidine 4% Liquid 1 Application(s) Topical <User Schedule>  dextrose 40% Gel 15 Gram(s) Oral once  dextrose 5%. 1000 milliLiter(s) (50 mL/Hr) IV Continuous <Continuous>  dextrose 5%. 1000 milliLiter(s) (100 mL/Hr) IV Continuous <Continuous>  dextrose 50% Injectable 25 Gram(s) IV Push once  dextrose 50% Injectable 12.5 Gram(s) IV Push once  dextrose 50% Injectable 25 Gram(s) IV Push once  enoxaparin Injectable 40 milliGRAM(s) SubCutaneous every 24 hours  glucagon  Injectable 1 milliGRAM(s) IntraMuscular once  insulin lispro (ADMELOG) corrective regimen sliding scale   SubCutaneous every 6 hours  mirtazapine 15 milliGRAM(s) Oral daily  nystatin Cream 1 Application(s) Topical two times a day    MEDICATIONS  (PRN):    Allergies    iodine containing compounds (Unknown)  lisinopril (Unknown)    Intolerances        VITAL SIGNS:  Vital Signs Last 24 Hrs  T(C): 37 (09 Sep 2021 06:14), Max: 37.6 (08 Sep 2021 10:28)  T(F): 98.6 (09 Sep 2021 06:14), Max: 99.7 (08 Sep 2021 22:15)  HR: 71 (09 Sep 2021 06:14) (68 - 80)  BP: 117/71 (09 Sep 2021 06:14) (98/54 - 126/59)  BP(mean): 85 (08 Sep 2021 21:25) (72 - 85)  RR: 17 (09 Sep 2021 06:14) (16 - 18)  SpO2: 94% (09 Sep 2021 06:14) (94% - 100%)      09-07-21 @ 07:01  -  09-08-21 @ 07:00  --------------------------------------------------------  IN: 387 mL / OUT: 500 mL / NET: -113 mL        PHYSICAL EXAM:  General: Elderly female laying comfortably in bed, NAD  Neuro: AAOx3, follows commands, motor and sensation intact.  HEENT: NC/AT; PERRL, clear conjunctiva, dentures   Neck: supple, trachea midline  Respiratory: Coarse breath sounds b/l, no w/r/r  Cardiovascular: +S1/S2; RRR  Abdomen: soft, NT/ND; +BS x4, small umbilical hernia  Extremities: WWP, 2+ peripheral pulses b/l; no LE edema  Skin: +Stage 2 sacral decubitus ulcer dressing c/d/i, +healed ulcer on upper thoracic, normal color and turgor; no rashes      LABS:                        8.0    4.92  )-----------( 216      ( 08 Sep 2021 13:08 )             26.5     09-08    138  |  96  |  22  ----------------------------<  98  4.6   |  35<H>  |  0.59    Ca    9.3      08 Sep 2021 23:36  Phos  4.0     09-08  Mg     2.0     09-08    TPro  6.9  /  Alb  2.7<L>  /  TBili  0.2  /  DBili  x   /  AST  14  /  ALT  10  /  AlkPhos  73  09-08        CAPILLARY BLOOD GLUCOSE      POCT Blood Glucose.: 109 mg/dL (09 Sep 2021 06:06)  POCT Blood Glucose.: 102 mg/dL (08 Sep 2021 21:49)  POCT Blood Glucose.: 89 mg/dL (08 Sep 2021 16:50)  POCT Blood Glucose.: 160 mg/dL (08 Sep 2021 11:23)          RADIOLOGY & ADDITIONAL TESTS: Reviewed. : ID [id number]    OVERNIGHT EVENTS: Received nystatin for fungal groin rash    SUBJECTIVE / INTERVAL HPI: Patient seen and examined at bedside. AAOx1. No complaints. No headaches, dizziness, CP, SOB, abd pain, N/V/D/C.     MEDICATIONS  (STANDING):  albuterol/ipratropium for Nebulization 3 milliLiter(s) Nebulizer every 6 hours  amLODIPine   Tablet 10 milliGRAM(s) Oral daily  ampicillin  IVPB 1 Gram(s) IV Intermittent every 6 hours  atorvastatin 20 milliGRAM(s) Oral at bedtime  chlorhexidine 4% Liquid 1 Application(s) Topical <User Schedule>  dextrose 40% Gel 15 Gram(s) Oral once  dextrose 5%. 1000 milliLiter(s) (50 mL/Hr) IV Continuous <Continuous>  dextrose 5%. 1000 milliLiter(s) (100 mL/Hr) IV Continuous <Continuous>  dextrose 50% Injectable 25 Gram(s) IV Push once  dextrose 50% Injectable 12.5 Gram(s) IV Push once  dextrose 50% Injectable 25 Gram(s) IV Push once  enoxaparin Injectable 40 milliGRAM(s) SubCutaneous every 24 hours  glucagon  Injectable 1 milliGRAM(s) IntraMuscular once  insulin lispro (ADMELOG) corrective regimen sliding scale   SubCutaneous every 6 hours  mirtazapine 15 milliGRAM(s) Oral daily  nystatin Cream 1 Application(s) Topical two times a day    MEDICATIONS  (PRN):    Allergies    iodine containing compounds (Unknown)  lisinopril (Unknown)    Intolerances        VITAL SIGNS:  Vital Signs Last 24 Hrs  T(C): 37 (09 Sep 2021 06:14), Max: 37.6 (08 Sep 2021 10:28)  T(F): 98.6 (09 Sep 2021 06:14), Max: 99.7 (08 Sep 2021 22:15)  HR: 71 (09 Sep 2021 06:14) (68 - 80)  BP: 117/71 (09 Sep 2021 06:14) (98/54 - 126/59)  BP(mean): 85 (08 Sep 2021 21:25) (72 - 85)  RR: 17 (09 Sep 2021 06:14) (16 - 18)  SpO2: 94% (09 Sep 2021 06:14) (94% - 100%)      09-07-21 @ 07:01  -  09-08-21 @ 07:00  --------------------------------------------------------  IN: 387 mL / OUT: 500 mL / NET: -113 mL        PHYSICAL EXAM:  General: Elderly female laying comfortably in bed, NAD  Neuro: AAOx3, follows commands, motor and sensation intact.  HEENT: NC/AT; PERRL, clear conjunctiva, dentures   Neck: supple, trachea midline  Respiratory: Coarse breath sounds b/l, no w/r/r  Cardiovascular: +S1/S2; RRR  Abdomen: soft, NT/ND; +BS x4, small umbilical hernia  Extremities: WWP, 2+ peripheral pulses b/l; no LE edema  Skin: +Stage 2 sacral decubitus ulcer dressing c/d/i, +healed ulcer on upper thoracic, normal color and turgor; no rashes      LABS:                        8.0    4.92  )-----------( 216      ( 08 Sep 2021 13:08 )             26.5     09-08    138  |  96  |  22  ----------------------------<  98  4.6   |  35<H>  |  0.59    Ca    9.3      08 Sep 2021 23:36  Phos  4.0     09-08  Mg     2.0     09-08    TPro  6.9  /  Alb  2.7<L>  /  TBili  0.2  /  DBili  x   /  AST  14  /  ALT  10  /  AlkPhos  73  09-08        CAPILLARY BLOOD GLUCOSE      POCT Blood Glucose.: 109 mg/dL (09 Sep 2021 06:06)  POCT Blood Glucose.: 102 mg/dL (08 Sep 2021 21:49)  POCT Blood Glucose.: 89 mg/dL (08 Sep 2021 16:50)  POCT Blood Glucose.: 160 mg/dL (08 Sep 2021 11:23)          RADIOLOGY & ADDITIONAL TESTS: Reviewed. : ID [id number]    OVERNIGHT EVENTS: Received nystatin for fungal groin rash.    SUBJECTIVE / INTERVAL HPI: Patient seen and examined at bedside. AAOx1. Pt was confused. She thinks she is home. When I told her she     MEDICATIONS  (STANDING):  albuterol/ipratropium for Nebulization 3 milliLiter(s) Nebulizer every 6 hours  amLODIPine   Tablet 10 milliGRAM(s) Oral daily  ampicillin  IVPB 1 Gram(s) IV Intermittent every 6 hours  atorvastatin 20 milliGRAM(s) Oral at bedtime  chlorhexidine 4% Liquid 1 Application(s) Topical <User Schedule>  dextrose 40% Gel 15 Gram(s) Oral once  dextrose 5%. 1000 milliLiter(s) (50 mL/Hr) IV Continuous <Continuous>  dextrose 5%. 1000 milliLiter(s) (100 mL/Hr) IV Continuous <Continuous>  dextrose 50% Injectable 25 Gram(s) IV Push once  dextrose 50% Injectable 12.5 Gram(s) IV Push once  dextrose 50% Injectable 25 Gram(s) IV Push once  enoxaparin Injectable 40 milliGRAM(s) SubCutaneous every 24 hours  glucagon  Injectable 1 milliGRAM(s) IntraMuscular once  insulin lispro (ADMELOG) corrective regimen sliding scale   SubCutaneous every 6 hours  mirtazapine 15 milliGRAM(s) Oral daily  nystatin Cream 1 Application(s) Topical two times a day    MEDICATIONS  (PRN):    Allergies    iodine containing compounds (Unknown)  lisinopril (Unknown)    Intolerances        VITAL SIGNS:  Vital Signs Last 24 Hrs  T(C): 37 (09 Sep 2021 06:14), Max: 37.6 (08 Sep 2021 10:28)  T(F): 98.6 (09 Sep 2021 06:14), Max: 99.7 (08 Sep 2021 22:15)  HR: 71 (09 Sep 2021 06:14) (68 - 80)  BP: 117/71 (09 Sep 2021 06:14) (98/54 - 126/59)  BP(mean): 85 (08 Sep 2021 21:25) (72 - 85)  RR: 17 (09 Sep 2021 06:14) (16 - 18)  SpO2: 94% (09 Sep 2021 06:14) (94% - 100%)      09-07-21 @ 07:01  -  09-08-21 @ 07:00  --------------------------------------------------------  IN: 387 mL / OUT: 500 mL / NET: -113 mL        PHYSICAL EXAM:  General: Elderly female laying comfortably in bed, NAD  Neuro: AAOx3, follows commands, motor and sensation intact.  HEENT: NC/AT; PERRL, clear conjunctiva, dentures   Neck: supple, trachea midline  Respiratory: Coarse breath sounds b/l, no w/r/r  Cardiovascular: +S1/S2; RRR  Abdomen: soft, NT/ND; +BS x4, small umbilical hernia  Extremities: WWP, 2+ peripheral pulses b/l; no LE edema  Skin: +Stage 2 sacral decubitus ulcer dressing c/d/i, +healed ulcer on upper thoracic, normal color and turgor; no rashes      LABS:                        8.0    4.92  )-----------( 216      ( 08 Sep 2021 13:08 )             26.5     09-08    138  |  96  |  22  ----------------------------<  98  4.6   |  35<H>  |  0.59    Ca    9.3      08 Sep 2021 23:36  Phos  4.0     09-08  Mg     2.0     09-08    TPro  6.9  /  Alb  2.7<L>  /  TBili  0.2  /  DBili  x   /  AST  14  /  ALT  10  /  AlkPhos  73  09-08        CAPILLARY BLOOD GLUCOSE      POCT Blood Glucose.: 109 mg/dL (09 Sep 2021 06:06)  POCT Blood Glucose.: 102 mg/dL (08 Sep 2021 21:49)  POCT Blood Glucose.: 89 mg/dL (08 Sep 2021 16:50)  POCT Blood Glucose.: 160 mg/dL (08 Sep 2021 11:23)          RADIOLOGY & ADDITIONAL TESTS: Reviewed. : ID [id number]    OVERNIGHT EVENTS: Received nystatin for fungal groin rash.    SUBJECTIVE / INTERVAL HPI: Patient seen and examined at bedside. AAOx1. Pt was confused. She thinks she is home. When I told her she is in the hospital, she was confused regarding the reason why "since she is in perfect health". She denies dizziness, SOB, CP, N/V.     ROS negative except if stated in HPI.     MEDICATIONS  (STANDING):  albuterol/ipratropium for Nebulization 3 milliLiter(s) Nebulizer every 6 hours  amLODIPine   Tablet 10 milliGRAM(s) Oral daily  ampicillin  IVPB 1 Gram(s) IV Intermittent every 6 hours  atorvastatin 20 milliGRAM(s) Oral at bedtime  chlorhexidine 4% Liquid 1 Application(s) Topical <User Schedule>  dextrose 40% Gel 15 Gram(s) Oral once  dextrose 5%. 1000 milliLiter(s) (50 mL/Hr) IV Continuous <Continuous>  dextrose 5%. 1000 milliLiter(s) (100 mL/Hr) IV Continuous <Continuous>  dextrose 50% Injectable 25 Gram(s) IV Push once  dextrose 50% Injectable 12.5 Gram(s) IV Push once  dextrose 50% Injectable 25 Gram(s) IV Push once  enoxaparin Injectable 40 milliGRAM(s) SubCutaneous every 24 hours  glucagon  Injectable 1 milliGRAM(s) IntraMuscular once  insulin lispro (ADMELOG) corrective regimen sliding scale   SubCutaneous every 6 hours  mirtazapine 15 milliGRAM(s) Oral daily  nystatin Cream 1 Application(s) Topical two times a day    MEDICATIONS  (PRN):    Allergies    iodine containing compounds (Unknown)  lisinopril (Unknown)    Intolerances        VITAL SIGNS:  Vital Signs Last 24 Hrs  T(C): 37 (09 Sep 2021 06:14), Max: 37.6 (08 Sep 2021 10:28)  T(F): 98.6 (09 Sep 2021 06:14), Max: 99.7 (08 Sep 2021 22:15)  HR: 71 (09 Sep 2021 06:14) (68 - 80)  BP: 117/71 (09 Sep 2021 06:14) (98/54 - 126/59)  BP(mean): 85 (08 Sep 2021 21:25) (72 - 85)  RR: 17 (09 Sep 2021 06:14) (16 - 18)  SpO2: 94% (09 Sep 2021 06:14) (94% - 100%)      09-07-21 @ 07:01  -  09-08-21 @ 07:00  --------------------------------------------------------  IN: 387 mL / OUT: 500 mL / NET: -113 mL        PHYSICAL EXAM:  General: Elderly female laying comfortably in bed, NAD  Neuro: AAOx3, follows commands, motor and sensation intact.  HEENT: NC/AT; PERRL, clear conjunctiva, dentures   Neck: supple, trachea midline  Respiratory: Coarse breath sounds b/l, no w/r/r  Cardiovascular: +S1/S2; RRR  Abdomen: soft, NT/ND; +BS x4, small umbilical hernia  Extremities: WWP, 2+ peripheral pulses b/l; no LE edema  Skin: +Stage 2 sacral decubitus ulcer dressing c/d/i, +healed ulcer on upper thoracic, normal color and turgor; no rashes      LABS:                        8.0    4.92  )-----------( 216      ( 08 Sep 2021 13:08 )             26.5     09-08    138  |  96  |  22  ----------------------------<  98  4.6   |  35<H>  |  0.59    Ca    9.3      08 Sep 2021 23:36  Phos  4.0     09-08  Mg     2.0     09-08    TPro  6.9  /  Alb  2.7<L>  /  TBili  0.2  /  DBili  x   /  AST  14  /  ALT  10  /  AlkPhos  73  09-08        CAPILLARY BLOOD GLUCOSE      POCT Blood Glucose.: 109 mg/dL (09 Sep 2021 06:06)  POCT Blood Glucose.: 102 mg/dL (08 Sep 2021 21:49)  POCT Blood Glucose.: 89 mg/dL (08 Sep 2021 16:50)  POCT Blood Glucose.: 160 mg/dL (08 Sep 2021 11:23)          RADIOLOGY & ADDITIONAL TESTS: Reviewed.

## 2021-09-09 NOTE — PROGRESS NOTE ADULT - PROBLEM SELECTOR PLAN 2
2/2 pulmonary edema and b/l pleural effusions of unclear origin. S/p extubation 9/6.    - Received diuresis lasix 40 IV x2, pulmonary edema improved  - Currently euvolemic, on RA  - Patient still retaining CO2, with elevated bicarb  - Unclear cause of overload: No history of smoking, HF, COPD, asthma. 2/2 pulmonary edema and b/l pleural effusions vs PNA. S/p extubation 9/7  Received diuresis lasix 40 IV x2, pulmonary edema improved  Patient still retaining CO2, with elevated bicarb, 36 on 9/9 AM  Unclear cause of overload: No history of smoking, HF, COPD, asthma.    -Monitor oxygen saturation  -f/u pulm consult

## 2021-09-09 NOTE — PHYSICAL THERAPY INITIAL EVALUATION ADULT - PERTINENT HX OF CURRENT PROBLEM, REHAB EVAL
89 yo Mongolian speaking F with PMH bedbound, DNR, HTN, HLD, and OA who p/f NH with AMS x1d. Had been on CTX x1d outpt for PNA. Started becoming hypoxic and less verbal, so was sent to ER. Limited history d/t pt mental status.

## 2021-09-09 NOTE — PROGRESS NOTE ADULT - PROBLEM SELECTOR PLAN 1
d/t hypervolemia, in setting of moderate pHTN; possible KASSIDY/OHS; no e/o CHF on TTE; now doing well s/p extubation, s/p diuresis; will monitor volume status and consult Pulm, may need additional work-up; wean off O2 as tolerated

## 2021-09-09 NOTE — PROGRESS NOTE ADULT - PROBLEM SELECTOR PLAN 5
- UCx initially showed VRE, but then determined to be contaminated  - Patient started on ampicillin for E. faecalis, and pt improving  - C/w ampicillin 1g q6 through 9/9. 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 faecalis, and pt improving  - C/w ampicillin 1g q6 through 9/9.

## 2021-09-09 NOTE — PHYSICAL THERAPY INITIAL EVALUATION ADULT - ADDITIONAL COMMENTS
Pt able to recall birth date and year however did not know where she was. She reports using a SC and RW to ambulate, however, pt may not be reliable historian.

## 2021-09-09 NOTE — PROGRESS NOTE ADULT - ASSESSMENT
89 yo DNR Kosovan speaking F who is bedbound with PMHx of HTN, HLD and OA who presented with AMS in the setting of fluid overload of unclear etiology, s/p intubation/extubation for hypoxic respiratory failure, hemodynamically stable and adequate oxygenation since, transferred to [facility] for further monitoring/management

## 2021-09-10 DIAGNOSIS — E87.3 ALKALOSIS: ICD-10-CM

## 2021-09-10 LAB
ALBUMIN SERPL ELPH-MCNC: 2.8 G/DL — LOW (ref 3.3–5)
ALP SERPL-CCNC: 67 U/L — SIGNIFICANT CHANGE UP (ref 40–120)
ALT FLD-CCNC: 10 U/L — SIGNIFICANT CHANGE UP (ref 10–45)
ANION GAP SERPL CALC-SCNC: 8 MMOL/L — SIGNIFICANT CHANGE UP (ref 5–17)
AST SERPL-CCNC: 14 U/L — SIGNIFICANT CHANGE UP (ref 10–40)
BILIRUB SERPL-MCNC: 0.2 MG/DL — SIGNIFICANT CHANGE UP (ref 0.2–1.2)
BUN SERPL-MCNC: 15 MG/DL — SIGNIFICANT CHANGE UP (ref 7–23)
CALCIUM SERPL-MCNC: 9.2 MG/DL — SIGNIFICANT CHANGE UP (ref 8.4–10.5)
CHLORIDE SERPL-SCNC: 96 MMOL/L — SIGNIFICANT CHANGE UP (ref 96–108)
CO2 SERPL-SCNC: 33 MMOL/L — HIGH (ref 22–31)
CREAT SERPL-MCNC: 0.49 MG/DL — LOW (ref 0.5–1.3)
CULTURE RESULTS: SIGNIFICANT CHANGE UP
CULTURE RESULTS: SIGNIFICANT CHANGE UP
GLUCOSE BLDC GLUCOMTR-MCNC: 83 MG/DL — SIGNIFICANT CHANGE UP (ref 70–99)
GLUCOSE BLDC GLUCOMTR-MCNC: 88 MG/DL — SIGNIFICANT CHANGE UP (ref 70–99)
GLUCOSE BLDC GLUCOMTR-MCNC: 90 MG/DL — SIGNIFICANT CHANGE UP (ref 70–99)
GLUCOSE BLDC GLUCOMTR-MCNC: 92 MG/DL — SIGNIFICANT CHANGE UP (ref 70–99)
GLUCOSE SERPL-MCNC: 84 MG/DL — SIGNIFICANT CHANGE UP (ref 70–99)
HCT VFR BLD CALC: 30.2 % — LOW (ref 34.5–45)
HGB BLD-MCNC: 8.9 G/DL — LOW (ref 11.5–15.5)
MAGNESIUM SERPL-MCNC: 1.8 MG/DL — SIGNIFICANT CHANGE UP (ref 1.6–2.6)
MCHC RBC-ENTMCNC: 28.4 PG — SIGNIFICANT CHANGE UP (ref 27–34)
MCHC RBC-ENTMCNC: 29.5 GM/DL — LOW (ref 32–36)
MCV RBC AUTO: 96.5 FL — SIGNIFICANT CHANGE UP (ref 80–100)
NRBC # BLD: 0 /100 WBCS — SIGNIFICANT CHANGE UP (ref 0–0)
PHOSPHATE SERPL-MCNC: 3 MG/DL — SIGNIFICANT CHANGE UP (ref 2.5–4.5)
PLATELET # BLD AUTO: 212 K/UL — SIGNIFICANT CHANGE UP (ref 150–400)
POTASSIUM SERPL-MCNC: 4.2 MMOL/L — SIGNIFICANT CHANGE UP (ref 3.5–5.3)
POTASSIUM SERPL-SCNC: 4.2 MMOL/L — SIGNIFICANT CHANGE UP (ref 3.5–5.3)
PROT SERPL-MCNC: 7.3 G/DL — SIGNIFICANT CHANGE UP (ref 6–8.3)
RBC # BLD: 3.13 M/UL — LOW (ref 3.8–5.2)
RBC # FLD: 14.3 % — SIGNIFICANT CHANGE UP (ref 10.3–14.5)
SODIUM SERPL-SCNC: 137 MMOL/L — SIGNIFICANT CHANGE UP (ref 135–145)
SPECIMEN SOURCE: SIGNIFICANT CHANGE UP
SPECIMEN SOURCE: SIGNIFICANT CHANGE UP
WBC # BLD: 3.42 K/UL — LOW (ref 3.8–10.5)
WBC # FLD AUTO: 3.42 K/UL — LOW (ref 3.8–10.5)

## 2021-09-10 PROCEDURE — 99233 SBSQ HOSP IP/OBS HIGH 50: CPT | Mod: GC

## 2021-09-10 PROCEDURE — 71045 X-RAY EXAM CHEST 1 VIEW: CPT | Mod: 26

## 2021-09-10 RX ADMIN — ENOXAPARIN SODIUM 40 MILLIGRAM(S): 100 INJECTION SUBCUTANEOUS at 21:51

## 2021-09-10 RX ADMIN — NYSTATIN CREAM 1 APPLICATION(S): 100000 CREAM TOPICAL at 17:20

## 2021-09-10 RX ADMIN — Medication 3 MILLILITER(S): at 06:40

## 2021-09-10 RX ADMIN — ATORVASTATIN CALCIUM 20 MILLIGRAM(S): 80 TABLET, FILM COATED ORAL at 21:51

## 2021-09-10 RX ADMIN — MIRTAZAPINE 15 MILLIGRAM(S): 45 TABLET, ORALLY DISINTEGRATING ORAL at 11:48

## 2021-09-10 RX ADMIN — Medication 3 MILLILITER(S): at 17:20

## 2021-09-10 RX ADMIN — Medication 3 MILLILITER(S): at 11:47

## 2021-09-10 RX ADMIN — NYSTATIN CREAM 1 APPLICATION(S): 100000 CREAM TOPICAL at 06:40

## 2021-09-10 NOTE — PROGRESS NOTE ADULT - SUBJECTIVE AND OBJECTIVE BOX
*INCOMPLETE* *INCOMPLETE*  OVERNIGHT EVENTS: NAOE    SUBJECTIVE / INTERVAL HPI: Patient seen and examined at bedside.     MEDICATIONS  (STANDING):  albuterol/ipratropium for Nebulization 3 milliLiter(s) Nebulizer every 6 hours  amLODIPine   Tablet 10 milliGRAM(s) Oral daily  atorvastatin 20 milliGRAM(s) Oral at bedtime  chlorhexidine 4% Liquid 1 Application(s) Topical <User Schedule>  dextrose 40% Gel 15 Gram(s) Oral once  dextrose 5%. 1000 milliLiter(s) (50 mL/Hr) IV Continuous <Continuous>  dextrose 5%. 1000 milliLiter(s) (100 mL/Hr) IV Continuous <Continuous>  dextrose 50% Injectable 25 Gram(s) IV Push once  dextrose 50% Injectable 12.5 Gram(s) IV Push once  dextrose 50% Injectable 25 Gram(s) IV Push once  enoxaparin Injectable 40 milliGRAM(s) SubCutaneous every 24 hours  glucagon  Injectable 1 milliGRAM(s) IntraMuscular once  insulin lispro (ADMELOG) corrective regimen sliding scale   SubCutaneous every 6 hours  mirtazapine 15 milliGRAM(s) Oral daily  nystatin Cream 1 Application(s) Topical two times a day    MEDICATIONS  (PRN):    Allergies    iodine containing compounds (Unknown)  lisinopril (Unknown)    Intolerances        VITAL SIGNS:  Vital Signs Last 24 Hrs  T(C): 36.6 (10 Sep 2021 05:50), Max: 37 (09 Sep 2021 10:33)  T(F): 97.8 (10 Sep 2021 05:50), Max: 98.6 (09 Sep 2021 10:33)  HR: 69 (10 Sep 2021 05:50) (69 - 74)  BP: 138/77 (10 Sep 2021 05:50) (122/68 - 138/77)  BP(mean): --  RR: 17 (10 Sep 2021 05:50) (16 - 18)  SpO2: 97% (10 Sep 2021 05:50) (94% - 97%)      09-08-21 @ 07:01  -  09-09-21 @ 07:00  --------------------------------------------------------  IN: 100 mL / OUT: 400 mL / NET: -300 mL    09-09-21 @ 07:01  -  09-10-21 @ 06:17  --------------------------------------------------------  IN: 100 mL / OUT: 0 mL / NET: 100 mL        PHYSICAL EXAM:  General: Elderly female laying comfortably in bed, NAD  Neuro: AAOx3, follows commands, motor and sensation intact.  HEENT: NC/AT; PERRL, clear conjunctiva, dentures   Neck: supple, trachea midline  Respiratory: Coarse breath sounds b/l, no w/r/r  Cardiovascular: +S1/S2; RRR  Abdomen: soft, NT/ND; +BS x4, small umbilical hernia  Extremities: WWP, 2+ peripheral pulses b/l; no LE edema  Skin: +Stage 2 sacral decubitus ulcer dressing c/d/i, +healed ulcer on upper thoracic, normal color and turgor; no rashes      LABS:                        8.1    4.25  )-----------( 211      ( 09 Sep 2021 07:06 )             27.0     09-09    138  |  96  |  20  ----------------------------<  96  4.4   |  36<H>  |  0.55    Ca    9.0      09 Sep 2021 07:06  Phos  3.2     09-09  Mg     2.0     09-09    TPro  6.9  /  Alb  2.7<L>  /  TBili  0.2  /  DBili  x   /  AST  14  /  ALT  10  /  AlkPhos  73  09-08        CAPILLARY BLOOD GLUCOSE      POCT Blood Glucose.: 88 mg/dL (10 Sep 2021 06:16)  POCT Blood Glucose.: 92 mg/dL (10 Sep 2021 00:39)  POCT Blood Glucose.: 92 mg/dL (09 Sep 2021 21:59)  POCT Blood Glucose.: 81 mg/dL (09 Sep 2021 17:39)  POCT Blood Glucose.: 168 mg/dL (09 Sep 2021 12:05)          RADIOLOGY & ADDITIONAL TESTS: Reviewed. OVERNIGHT EVENTS: Spoke with Pt's nieces regarding clinical status of patient.     SUBJECTIVE / INTERVAL HPI: Patient seen and examined at bedside. Pt lethargic this morning. No new complaints. Pt denied N/V, abd pain, SOB, CP.       ROS negative except if stated in HPI.    MEDICATIONS  (STANDING):  albuterol/ipratropium for Nebulization 3 milliLiter(s) Nebulizer every 6 hours  amLODIPine   Tablet 10 milliGRAM(s) Oral daily  atorvastatin 20 milliGRAM(s) Oral at bedtime  chlorhexidine 4% Liquid 1 Application(s) Topical <User Schedule>  dextrose 40% Gel 15 Gram(s) Oral once  dextrose 5%. 1000 milliLiter(s) (50 mL/Hr) IV Continuous <Continuous>  dextrose 5%. 1000 milliLiter(s) (100 mL/Hr) IV Continuous <Continuous>  dextrose 50% Injectable 25 Gram(s) IV Push once  dextrose 50% Injectable 12.5 Gram(s) IV Push once  dextrose 50% Injectable 25 Gram(s) IV Push once  enoxaparin Injectable 40 milliGRAM(s) SubCutaneous every 24 hours  glucagon  Injectable 1 milliGRAM(s) IntraMuscular once  insulin lispro (ADMELOG) corrective regimen sliding scale   SubCutaneous every 6 hours  mirtazapine 15 milliGRAM(s) Oral daily  nystatin Cream 1 Application(s) Topical two times a day    MEDICATIONS  (PRN):    Allergies    iodine containing compounds (Unknown)  lisinopril (Unknown)    Intolerances        VITAL SIGNS:  Vital Signs Last 24 Hrs  T(C): 36.6 (10 Sep 2021 05:50), Max: 37 (09 Sep 2021 10:33)  T(F): 97.8 (10 Sep 2021 05:50), Max: 98.6 (09 Sep 2021 10:33)  HR: 69 (10 Sep 2021 05:50) (69 - 74)  BP: 138/77 (10 Sep 2021 05:50) (122/68 - 138/77)  BP(mean): --  RR: 17 (10 Sep 2021 05:50) (16 - 18)  SpO2: 97% (10 Sep 2021 05:50) (94% - 97%)      09-08-21 @ 07:01  -  09-09-21 @ 07:00  --------------------------------------------------------  IN: 100 mL / OUT: 400 mL / NET: -300 mL    09-09-21 @ 07:01  -  09-10-21 @ 06:17  --------------------------------------------------------  IN: 100 mL / OUT: 0 mL / NET: 100 mL        PHYSICAL EXAM:  General: Elderly female laying comfortably in bed, NAD  Neuro: AAOx3, follows commands, motor and sensation intact.  HEENT: NC/AT; PERRL, clear conjunctiva, dentures   Neck: supple, trachea midline  Respiratory: Coarse breath sounds b/l, no w/r/r  Cardiovascular: +S1/S2; RRR  Abdomen: soft, NT/ND; +BS x4, small umbilical hernia  Extremities: WWP, 2+ peripheral pulses b/l; no LE edema  Skin: +Stage 2 sacral decubitus ulcer dressing c/d/i, +healed ulcer on upper thoracic, normal color and turgor; no rashes      LABS:                        8.1    4.25  )-----------( 211      ( 09 Sep 2021 07:06 )             27.0     09-09    138  |  96  |  20  ----------------------------<  96  4.4   |  36<H>  |  0.55    Ca    9.0      09 Sep 2021 07:06  Phos  3.2     09-09  Mg     2.0     09-09    TPro  6.9  /  Alb  2.7<L>  /  TBili  0.2  /  DBili  x   /  AST  14  /  ALT  10  /  AlkPhos  73  09-08        CAPILLARY BLOOD GLUCOSE      POCT Blood Glucose.: 88 mg/dL (10 Sep 2021 06:16)  POCT Blood Glucose.: 92 mg/dL (10 Sep 2021 00:39)  POCT Blood Glucose.: 92 mg/dL (09 Sep 2021 21:59)  POCT Blood Glucose.: 81 mg/dL (09 Sep 2021 17:39)  POCT Blood Glucose.: 168 mg/dL (09 Sep 2021 12:05)          RADIOLOGY & ADDITIONAL TESTS: Reviewed.

## 2021-09-10 NOTE — PROGRESS NOTE ADULT - ASSESSMENT
Pt is a 89 yo Persian speaking F with PMH bedbound, DNR, HTN, HLD and OA who p/f NH with AMS x1d. I&S for hypercarbic respiratory failure, currently hemodynamically stable and proper oxygenation, awaiting WANDA placement.  Pt is a 89 yo Turkish speaking F with PMH bedbound, DNR, HTN, HLD and OA who p/f NH with AMS x1d. I&S for hypercarbic respiratory failure, currently hemodynamically stable and proper oxygenation, awaiting safe discharge.

## 2021-09-10 NOTE — DISCHARGE NOTE PROVIDER - PROVIDER TOKENS
PROVIDER:[TOKEN:[[id number]],FOLLOWUP:[2 weeks]] PROVIDER:[TOKEN:[[id number]],FOLLOWUP:[2 weeks]]

## 2021-09-10 NOTE — PROGRESS NOTE ADULT - PROBLEM SELECTOR PLAN 5
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 faecalis, and pt improving  s/p ampicillin 1g q6 through 9/9

## 2021-09-10 NOTE — DISCHARGE NOTE PROVIDER - NSDCMRMEDTOKEN_GEN_ALL_CORE_FT
amLODIPine 10 mg oral tablet: 1 tab(s) orally once a day  aspirin 81 mg oral tablet, chewable: 1 tab(s) orally once a day  atorvastatin 20 mg oral tablet: 1 tab(s) orally once a day  cholecalciferol oral tablet: 50 microgram(s) orally once a day  Colace 100 mg oral capsule: 100 milligram(s) orally 3 times a day  mirtazapine 15 mg oral tablet: 1 tab(s) orally once a day (at bedtime)  Multi Vitamin+:   polyethylene glycol 3350 oral powder for reconstitution: 17 gram(s) orally once a day  ramipril 2.5 mg oral capsule: 1 cap(s) orally once a day  Senna 8.6 mg oral tablet: 2 tab(s) orally once a day (at bedtime)   amLODIPine 10 mg oral tablet: 1 tab(s) orally once a day  aspirin 81 mg oral tablet, chewable: 1 tab(s) orally once a day  atorvastatin 20 mg oral tablet: 1 tab(s) orally once a day  cholecalciferol oral tablet: 50 microgram(s) orally once a day  furosemide 20 mg oral tablet: 1 tab(s) orally every 24 hours  ipratropium-albuterol 0.5 mg-2.5 mg/3 mL inhalation solution: 3 milliliter(s) inhaled every 6 hours  mirtazapine 15 mg oral tablet: 1 tab(s) orally once a day (at bedtime)  Multi Vitamin+:   nystatin 100,000 units/g topical cream: 1 application topically 2 times a day   acetaminophen 325 mg oral tablet: 2 tab(s) orally every 8 hours  amLODIPine 10 mg oral tablet: 1 tab(s) orally once a day  aspirin 81 mg oral tablet, chewable: 1 tab(s) orally once a day  atorvastatin 20 mg oral tablet: 1 tab(s) orally once a day  cholecalciferol oral tablet: 50 microgram(s) orally once a day  furosemide 20 mg oral tablet: 1 tab(s) orally every 24 hours  ipratropium-albuterol 0.5 mg-2.5 mg/3 mL inhalation solution: 3 milliliter(s) inhaled every 6 hours  mirtazapine 15 mg oral tablet: 1 tab(s) orally once a day (at bedtime)  Multi Vitamin+:   nystatin 100,000 units/g topical cream: 1 application topically 2 times a day

## 2021-09-10 NOTE — PROGRESS NOTE ADULT - PROBLEM SELECTOR PLAN 1
2/2 to hyponatremia in the setting of fluid overload and PNA. Per niece, patient has been acting "weird" 1 week prior to hospital admission, and has baseline dementia. Now AAOx1  CTH negative for acute pathology   PT recs WANDA    - c/w home Mirtazepine 15mg nightly  - Monitor for pain control, s/p 1g ofirmev on 9/9/21

## 2021-09-10 NOTE — DISCHARGE NOTE PROVIDER - NSDCFUADDAPPT_GEN_ALL_CORE_FT
1) Please follow up with Dr. Lilly Hunt ([phone]) on     2) You should receive a call from the office of Dr Opal Whitley (pulmonologist)([phone]) within the next two weeks for a follow-up appointment.  You should receive a call from the office of Dr Opal Whitley (pulmonologist)([phone]) within the next two weeks for a follow-up appointment.

## 2021-09-10 NOTE — PROGRESS NOTE ADULT - SUBJECTIVE AND OBJECTIVE BOX
Patient is a 89y old  Female who presents with a chief complaint of Hypercarbic respiratory failure, Hyponatremia (10 Sep 2021 09:43)      INTERVAL HPI/OVERNIGHT EVENTS:    Pt. seen and examined earlier today  Pt. emotional this AM, no specific complaints elicited  S&S reassessment events noted  No F/C    Review of Systems: 12 point review of systems otherwise negative    MEDICATIONS  (STANDING):  albuterol/ipratropium for Nebulization 3 milliLiter(s) Nebulizer every 6 hours  amLODIPine   Tablet 10 milliGRAM(s) Oral daily  atorvastatin 20 milliGRAM(s) Oral at bedtime  chlorhexidine 4% Liquid 1 Application(s) Topical <User Schedule>  dextrose 40% Gel 15 Gram(s) Oral once  dextrose 5%. 1000 milliLiter(s) (50 mL/Hr) IV Continuous <Continuous>  dextrose 5%. 1000 milliLiter(s) (100 mL/Hr) IV Continuous <Continuous>  dextrose 50% Injectable 25 Gram(s) IV Push once  dextrose 50% Injectable 12.5 Gram(s) IV Push once  dextrose 50% Injectable 25 Gram(s) IV Push once  enoxaparin Injectable 40 milliGRAM(s) SubCutaneous every 24 hours  glucagon  Injectable 1 milliGRAM(s) IntraMuscular once  insulin lispro (ADMELOG) corrective regimen sliding scale   SubCutaneous every 6 hours  mirtazapine 15 milliGRAM(s) Oral daily  nystatin Cream 1 Application(s) Topical two times a day    MEDICATIONS  (PRN):      Allergies    iodine containing compounds (Unknown)  lisinopril (Unknown)    Intolerances          Vital Signs Last 24 Hrs  T(C): 36.6 (10 Sep 2021 16:37), Max: 36.6 (10 Sep 2021 05:50)  T(F): 97.8 (10 Sep 2021 16:37), Max: 97.9 (10 Sep 2021 10:12)  HR: 75 (10 Sep 2021 16:37) (69 - 84)  BP: 133/82 (10 Sep 2021 16:37) (100/68 - 138/77)  BP(mean): --  RR: 18 (10 Sep 2021 16:37) (17 - 20)  SpO2: 90% (10 Sep 2021 16:37) (90% - 97%)  CAPILLARY BLOOD GLUCOSE      POCT Blood Glucose.: 90 mg/dL (10 Sep 2021 12:11)  POCT Blood Glucose.: 88 mg/dL (10 Sep 2021 06:16)  POCT Blood Glucose.: 92 mg/dL (10 Sep 2021 00:39)  POCT Blood Glucose.: 92 mg/dL (09 Sep 2021 21:59)  POCT Blood Glucose.: 81 mg/dL (09 Sep 2021 17:39)      09-09 @ 07:01  -  09-10 @ 07:00  --------------------------------------------------------  IN: 100 mL / OUT: 0 mL / NET: 100 mL        Physical Exam:  (earlier today)  Daily     Daily   General:  non-toxic appearing in NAD, tearful  HEENT:  MMM  CV:  RRR, no JVD, no S3  Lungs:  CTA B/L anteriorly; normal WOB on 2L NC  Abdomen:  soft NT ND  Extremities:  no edema B/L LE  Skin:  WWP  :  +PrimaFit  Neuro:  AAOx1-2    LABS:                        8.9    3.42  )-----------( 212      ( 10 Sep 2021 11:13 )             30.2     09-10    137  |  96  |  15  ----------------------------<  84  4.2   |  33<H>  |  0.49<L>    Ca    9.2      10 Sep 2021 11:13  Phos  3.0     09-10  Mg     1.8     09-10    TPro  7.3  /  Alb  2.8<L>  /  TBili  0.2  /  DBili  x   /  AST  14  /  ALT  10  /  AlkPhos  67  09-10

## 2021-09-10 NOTE — PROGRESS NOTE ADULT - PROBLEM SELECTOR PLAN 2
2/2 pulmonary edema and b/l pleural effusions vs PNA. S/p extubation 9/7  Received diuresis lasix 40 IV x2, pulmonary edema improved  Patient still retaining CO2, with elevated bicarb, 36 on 9/9 AM  Unclear cause of overload: No history of smoking, HF, COPD, asthma.    -Monitor oxygen saturation  -f/u pulm consult.

## 2021-09-10 NOTE — CHART NOTE - NSCHARTNOTEFT_GEN_A_CORE
Admitting Diagnosis:   Patient is a 89y old  Female who presents with a chief complaint of Hypercarbic respiratory failure, Hyponatremia (10 Sep 2021 09:43)    PAST MEDICAL & SURGICAL HISTORY:  HTN (hypertension)    HLD (hyperlipidemia)    Osteoarthritis    Hyponatremia    Altered mental status    Urinary tract infection    Current Nutrition Order:  NPO    PO Intake: Good (%) [   ]  Fair (50-75%) [   ] Poor (<25%) [   ]-NPO    GI Issues:   Pt stating "I'm choking" during visit, remains NPO    Pain:  Pt reports back pain    Skin Integrity:  1+ generalized edema    Labs:   09-10    137  |  96  |  15  ----------------------------<  84  4.2   |  33<H>  |  0.49<L>    Ca    9.2      10 Sep 2021 11:13  Phos  3.0     09-10  Mg     1.8     09-10    TPro  7.3  /  Alb  2.8<L>  /  TBili  0.2  /  DBili  x   /  AST  14  /  ALT  10  /  AlkPhos  67  09-10    CAPILLARY BLOOD GLUCOSE    POCT Blood Glucose.: 90 mg/dL (10 Sep 2021 12:11)  POCT Blood Glucose.: 88 mg/dL (10 Sep 2021 06:16)  POCT Blood Glucose.: 92 mg/dL (10 Sep 2021 00:39)  POCT Blood Glucose.: 92 mg/dL (09 Sep 2021 21:59)  POCT Blood Glucose.: 81 mg/dL (09 Sep 2021 17:39)    Medications:  MEDICATIONS  (STANDING):  albuterol/ipratropium for Nebulization 3 milliLiter(s) Nebulizer every 6 hours  amLODIPine   Tablet 10 milliGRAM(s) Oral daily  atorvastatin 20 milliGRAM(s) Oral at bedtime  chlorhexidine 4% Liquid 1 Application(s) Topical <User Schedule>  dextrose 40% Gel 15 Gram(s) Oral once  dextrose 5%. 1000 milliLiter(s) (50 mL/Hr) IV Continuous <Continuous>  dextrose 5%. 1000 milliLiter(s) (100 mL/Hr) IV Continuous <Continuous>  dextrose 50% Injectable 25 Gram(s) IV Push once  dextrose 50% Injectable 12.5 Gram(s) IV Push once  dextrose 50% Injectable 25 Gram(s) IV Push once  enoxaparin Injectable 40 milliGRAM(s) SubCutaneous every 24 hours  glucagon  Injectable 1 milliGRAM(s) IntraMuscular once  insulin lispro (ADMELOG) corrective regimen sliding scale   SubCutaneous every 6 hours  mirtazapine 15 milliGRAM(s) Oral daily  nystatin Cream 1 Application(s) Topical two times a day    MEDICATIONS  (PRN):    Anthropometric Measurements:    Weight Assessment:  · Height for BMI (FEET)	5 Feet  · Height for BMI (INCHES)	4 Inch(s)  · Height for BMI (CENTIMETERS)	162.56 Centimeter(s)  · Weight for BMI (lbs)	153 lb  · Weight for BMI (kg)	69.4 kg  · Body Mass Index	26.2  · Ideal Body Weight (lbs)	120  · Ideal Body Weight (kg)	54.4    Weight Change:   -128lbs?-please obtain reweight 2/2 drastic change from admit wt    Estimated energy needs:   IBW used for calculations as pt >120% of IBW (122%), adjusted for age, fluids per team  25-30kcal/k-1632kcal  1-1.2g/k-65gprotein    Subjective:   89 yo [place] speaking F with PMH bedbound, DNR, HTN, HLD and OA who p/f NH with AMS x1d. I&S for hypercarbic respiratory failure, currently hemodynamically stable and proper oxygenation, awaiting WANDA placement.      Pt seen in room, sitting up in bed. Used [place]  during assessment. Pt asking where she is and why she is in the hospital. Pt on TF via NGT -. Extubated . S/p swallow bedside assessment  with recs for puree diet w/thin liquids. Pt made NPO yesterday  2/2 coughing during meals per RN. Of note, pt NPO this AM however stating "I'm choking" during RD visit. Asking RD to please not go. RD informed RN and MD. Pt is now s/p repeat SLP eval today 9/10 with recs for puree diet w/thin liquids and establish long term nutrition related GOC. Palliative consulted. Will continue to monitor GOC and align nutrition at all times. Please see full recs below. Will continue to follow per RD protocol.     Previous Nutrition Diagnosis: Inadequate oral intake RT recent extubation AEB pt on TF pending SLP     Active [   ]  Resolved [ x  ]    If resolved, new PES: Inadequate energy intake RT NPO status AEB meeting 0% EER at this time    Goal: Establish nutrition related GOC, align at all times    Recommendations:  1. Establish nutrition related GOC  2. Continue to monitor SLP recs-puree diet w/thin liquids  3. Trend wts  4. Monitor lytes and replete  5. RD to remain available prn    Education: N/A    Risk Level: High [ x ] Moderate [   ] Low [   ]

## 2021-09-10 NOTE — DISCHARGE NOTE PROVIDER - NSDCCPCAREPLAN_GEN_ALL_CORE_FT
PRINCIPAL DISCHARGE DIAGNOSIS  Diagnosis: Acute respiratory failure with hypoxia  Assessment and Plan of Treatment:       SECONDARY DISCHARGE DIAGNOSES  Diagnosis: Hyponatremia  Assessment and Plan of Treatment:     Diagnosis: Metabolic encephalopathy  Assessment and Plan of Treatment:     Diagnosis: HTN (hypertension)  Assessment and Plan of Treatment:     Diagnosis: HLD (hyperlipidemia)  Assessment and Plan of Treatment:     Diagnosis: UTI (urinary tract infection)  Assessment and Plan of Treatment:     Diagnosis: Acute respiratory failure with hypoxia  Assessment and Plan of Treatment:      PRINCIPAL DISCHARGE DIAGNOSIS  Diagnosis: Acute respiratory failure with hypoxia  Assessment and Plan of Treatment:       SECONDARY DISCHARGE DIAGNOSES  Diagnosis: Metabolic encephalopathy  Assessment and Plan of Treatment: You were admitted to the hospital with toxic metabolic encepholapathy. Toxic encephalopathy describes acute mental status alteration due to medications, illicit drugs, or toxic chemicals. This caused you to become confused and behave differently from your baseline.      Diagnosis: HTN (hypertension)  Assessment and Plan of Treatment: Hypertension is the medical term for high blood pressure. Blood pressure refers to the pressure that blood applies to the inner walls of the arteries. Arteries carry blood from the heart to other organs and parts of the body. Untreated high blood pressure increases the strain on the heart and arteries, eventually causing organ damage. High blood pressure increases the risk of heart failure, heart attack (myocardial infarction), stroke, and kidney failure. High blood pressure does not usually cause any symptoms. Treatment of hypertension usually begins with lifestyle changes. Making these lifestyle changes involves little or no risk. Recommended changes often include reducing the amount of salt in your diet, losing weight if you are overweight or obese, avoiding drinking too much alcohol, stopping smoking and exercising at least 30 minutes per day most days of the week. If you are prescribed medication for your hypertension it is important to take these as prescribed to prevent the possible complications of uncontrolled hypertension.      Diagnosis: HLD (hyperlipidemia)  Assessment and Plan of Treatment: You have high cholesterol. Cholesterol is a substance that is found in the blood. Everyone has some. It is needed for good health. The problem is, people sometimes have too much cholesterol. Compared with people with normal cholesterol, people with high cholesterol have a higher risk of heart attacks, strokes, and other health problems. The higher your cholesterol, the higher your risk of these problems. It is important to take your medications for high cholesterol as prescribed to prevent the complications of this condition.      Diagnosis: UTI (urinary tract infection)  Assessment and Plan of Treatment: Urinary tract infections, also called "UTIs," are infections that affect either the bladder or the kidneys. Bladder infections are more common than kidney infections. Bladder infections happen when bacteria get into the urethra and travel up into the bladder. Kidney infections happen when the bacteria travel even higher, up into the kidneys. The symptoms of a bladder infection include pain or a burning feeling when you urinate, the need to urinate often, the need to urinate suddenly or in a hurry, blood in the urine. Signs that an infection has spread to the kidneys include fever, back pain, or nausea/vomiting. It is important that you take your antibiotics as prescribed and to completion to properly treat your urinary tract infection and prevent antibiotic resistance.      Diagnosis: Acute respiratory failure with hypoxia  Assessment and Plan of Treatment:      PRINCIPAL DISCHARGE DIAGNOSIS  Diagnosis: Acute respiratory failure with hypoxia  Assessment and Plan of Treatment: You were admitted to the hospital due to altered mental status in the setting of respiratory failure with low blood oxygenation. Imaging revealed fluid in your lungs. You were intubated and started on a diuretic to clear the fluid. You were eventually extubated once your mental status returned to baseline. You had persistent low blood oxygenation after your extubation, and we provided you oxygen through a nasal cannula. Eventiually, your oxygenation level improved. You should continue with lasix 20 mg every day and re-assessed in the subacuter rehab facility.      SECONDARY DISCHARGE DIAGNOSES  Diagnosis: Metabolic encephalopathy  Assessment and Plan of Treatment: You were admitted to the hospital with toxic metabolic encepholapathy. Toxic encephalopathy describes acute mental status alteration due to dementia, change in your environment and low oxygenation level. This caused you to become confused and behave differently from your baseline.      Diagnosis: HTN (hypertension)  Assessment and Plan of Treatment: Hypertension is the medical term for high blood pressure. Blood pressure refers to the pressure that blood applies to the inner walls of the arteries. Arteries carry blood from the heart to other organs and parts of the body. Untreated high blood pressure increases the strain on the heart and arteries, eventually causing organ damage. High blood pressure increases the risk of heart failure, heart attack (myocardial infarction), stroke, and kidney failure. High blood pressure does not usually cause any symptoms. Treatment of hypertension usually begins with lifestyle changes. Making these lifestyle changes involves little or no risk. Recommended changes often include reducing the amount of salt in your diet, losing weight if you are overweight or obese, avoiding drinking too much alcohol, stopping smoking and exercising at least 30 minutes per day most days of the week. You should continue taking your amlodipine 10 mg once per day.       Diagnosis: HLD (hyperlipidemia)  Assessment and Plan of Treatment: You have high cholesterol. Cholesterol is a substance that is found in the blood. Everyone has some. It is needed for good health. The problem is, people sometimes have too much cholesterol. Compared with people with normal cholesterol, people with high cholesterol have a higher risk of heart attacks, strokes, and other health problems. The higher your cholesterol, the higher your risk of these problems. Please continue taking atorvastatin 20 mg at bedtime every day.       Diagnosis: Acute respiratory failure with hypoxia  Assessment and Plan of Treatment:

## 2021-09-10 NOTE — PROGRESS NOTE ADULT - PROBLEM SELECTOR PLAN 6
F: none  E: monitor, replete to K>4 and Mg>2  N: Puree diet with thin liquids  A: Lovenox  GI ppx: protonix   DISPO: Home  CODE STATUS: DNR with trial intubation.

## 2021-09-10 NOTE — PROGRESS NOTE ADULT - PROBLEM SELECTOR PLAN 1
d/t hypervolemia, in setting of moderate pHTN; no e/o CHF on TTE; now s/p extubation, s/p diuresis; will monitor volume status and consult Pulm, may need additional work-up; wean off O2 as tolerated; cont. suctioning PRN, repeat CXR, possible aspiration events, NPO for now, SLP following

## 2021-09-11 LAB
ALBUMIN SERPL ELPH-MCNC: 3 G/DL — LOW (ref 3.3–5)
ALP SERPL-CCNC: 73 U/L — SIGNIFICANT CHANGE UP (ref 40–120)
ALT FLD-CCNC: 9 U/L — LOW (ref 10–45)
ANION GAP SERPL CALC-SCNC: 10 MMOL/L — SIGNIFICANT CHANGE UP (ref 5–17)
AST SERPL-CCNC: 14 U/L — SIGNIFICANT CHANGE UP (ref 10–40)
BILIRUB SERPL-MCNC: 0.3 MG/DL — SIGNIFICANT CHANGE UP (ref 0.2–1.2)
BUN SERPL-MCNC: 13 MG/DL — SIGNIFICANT CHANGE UP (ref 7–23)
CALCIUM SERPL-MCNC: 9.3 MG/DL — SIGNIFICANT CHANGE UP (ref 8.4–10.5)
CHLORIDE SERPL-SCNC: 94 MMOL/L — LOW (ref 96–108)
CO2 SERPL-SCNC: 32 MMOL/L — HIGH (ref 22–31)
CREAT SERPL-MCNC: 0.48 MG/DL — LOW (ref 0.5–1.3)
GLUCOSE BLDC GLUCOMTR-MCNC: 83 MG/DL — SIGNIFICANT CHANGE UP (ref 70–99)
GLUCOSE BLDC GLUCOMTR-MCNC: 83 MG/DL — SIGNIFICANT CHANGE UP (ref 70–99)
GLUCOSE BLDC GLUCOMTR-MCNC: 87 MG/DL — SIGNIFICANT CHANGE UP (ref 70–99)
GLUCOSE BLDC GLUCOMTR-MCNC: 88 MG/DL — SIGNIFICANT CHANGE UP (ref 70–99)
GLUCOSE SERPL-MCNC: 92 MG/DL — SIGNIFICANT CHANGE UP (ref 70–99)
HCT VFR BLD CALC: 31.5 % — LOW (ref 34.5–45)
HGB BLD-MCNC: 9.4 G/DL — LOW (ref 11.5–15.5)
MAGNESIUM SERPL-MCNC: 1.6 MG/DL — SIGNIFICANT CHANGE UP (ref 1.6–2.6)
MCHC RBC-ENTMCNC: 28.4 PG — SIGNIFICANT CHANGE UP (ref 27–34)
MCHC RBC-ENTMCNC: 29.8 GM/DL — LOW (ref 32–36)
MCV RBC AUTO: 95.2 FL — SIGNIFICANT CHANGE UP (ref 80–100)
NRBC # BLD: 0 /100 WBCS — SIGNIFICANT CHANGE UP (ref 0–0)
PHOSPHATE SERPL-MCNC: 2.4 MG/DL — LOW (ref 2.5–4.5)
PLATELET # BLD AUTO: 229 K/UL — SIGNIFICANT CHANGE UP (ref 150–400)
POTASSIUM SERPL-MCNC: 3.7 MMOL/L — SIGNIFICANT CHANGE UP (ref 3.5–5.3)
POTASSIUM SERPL-SCNC: 3.7 MMOL/L — SIGNIFICANT CHANGE UP (ref 3.5–5.3)
PROT SERPL-MCNC: 7.9 G/DL — SIGNIFICANT CHANGE UP (ref 6–8.3)
RBC # BLD: 3.31 M/UL — LOW (ref 3.8–5.2)
RBC # FLD: 14.1 % — SIGNIFICANT CHANGE UP (ref 10.3–14.5)
SODIUM SERPL-SCNC: 136 MMOL/L — SIGNIFICANT CHANGE UP (ref 135–145)
WBC # BLD: 4.57 K/UL — SIGNIFICANT CHANGE UP (ref 3.8–10.5)
WBC # FLD AUTO: 4.57 K/UL — SIGNIFICANT CHANGE UP (ref 3.8–10.5)

## 2021-09-11 PROCEDURE — 99233 SBSQ HOSP IP/OBS HIGH 50: CPT | Mod: GC

## 2021-09-11 PROCEDURE — 99221 1ST HOSP IP/OBS SF/LOW 40: CPT | Mod: GC

## 2021-09-11 RX ADMIN — MIRTAZAPINE 15 MILLIGRAM(S): 45 TABLET, ORALLY DISINTEGRATING ORAL at 11:44

## 2021-09-11 RX ADMIN — Medication 3 MILLILITER(S): at 05:58

## 2021-09-11 RX ADMIN — AMLODIPINE BESYLATE 10 MILLIGRAM(S): 2.5 TABLET ORAL at 05:57

## 2021-09-11 RX ADMIN — ENOXAPARIN SODIUM 40 MILLIGRAM(S): 100 INJECTION SUBCUTANEOUS at 22:18

## 2021-09-11 RX ADMIN — Medication 3 MILLILITER(S): at 11:44

## 2021-09-11 RX ADMIN — Medication 3 MILLILITER(S): at 17:14

## 2021-09-11 RX ADMIN — NYSTATIN CREAM 1 APPLICATION(S): 100000 CREAM TOPICAL at 05:58

## 2021-09-11 RX ADMIN — ATORVASTATIN CALCIUM 20 MILLIGRAM(S): 80 TABLET, FILM COATED ORAL at 22:17

## 2021-09-11 RX ADMIN — NYSTATIN CREAM 1 APPLICATION(S): 100000 CREAM TOPICAL at 17:15

## 2021-09-11 NOTE — PROGRESS NOTE ADULT - SUBJECTIVE AND OBJECTIVE BOX
Patient is a 89y old  Female who presents with a chief complaint of Hypercarbic respiratory failure, Hyponatremia (11 Sep 2021 13:46)      INTERVAL HPI/OVERNIGHT EVENTS:    Pt. seen and examined earlier today  Pt. less confused today  No complaints elicited  Tolerating PO, per report    Review of Systems: 12 point review of systems otherwise negative    MEDICATIONS  (STANDING):  albuterol/ipratropium for Nebulization 3 milliLiter(s) Nebulizer every 6 hours  amLODIPine   Tablet 10 milliGRAM(s) Oral daily  atorvastatin 20 milliGRAM(s) Oral at bedtime  chlorhexidine 4% Liquid 1 Application(s) Topical <User Schedule>  dextrose 40% Gel 15 Gram(s) Oral once  dextrose 5%. 1000 milliLiter(s) (50 mL/Hr) IV Continuous <Continuous>  dextrose 5%. 1000 milliLiter(s) (100 mL/Hr) IV Continuous <Continuous>  dextrose 50% Injectable 25 Gram(s) IV Push once  dextrose 50% Injectable 12.5 Gram(s) IV Push once  dextrose 50% Injectable 25 Gram(s) IV Push once  enoxaparin Injectable 40 milliGRAM(s) SubCutaneous every 24 hours  glucagon  Injectable 1 milliGRAM(s) IntraMuscular once  insulin lispro (ADMELOG) corrective regimen sliding scale   SubCutaneous every 6 hours  mirtazapine 15 milliGRAM(s) Oral daily  nystatin Cream 1 Application(s) Topical two times a day    MEDICATIONS  (PRN):      Allergies    iodine containing compounds (Unknown)  lisinopril (Unknown)    Intolerances          Vital Signs Last 24 Hrs  T(C): 36.9 (11 Sep 2021 16:56), Max: 36.9 (11 Sep 2021 16:56)  T(F): 98.4 (11 Sep 2021 16:56), Max: 98.4 (11 Sep 2021 16:56)  HR: 73 (11 Sep 2021 16:56) (59 - 80)  BP: 144/71 (11 Sep 2021 16:56) (130/79 - 144/71)  BP(mean): --  RR: 18 (11 Sep 2021 16:56) (17 - 20)  SpO2: 97% (11 Sep 2021 16:56) (94% - 97%)  CAPILLARY BLOOD GLUCOSE      POCT Blood Glucose.: 83 mg/dL (11 Sep 2021 17:41)  POCT Blood Glucose.: 88 mg/dL (11 Sep 2021 12:25)  POCT Blood Glucose.: 87 mg/dL (11 Sep 2021 07:00)  POCT Blood Glucose.: 83 mg/dL (11 Sep 2021 00:04)        Physical Exam:  (earlier today)  Daily     Daily   General:  non-toxic appearing in NAD  HEENT:  MMM  CV:  RRR, no JVD, no S3  Lungs:  CTA B/L anteriorly; normal WOB on 2L NC  Abdomen:  soft NT ND  Extremities:  no edema B/L LE  Skin:  WWP  :  +PrimaFit  Neuro:  AAOx2, easily oriented    LABS:                        9.4    4.57  )-----------( 229      ( 11 Sep 2021 09:05 )             31.5     09-11    136  |  94<L>  |  13  ----------------------------<  92  3.7   |  32<H>  |  0.48<L>    Ca    9.3      11 Sep 2021 09:05  Phos  2.4     09-11  Mg     1.6     09-11    TPro  7.9  /  Alb  3.0<L>  /  TBili  0.3  /  DBili  x   /  AST  14  /  ALT  9<L>  /  AlkPhos  73  09-11

## 2021-09-11 NOTE — PROGRESS NOTE ADULT - PROBLEM SELECTOR PLAN 1
d/t hypervolemia, in setting of moderate pHTN; no e/o CHF on TTE; now s/p extubation, s/p diuresis; will monitor volume status and consult Pulm, may need additional work-up; wean off O2 as tolerated; cont. suctioning PRN; possible aspiration events, cont. dysphagia diet w/ aspiration precautions, per SLP recs

## 2021-09-11 NOTE — PROGRESS NOTE ADULT - PROBLEM SELECTOR PLAN 4
multifactorial, d/t hyponatremia, UTI, and hypercapnia (all POA), in setting of likely mild dementia; mental status has improved, cont. mgmt as above; frequent re-orientation, aspiration precautions

## 2021-09-11 NOTE — CONSULT NOTE ADULT - ASSESSMENT
89 yo F with admitted intially to MICU on 9/5/2021 and intubated for AMS, hypercapnic respiratory failure, and pulmonary edema which resolved with diuresis and treated for e. faecalis UTI w/ ampiscillin s/p extubation on 9/6/2021 with mild persistent hypoxia with imaging consistent with volume overload and persistent pumonary edema.     #hypercapneic  respiratory failure, resolved  #mild persistent hypoxia likey 2/2 pulmonary edema  On admission likely mixed picture given initial hypercapenia from AMS 2/2 ?UTI and/or aspiration and simultaneous pulmonary edema as evidenced on CXR/CT scan. Extubated successfully s/p diruesis. Continues on 2L NC sating high 90s and pt asymptomatic at rest although is bedbound so cannot describe PASCAL but does report orthopnea and some shortness of breath turning in bed. Recent chest imaging with persistent b/l pleural effusions. Echo reviewed elevated PASP likely in the setting of volume overload.   -recommend diuresis   -trial off oxygen; would tolerate low to mid 90s if asymptomatic   -incentive spirometry   -mobilize/OOB if able  -aspiration precautions

## 2021-09-11 NOTE — CONSULT NOTE ADULT - SUBJECTIVE AND OBJECTIVE BOX
PULMONARY SERVICE INITIAL CONSULT NOTE    HPI:  Pt is a 89 yo Irish speaking F with PMH bedbound, DNR, HTN, HLD, and OA who p/f NH with AMS x1d. Had been on CTX x1d outpt for PNA. Started becoming hypoxic and less verbal, so was sent to ER. Limited history d/t pt mental status. Per NH, no other changes to daily routine. No changes to appetite, BMs, or urination.     On arrival, T 98, HR 81, /70, RR 18 O2sat 99% 10L NRB--88% 4L--99% BIPAP 40%. Labs with WBC 6.41, 71% neutrophils, Hb 10, MCV 87%, Na 115, Cl 75, lactate neg, TSH neg, Mg 1.5, VBG 7.24/86/59/37. COVID neg. UA +LE, blood, WBCs. CT chest with cardiogenic pulmonary edema, small-mod BL pleural effusions with BL atelectasis vs. PNA. CTH neg. Pt given Mg 1g, NS 1L. ICU consulted for hypoNa.    On ICU eval, pt somnolent and unarousable. ABG collected with 7.18/98/86/37. Family contacted (ivon Bowers [phone]) requesting trial of intubation and confirmed DNR status. Pt intubated with etomidate 20mg, fentanyl 50mg, versed 1mg x2 and 2mg. Biting on tube and given fentanyl 50mg and started on precedex gtt, fentanyl gtt, peripheral levo gtt for MAP 60 after sedation initiated. Urine legionella and strep neg. MRSA swab sent. Started on azithromycin, CTX, steroids. Admitted to MICU for further observation and management. (05 Sep 2021 03:05)      REVIEW OF SYSTEMS:  Constitutional: No fever, weight loss or fatigue  Eyes: No eye pain, visual disturbances, or discharge  ENMT:  No difficulty hearing, tinnitus, vertigo; No sinus or throat pain  Neck: No pain, stiffness or neck swelling  Respiratory: see HPI  Cardiovascular: No chest pain, palpitations, dizziness or leg swelling  Gastrointestinal: No abdominal or epigastric pain. No nausea, vomiting or hematemesis; No diarrhea or constipation. No melena or hematochezia.  Genitourinary: No dysuria, frequency, hematuria or incontinence  Neurological: No headaches, memory loss, loss of strength, numbness or tremors  Skin: No itching, burning, rashes or lesions   Lymph Nodes: No enlarged glands  Endocrine: No heat or cold intolerance; No hair loss  Musculoskeletal: No joint pain or swelling; No muscle, back or extremity pain  Psychiatric: No depression, anxiety, mood swings or difficulty sleeping  Heme/Lymph: No easy bruising or bleeding gums  Allergy and Immunologic: No hives or eczema    PAST MEDICAL & SURGICAL HISTORY:  HTN (hypertension)    HLD (hyperlipidemia)    Osteoarthritis    Hyponatremia    Altered mental status    Urinary tract infection        FAMILY HISTORY:      SOCIAL HISTORY:  Smoking Status: [ ] Current, [ ] Former, [ ] Never  Pack Years:    MEDICATIONS:  Pulmonary:  albuterol/ipratropium for Nebulization 3 milliLiter(s) Nebulizer every 6 hours    Antimicrobials:    Anticoagulants:  enoxaparin Injectable 40 milliGRAM(s) SubCutaneous every 24 hours    Onc:    GI/:    Endocrine:  atorvastatin 20 milliGRAM(s) Oral at bedtime  dextrose 40% Gel 15 Gram(s) Oral once  dextrose 50% Injectable 25 Gram(s) IV Push once  dextrose 50% Injectable 12.5 Gram(s) IV Push once  dextrose 50% Injectable 25 Gram(s) IV Push once  glucagon  Injectable 1 milliGRAM(s) IntraMuscular once  insulin lispro (ADMELOG) corrective regimen sliding scale   SubCutaneous every 6 hours    Cardiac:  amLODIPine   Tablet 10 milliGRAM(s) Oral daily    Other Medications:  chlorhexidine 4% Liquid 1 Application(s) Topical <User Schedule>  dextrose 5%. 1000 milliLiter(s) IV Continuous <Continuous>  dextrose 5%. 1000 milliLiter(s) IV Continuous <Continuous>  mirtazapine 15 milliGRAM(s) Oral daily  nystatin Cream 1 Application(s) Topical two times a day      Allergies    iodine containing compounds (Unknown)  lisinopril (Unknown)    Intolerances        Vital Signs Last 24 Hrs  T(C): 36.6 (11 Sep 2021 10:40), Max: 36.6 (10 Sep 2021 16:37)  T(F): 97.9 (11 Sep 2021 10:40), Max: 97.9 (10 Sep 2021 20:35)  HR: 59 (11 Sep 2021 10:40) (59 - 80)  BP: 136/95 (11 Sep 2021 10:40) (130/79 - 139/82)  BP(mean): --  RR: 20 (11 Sep 2021 10:40) (17 - 20)  SpO2: 96% (11 Sep 2021 10:40) (90% - 96%)        PHYSICAL EXAM:  Constitutional: WDWN  Head: NC/AT  EENT: PERRL, anicteric sclera; oropharynx clear, MMM  Neck: supple, no appreciable JVD  Respiratory: CTA B/L; no W/R/R  Cardiovascular: +S1/S2, RRR  Gastrointestinal: soft, NT/ND  Extremities: WWP; no edema, clubbing or cyanosis  Vascular: 2+ radial pulses B/L  Neurological: awake and alert; DUNHAM    LABS:      CBC Full  -  ( 11 Sep 2021 09:05 )  WBC Count : 4.57 K/uL  RBC Count : 3.31 M/uL  Hemoglobin : 9.4 g/dL  Hematocrit : 31.5 %  Platelet Count - Automated : 229 K/uL  Mean Cell Volume : 95.2 fl  Mean Cell Hemoglobin : 28.4 pg  Mean Cell Hemoglobin Concentration : 29.8 gm/dL  Auto Neutrophil # : x  Auto Lymphocyte # : x  Auto Monocyte # : x  Auto Eosinophil # : x  Auto Basophil # : x  Auto Neutrophil % : x  Auto Lymphocyte % : x  Auto Monocyte % : x  Auto Eosinophil % : x  Auto Basophil % : x    09-11    136  |  94<L>  |  13  ----------------------------<  92  3.7   |  32<H>  |  0.48<L>    Ca    9.3      11 Sep 2021 09:05  Phos  2.4     09-11  Mg     1.6     09-11    TPro  7.9  /  Alb  3.0<L>  /  TBili  0.3  /  DBili  x   /  AST  14  /  ALT  9<L>  /  AlkPhos  73  09-11                      RADIOLOGY & ADDITIONAL STUDIES: PULMONARY SERVICE INITIAL CONSULT NOTE    HPI:  Pt is a 89 yo Pashto speaking F with PMH bedbound, DNR, HTN, HLD, and OA who p/f NH with AMS x1d. Had been on CTX x1d outpt for PNA. Started becoming hypoxic and less verbal, so was sent to ER. Limited history d/t pt mental status. Per NH, no other changes to daily routine. No changes to appetite, BMs, or urination.     On arrival, T 98, HR 81, /70, RR 18 O2sat 99% 10L NRB--88% 4L--99% BIPAP 40%. Labs with WBC 6.41, 71% neutrophils, Hb 10, MCV 87%, Na 115, Cl 75, lactate neg, TSH neg, Mg 1.5, VBG 7.24/86/59/37. COVID neg. UA +LE, blood, WBCs. CT chest with cardiogenic pulmonary edema, small-mod BL pleural effusions with BL atelectasis vs. PNA. CTH neg. Pt given Mg 1g, NS 1L. ICU consulted for hypoNa.    On ICU eval, pt somnolent and unarousable. ABG collected with 7.18/98/86/37. Family contacted (ivon Bowers [phone]) requesting trial of intubation and confirmed DNR status. Pt intubated with etomidate 20mg, fentanyl 50mg, versed 1mg x2 and 2mg. Biting on tube and given fentanyl 50mg and started on precedex gtt, fentanyl gtt, peripheral levo gtt for MAP 60 after sedation initiated. Urine legionella and strep neg. MRSA swab sent. Started on azithromycin, CTX, steroids. Admitted to MICU for further observation and management. (05 Sep 2021 03:05)    Today, pt states she has no complaints and feels much improved from admission. She denies shortness of breath, chest pain, palpitations, fever, chills, lower extremity swelling. She feels comfortable on current oxygen. She is originally from [place] and is not active at all, she is bedbound and states this is 2/2 lower extremity weakness and pain. Prior social/intermittent smoker and quit many years ago. Pt states she has intermittent choking/aspiration with eating/drinking. Reports orthopnea and some shortness of breath turning in bed.    REVIEW OF SYSTEMS:  Constitutional: No fever, weight loss or fatigue  Eyes: No eye pain, visual disturbances, or discharge  ENMT:  No difficulty hearing, tinnitus, vertigo; No sinus or throat pain  Neck: No pain, stiffness or neck swelling  Respiratory: see HPI  Cardiovascular: No chest pain, palpitations, dizziness or leg swelling  Gastrointestinal: No abdominal or epigastric pain. No nausea, vomiting or hematemesis; No diarrhea or constipation. No melena or hematochezia.  Genitourinary: No dysuria, frequency, hematuria or incontinence  Neurological: No headaches, memory loss, loss of strength, numbness or tremors  Skin: No itching, burning, rashes or lesions   Lymph Nodes: No enlarged glands  Endocrine: No heat or cold intolerance; No hair loss  Musculoskeletal: No joint pain or swelling; No muscle, back or extremity pain  Psychiatric: No depression, anxiety, mood swings or difficulty sleeping  Heme/Lymph: No easy bruising or bleeding gums  Allergy and Immunologic: No hives or eczema    PAST MEDICAL & SURGICAL HISTORY:  HTN (hypertension)    HLD (hyperlipidemia)    Osteoarthritis    Hyponatremia    Altered mental status    Urinary tract infection    FAMILY HISTORY:    SOCIAL HISTORY:  Smoking Status: former     MEDICATIONS:  Pulmonary:  albuterol/ipratropium for Nebulization 3 milliLiter(s) Nebulizer every 6 hours    Antimicrobials:    Anticoagulants:  enoxaparin Injectable 40 milliGRAM(s) SubCutaneous every 24 hours    Onc:    GI/:    Endocrine:  atorvastatin 20 milliGRAM(s) Oral at bedtime  dextrose 40% Gel 15 Gram(s) Oral once  dextrose 50% Injectable 25 Gram(s) IV Push once  dextrose 50% Injectable 12.5 Gram(s) IV Push once  dextrose 50% Injectable 25 Gram(s) IV Push once  glucagon  Injectable 1 milliGRAM(s) IntraMuscular once  insulin lispro (ADMELOG) corrective regimen sliding scale   SubCutaneous every 6 hours    Cardiac:  amLODIPine   Tablet 10 milliGRAM(s) Oral daily    Other Medications:  chlorhexidine 4% Liquid 1 Application(s) Topical <User Schedule>  dextrose 5%. 1000 milliLiter(s) IV Continuous <Continuous>  dextrose 5%. 1000 milliLiter(s) IV Continuous <Continuous>  mirtazapine 15 milliGRAM(s) Oral daily  nystatin Cream 1 Application(s) Topical two times a day      Allergies    iodine containing compounds (Unknown)  lisinopril (Unknown)    Intolerances    Vital Signs Last 24 Hrs  T(C): 36.6 (11 Sep 2021 10:40), Max: 36.6 (10 Sep 2021 16:37)  T(F): 97.9 (11 Sep 2021 10:40), Max: 97.9 (10 Sep 2021 20:35)  HR: 59 (11 Sep 2021 10:40) (59 - 80)  BP: 136/95 (11 Sep 2021 10:40) (130/79 - 139/82)  BP(mean): --  RR: 20 (11 Sep 2021 10:40) (17 - 20)  SpO2: 96% (11 Sep 2021 10:40) (90% - 96%)    PHYSICAL EXAM:  Constitutional: elderly woman in bed, no acute distress  Head: NC/AT  EENT: PERRL, anicteric sclera; oropharynx clear, MMM  Neck: supple, no appreciable JVD  Respiratory: crackles at bases bilaterally   Cardiovascular: irregularly irregular with murmur   Gastrointestinal: soft, NT/ND  Extremities: WWP; no edema, clubbing or cyanosis  Vascular: 2+ radial pulses B/L  Neurological: awake and alert; DUNHAM    LABS:  CBC Full  -  ( 11 Sep 2021 09:05 )  WBC Count : 4.57 K/uL  RBC Count : 3.31 M/uL  Hemoglobin : 9.4 g/dL  Hematocrit : 31.5 %  Platelet Count - Automated : 229 K/uL  Mean Cell Volume : 95.2 fl  Mean Cell Hemoglobin : 28.4 pg  Mean Cell Hemoglobin Concentration : 29.8 gm/dL    09-11    136  |  94<L>  |  13  ----------------------------<  92  3.7   |  32<H>  |  0.48<L>    Ca    9.3      11 Sep 2021 09:05  Phos  2.4     09-11  Mg     1.6     09-11    TPro  7.9  /  Alb  3.0<L>  /  TBili  0.3  /  DBili  x   /  AST  14  /  ALT  9<L>  /  AlkPhos  73  09-11    RADIOLOGY & ADDITIONAL STUDIES:  All chest imaging and echo reviewed.

## 2021-09-11 NOTE — CONSULT NOTE ADULT - ATTENDING COMMENTS
89 yo F with hypercapnic respiratory failure and pulmonary edema admitted to MICU now on RMF with mild persistent hypoxia, CXR reveals persistent pulmonary edema. Would resume diuresis, encourage OOBTC or at least sitting up in bed, IS if able to participate. May require temporary home oxygen as pulmonary edema slowly resolves, patient stable on 2L NC 89 yo F with hypercapnic respiratory failure and pulmonary edema admitted to MICU now on RMF with mild persistent hypoxia, CXR reveals persistent pulmonary edema. Would resume diuresis, encourage OOBTC or at least sitting up in bed, IS if able to participate. May require temporary home oxygen as pulmonary edema slowly resolves, patient stable on 2L NC    Pulmonary will sign off, please reconsult if any further questions

## 2021-09-12 LAB
ALBUMIN SERPL ELPH-MCNC: 2.8 G/DL — LOW (ref 3.3–5)
ALP SERPL-CCNC: 68 U/L — SIGNIFICANT CHANGE UP (ref 40–120)
ALT FLD-CCNC: 9 U/L — LOW (ref 10–45)
ANION GAP SERPL CALC-SCNC: 11 MMOL/L — SIGNIFICANT CHANGE UP (ref 5–17)
AST SERPL-CCNC: 19 U/L — SIGNIFICANT CHANGE UP (ref 10–40)
BASOPHILS # BLD AUTO: 0.03 K/UL — SIGNIFICANT CHANGE UP (ref 0–0.2)
BASOPHILS NFR BLD AUTO: 0.7 % — SIGNIFICANT CHANGE UP (ref 0–2)
BILIRUB SERPL-MCNC: 0.2 MG/DL — SIGNIFICANT CHANGE UP (ref 0.2–1.2)
BUN SERPL-MCNC: 13 MG/DL — SIGNIFICANT CHANGE UP (ref 7–23)
CALCIUM SERPL-MCNC: 9.2 MG/DL — SIGNIFICANT CHANGE UP (ref 8.4–10.5)
CHLORIDE SERPL-SCNC: 98 MMOL/L — SIGNIFICANT CHANGE UP (ref 96–108)
CO2 SERPL-SCNC: 28 MMOL/L — SIGNIFICANT CHANGE UP (ref 22–31)
CREAT SERPL-MCNC: 0.45 MG/DL — LOW (ref 0.5–1.3)
EOSINOPHIL # BLD AUTO: 0.04 K/UL — SIGNIFICANT CHANGE UP (ref 0–0.5)
EOSINOPHIL NFR BLD AUTO: 0.9 % — SIGNIFICANT CHANGE UP (ref 0–6)
GLUCOSE BLDC GLUCOMTR-MCNC: 85 MG/DL — SIGNIFICANT CHANGE UP (ref 70–99)
GLUCOSE BLDC GLUCOMTR-MCNC: 85 MG/DL — SIGNIFICANT CHANGE UP (ref 70–99)
GLUCOSE BLDC GLUCOMTR-MCNC: 86 MG/DL — SIGNIFICANT CHANGE UP (ref 70–99)
GLUCOSE BLDC GLUCOMTR-MCNC: 87 MG/DL — SIGNIFICANT CHANGE UP (ref 70–99)
GLUCOSE BLDC GLUCOMTR-MCNC: 91 MG/DL — SIGNIFICANT CHANGE UP (ref 70–99)
GLUCOSE BLDC GLUCOMTR-MCNC: 94 MG/DL — SIGNIFICANT CHANGE UP (ref 70–99)
GLUCOSE SERPL-MCNC: 80 MG/DL — SIGNIFICANT CHANGE UP (ref 70–99)
HCT VFR BLD CALC: 32.6 % — LOW (ref 34.5–45)
HGB BLD-MCNC: 9.3 G/DL — LOW (ref 11.5–15.5)
IMM GRANULOCYTES NFR BLD AUTO: 0.5 % — SIGNIFICANT CHANGE UP (ref 0–1.5)
LYMPHOCYTES # BLD AUTO: 1.29 K/UL — SIGNIFICANT CHANGE UP (ref 1–3.3)
LYMPHOCYTES # BLD AUTO: 29.3 % — SIGNIFICANT CHANGE UP (ref 13–44)
MAGNESIUM SERPL-MCNC: 1.6 MG/DL — SIGNIFICANT CHANGE UP (ref 1.6–2.6)
MCHC RBC-ENTMCNC: 28 PG — SIGNIFICANT CHANGE UP (ref 27–34)
MCHC RBC-ENTMCNC: 28.5 GM/DL — LOW (ref 32–36)
MCV RBC AUTO: 98.2 FL — SIGNIFICANT CHANGE UP (ref 80–100)
MONOCYTES # BLD AUTO: 0.67 K/UL — SIGNIFICANT CHANGE UP (ref 0–0.9)
MONOCYTES NFR BLD AUTO: 15.2 % — HIGH (ref 2–14)
NEUTROPHILS # BLD AUTO: 2.36 K/UL — SIGNIFICANT CHANGE UP (ref 1.8–7.4)
NEUTROPHILS NFR BLD AUTO: 53.4 % — SIGNIFICANT CHANGE UP (ref 43–77)
NRBC # BLD: 0 /100 WBCS — SIGNIFICANT CHANGE UP (ref 0–0)
PHOSPHATE SERPL-MCNC: 3.1 MG/DL — SIGNIFICANT CHANGE UP (ref 2.5–4.5)
PLATELET # BLD AUTO: 183 K/UL — SIGNIFICANT CHANGE UP (ref 150–400)
POTASSIUM SERPL-MCNC: 4 MMOL/L — SIGNIFICANT CHANGE UP (ref 3.5–5.3)
POTASSIUM SERPL-SCNC: 4 MMOL/L — SIGNIFICANT CHANGE UP (ref 3.5–5.3)
PROT SERPL-MCNC: 7.5 G/DL — SIGNIFICANT CHANGE UP (ref 6–8.3)
RBC # BLD: 3.32 M/UL — LOW (ref 3.8–5.2)
RBC # FLD: 14.5 % — SIGNIFICANT CHANGE UP (ref 10.3–14.5)
SARS-COV-2 RNA SPEC QL NAA+PROBE: SIGNIFICANT CHANGE UP
SODIUM SERPL-SCNC: 137 MMOL/L — SIGNIFICANT CHANGE UP (ref 135–145)
WBC # BLD: 4.41 K/UL — SIGNIFICANT CHANGE UP (ref 3.8–10.5)
WBC # FLD AUTO: 4.41 K/UL — SIGNIFICANT CHANGE UP (ref 3.8–10.5)

## 2021-09-12 PROCEDURE — 99233 SBSQ HOSP IP/OBS HIGH 50: CPT | Mod: GC

## 2021-09-12 RX ORDER — FUROSEMIDE 40 MG
20 TABLET ORAL EVERY 24 HOURS
Refills: 0 | Status: DISCONTINUED | OUTPATIENT
Start: 2021-09-12 | End: 2021-09-15

## 2021-09-12 RX ADMIN — Medication 3 MILLILITER(S): at 19:01

## 2021-09-12 RX ADMIN — Medication 3 MILLILITER(S): at 00:58

## 2021-09-12 RX ADMIN — Medication 3 MILLILITER(S): at 06:19

## 2021-09-12 RX ADMIN — ATORVASTATIN CALCIUM 20 MILLIGRAM(S): 80 TABLET, FILM COATED ORAL at 22:15

## 2021-09-12 RX ADMIN — ENOXAPARIN SODIUM 40 MILLIGRAM(S): 100 INJECTION SUBCUTANEOUS at 22:15

## 2021-09-12 RX ADMIN — NYSTATIN CREAM 1 APPLICATION(S): 100000 CREAM TOPICAL at 06:19

## 2021-09-12 RX ADMIN — MIRTAZAPINE 15 MILLIGRAM(S): 45 TABLET, ORALLY DISINTEGRATING ORAL at 11:08

## 2021-09-12 RX ADMIN — CHLORHEXIDINE GLUCONATE 1 APPLICATION(S): 213 SOLUTION TOPICAL at 11:07

## 2021-09-12 RX ADMIN — AMLODIPINE BESYLATE 10 MILLIGRAM(S): 2.5 TABLET ORAL at 06:19

## 2021-09-12 RX ADMIN — Medication 3 MILLILITER(S): at 11:08

## 2021-09-12 RX ADMIN — Medication 20 MILLIGRAM(S): at 11:08

## 2021-09-12 RX ADMIN — NYSTATIN CREAM 1 APPLICATION(S): 100000 CREAM TOPICAL at 19:01

## 2021-09-12 NOTE — PROGRESS NOTE ADULT - SUBJECTIVE AND OBJECTIVE BOX
CC: Patient is a 89y old  Female who presents with a chief complaint of Hypercarbic respiratory failure, Hyponatremia (12 Sep 2021 14:10)      INTERVAL EVENTS: LUCA    SUBJECTIVE / INTERVAL HPI: Patient seen and examined at bedside. Denies any pain. Denies fever, SOB and cough. Says she feels "fine" and doing better    ROS: negative unless otherwise stated above.    VITAL SIGNS:  Vital Signs Last 24 Hrs  T(C): 36.2 (12 Sep 2021 11:23), Max: 36.9 (11 Sep 2021 16:56)  T(F): 97.2 (12 Sep 2021 11:23), Max: 98.4 (11 Sep 2021 16:56)  HR: 72 (12 Sep 2021 11:23) (67 - 77)  BP: 159/80 (12 Sep 2021 11:23) (142/68 - 161/75)  BP(mean): --  RR: 18 (12 Sep 2021 11:23) (18 - 18)  SpO2: 100% (12 Sep 2021 11:23) (97% - 100%)        PHYSICAL EXAM:    General: NAD  HEENT: MMM  Neck: supple  Cardiovascular: +S1/S2; RRR  Respiratory: CTA B/L; no W/R/R  Gastrointestinal: soft, NT/ND  Extremities: WWP; no edema, clubbing or cyanosis  Vascular: 2+ radial, DP/PT pulses B/L  Neurological: AAOx3; no focal deficits    MEDICATIONS:  MEDICATIONS  (STANDING):  albuterol/ipratropium for Nebulization 3 milliLiter(s) Nebulizer every 6 hours  amLODIPine   Tablet 10 milliGRAM(s) Oral daily  atorvastatin 20 milliGRAM(s) Oral at bedtime  chlorhexidine 4% Liquid 1 Application(s) Topical <User Schedule>  dextrose 40% Gel 15 Gram(s) Oral once  dextrose 5%. 1000 milliLiter(s) (100 mL/Hr) IV Continuous <Continuous>  dextrose 5%. 1000 milliLiter(s) (50 mL/Hr) IV Continuous <Continuous>  dextrose 50% Injectable 25 Gram(s) IV Push once  dextrose 50% Injectable 12.5 Gram(s) IV Push once  dextrose 50% Injectable 25 Gram(s) IV Push once  enoxaparin Injectable 40 milliGRAM(s) SubCutaneous every 24 hours  furosemide    Tablet 20 milliGRAM(s) Oral every 24 hours  glucagon  Injectable 1 milliGRAM(s) IntraMuscular once  insulin lispro (ADMELOG) corrective regimen sliding scale   SubCutaneous every 6 hours  mirtazapine 15 milliGRAM(s) Oral daily  nystatin Cream 1 Application(s) Topical two times a day    MEDICATIONS  (PRN):      ALLERGIES:  Allergies    iodine containing compounds (Unknown)  lisinopril (Unknown)    Intolerances        LABS:                        9.3    4.41  )-----------( 183      ( 12 Sep 2021 07:47 )             32.6     09-12    137  |  98  |  13  ----------------------------<  80  4.0   |  28  |  0.45<L>    Ca    9.2      12 Sep 2021 07:47  Phos  3.1     09-12  Mg     1.6     09-12    TPro  7.5  /  Alb  2.8<L>  /  TBili  0.2  /  DBili  x   /  AST  19  /  ALT  9<L>  /  AlkPhos  68  09-12        CAPILLARY BLOOD GLUCOSE      POCT Blood Glucose.: 87 mg/dL (12 Sep 2021 12:56)      RADIOLOGY & ADDITIONAL TESTS: Reviewed.

## 2021-09-12 NOTE — PROGRESS NOTE ADULT - ASSESSMENT
Pt is a 89 yo Indonesian speaking F with PMH bedbound, DNR, HTN, HLD and OA who p/f NH with AMS x1d. I&S for hypercarbic respiratory failure, currently hemodynamically stable and proper oxygenation, awaiting safe discharge.

## 2021-09-12 NOTE — PROGRESS NOTE ADULT - PROBLEM SELECTOR PLAN 2
2/2 pulmonary edema and b/l pleural effusions vs PNA. S/p extubation 9/7  Received diuresis lasix 40 IV x2, pulmonary edema improved  Patient still retaining CO2, with elevated bicarb, 36 on 9/9 AM  Unclear cause of overload: No history of smoking, HF, COPD, asthma.    -Monitor oxygen saturation  -f/u pulm consult.  -started lasix PO 20mg BID 2/2 pulmonary edema and b/l pleural effusions vs PNA. S/p extubation 9/7  Received diuresis lasix 40 IV x2, pulmonary edema improved  Patient still retaining CO2, with elevated bicarb, 36 on 9/9 AM  Unclear cause of overload: No history of smoking, HF, COPD, asthma.    -Monitor oxygen saturation  -f/u pulm consult.  -started lasix PO 20mg qd

## 2021-09-12 NOTE — PROGRESS NOTE ADULT - SUBJECTIVE AND OBJECTIVE BOX
Patient is a 89y old  Female who presents with a chief complaint of Hypercarbic respiratory failure, Hyponatremia (11 Sep 2021 18:44)      INTERVAL HPI/OVERNIGHT EVENTS:    Pt. seen and examined earlier today  Pt. using nebulizer  Pt. had no complaints    Review of Systems: 12 point review of systems otherwise negative    MEDICATIONS  (STANDING):  albuterol/ipratropium for Nebulization 3 milliLiter(s) Nebulizer every 6 hours  amLODIPine   Tablet 10 milliGRAM(s) Oral daily  atorvastatin 20 milliGRAM(s) Oral at bedtime  chlorhexidine 4% Liquid 1 Application(s) Topical <User Schedule>  dextrose 40% Gel 15 Gram(s) Oral once  dextrose 5%. 1000 milliLiter(s) (50 mL/Hr) IV Continuous <Continuous>  dextrose 5%. 1000 milliLiter(s) (100 mL/Hr) IV Continuous <Continuous>  dextrose 50% Injectable 25 Gram(s) IV Push once  dextrose 50% Injectable 12.5 Gram(s) IV Push once  dextrose 50% Injectable 25 Gram(s) IV Push once  enoxaparin Injectable 40 milliGRAM(s) SubCutaneous every 24 hours  furosemide    Tablet 20 milliGRAM(s) Oral every 24 hours  glucagon  Injectable 1 milliGRAM(s) IntraMuscular once  insulin lispro (ADMELOG) corrective regimen sliding scale   SubCutaneous every 6 hours  mirtazapine 15 milliGRAM(s) Oral daily  nystatin Cream 1 Application(s) Topical two times a day    MEDICATIONS  (PRN):      Allergies    iodine containing compounds (Unknown)  lisinopril (Unknown)    Intolerances          Vital Signs Last 24 Hrs  T(C): 36.2 (12 Sep 2021 11:23), Max: 36.9 (11 Sep 2021 16:56)  T(F): 97.2 (12 Sep 2021 11:23), Max: 98.4 (11 Sep 2021 16:56)  HR: 72 (12 Sep 2021 11:23) (67 - 77)  BP: 159/80 (12 Sep 2021 11:23) (142/68 - 161/75)  BP(mean): --  RR: 18 (12 Sep 2021 11:23) (18 - 18)  SpO2: 100% (12 Sep 2021 11:23) (97% - 100%)  CAPILLARY BLOOD GLUCOSE      POCT Blood Glucose.: 87 mg/dL (12 Sep 2021 12:56)  POCT Blood Glucose.: 91 mg/dL (12 Sep 2021 08:48)  POCT Blood Glucose.: 85 mg/dL (12 Sep 2021 06:39)  POCT Blood Glucose.: 86 mg/dL (12 Sep 2021 00:46)  POCT Blood Glucose.: 83 mg/dL (11 Sep 2021 17:41)        Physical Exam:  (earlier today)  Daily     Daily   General:  non-toxic appearing in NAD  HEENT:  MMM  CV:  RRR, no JVD, no S3  Lungs:  CTA B/L anteriorly; normal WOB on nebulizer  Abdomen:  soft NT ND  Extremities:  no edema B/L LE  Skin:  WWP  :  +PrimaFit  Neuro:  AAOx2-3    LABS:                        9.3    4.41  )-----------( 183      ( 12 Sep 2021 07:47 )             32.6     09-12    137  |  98  |  13  ----------------------------<  80  4.0   |  28  |  0.45<L>    Ca    9.2      12 Sep 2021 07:47  Phos  3.1     09-12  Mg     1.6     09-12    TPro  7.5  /  Alb  2.8<L>  /  TBili  0.2  /  DBili  x   /  AST  19  /  ALT  9<L>  /  AlkPhos  68  09-12

## 2021-09-12 NOTE — PROGRESS NOTE ADULT - PROBLEM SELECTOR PLAN 1
d/t hypervolemia, in setting of moderate pHTN; no e/o CHF on TTE; now s/p extubation; starting Lasix 20mg PO qday, monitor volume status; appreciate Pulm recs, may need additional work-up; wean off O2 as tolerated; cont. suctioning PRN; cont. dysphagia diet w/ aspiration precautions, per SLP recs

## 2021-09-12 NOTE — PROGRESS NOTE ADULT - PROBLEM SELECTOR PLAN 4
multifactorial, d/t hyponatremia, UTI, and hypercapnia (all POA), in setting of likely mild dementia; mental status improving to baseline, cont. mgmt as above; frequent re-orientation, aspiration precautions

## 2021-09-13 LAB
GLUCOSE BLDC GLUCOMTR-MCNC: 81 MG/DL — SIGNIFICANT CHANGE UP (ref 70–99)
GLUCOSE BLDC GLUCOMTR-MCNC: 87 MG/DL — SIGNIFICANT CHANGE UP (ref 70–99)
GLUCOSE BLDC GLUCOMTR-MCNC: 87 MG/DL — SIGNIFICANT CHANGE UP (ref 70–99)

## 2021-09-13 PROCEDURE — 99233 SBSQ HOSP IP/OBS HIGH 50: CPT | Mod: GC

## 2021-09-13 RX ORDER — CHOLECALCIFEROL (VITAMIN D3) 125 MCG
50 CAPSULE ORAL
Qty: 0 | Refills: 0 | DISCHARGE

## 2021-09-13 RX ORDER — ACETAMINOPHEN 500 MG
650 TABLET ORAL ONCE
Refills: 0 | Status: COMPLETED | OUTPATIENT
Start: 2021-09-13 | End: 2021-09-13

## 2021-09-13 RX ORDER — ACETAMINOPHEN 500 MG
650 TABLET ORAL EVERY 6 HOURS
Refills: 0 | Status: DISCONTINUED | OUTPATIENT
Start: 2021-09-13 | End: 2021-09-15

## 2021-09-13 RX ADMIN — Medication 20 MILLIGRAM(S): at 09:18

## 2021-09-13 RX ADMIN — Medication 3 MILLILITER(S): at 17:51

## 2021-09-13 RX ADMIN — Medication 3 MILLILITER(S): at 06:17

## 2021-09-13 RX ADMIN — ATORVASTATIN CALCIUM 20 MILLIGRAM(S): 80 TABLET, FILM COATED ORAL at 21:51

## 2021-09-13 RX ADMIN — ENOXAPARIN SODIUM 40 MILLIGRAM(S): 100 INJECTION SUBCUTANEOUS at 21:52

## 2021-09-13 RX ADMIN — MIRTAZAPINE 15 MILLIGRAM(S): 45 TABLET, ORALLY DISINTEGRATING ORAL at 13:01

## 2021-09-13 RX ADMIN — Medication 650 MILLIGRAM(S): at 21:52

## 2021-09-13 RX ADMIN — NYSTATIN CREAM 1 APPLICATION(S): 100000 CREAM TOPICAL at 17:51

## 2021-09-13 RX ADMIN — CHLORHEXIDINE GLUCONATE 1 APPLICATION(S): 213 SOLUTION TOPICAL at 06:17

## 2021-09-13 RX ADMIN — Medication 3 MILLILITER(S): at 00:37

## 2021-09-13 RX ADMIN — Medication 3 MILLILITER(S): at 13:01

## 2021-09-13 RX ADMIN — NYSTATIN CREAM 1 APPLICATION(S): 100000 CREAM TOPICAL at 06:18

## 2021-09-13 RX ADMIN — AMLODIPINE BESYLATE 10 MILLIGRAM(S): 2.5 TABLET ORAL at 06:17

## 2021-09-13 NOTE — PROGRESS NOTE ADULT - PROBLEM SELECTOR PLAN 2
2/2 to hyponatremia in the setting of fluid overload and PNA. Per niece, patient has been acting "weird" 1 week prior to hospital admission, and has baseline dementia. Now AAOx1  CTH negative for acute pathology   PT recs WANDA    - c/w home Mirtazepine 15mg nightly  - Monitor for pain control, s/p 1g ofirmev on 9/9/21.

## 2021-09-13 NOTE — PROGRESS NOTE ADULT - ASSESSMENT
Pt is a 89 yo Malagasy speaking F with PMH bedbound, DNR, HTN, HLD and OA. Admitted initially to MICU on 9/5/21 and intubated for AMS, hypercapnic respiratory failure and pulmoary edema in setting of moderate pHTN which resolved with diuresis s/p extubation on 9/7/21, now with mild persistent hypoxia with imaging consistent with volume overload and persistent pulmonary edema, awaiting palliative recs and family planning for a safe d/c

## 2021-09-13 NOTE — PROGRESS NOTE ADULT - SUBJECTIVE AND OBJECTIVE BOX
*INCOMPLETE*    OVERNIGHT EVENTS: NAOE    SUBJECTIVE / INTERVAL HPI: Patient seen and examined at bedside. Denies any pain. Denies fever, SOB and cough. Says she feels "fine" and doing better    ROS negative except if stated in HPI.    MEDICATIONS  (STANDING):  albuterol/ipratropium for Nebulization 3 milliLiter(s) Nebulizer every 6 hours  amLODIPine   Tablet 10 milliGRAM(s) Oral daily  atorvastatin 20 milliGRAM(s) Oral at bedtime  chlorhexidine 4% Liquid 1 Application(s) Topical <User Schedule>  dextrose 40% Gel 15 Gram(s) Oral once  dextrose 5%. 1000 milliLiter(s) (100 mL/Hr) IV Continuous <Continuous>  dextrose 5%. 1000 milliLiter(s) (50 mL/Hr) IV Continuous <Continuous>  dextrose 50% Injectable 25 Gram(s) IV Push once  dextrose 50% Injectable 12.5 Gram(s) IV Push once  dextrose 50% Injectable 25 Gram(s) IV Push once  enoxaparin Injectable 40 milliGRAM(s) SubCutaneous every 24 hours  furosemide    Tablet 20 milliGRAM(s) Oral every 24 hours  glucagon  Injectable 1 milliGRAM(s) IntraMuscular once  insulin lispro (ADMELOG) corrective regimen sliding scale   SubCutaneous every 6 hours  mirtazapine 15 milliGRAM(s) Oral daily  nystatin Cream 1 Application(s) Topical two times a day    MEDICATIONS  (PRN):    Allergies    iodine containing compounds (Unknown)  lisinopril (Unknown)    Intolerances        VITAL SIGNS:  Vital Signs Last 24 Hrs  T(C): 36.4 (13 Sep 2021 05:27), Max: 36.4 (13 Sep 2021 05:27)  T(F): 97.5 (13 Sep 2021 05:27), Max: 97.5 (13 Sep 2021 05:27)  HR: 66 (13 Sep 2021 05:27) (66 - 76)  BP: 116/57 (13 Sep 2021 05:27) (116/57 - 159/80)  BP(mean): --  RR: 17 (13 Sep 2021 05:27) (16 - 18)  SpO2: 100% (13 Sep 2021 05:27) (99% - 100%)        PHYSICAL EXAM:  General: Elderly female laying comfortably in bed, NAD  Neuro: AAOx3, follows commands, motor and sensation intact.  HEENT: NC/AT; PERRL, clear conjunctiva, dentures   Neck: supple, trachea midline  Respiratory: CTAB, no use of accessory muscles.   Cardiovascular: +S1/S2; RRR  Abdomen: soft, NT/ND; +BS x4, small umbilical hernia  Extremities: WWP, 2+ peripheral pulses b/l; no LE edema  Skin: +Stage 2 sacral decubitus ulcer dressing c/d/i, +healed ulcer on upper thoracic, normal color and turgor; no rashes      LABS:                        9.3    4.41  )-----------( 183      ( 12 Sep 2021 07:47 )             32.6     09-12    137  |  98  |  13  ----------------------------<  80  4.0   |  28  |  0.45<L>    Ca    9.2      12 Sep 2021 07:47  Phos  3.1     09-12  Mg     1.6     09-12    TPro  7.5  /  Alb  2.8<L>  /  TBili  0.2  /  DBili  x   /  AST  19  /  ALT  9<L>  /  AlkPhos  68  09-12        CAPILLARY BLOOD GLUCOSE      POCT Blood Glucose.: 94 mg/dL (12 Sep 2021 23:50)  POCT Blood Glucose.: 85 mg/dL (12 Sep 2021 18:15)  POCT Blood Glucose.: 87 mg/dL (12 Sep 2021 12:56)  POCT Blood Glucose.: 91 mg/dL (12 Sep 2021 08:48)  POCT Blood Glucose.: 85 mg/dL (12 Sep 2021 06:39)          RADIOLOGY & ADDITIONAL TESTS: Reviewed. OVERNIGHT EVENTS: NAOE    SUBJECTIVE / INTERVAL HPI: Patient seen and examined at bedside. Pt reports she speaks english and feels frustrated with the use of the . I acknowledged her concern. She denies any new complaints. She reports proper breathing with the nasal cannula. She denies any fever/chills, CP, SOB, cough.     ROS negative except if stated in HPI.    MEDICATIONS  (STANDING):  albuterol/ipratropium for Nebulization 3 milliLiter(s) Nebulizer every 6 hours  amLODIPine   Tablet 10 milliGRAM(s) Oral daily  atorvastatin 20 milliGRAM(s) Oral at bedtime  chlorhexidine 4% Liquid 1 Application(s) Topical <User Schedule>  dextrose 40% Gel 15 Gram(s) Oral once  dextrose 5%. 1000 milliLiter(s) (100 mL/Hr) IV Continuous <Continuous>  dextrose 5%. 1000 milliLiter(s) (50 mL/Hr) IV Continuous <Continuous>  dextrose 50% Injectable 25 Gram(s) IV Push once  dextrose 50% Injectable 12.5 Gram(s) IV Push once  dextrose 50% Injectable 25 Gram(s) IV Push once  enoxaparin Injectable 40 milliGRAM(s) SubCutaneous every 24 hours  furosemide    Tablet 20 milliGRAM(s) Oral every 24 hours  glucagon  Injectable 1 milliGRAM(s) IntraMuscular once  insulin lispro (ADMELOG) corrective regimen sliding scale   SubCutaneous every 6 hours  mirtazapine 15 milliGRAM(s) Oral daily  nystatin Cream 1 Application(s) Topical two times a day    MEDICATIONS  (PRN):    Allergies    iodine containing compounds (Unknown)  lisinopril (Unknown)    Intolerances        VITAL SIGNS:  Vital Signs Last 24 Hrs  T(C): 36.4 (13 Sep 2021 05:27), Max: 36.4 (13 Sep 2021 05:27)  T(F): 97.5 (13 Sep 2021 05:27), Max: 97.5 (13 Sep 2021 05:27)  HR: 66 (13 Sep 2021 05:27) (66 - 76)  BP: 116/57 (13 Sep 2021 05:27) (116/57 - 159/80)  BP(mean): --  RR: 17 (13 Sep 2021 05:27) (16 - 18)  SpO2: 100% (13 Sep 2021 05:27) (99% - 100%)        PHYSICAL EXAM:  General: Elderly female laying comfortably in bed, NAD  Neuro: AAOx3, follows commands, motor and sensation intact.  HEENT: NC/AT; PERRL, clear conjunctiva, dentures   Neck: supple, trachea midline  Respiratory: CTAB, no use of accessory muscles.   Cardiovascular: +S1/S2; RRR  Abdomen: soft, NT/ND; +BS x4, small umbilical hernia  Extremities: WWP, 2+ peripheral pulses b/l; no LE edema  Skin: +Stage 2 sacral decubitus ulcer dressing c/d/i, +healed ulcer on upper thoracic, normal color and turgor; no rashes      LABS:                        9.3    4.41  )-----------( 183      ( 12 Sep 2021 07:47 )             32.6     09-12    137  |  98  |  13  ----------------------------<  80  4.0   |  28  |  0.45<L>    Ca    9.2      12 Sep 2021 07:47  Phos  3.1     09-12  Mg     1.6     09-12    TPro  7.5  /  Alb  2.8<L>  /  TBili  0.2  /  DBili  x   /  AST  19  /  ALT  9<L>  /  AlkPhos  68  09-12        CAPILLARY BLOOD GLUCOSE      POCT Blood Glucose.: 94 mg/dL (12 Sep 2021 23:50)  POCT Blood Glucose.: 85 mg/dL (12 Sep 2021 18:15)  POCT Blood Glucose.: 87 mg/dL (12 Sep 2021 12:56)  POCT Blood Glucose.: 91 mg/dL (12 Sep 2021 08:48)  POCT Blood Glucose.: 85 mg/dL (12 Sep 2021 06:39)          RADIOLOGY & ADDITIONAL TESTS: Reviewed.

## 2021-09-13 NOTE — PROGRESS NOTE ADULT - PROBLEM SELECTOR PLAN 1
2/2 pulmonary edema and b/l pleural effusions vs PNA. S/p extubation 9/7  Received diuresis lasix 40 IV x2, pulmonary edema improved  Patient still retaining CO2, with elevated bicarb, 36 on 9/9 AM  Unclear cause of overload: No history of smoking, HF, COPD, asthma.    -Monitor oxygen saturation  -f/u palliative recs  -May require temporary home oxygen (or wean off from oxygen prior to d/c) 2/2 pulmonary edema and b/l pleural effusions vs PNA. S/p extubation 9/7  Received diuresis lasix 40 IV x2, pulmonary edema improved  Patient still retaining CO2, with elevated bicarb, 36 on 9/9 AM  Unclear cause of overload: No history of smoking, HF, COPD, asthma.    -Monitor oxygen saturation  -f/u palliative recs  -May require temporary home oxygen (or wean off from oxygen prior to d/c)  -started on lasix 20mg qd 2/2 pulmonary edema and b/l pleural effusions vs PNA. S/p extubation 9/7  Received diuresis lasix 40 IV x2, pulmonary edema improved  Patient still retaining CO2, with elevated bicarb, 36 on 9/9 AM  Unclear cause of overload: No history of smoking, HF, COPD, asthma.  Pt desatted 92% > 82% while weaning off 2L NC > RA.     -Monitor oxygen saturation  -f/u palliative recs  -May require temporary home oxygen (or wean off from oxygen prior to d/c)  -started on lasix 20mg qd

## 2021-09-13 NOTE — PROGRESS NOTE ADULT - ATTENDING COMMENTS
Pt. seen and examined by me earlier today; I have read Dr. Aldana's note, I agree w/ his findings and plan of care as documented; d/c planning, will need home O2 and outpatient Pulm f/u

## 2021-09-14 DIAGNOSIS — R53.81 OTHER MALAISE: ICD-10-CM

## 2021-09-14 DIAGNOSIS — Z71.89 OTHER SPECIFIED COUNSELING: ICD-10-CM

## 2021-09-14 DIAGNOSIS — Z51.5 ENCOUNTER FOR PALLIATIVE CARE: ICD-10-CM

## 2021-09-14 DIAGNOSIS — R52 PAIN, UNSPECIFIED: ICD-10-CM

## 2021-09-14 LAB
ALBUMIN SERPL ELPH-MCNC: 2.8 G/DL — LOW (ref 3.3–5)
ALP SERPL-CCNC: 77 U/L — SIGNIFICANT CHANGE UP (ref 40–120)
ALT FLD-CCNC: 10 U/L — SIGNIFICANT CHANGE UP (ref 10–45)
ANION GAP SERPL CALC-SCNC: 13 MMOL/L — SIGNIFICANT CHANGE UP (ref 5–17)
AST SERPL-CCNC: 16 U/L — SIGNIFICANT CHANGE UP (ref 10–40)
BILIRUB SERPL-MCNC: 0.2 MG/DL — SIGNIFICANT CHANGE UP (ref 0.2–1.2)
BUN SERPL-MCNC: 29 MG/DL — HIGH (ref 7–23)
CALCIUM SERPL-MCNC: 9.8 MG/DL — SIGNIFICANT CHANGE UP (ref 8.4–10.5)
CHLORIDE SERPL-SCNC: 97 MMOL/L — SIGNIFICANT CHANGE UP (ref 96–108)
CO2 SERPL-SCNC: 32 MMOL/L — HIGH (ref 22–31)
CREAT SERPL-MCNC: 0.7 MG/DL — SIGNIFICANT CHANGE UP (ref 0.5–1.3)
GLUCOSE BLDC GLUCOMTR-MCNC: 103 MG/DL — HIGH (ref 70–99)
GLUCOSE BLDC GLUCOMTR-MCNC: 112 MG/DL — HIGH (ref 70–99)
GLUCOSE BLDC GLUCOMTR-MCNC: 137 MG/DL — HIGH (ref 70–99)
GLUCOSE BLDC GLUCOMTR-MCNC: 151 MG/DL — HIGH (ref 70–99)
GLUCOSE BLDC GLUCOMTR-MCNC: 161 MG/DL — HIGH (ref 70–99)
GLUCOSE BLDC GLUCOMTR-MCNC: 79 MG/DL — SIGNIFICANT CHANGE UP (ref 70–99)
GLUCOSE SERPL-MCNC: 129 MG/DL — HIGH (ref 70–99)
HCT VFR BLD CALC: 32.2 % — LOW (ref 34.5–45)
HGB BLD-MCNC: 9.3 G/DL — LOW (ref 11.5–15.5)
MAGNESIUM SERPL-MCNC: 1.7 MG/DL — SIGNIFICANT CHANGE UP (ref 1.6–2.6)
MCHC RBC-ENTMCNC: 28.3 PG — SIGNIFICANT CHANGE UP (ref 27–34)
MCHC RBC-ENTMCNC: 28.9 GM/DL — LOW (ref 32–36)
MCV RBC AUTO: 97.9 FL — SIGNIFICANT CHANGE UP (ref 80–100)
NRBC # BLD: 0 /100 WBCS — SIGNIFICANT CHANGE UP (ref 0–0)
PHOSPHATE SERPL-MCNC: 2.9 MG/DL — SIGNIFICANT CHANGE UP (ref 2.5–4.5)
PLATELET # BLD AUTO: 200 K/UL — SIGNIFICANT CHANGE UP (ref 150–400)
POTASSIUM SERPL-MCNC: 4 MMOL/L — SIGNIFICANT CHANGE UP (ref 3.5–5.3)
POTASSIUM SERPL-SCNC: 4 MMOL/L — SIGNIFICANT CHANGE UP (ref 3.5–5.3)
PROT SERPL-MCNC: 8 G/DL — SIGNIFICANT CHANGE UP (ref 6–8.3)
RBC # BLD: 3.29 M/UL — LOW (ref 3.8–5.2)
RBC # FLD: 14 % — SIGNIFICANT CHANGE UP (ref 10.3–14.5)
SODIUM SERPL-SCNC: 142 MMOL/L — SIGNIFICANT CHANGE UP (ref 135–145)
WBC # BLD: 5.32 K/UL — SIGNIFICANT CHANGE UP (ref 3.8–10.5)
WBC # FLD AUTO: 5.32 K/UL — SIGNIFICANT CHANGE UP (ref 3.8–10.5)

## 2021-09-14 PROCEDURE — 99223 1ST HOSP IP/OBS HIGH 75: CPT

## 2021-09-14 PROCEDURE — 99233 SBSQ HOSP IP/OBS HIGH 50: CPT | Mod: GC

## 2021-09-14 PROCEDURE — 99358 PROLONG SERVICE W/O CONTACT: CPT

## 2021-09-14 RX ORDER — IPRATROPIUM/ALBUTEROL SULFATE 18-103MCG
3 AEROSOL WITH ADAPTER (GRAM) INHALATION
Qty: 0 | Refills: 0 | DISCHARGE
Start: 2021-09-14

## 2021-09-14 RX ORDER — RAMIPRIL 5 MG
1 CAPSULE ORAL
Qty: 0 | Refills: 0 | DISCHARGE

## 2021-09-14 RX ORDER — AMLODIPINE BESYLATE 2.5 MG/1
1 TABLET ORAL
Qty: 0 | Refills: 0 | DISCHARGE
Start: 2021-09-14

## 2021-09-14 RX ORDER — POLYETHYLENE GLYCOL 3350 17 G/17G
17 POWDER, FOR SOLUTION ORAL
Qty: 0 | Refills: 0 | DISCHARGE

## 2021-09-14 RX ORDER — NYSTATIN CREAM 100000 [USP'U]/G
1 CREAM TOPICAL
Qty: 0 | Refills: 0 | DISCHARGE
Start: 2021-09-14

## 2021-09-14 RX ORDER — DOCUSATE SODIUM 100 MG
100 CAPSULE ORAL
Qty: 0 | Refills: 0 | DISCHARGE

## 2021-09-14 RX ORDER — SENNA PLUS 8.6 MG/1
2 TABLET ORAL
Qty: 0 | Refills: 0 | DISCHARGE

## 2021-09-14 RX ORDER — AMLODIPINE BESYLATE 2.5 MG/1
1 TABLET ORAL
Qty: 0 | Refills: 0 | DISCHARGE

## 2021-09-14 RX ORDER — FUROSEMIDE 40 MG
1 TABLET ORAL
Qty: 0 | Refills: 0 | DISCHARGE
Start: 2021-09-14

## 2021-09-14 RX ADMIN — NYSTATIN CREAM 1 APPLICATION(S): 100000 CREAM TOPICAL at 17:25

## 2021-09-14 RX ADMIN — NYSTATIN CREAM 1 APPLICATION(S): 100000 CREAM TOPICAL at 06:35

## 2021-09-14 RX ADMIN — AMLODIPINE BESYLATE 10 MILLIGRAM(S): 2.5 TABLET ORAL at 06:36

## 2021-09-14 RX ADMIN — Medication 2: at 18:08

## 2021-09-14 RX ADMIN — ATORVASTATIN CALCIUM 20 MILLIGRAM(S): 80 TABLET, FILM COATED ORAL at 22:12

## 2021-09-14 RX ADMIN — Medication 650 MILLIGRAM(S): at 17:25

## 2021-09-14 RX ADMIN — CHLORHEXIDINE GLUCONATE 1 APPLICATION(S): 213 SOLUTION TOPICAL at 08:31

## 2021-09-14 RX ADMIN — Medication 20 MILLIGRAM(S): at 09:03

## 2021-09-14 RX ADMIN — Medication 3 MILLILITER(S): at 22:35

## 2021-09-14 RX ADMIN — ENOXAPARIN SODIUM 40 MILLIGRAM(S): 100 INJECTION SUBCUTANEOUS at 22:12

## 2021-09-14 RX ADMIN — Medication 650 MILLIGRAM(S): at 18:10

## 2021-09-14 RX ADMIN — MIRTAZAPINE 15 MILLIGRAM(S): 45 TABLET, ORALLY DISINTEGRATING ORAL at 12:36

## 2021-09-14 RX ADMIN — Medication 3 MILLILITER(S): at 12:37

## 2021-09-14 RX ADMIN — Medication 3 MILLILITER(S): at 06:35

## 2021-09-14 NOTE — PROGRESS NOTE ADULT - ASSESSMENT
Pt is a 89 yo Tongan speaking F with PMH bedbound, DNR, HTN, HLD and OA. Admitted initially to MICU on 9/5/21 and intubated for AMS, hypercapnic respiratory failure and pulmoary edema in setting of moderate pHTN which resolved with diuresis s/p extubation on 9/7/21, now with mild persistent hypoxia with imaging consistent with volume overload and persistent pulmonary edema, awaiting palliative recs and family planning for a safe d/c   Pt is a 89 yo Cypriot speaking F with PMH bedbound, DNR, HTN, HLD and OA. Admitted initially to MICU on 9/5/21 and intubated for AMS, hypercapnic respiratory failure and pulmoary edema in setting of moderate pHTN which resolved with diuresis s/p extubation on 9/7/21, now with mild persistent hypoxia with imaging consistent with volume overload and persistent pulmonary edema, awaiting safe discharge

## 2021-09-14 NOTE — PROGRESS NOTE ADULT - PROBLEM SELECTOR PLAN 1
2/2 pulmonary edema and b/l pleural effusions vs PNA. S/p extubation 9/7  Received diuresis lasix 40 IV x2, pulmonary edema improved  Patient still retaining CO2, with elevated bicarb, 36 on 9/9 AM  Unclear cause of overload: No history of smoking, HF, COPD, asthma.  Pt desatted 92% > 82% while weaning off 2L NC > RA.     -Monitor oxygen saturation  -f/u palliative recs  -May require temporary home oxygen (or wean off from oxygen prior to d/c)  -started on lasix 20mg qd. 2/2 pulmonary edema and b/l pleural effusions vs PNA. S/p extubation 9/7  Receiving lasix 20 mg qd for persistent pulm edema  Unclear cause of overload: No history of smoking, HF, COPD, asthma.  Pt desatted 92% > 82% while weaning off 2L NC > RA.     -Monitor oxygen saturation  -May require temporary home oxygen (or wean off from oxygen prior to d/c)  -started on lasix 20mg qd.

## 2021-09-14 NOTE — CONSULT NOTE ADULT - CONSULT REASON
Complex Medical Decision Making/GOC in the setting of Advanced Illness
respiratory failure, volume overload
HypoNa

## 2021-09-14 NOTE — CONSULT NOTE ADULT - SUBJECTIVE AND OBJECTIVE BOX
[facility]  Fredy Escobar, Palliative Care Attending  Contact Info: Call [phone] (HEAL Line) or message on Microsoft Teams (Fredy Escobar)    HPI:  Pt is a 89 yo Kinyarwanda speaking F with PMH bedbound, DNR, HTN, HLD, and OA who p/f NH with AMS x1d. Had been on CTX x1d outpt for PNA. Started becoming hypoxic and less verbal, so was sent to ER. Limited history d/t pt mental status. Per NH, no other changes to daily routine. No changes to appetite, BMs, or urination.     On arrival, T 98, HR 81, /70, RR 18 O2sat 99% 10L NRB--88% 4L--99% BIPAP 40%. Labs with WBC 6.41, 71% neutrophils, Hb 10, MCV 87%, Na 115, Cl 75, lactate neg, TSH neg, Mg 1.5, VBG 7.24/86/59/37. COVID neg. UA +LE, blood, WBCs. CT chest with cardiogenic pulmonary edema, small-mod BL pleural effusions with BL atelectasis vs. PNA. CTH neg. Pt given Mg 1g, NS 1L. ICU consulted for hypoNa.    On ICU eval, pt somnolent and unarousable. ABG collected with 7.18/98/86/37. Family contacted (ivon Bowers [phone]) requesting trial of intubation and confirmed DNR status. Pt intubated with etomidate 20mg, fentanyl 50mg, versed 1mg x2 and 2mg. Biting on tube and given fentanyl 50mg and started on precedex gtt, fentanyl gtt, peripheral levo gtt for MAP 60 after sedation initiated. Urine legionella and strep neg. MRSA swab sent. Started on azithromycin, CTX, steroids. Admitted to MICU for further observation and management. (05 Sep 2021 03:05)    PERTINENT PM/SXH:   HTN (hypertension)    HLD (hyperlipidemia)    Osteoarthritis    Hyponatremia    Altered mental status    Urinary tract infection        FAMILY HISTORY:    ITEMS NOT CHECKED ARE NOT PRESENT    SOCIAL HISTORY:   Significant other/partner:  []  Children:  []  Christianity/Spirituality:  Substance hx:  []   Tobacco hx:  []   Alcohol hx: []   Home Opioid hx:  [] I-Stop Reference No:  - no active Rx's / see chart note  Living Situation: []Home  []Long term care  []Rehab []Other    ADVANCE DIRECTIVES:    []MOLST  []Living Will  DECISION MAKER(s):  [] Health Care Proxy(s)  [] Surrogate(s)  [] Guardian           Name(s)/Phone Number(s):     BASELINE (I)ADLs (prior to admission):  [place]: []Total  [] Moderate []Dependent    ALLERGIES:  iodine containing compounds (Unknown)  lisinopril (Unknown)    MEDICATIONS  (STANDING):  albuterol/ipratropium for Nebulization 3 milliLiter(s) Nebulizer every 6 hours  amLODIPine   Tablet 10 milliGRAM(s) Oral daily  atorvastatin 20 milliGRAM(s) Oral at bedtime  chlorhexidine 4% Liquid 1 Application(s) Topical <User Schedule>  dextrose 40% Gel 15 Gram(s) Oral once  dextrose 5%. 1000 milliLiter(s) (50 mL/Hr) IV Continuous <Continuous>  dextrose 5%. 1000 milliLiter(s) (100 mL/Hr) IV Continuous <Continuous>  dextrose 50% Injectable 25 Gram(s) IV Push once  dextrose 50% Injectable 12.5 Gram(s) IV Push once  dextrose 50% Injectable 25 Gram(s) IV Push once  enoxaparin Injectable 40 milliGRAM(s) SubCutaneous every 24 hours  furosemide    Tablet 20 milliGRAM(s) Oral every 24 hours  glucagon  Injectable 1 milliGRAM(s) IntraMuscular once  insulin lispro (ADMELOG) corrective regimen sliding scale   SubCutaneous every 6 hours  mirtazapine 15 milliGRAM(s) Oral daily  nystatin Cream 1 Application(s) Topical two times a day    MEDICATIONS  (PRN):  acetaminophen   Tablet .. 650 milliGRAM(s) Oral every 6 hours PRN Mild Pain (1 - 3), Moderate Pain (4 - 6)    PRESENT SYMPTOMS: []Unable to obtain due to poor mentation/encephalopathy  Source if other than patient:  []Family   []Team     Pain: [ ] yes [ ] no  QOL impact -   Location -                    Aggravating Factors -  Quality -  Radiation -  Timing -  Severity (0-10 scale) -   Minimal Acceptable Level (0-10 scale) -    PAIN AD Score:  http://geriatrictoolkit.[place].[email or web address]/painad.pdf (press ctrl +  left click to view)    Dyspnea:                           []Mild  []Moderate []Severe  Anxiety:                             []Mild []Moderate []Severe  Fatigue:                             []Mild []Moderate []Severe  Nausea:                             []Mild []Moderate []Severe  Loss of Appetite:              []Mild []Moderate []Severe  Constipation:                    []Mild []Moderate []Severe    Other Symptoms:  []All other review of systems negative     Palliative Performance Status Version 2:  %    [email or web address]_[email or web address]    PHYSICAL EXAM:  GENERAL:  []Alert  []Oriented x   []Lethargic  []Cachexia  []Unarousable  []Verbal  []Non-Verbal  Behavioral:   []Anxiety  []Delirium []Agitation []Cooperative  HEENT:  []Normal   []Dry mouth   []ET Tube/Trach  []Oral lesions  PULMONARY:   []Clear []Tachypnea  []Audible excessive secretions   []Rhonchi        []Right []Left []Bilateral  []Crackles        []Right []Left []Bilateral  []Wheezing     []Right []Left []Bilateral  CARDIOVASCULAR:    []Regular []Irregular []Tachy  []Al []Murmur []Other  GASTROINTESTINAL:  []Soft  []Distended   []+BS  []Non tender []Tender  []PEG []OGT/ NGT  Last BM:     GENITOURINARY:  []Normal [] Incontinent   []Oliguria/Anuria   []Liriano  MUSCULOSKELETAL:   []Normal   []Weakness  []Bed/Wheelchair bound []Edema  NEUROLOGIC:   []No focal deficits  []Cognitive impairment  []Dysphagia []Dysarthria []Paresis []Encephalopathic   SKIN:   []Normal   []Pressure ulcer(s)  []Rash    CRITICAL CARE:  [ ]Shock Present  [ ]Septic [ ]Cardiogenic [ ]Neurologic [ ]Hypovolemic  [ ]Vasopressors [ ]Inotropes   [ ]Respiratory failure present [ ]Mechanical Ventilation [ ]Non-invasive ventilatory support [ ]High-Flow  [ ]Acute  [ ]Chronic [ ]Hypoxic  [ ]Hypercarbic  [ ]Other organ failure    Vital Signs Last 24 Hrs  T(C): 36.7 (14 Sep 2021 16:13), Max: 37.4 (14 Sep 2021 06:54)  T(F): 98.1 (14 Sep 2021 16:13), Max: 99.3 (14 Sep 2021 06:54)  HR: 82 (14 Sep 2021 16:13) (67 - 95)  BP: 120/83 (14 Sep 2021 16:13) (118/76 - 135/79)  BP(mean): --  RR: 16 (14 Sep 2021 16:13) (16 - 18)  SpO2: 97% (14 Sep 2021 16:13) (93% - 97%) I&O's Summary      LABS:                        9.3    5.32  )-----------( 200      ( 14 Sep 2021 08:04 )             32.2   09-14    142  |  97  |  29<H>  ----------------------------<  129<H>  4.0   |  32<H>  |  0.70    Ca    9.8      14 Sep 2021 08:04  Phos  2.9     09-14  Mg     1.7     09-14    TPro  8.0  /  Alb  2.8<L>  /  TBili  0.2  /  DBili  x   /  AST  16  /  ALT  10  /  AlkPhos  77  09-14      RADIOLOGY & ADDITIONAL STUDIES:      PROTEIN CALORIE MALNUTRITION PRESENT: [ ]mild [ ]moderate [ ]severe [ ]underweight [ ]morbid obesity  []PPSV2 < or = to 30% []significant weight loss  []poor nutritional intake []catabolic state []anasarca     Artificial Nutrition []     REFERRALS:  [x]Social Work  []Case management []PT/OT []Chaplaincy  []Hospice  []Patient/Family Support    DISCUSSION OF CASE: Family - to obtain additional history and to provide emotional support; ( ) -     Care Coordination/Goals of Care Document:                                          Progress Notes    PROGRESS NOTE  Date & Time of Note   2021-09-14 15:05    Notes    Notes: Chart reviewed and appreciated. Patient discussed during IDR. As per  medical team, patient is medically ready for discharge. DUNCAN and CM spoke with  patient's niece/HCP Paulette over the phone to discuss discharge plan. DUNCAN  informed niece that patient would need to be home for evaluation for HHA; as  per [facility], patient has no active HHA auth on file and needs a new  evaluation to determine HHA hours. Niece expressed understanding of this. Niece  states that she is unable to care for patient at home, requesting that patient  return to [facility] upon discharge; patient was a long-term resident at [facility] PTA at  [facility]. SW sent SNF referral to [facility]; patient medically accepted, [facility] requesting  that patient transfer tomorrow 9/15 at 10AM. Family aware and in agreement.    SW scheduled BLS ambulance via [facility] ([phone]) for tomorrow 9/15 at  10AM, trip# [id number]. Family/MD/RN made aware.    [place] [address]  [phone]    Niece-  Doretha Bowers: [phone]    Niece/HCP-  Paulette: [phone] / [phone]    [facility] [employer]  Phone: [phone]  Fax: [phone]       Electronically signed by:  Luis Hamlin  Electronically signed on:  2021-09-14  15:39           PALLIATIVE MEDICINE COORDINATION OF CARE DOCUMENTATION: [x] Inpatient Consult  Non-Face-to-Face prolonged service provided that relates to (face-to-face) care that has or will occur and ongoing patient management, including one or more of the following: - Reviewed documentation from other physicians and other health care professional services - Reviewed medical records and diagnostic / radiology study results - Coordination with patient's support system  ************************************************************************  MEDICATION REVIEW:  - See Medication List Above    ISTOP REFERENCE:   - no active Rx's / see ISTOP Chart Note  - PRN usage: NO PRN'S  ------------------------------------------------------------------------  COORDINATION OF CARE:  - Palliative Care consulted for: GOC / Symptom Management  - Patient (to be) assessed:  - Patient previously seen by Palliative Care service: NO    ADVANCE CARE PLANNING  - Code status: DNR/DNI  - MOLST reviewed in chart: NONE; None found on Alpha  - HCP/Surrogate:  - GOC documents: NONE found on [place]  - HCP/Living will/Other Advanced Directives in Alpha: NONE found on Alpha  ------------------------------------------------------------------------  CARE PROVIDER DOCUMENTATION:  - SW/CM notes: Remains medically active  -   -   -     PLAN OF CARE  - Known admissions in past year:  - Current admit date:  - LOS:  - LACE score:   - Current dispo plan: TO BE DETERMINED    09-05-21 (9d)  ------------------------------------------------------------------------  - Time Spent/Chart reviewed: 31 Minutes [including time used to gather, review and transfer data]  - Start:  - End:    Prolonged services rendered, as part of this patient's care provided by Palliative Medicine, include: i. chart review for provider and ancillary service documentation, ii. pertinent diagnostics including laboratory and imaging studies, iii. medication review including PRN use, iv. admission history including previous palliative care encounters and GOC notes, v. advance care planning documents including HCP and MOLST forms in Alpha. Part of Palliative Medicine extended evaluation and management also involves coordination of care with our IDT, the primary and consulting teams, and unit CM/SW and Hospice if eligible. Recommendations based on the information gathered and discussed are outlined in the A/P of Palliative notes.   [facility]  Fredy Escobar, Palliative Care Attending  Contact Info: Call [phone] (HEAL Line) or message on Microsoft Teams (Fredy Escobar)    HPI:  Pt is a 89 yo Thai speaking F with PMH bedbound, DNR, HTN, HLD, and OA who p/f NH with AMS x1d. Had been on CTX x1d outpt for PNA. Started becoming hypoxic and less verbal, so was sent to ER. Limited history d/t pt mental status. Per NH, no other changes to daily routine. No changes to appetite, BMs, or urination.     On arrival, T 98, HR 81, /70, RR 18 O2sat 99% 10L NRB--88% 4L--99% BIPAP 40%. Labs with WBC 6.41, 71% neutrophils, Hb 10, MCV 87%, Na 115, Cl 75, lactate neg, TSH neg, Mg 1.5, VBG 7.24/86/59/37. COVID neg. UA +LE, blood, WBCs. CT chest with cardiogenic pulmonary edema, small-mod BL pleural effusions with BL atelectasis vs. PNA. CTH neg. Pt given Mg 1g, NS 1L. ICU consulted for hypoNa.    On ICU eval, pt somnolent and unarousable. ABG collected with 7.18/98/86/37. Family contacted (ivon Bowers [phone]) requesting trial of intubation and confirmed DNR status. Pt intubated with etomidate 20mg, fentanyl 50mg, versed 1mg x2 and 2mg. Biting on tube and given fentanyl 50mg and started on precedex gtt, fentanyl gtt, peripheral levo gtt for MAP 60 after sedation initiated. Urine legionella and strep neg. MRSA swab sent. Started on azithromycin, CTX, steroids. Admitted to MICU for further observation and management. (05 Sep 2021 03:05)    PERTINENT PM/SXH:   HTN (hypertension)  HLD (hyperlipidemia)  Osteoarthritis  Hyponatremia  Altered mental status  Urinary tract infection    FAMILY HISTORY:  Non-contributory in first degree relatives    ITEMS NOT CHECKED ARE NOT PRESENT    SOCIAL HISTORY:   Significant other/partner:  []  Children:  []  Yazidism/Spirituality:  Substance hx:  []   Tobacco hx:  []   Alcohol hx: []   Home Opioid hx:  [] I-Stop Reference No: [id number]  - no active Rx's  Living Situation: []Home  [x]Long term care  []Rehab []Other    ADVANCE DIRECTIVES:    [x]MOLST  []Living Will  DECISION MAKER(s):  [x] Health Care Proxy(s)  [] Surrogate(s)  [] Guardian           Name(s)/Phone Number(s): Paulette Bowers, [phone]    BASELINE (I)ADLs (prior to admission):  [place]: []Total  [x] Moderate []Dependent    ALLERGIES:  iodine containing compounds (Unknown)  lisinopril (Unknown)    MEDICATIONS  (STANDING):  albuterol/ipratropium for Nebulization 3 milliLiter(s) Nebulizer every 6 hours  amLODIPine   Tablet 10 milliGRAM(s) Oral daily  atorvastatin 20 milliGRAM(s) Oral at bedtime  chlorhexidine 4% Liquid 1 Application(s) Topical <User Schedule>  dextrose 40% Gel 15 Gram(s) Oral once  dextrose 5%. 1000 milliLiter(s) (50 mL/Hr) IV Continuous <Continuous>  dextrose 5%. 1000 milliLiter(s) (100 mL/Hr) IV Continuous <Continuous>  dextrose 50% Injectable 25 Gram(s) IV Push once  dextrose 50% Injectable 12.5 Gram(s) IV Push once  dextrose 50% Injectable 25 Gram(s) IV Push once  enoxaparin Injectable 40 milliGRAM(s) SubCutaneous every 24 hours  furosemide    Tablet 20 milliGRAM(s) Oral every 24 hours  glucagon  Injectable 1 milliGRAM(s) IntraMuscular once  insulin lispro (ADMELOG) corrective regimen sliding scale   SubCutaneous every 6 hours  mirtazapine 15 milliGRAM(s) Oral daily  nystatin Cream 1 Application(s) Topical two times a day    MEDICATIONS  (PRN):  acetaminophen   Tablet .. 650 milliGRAM(s) Oral every 6 hours PRN Mild Pain (1 - 3), Moderate Pain (4 - 6)    PRESENT SYMPTOMS: []Unable to obtain due to poor mentation/encephalopathy  Source if other than patient:  []Family   []Team     Pain: [x] yes [ ] no  QOL impact - bothersome   Location - sacrum                   Aggravating Factors - pressure, immobility  Quality - aching  Radiation - none  Timing - constant  Severity (0-10 scale) - unable to answer  Minimal Acceptable Level (0-10 scale) -    PAIN AD Score:  [email or web address]/[email or web address].pdf (press ctrl +  left click to view)    Dyspnea:                           []Mild  []Moderate []Severe  Anxiety:                             []Mild []Moderate []Severe  Fatigue:                             []Mild []Moderate []Severe  Nausea:                             []Mild []Moderate []Severe  Loss of Appetite:              []Mild []Moderate []Severe  Constipation:                    []Mild []Moderate []Severe    Other Symptoms:  [x]All other review of systems negative     Palliative Performance Status Version 2:  40%    [email or web address]_performance_[email or web address]    PHYSICAL EXAM:  GENERAL:  [x]Alert  [x]Oriented x2   []Lethargic  []Cachexia  []Unarousable  [x]Verbal  []Non-Verbal  Behavioral:   []Anxiety  [x]Delirium []Agitation []Cooperative  HEENT:  []Normal   [x]Dry mouth   []ET Tube/Trach  []Oral lesions  PULMONARY:   [x]Clear []Tachypnea  []Audible excessive secretions   []Rhonchi        []Right []Left []Bilateral  []Crackles        []Right []Left []Bilateral  []Wheezing     []Right []Left []Bilateral  CARDIOVASCULAR:    [x]Regular []Irregular []Tachy  []Al []Murmur []Other  GASTROINTESTINAL:  [x]Soft  []Distended   [x]+BS  [x]Non tender []Tender  []PEG []OGT/ NGT  Last BM:   GENITOURINARY:  [x]Normal [] Incontinent   []Oliguria/Anuria   []Liriano  MUSCULOSKELETAL:   []Normal   [x]Weakness  []Bed/Wheelchair bound []Edema  NEUROLOGIC:   []No focal deficits  [x]Cognitive impairment  []Dysphagia []Dysarthria []Paresis []Encephalopathic   SKIN:   [x]Normal   []Pressure ulcer(s)  []Rash    CRITICAL CARE:  [ ]Shock Present  [ ]Septic [ ]Cardiogenic [ ]Neurologic [ ]Hypovolemic  [ ]Vasopressors [ ]Inotropes   [ ]Respiratory failure present [ ]Mechanical Ventilation [ ]Non-invasive ventilatory support [ ]High-Flow  [ ]Acute  [ ]Chronic [ ]Hypoxic  [ ]Hypercarbic  [ ]Other organ failure    Vital Signs Last 24 Hrs  T(C): 36.7 (14 Sep 2021 16:13), Max: 37.4 (14 Sep 2021 06:54)  T(F): 98.1 (14 Sep 2021 16:13), Max: 99.3 (14 Sep 2021 06:54)  HR: 82 (14 Sep 2021 16:13) (67 - 95)  BP: 120/83 (14 Sep 2021 16:13) (118/76 - 135/79)  BP(mean): --  RR: 16 (14 Sep 2021 16:13) (16 - 18)  SpO2: 97% (14 Sep 2021 16:13) (93% - 97%) I&O's Summary    LABS:                        9.3    5.32  )-----------( 200      ( 14 Sep 2021 08:04 )             32.2   09-14    142  |  97  |  29<H>  ----------------------------<  129<H>  4.0   |  32<H>  |  0.70    Ca    9.8      14 Sep 2021 08:04  Phos  2.9     09-14  Mg     1.7     09-14    TPro  8.0  /  Alb  2.8<L>  /  TBili  0.2  /  DBili  x   /  AST  16  /  ALT  10  /  AlkPhos  77  09-14      RADIOLOGY & ADDITIONAL STUDIES:  < from: Xray Chest 1 View- PORTABLE-Urgent (Xray Chest 1 View- PORTABLE-Urgent .) (09.10.21 @ 17:51) >  No acute infiltrates. Small bilateral effusions with improvement in comparison to prior examination of the chest 9/8/2021. Limited inspiration    PROTEIN CALORIE MALNUTRITION PRESENT: [ ]mild [ ]moderate [ ]severe [ ]underweight [ ]morbid obesity  []PPSV2 < or = to 30% []significant weight loss  []poor nutritional intake []catabolic state []anasarca     Artificial Nutrition []     REFERRALS:  [x]Social Work  []Case management []PT/OT []Chaplaincy  []Hospice  []Patient/Family Support    DISCUSSION OF CASE: Paulette Bowers - to obtain additional history and to provide emotional support     Care Coordination/Goals of Care Document:     PROGRESS NOTE  Date & Time of Note   2021-09-14 15:05      Notes: Chart reviewed and appreciated. Patient discussed during IDR. As per  medical team, patient is medically ready for discharge. SW and CM spoke with  patient's niece/HCP Paulette over the phone to discuss discharge plan. SW  informed niece that patient would need to be home for evaluation for HHA; as  per [facility], patient has no active HHA auth on file and needs a new  evaluation to determine HHA hours. Niece expressed understanding of this. Niece  states that she is unable to care for patient at home, requesting that patient  return to [facility] upon discharge; patient was a long-term resident at [facility] PTA at  [facility]. SW sent SNF referral to [facility]; patient medically accepted, [facility] requesting  that patient transfer tomorrow 9/15 at 10AM. Family aware and in agreement.    DUNCAN scheduled BLS ambulance via [employer] ([phone]) for tomorrow 9/15 at  10AM, trip# [id number]. Family/MD/RN made aware.    [place] [address]  [phone]    Niece-  Doretha Bowers: [phone]    Niece/HCP-  Paulette: [phone] / [phone]    [facility]  Phone: [phone]  Fax: [phone]    Electronically signed by:  Luis Hamlin  Electronically signed on:  2021-09-14  15:39           PALLIATIVE MEDICINE COORDINATION OF CARE DOCUMENTATION: [x] Inpatient Consult  Non-Face-to-Face prolonged service provided that relates to (face-to-face) care that has or will occur and ongoing patient management, including one or more of the following: - Reviewed documentation from other physicians and other health care professional services - Reviewed medical records and diagnostic / radiology study results - Coordination with patient's support system  ************************************************************************  MEDICATION REVIEW:  - See Medication List Above    ISTOP REFERENCE:   - no active Rx's / see ISTOP Chart Note  - PRN usage: NO PRN'S  ------------------------------------------------------------------------  COORDINATION OF CARE:  - Palliative Care consulted for: GOC / Symptom Management  - Patient (to be) assessed:  - Patient previously seen by Palliative Care service: NO    ADVANCE CARE PLANNING  - Code status: DNR/DNI  - MOLST reviewed in chart: YES; None found on Alpha  - HCP/Surrogate:  - GOC documents: NONE found on [place]  - HCP/Living will/Other Advanced Directives in Alpha: NONE found on Alpha  ------------------------------------------------------------------------  CARE PROVIDER DOCUMENTATION:  - /CM notes: Remains medically active  -   -   -     PLAN OF CARE  - Known admissions in past year:  - Current admit date:  - LOS:  - LACE score:   - Current dispo plan: TO BE DETERMINED    09-05-21 (9d)  ------------------------------------------------------------------------  - Time Spent/Chart reviewed: 31 Minutes [including time used to gather, review and transfer data]  - Start:  - End:    Prolonged services rendered, as part of this patient's care provided by Palliative Medicine, include: i. chart review for provider and ancillary service documentation, ii. pertinent diagnostics including laboratory and imaging studies, iii. medication review including PRN use, iv. admission history including previous palliative care encounters and GOC notes, v. advance care planning documents including HCP and MOLST forms in Alpha. Part of Palliative Medicine extended evaluation and management also involves coordination of care with our IDT, the primary and consulting teams, and unit CM/SW and Hospice if eligible. Recommendations based on the information gathered and discussed are outlined in the A/P of Palliative notes.   [facility] Geriatrics and Palliative Care  Fredy Escobar, Palliative Care Attending  Contact Info: Call [phone] (HEAL Line) or message on [employer] Teams (Fredy Escobar)    HPI:  Pt is a 89 yo Croatian speaking F with PMH bedbound, DNR, HTN, HLD, and OA who p/f NH with AMS x1d. Had been on CTX x1d outpt for PNA. Started becoming hypoxic and less verbal, so was sent to ER. Limited history d/t pt mental status. Per NH, no other changes to daily routine. No changes to appetite, BMs, or urination.     On arrival, T 98, HR 81, /70, RR 18 O2sat 99% 10L NRB--88% 4L--99% BIPAP 40%. Labs with WBC 6.41, 71% neutrophils, Hb 10, MCV 87%, Na 115, Cl 75, lactate neg, TSH neg, Mg 1.5, VBG 7.24/86/59/37. COVID neg. UA +LE, blood, WBCs. CT chest with cardiogenic pulmonary edema, small-mod BL pleural effusions with BL atelectasis vs. PNA. CTH neg. Pt given Mg 1g, NS 1L. ICU consulted for hypoNa.    On ICU eval, pt somnolent and unarousable. ABG collected with 7.18/98/86/37. Family contacted (ivon Bowers [phone]) requesting trial of intubation and confirmed DNR status. Pt intubated with etomidate 20mg, fentanyl 50mg, versed 1mg x2 and 2mg. Biting on tube and given fentanyl 50mg and started on precedex gtt, fentanyl gtt, peripheral levo gtt for MAP 60 after sedation initiated. Urine legionella and strep neg. MRSA swab sent. Started on azithromycin, CTX, steroids. Admitted to MICU for further observation and management. (05 Sep 2021 03:05)    PERTINENT PM/SXH:   HTN (hypertension)  HLD (hyperlipidemia)  Osteoarthritis  Hyponatremia  Altered mental status  Urinary tract infection    FAMILY HISTORY:  Non-contributory in first degree relatives    ITEMS NOT CHECKED ARE NOT PRESENT    SOCIAL HISTORY:   Significant other/partner:  []  Children:  []  Scientologist/Spirituality:  Substance hx:  []   Tobacco hx:  []   Alcohol hx: []   Home Opioid hx:  [] I-Stop Reference No: [id number]  - no active Rx's  Living Situation: []Home  [x]Long term care  []Rehab []Other    ADVANCE DIRECTIVES:    [x]MOLST  []Living Will  DECISION MAKER(s):  [x] Health Care Proxy(s)  [] Surrogate(s)  [] Guardian           Name(s)/Phone Number(s): Paulette Bowers, [phone]    BASELINE (I)ADLs (prior to admission):  [place]: []Total  [x] Moderate []Dependent    ALLERGIES:  iodine containing compounds (Unknown)  lisinopril (Unknown)    MEDICATIONS  (STANDING):  albuterol/ipratropium for Nebulization 3 milliLiter(s) Nebulizer every 6 hours  amLODIPine   Tablet 10 milliGRAM(s) Oral daily  atorvastatin 20 milliGRAM(s) Oral at bedtime  chlorhexidine 4% Liquid 1 Application(s) Topical <User Schedule>  dextrose 40% Gel 15 Gram(s) Oral once  dextrose 5%. 1000 milliLiter(s) (50 mL/Hr) IV Continuous <Continuous>  dextrose 5%. 1000 milliLiter(s) (100 mL/Hr) IV Continuous <Continuous>  dextrose 50% Injectable 25 Gram(s) IV Push once  dextrose 50% Injectable 12.5 Gram(s) IV Push once  dextrose 50% Injectable 25 Gram(s) IV Push once  enoxaparin Injectable 40 milliGRAM(s) SubCutaneous every 24 hours  furosemide    Tablet 20 milliGRAM(s) Oral every 24 hours  glucagon  Injectable 1 milliGRAM(s) IntraMuscular once  insulin lispro (ADMELOG) corrective regimen sliding scale   SubCutaneous every 6 hours  mirtazapine 15 milliGRAM(s) Oral daily  nystatin Cream 1 Application(s) Topical two times a day    MEDICATIONS  (PRN):  acetaminophen   Tablet .. 650 milliGRAM(s) Oral every 6 hours PRN Mild Pain (1 - 3), Moderate Pain (4 - 6)    PRESENT SYMPTOMS: []Unable to obtain due to poor mentation/encephalopathy  Source if other than patient:  []Family   []Team     Pain: [x] yes [ ] no  QOL impact - bothersome   Location - sacrum                   Aggravating Factors - pressure, immobility  Quality - aching  Radiation - none  Timing - constant  Severity (0-10 scale) - unable to answer  Minimal Acceptable Level (0-10 scale) -    PAIN AD Score:  http://[email or web address]/painad.pdf (press ctrl +  left click to view)    Dyspnea:                           []Mild  []Moderate []Severe  Anxiety:                             []Mild []Moderate []Severe  Fatigue:                             []Mild []Moderate []Severe  Nausea:                             []Mild []Moderate []Severe  Loss of Appetite:              []Mild []Moderate []Severe  Constipation:                    []Mild []Moderate []Severe    Other Symptoms:  [x]All other review of systems negative     Palliative Performance Status Version 2:  40%    http://[email or web address].org/files/news/palliative_performance_scale_[email or web address].pdf    PHYSICAL EXAM:  GENERAL:  [x]Alert  [x]Oriented x2   []Lethargic  []Cachexia  []Unarousable  [x]Verbal  []Non-Verbal  Behavioral:   []Anxiety  [x]Delirium []Agitation []Cooperative  HEENT:  []Normal   [x]Dry mouth   []ET Tube/Trach  []Oral lesions  PULMONARY:   [x]Clear []Tachypnea  []Audible excessive secretions   []Rhonchi        []Right []Left []Bilateral  []Crackles        []Right []Left []Bilateral  []Wheezing     []Right []Left []Bilateral  CARDIOVASCULAR:    [x]Regular []Irregular []Tachy  []Al []Murmur []Other  GASTROINTESTINAL:  [x]Soft  []Distended   [x]+BS  [x]Non tender []Tender  []PEG []OGT/ NGT  Last BM:   GENITOURINARY:  [x]Normal [] Incontinent   []Oliguria/Anuria   []Liriano  MUSCULOSKELETAL:   []Normal   [x]Weakness  []Bed/Wheelchair bound []Edema  NEUROLOGIC:   []No focal deficits  [x]Cognitive impairment  []Dysphagia []Dysarthria []Paresis []Encephalopathic   SKIN:   [x]Normal   []Pressure ulcer(s)  []Rash    CRITICAL CARE:  [ ]Shock Present  [ ]Septic [ ]Cardiogenic [ ]Neurologic [ ]Hypovolemic  [ ]Vasopressors [ ]Inotropes   [ ]Respiratory failure present [ ]Mechanical Ventilation [ ]Non-invasive ventilatory support [ ]High-Flow  [ ]Acute  [ ]Chronic [ ]Hypoxic  [ ]Hypercarbic  [ ]Other organ failure    Vital Signs Last 24 Hrs  T(C): 36.7 (14 Sep 2021 16:13), Max: 37.4 (14 Sep 2021 06:54)  T(F): 98.1 (14 Sep 2021 16:13), Max: 99.3 (14 Sep 2021 06:54)  HR: 82 (14 Sep 2021 16:13) (67 - 95)  BP: 120/83 (14 Sep 2021 16:13) (118/76 - 135/79)  BP(mean): --  RR: 16 (14 Sep 2021 16:13) (16 - 18)  SpO2: 97% (14 Sep 2021 16:13) (93% - 97%) I&O's Summary    LABS:                        9.3    5.32  )-----------( 200      ( 14 Sep 2021 08:04 )             32.2   09-14    142  |  97  |  29<H>  ----------------------------<  129<H>  4.0   |  32<H>  |  0.70    Ca    9.8      14 Sep 2021 08:04  Phos  2.9     09-14  Mg     1.7     09-14    TPro  8.0  /  Alb  2.8<L>  /  TBili  0.2  /  DBili  x   /  AST  16  /  ALT  10  /  AlkPhos  77  09-14      RADIOLOGY & ADDITIONAL STUDIES:  < from: Xray Chest 1 View- PORTABLE-Urgent (Xray Chest 1 View- PORTABLE-Urgent .) (09.10.21 @ 17:51) >  No acute infiltrates. Small bilateral effusions with improvement in comparison to prior examination of the chest 9/8/2021. Limited inspiration    PROTEIN CALORIE MALNUTRITION PRESENT: [ ]mild [ ]moderate [ ]severe [ ]underweight [ ]morbid obesity  []PPSV2 < or = to 30% []significant weight loss  []poor nutritional intake []catabolic state []anasarca     Artificial Nutrition []     REFERRALS:  [x]Social Work  []Case management []PT/OT []Chaplaincy  []Hospice  []Patient/Family Support    DISCUSSION OF CASE: Paulette Bowers - to obtain additional history and to provide emotional support     Care Coordination/Goals of Care Document:     PROGRESS NOTE  Date & Time of Note   2021-09-14 15:05      Notes: Chart reviewed and appreciated. Patient discussed during IDR. As per  medical team, patient is medically ready for discharge. SW and CM spoke with  patient's niece/HCP Paulette over the phone to discuss discharge plan. SW  informed niece that patient would need to be home for evaluation for HHA; as  per [facility], patient has no active HHA auth on file and needs a new  evaluation to determine HHA hours. Niece expressed understanding of this. Niece  states that she is unable to care for patient at home, requesting that patient  return to [facility] upon discharge; patient was a long-term resident at [facility] PTA at  [facility]. SW sent SNF referral to [facility]; patient medically accepted, [facility] requesting  that patient transfer tomorrow 9/15 at 10AM. Family aware and in agreement.    DUNCAN scheduled BLS ambulance via [facility] ([phone]) for tomorrow 9/15 at  10AM, trip# [id number]. Family/MD/RN made aware.    [place] [address]  [phone]    Niece-  Doretha Bowers: [phone]    Niece/HCP-  Paulette: [phone] / [phone]    [facility]  Phone: [phone]  Fax: [phone]    Electronically signed by:  Luis Hamlin  Electronically signed on:  2021-09-14  15:39           PALLIATIVE MEDICINE COORDINATION OF CARE DOCUMENTATION: [x] Inpatient Consult  Non-Face-to-Face prolonged service provided that relates to (face-to-face) care that has or will occur and ongoing patient management, including one or more of the following: - Reviewed documentation from other physicians and other health care professional services - Reviewed medical records and diagnostic / radiology study results - Coordination with patient's support system  ************************************************************************  MEDICATION REVIEW:  - See Medication List Above    ISTOP REFERENCE:   - no active Rx's / see ISTOP Chart Note  - PRN usage: NO PRN'S  ------------------------------------------------------------------------  COORDINATION OF CARE:  - Palliative Care consulted for: GOC / Symptom Management  - Patient (to be) assessed:  - Patient previously seen by Palliative Care service: NO    ADVANCE CARE PLANNING  - Code status: DNR  - MOLST reviewed in chart: YES; None found on [facility]  - HCP/Surrogate:  - GOC documents: NONE found on [place]  - HCP/Living will/Other Advanced Directives in Alpha: NONE found on Alpha  ------------------------------------------------------------------------  CARE PROVIDER DOCUMENTATION:  - DUNCAN/JUAN J notes: Remains medically active  -   -   -     PLAN OF CARE  - Known admissions in past year:  - Current admit date:  - LOS:  - LACE score:   - Current dispo plan: TO BE DETERMINED    09-05-21 (9d)  ------------------------------------------------------------------------  - Time Spent/Chart reviewed: 31 Minutes [including time used to gather, review and transfer data]  - Start:  - End:    Prolonged services rendered, as part of this patient's care provided by Palliative Medicine, include: i. chart review for provider and ancillary service documentation, ii. pertinent diagnostics including laboratory and imaging studies, iii. medication review including PRN use, iv. admission history including previous palliative care encounters and GOC notes, v. advance care planning documents including HCP and MOLST forms in [facility]. Part of Palliative Medicine extended evaluation and management also involves coordination of care with our IDT, the primary and consulting teams, and unit CM/SW and Hospice if eligible. Recommendations based on the information gathered and discussed are outlined in the A/P of Palliative notes.   [facility]  Fredy Escobar, Palliative Care Attending  Contact Info: Call [phone] (HEAL Line) or message on Microsoft Teams (Fredy Escobar)    HPI:  Pt is a 89 yo Hungarian speaking F with PMH bedbound, DNR, HTN, HLD, and OA who p/f NH with AMS x1d. Had been on CTX x1d outpt for PNA. Started becoming hypoxic and less verbal, so was sent to ER. Limited history d/t pt mental status. Per NH, no other changes to daily routine. No changes to appetite, BMs, or urination. On ICU eval, pt somnolent and unarousable. ABG collected with 7.18/98/86/37. Family contacted (ivon Velasquezvo [phone]) requesting trial of intubation and confirmed DNR status. Pt intubated with etomidate 20mg, fentanyl 50mg, versed 1mg x2 and 2mg. Biting on tube and given fentanyl 50mg and started on precedex gtt, fentanyl gtt, peripheral levo gtt for MAP 60 after sedation initiated. Urine legionella and strep neg. MRSA swab sent. Started on azithromycin, CTX, steroids. Admitted to MICU for further observation and management. (05 Sep 2021 03:05)    Patient seen and examined at bedside. Endorses pain in her lower back and asking to be repositioned. Otherwise denies complaint. Reached out to patient's reported HCP, she remains on a mindset for recovery.     PERTINENT PM/SXH:   HTN (hypertension)  HLD (hyperlipidemia)  Osteoarthritis  Hyponatremia  Altered mental status  Urinary tract infection    FAMILY HISTORY:  Non-contributory in first degree relatives    ITEMS NOT CHECKED ARE NOT PRESENT    SOCIAL HISTORY:   Significant other/partner:  []  Children:  []  Jehovah's witness/Spirituality:  Substance hx:  []   Tobacco hx:  []   Alcohol hx: []   Home Opioid hx:  [] I-Stop Reference No: [id number]  - no active Rx's  Living Situation: []Home  [x]Long term care  []Rehab []Other    ADVANCE DIRECTIVES:    [x]MOLST  []Living Will  DECISION MAKER(s):  [x] Health Care Proxy(s)  [] Surrogate(s)  [] Guardian           Name(s)/Phone Number(s): Paulette Bowers, [phone]    BASELINE (I)ADLs (prior to admission):  [place]: []Total  [x] Moderate []Dependent    ALLERGIES:  iodine containing compounds (Unknown)  lisinopril (Unknown)    MEDICATIONS  (STANDING):  albuterol/ipratropium for Nebulization 3 milliLiter(s) Nebulizer every 6 hours  amLODIPine   Tablet 10 milliGRAM(s) Oral daily  atorvastatin 20 milliGRAM(s) Oral at bedtime  chlorhexidine 4% Liquid 1 Application(s) Topical <User Schedule>  dextrose 40% Gel 15 Gram(s) Oral once  dextrose 5%. 1000 milliLiter(s) (50 mL/Hr) IV Continuous <Continuous>  dextrose 5%. 1000 milliLiter(s) (100 mL/Hr) IV Continuous <Continuous>  dextrose 50% Injectable 25 Gram(s) IV Push once  dextrose 50% Injectable 12.5 Gram(s) IV Push once  dextrose 50% Injectable 25 Gram(s) IV Push once  enoxaparin Injectable 40 milliGRAM(s) SubCutaneous every 24 hours  furosemide    Tablet 20 milliGRAM(s) Oral every 24 hours  glucagon  Injectable 1 milliGRAM(s) IntraMuscular once  insulin lispro (ADMELOG) corrective regimen sliding scale   SubCutaneous every 6 hours  mirtazapine 15 milliGRAM(s) Oral daily  nystatin Cream 1 Application(s) Topical two times a day    MEDICATIONS  (PRN):  acetaminophen   Tablet .. 650 milliGRAM(s) Oral every 6 hours PRN Mild Pain (1 - 3), Moderate Pain (4 - 6)    PRESENT SYMPTOMS: []Unable to obtain due to poor mentation/encephalopathy  Source if other than patient:  []Family   []Team     Pain: [x] yes [ ] no  QOL impact - bothersome   Location - sacrum                   Aggravating Factors - pressure, immobility  Quality - aching  Radiation - none  Timing - constant  Severity (0-10 scale) - unable to answer  Minimal Acceptable Level (0-10 scale) -    PAIN AD Score:  http://geriatrictoolkit.[employer].edu/cog/painad.pdf (press ctrl +  left click to view)    Dyspnea:                           []Mild  []Moderate []Severe  Anxiety:                             []Mild []Moderate []Severe  Fatigue:                             []Mild []Moderate []Severe  Nausea:                             []Mild []Moderate []Severe  Loss of Appetite:              []Mild []Moderate []Severe  Constipation:                    []Mild []Moderate []Severe    Other Symptoms:  [x]All other review of systems negative     Palliative Performance Status Version 2:  40%    http://[email or web address]/files/[email or web address]_performance_scale_[email or web address]    PHYSICAL EXAM:  GENERAL:  [x]Alert  [x]Oriented x2   []Lethargic  []Cachexia  []Unarousable  [x]Verbal  []Non-Verbal  Behavioral:   []Anxiety  [x]Delirium []Agitation []Cooperative  HEENT:  []Normal   [x]Dry mouth   []ET Tube/Trach  []Oral lesions  PULMONARY:   [x]Clear []Tachypnea  []Audible excessive secretions   []Rhonchi        []Right []Left []Bilateral  []Crackles        []Right []Left []Bilateral  []Wheezing     []Right []Left []Bilateral  CARDIOVASCULAR:    [x]Regular []Irregular []Tachy  []Al []Murmur []Other  GASTROINTESTINAL:  [x]Soft  []Distended   [x]+BS  [x]Non tender []Tender  []PEG []OGT/ NGT  Last BM:   GENITOURINARY:  [x]Normal [] Incontinent   []Oliguria/Anuria   []Liriano  MUSCULOSKELETAL:   []Normal   [x]Weakness  []Bed/Wheelchair bound []Edema  NEUROLOGIC:   []No focal deficits  [x]Cognitive impairment  []Dysphagia []Dysarthria []Paresis []Encephalopathic   SKIN:   [x]Normal   []Pressure ulcer(s)  []Rash    CRITICAL CARE:  [ ]Shock Present  [ ]Septic [ ]Cardiogenic [ ]Neurologic [ ]Hypovolemic  [ ]Vasopressors [ ]Inotropes   [ ]Respiratory failure present [ ]Mechanical Ventilation [ ]Non-invasive ventilatory support [ ]High-Flow  [ ]Acute  [ ]Chronic [ ]Hypoxic  [ ]Hypercarbic  [ ]Other organ failure    Vital Signs Last 24 Hrs  T(C): 36.7 (14 Sep 2021 16:13), Max: 37.4 (14 Sep 2021 06:54)  T(F): 98.1 (14 Sep 2021 16:13), Max: 99.3 (14 Sep 2021 06:54)  HR: 82 (14 Sep 2021 16:13) (67 - 95)  BP: 120/83 (14 Sep 2021 16:13) (118/76 - 135/79)  BP(mean): --  RR: 16 (14 Sep 2021 16:13) (16 - 18)  SpO2: 97% (14 Sep 2021 16:13) (93% - 97%) I&O's Summary    LABS:                        9.3    5.32  )-----------( 200      ( 14 Sep 2021 08:04 )             32.2   09-14    142  |  97  |  29<H>  ----------------------------<  129<H>  4.0   |  32<H>  |  0.70    Ca    9.8      14 Sep 2021 08:04  Phos  2.9     09-14  Mg     1.7     09-14    TPro  8.0  /  Alb  2.8<L>  /  TBili  0.2  /  DBili  x   /  AST  16  /  ALT  10  /  AlkPhos  77  09-14      RADIOLOGY & ADDITIONAL STUDIES:  < from: Xray Chest 1 View- PORTABLE-Urgent (Xray Chest 1 View- PORTABLE-Urgent .) (09.10.21 @ 17:51) >  No acute infiltrates. Small bilateral effusions with improvement in comparison to prior examination of the chest 9/8/2021. Limited inspiration    PROTEIN CALORIE MALNUTRITION PRESENT: [ ]mild [ ]moderate [ ]severe [ ]underweight [ ]morbid obesity  []PPSV2 < or = to 30% []significant weight loss  []poor nutritional intake []catabolic state []anasarca     Artificial Nutrition []     REFERRALS:  [x]Social Work  []Case management []PT/OT []Chaplaincy  []Hospice  []Patient/Family Support    DISCUSSION OF CASE: Paulette Bowers - to obtain additional history and to provide emotional support     Care Coordination/Goals of Care Document:     PROGRESS NOTE  Date & Time of Note   2021-09-14 15:05      Notes: Chart reviewed and appreciated. Patient discussed during IDR. As per  medical team, patient is medically ready for discharge. DUNCAN and CM spoke with  patient's niece/HCP Paulette over the phone to discuss discharge plan. DUNCAN  informed niece that patient would need to be home for evaluation for HHA; as  per [facility], patient has no active HHA auth on file and needs a new  evaluation to determine HHA hours. Niece expressed understanding of this. Niece  states that she is unable to care for patient at home, requesting that patient  return to [facility] upon discharge; patient was a long-term resident at [facility] PTA at  [facility]. SW sent SNF referral to [facility]; patient medically accepted, [facility] requesting  that patient transfer tomorrow 9/15 at 10AM. Family aware and in agreement.    SW scheduled BLS ambulance via [facility] ([phone]) for tomorrow 9/15 at  10AM, trip# [id number]. Family/MD/RN made aware.    [place] [address]  [phone]    Niece-  Doretha Bowers: [phone]    Niece/MEL-  Paulette: [phone] / [phone]    [facility]  Phone: [phone]  Fax: [phone]    Electronically signed by:  Luis Hamlin  Electronically signed on:  2021-09-14  15:39           PALLIATIVE MEDICINE COORDINATION OF CARE DOCUMENTATION: [x] Inpatient Consult  Non-Face-to-Face prolonged service provided that relates to (face-to-face) care that has or will occur and ongoing patient management, including one or more of the following: - Reviewed documentation from other physicians and other health care professional services - Reviewed medical records and diagnostic / radiology study results - Coordination with patient's support system  ************************************************************************  MEDICATION REVIEW:  - See Medication List Above    ISTOP REFERENCE:   - no active Rx's / see ISTOP Chart Note  - PRN usage: NO PRN'S  ------------------------------------------------------------------------  COORDINATION OF CARE:  - Palliative Care consulted for: [facility] / Symptom Management  - Patient (to be) assessed:  - Patient previously seen by Palliative Care service: NO    ADVANCE CARE PLANNING  - Code status: DNR  - MOLST reviewed in chart: YES; None found on [facility]  - HCP/Surrogate:  - GOC documents: NONE found on [place]  - HCP/Living will/Other Advanced Directives in Alpha: NONE found on Alpha  ------------------------------------------------------------------------  CARE PROVIDER DOCUMENTATION:  - SW/CM notes: Remains medically active  -   -   -     PLAN OF CARE  - Known admissions in past year:  - Current admit date:  - LOS:  - LACE score:   - Current dispo plan: TO BE DETERMINED    09-05-21 (9d)  ------------------------------------------------------------------------  - Time Spent/Chart reviewed: 31 Minutes [including time used to gather, review and transfer data]  - Start:  - End:    Prolonged services rendered, as part of this patient's care provided by Palliative Medicine, include: i. chart review for provider and ancillary service documentation, ii. pertinent diagnostics including laboratory and imaging studies, iii. medication review including PRN use, iv. admission history including previous palliative care encounters and GOC notes, v. advance care planning documents including HCP and MOLST forms in [facility]. Part of Palliative Medicine extended evaluation and management also involves coordination of care with our IDT, the primary and consulting teams, and unit CM/SW and Hospice if eligible. Recommendations based on the information gathered and discussed are outlined in the A/P of Palliative notes.   [facility] Ger[facility] and Palliative Care  Fredy Escobar, Palliative Care Attending  Contact Info: Call [phone] (HEAL Line) or message on [employer] Teams (Fredy Escobar)    HPI:  Pt is a 89 yo Icelandic speaking F with PMH bedbound, DNR, HTN, HLD, and OA who p/f NH with AMS x1d. Had been on CTX x1d outpt for PNA. Started becoming hypoxic and less verbal, so was sent to ER. Limited history d/t pt mental status. Per NH, no other changes to daily routine. No changes to appetite, BMs, or urination. On ICU eval, pt somnolent and unarousable. ABG collected with 7.18/98/86/37. Family contacted (ivelissenito Velasquezvo [phone]) requesting trial of intubation and confirmed DNR status. Pt intubated with etomidate 20mg, fentanyl 50mg, versed 1mg x2 and 2mg. Biting on tube and given fentanyl 50mg and started on precedex gtt, fentanyl gtt, peripheral levo gtt for MAP 60 after sedation initiated. Urine legionella and strep neg. MRSA swab sent. Started on azithromycin, CTX, steroids. Admitted to MICU for further observation and management. (05 Sep 2021 03:05)    Patient seen and examined at bedside. Endorses pain in her lower back and asking to be repositioned. Otherwise denies complaint. Reached out to patient's reported HCP, she remains on a mindset for recovery. Further collateral obtained from Paulette Bowers. She states that the patient was still functional and active immediately prior to this admission.    PERTINENT PM/SXH:   HTN (hypertension)  HLD (hyperlipidemia)  Osteoarthritis  Hyponatremia  Altered mental status  Urinary tract infection    FAMILY HISTORY:  Non-contributory in first degree relatives    ITEMS NOT CHECKED ARE NOT PRESENT    SOCIAL HISTORY:   Significant other/partner:  []  Children:  []  Religious/Spirituality:  Substance hx:  []   Tobacco hx:  []   Alcohol hx: []   Home Opioid hx:  [] I-Stop Reference No: [id number]  - no active Rx's  Living Situation: []Home  [x]Long term care  []Rehab []Other    ADVANCE DIRECTIVES:    [x]MOLST  []Living Will  DECISION MAKER(s):  [x] Health Care Proxy(s)  [] Surrogate(s)  [] Guardian           Name(s)/Phone Number(s): Paulette Bowers, [phone]    BASELINE (I)ADLs (prior to admission):  [place]: []Total  [x] Moderate []Dependent    ALLERGIES:  iodine containing compounds (Unknown)  lisinopril (Unknown)    MEDICATIONS  (STANDING):  albuterol/ipratropium for Nebulization 3 milliLiter(s) Nebulizer every 6 hours  amLODIPine   Tablet 10 milliGRAM(s) Oral daily  atorvastatin 20 milliGRAM(s) Oral at bedtime  chlorhexidine 4% Liquid 1 Application(s) Topical <User Schedule>  dextrose 40% Gel 15 Gram(s) Oral once  dextrose 5%. 1000 milliLiter(s) (50 mL/Hr) IV Continuous <Continuous>  dextrose 5%. 1000 milliLiter(s) (100 mL/Hr) IV Continuous <Continuous>  dextrose 50% Injectable 25 Gram(s) IV Push once  dextrose 50% Injectable 12.5 Gram(s) IV Push once  dextrose 50% Injectable 25 Gram(s) IV Push once  enoxaparin Injectable 40 milliGRAM(s) SubCutaneous every 24 hours  furosemide    Tablet 20 milliGRAM(s) Oral every 24 hours  glucagon  Injectable 1 milliGRAM(s) IntraMuscular once  insulin lispro (ADMELOG) corrective regimen sliding scale   SubCutaneous every 6 hours  mirtazapine 15 milliGRAM(s) Oral daily  nystatin Cream 1 Application(s) Topical two times a day    MEDICATIONS  (PRN):  acetaminophen   Tablet .. 650 milliGRAM(s) Oral every 6 hours PRN Mild Pain (1 - 3), Moderate Pain (4 - 6)    PRESENT SYMPTOMS: []Unable to obtain due to poor mentation/encephalopathy  Source if other than patient:  []Family   []Team     Pain: [x] yes [ ] no  QOL impact - bothersome   Location - sacrum                   Aggravating Factors - pressure, immobility  Quality - aching  Radiation - none  Timing - constant  Severity (0-10 scale) - unable to answer  Minimal Acceptable Level (0-10 scale) -    PAIN AD Score:  [email or web address] (press ctrl +  left click to view)    Dyspnea:                           []Mild  []Moderate []Severe  Anxiety:                             []Mild []Moderate []Severe  Fatigue:                             []Mild []Moderate []Severe  Nausea:                             []Mild []Moderate []Severe  Loss of Appetite:              []Mild []Moderate []Severe  Constipation:                    []Mild []Moderate []Severe    Other Symptoms:  [x]All other review of systems negative     Palliative Performance Status Version 2:  40%    [email or web address]    PHYSICAL EXAM:  GENERAL:  [x]Alert  [x]Oriented x2   []Lethargic  []Cachexia  []Unarousable  [x]Verbal  []Non-Verbal  Behavioral:   []Anxiety  [x]Delirium []Agitation []Cooperative  HEENT:  []Normal   [x]Dry mouth   []ET Tube/Trach  []Oral lesions  PULMONARY:   [x]Clear []Tachypnea  []Audible excessive secretions   []Rhonchi        []Right []Left []Bilateral  []Crackles        []Right []Left []Bilateral  []Wheezing     []Right []Left []Bilateral  CARDIOVASCULAR:    [x]Regular []Irregular []Tachy  []Al []Murmur []Other  GASTROINTESTINAL:  [x]Soft  []Distended   [x]+BS  [x]Non tender []Tender  []PEG []OGT/ NGT  Last BM: 9/13  GENITOURINARY:  [x]Normal [] Incontinent   []Oliguria/Anuria   []Liriano  MUSCULOSKELETAL:   []Normal   [x]Weakness  []Bed/Wheelchair bound []Edema  NEUROLOGIC:   []No focal deficits  [x]Cognitive impairment  []Dysphagia []Dysarthria []Paresis []Encephalopathic   SKIN:   [x]Normal   []Pressure ulcer(s)  []Rash    CRITICAL CARE:  [ ]Shock Present  [ ]Septic [ ]Cardiogenic [ ]Neurologic [ ]Hypovolemic  [ ]Vasopressors [ ]Inotropes   [ ]Respiratory failure present [ ]Mechanical Ventilation [ ]Non-invasive ventilatory support [ ]High-Flow  [ ]Acute  [ ]Chronic [ ]Hypoxic  [ ]Hypercarbic  [ ]Other organ failure    Vital Signs Last 24 Hrs  T(C): 36.7 (14 Sep 2021 16:13), Max: 37.4 (14 Sep 2021 06:54)  T(F): 98.1 (14 Sep 2021 16:13), Max: 99.3 (14 Sep 2021 06:54)  HR: 82 (14 Sep 2021 16:13) (67 - 95)  BP: 120/83 (14 Sep 2021 16:13) (118/76 - 135/79)  BP(mean): --  RR: 16 (14 Sep 2021 16:13) (16 - 18)  SpO2: 97% (14 Sep 2021 16:13) (93% - 97%) I&O's Summary    LABS:                        9.3    5.32  )-----------( 200      ( 14 Sep 2021 08:04 )             32.2   09-14    142  |  97  |  29<H>  ----------------------------<  129<H>  4.0   |  32<H>  |  0.70    Ca    9.8      14 Sep 2021 08:04  Phos  2.9     09-14  Mg     1.7     09-14    TPro  8.0  /  Alb  2.8<L>  /  TBili  0.2  /  DBili  x   /  AST  16  /  ALT  10  /  AlkPhos  77  09-14      RADIOLOGY & ADDITIONAL STUDIES:  < from: Xray Chest 1 View- PORTABLE-Urgent (Xray Chest 1 View- PORTABLE-Urgent .) (09.10.21 @ 17:51) >  No acute infiltrates. Small bilateral effusions with improvement in comparison to prior examination of the chest 9/8/2021. Limited inspiration    PROTEIN CALORIE MALNUTRITION PRESENT: [ ]mild [ ]moderate [ ]severe [ ]underweight [ ]morbid obesity  []PPSV2 < or = to 30% []significant weight loss  []poor nutritional intake []catabolic state []anasarca     Artificial Nutrition []     REFERRALS:  [x]Social Work  []Case management []PT/OT []Chaplaincy  []Hospice  []Patient/Family Support    DISCUSSION OF CASE: Paulette Bowers - to obtain additional history and to provide emotional support     Care Coordination/Goals of Care Document:     PROGRESS NOTE  Date & Time of Note   2021-09-14 15:05      Notes: Chart reviewed and appreciated. Patient discussed during IDR. As per  medical team, patient is medically ready for discharge. SW and CM spoke with  patient's niece/HCP Paulette over the phone to discuss discharge plan. SW  informed niece that patient would need to be home for evaluation for HHA; as  per [facility], patient has no active HHA auth on file and needs a new  evaluation to determine HHA hours. Niece expressed understanding of this. Niece  states that she is unable to care for patient at home, requesting that patient  return to [facility] upon discharge; patient was a long-term resident at [facility] PTA at  [facility]. SW sent SNF referral to [facility]; patient medically accepted, [facility] requesting  that patient transfer tomorrow 9/15 at 10AM. Family aware and in agreement.    SW scheduled BLS ambulance via [facility] ([phone]) for tomorrow 9/15 at  10AM, trip# [id number]. Family/MD/RN made aware.    Electronically signed by:  Luis Hamlin  Electronically signed on:  2021-09-14  15:39           PALLIATIVE MEDICINE COORDINATION OF CARE DOCUMENTATION: [x] Inpatient Consult  Non-Face-to-Face prolonged service provided that relates to (face-to-face) care that has or will occur and ongoing patient management, including one or more of the following: - Reviewed documentation from other physicians and other health care professional services - Reviewed medical records and diagnostic / radiology study results - Coordination with patient's support system  ************************************************************************  MEDICATION REVIEW:  - See Medication List Above    ISTOP REFERENCE:   - no active Rx's / see ISTOP Chart Note  - PRN usage: NO PRN'S  ------------------------------------------------------------------------  COORDINATION OF CARE:  - Palliative Care consulted for: GOC / Symptom Management  - Patient (to be) assessed: 9/14/21  - Patient previously seen by Palliative Care service: NO    ADVANCE CARE PLANNING  - Code status: DNR with trial of Intubation  - MOLST reviewed in chart: YES; None found on Alpha  - HCP/Surrogate: Paulette Bowers  - [facility] documents: NONE found on [facility]  - HCP/Living will/Other Advanced Directives in Alpha: NONE found on Alpha  ------------------------------------------------------------------------  CARE PROVIDER DOCUMENTATION:  - SW/CM notes: Planned for discharge back to [facility] tomorrow  - Medicine notes: mental status is improving, multifactorial etiology of encephalopathy    PLAN OF CARE  - Known admissions in past year: 0  - Current admit date: 9/5/21  - LOS: 9  - LACE score: 8  - Current dispo plan: LTC  ------------------------------------------------------------------------  - Time Spent/Chart reviewed: 31 Minutes [including time used to gather, review and transfer data]  - Start: 8:00AM  - End: 8:31AM    Prolonged services rendered, as part of this patient's care provided by Palliative Medicine, include: i. chart review for provider and ancillary service documentation, ii. pertinent diagnostics including laboratory and imaging studies, iii. medication review including PRN use, iv. admission history including previous palliative care encounters and [facility] notes, v. advance care planning documents including HCP and MOLST forms in Alpha. Part of Palliative Medicine extended evaluation and management also involves coordination of care with our IDT, the primary and consulting teams, and unit CM/SW and Hospice if eligible. Recommendations based on the information gathered and discussed are outlined in the A/P of Palliative notes.

## 2021-09-14 NOTE — CONSULT NOTE ADULT - PROBLEM SELECTOR RECOMMENDATION 4
.  Patient is DNR with trial of Intubation  -no changes desired by family at this time  -introduced the role of hospice services at LTC but family is not ready for this

## 2021-09-14 NOTE — CONSULT NOTE ADULT - TIME BILLING
case as noted above
Emotional Support/Supportive Care  Exploration of GOC  Clarifying Potential Disease Trajectories

## 2021-09-14 NOTE — PROGRESS NOTE ADULT - SUBJECTIVE AND OBJECTIVE BOX
Patient is a 89y old  Female who presents with a chief complaint of Hypercarbic respiratory failure, Hyponatremia (14 Sep 2021 06:14)      INTERVAL HPI/OVERNIGHT EVENTS:    Pt. seen and examined earlier today  Pt. feels well, reports feeling thirsty, otherwise no complaints  Pt. denies F/C, CP, SOB, cough    Review of Systems: 12 point review of systems otherwise negative    MEDICATIONS  (STANDING):  albuterol/ipratropium for Nebulization 3 milliLiter(s) Nebulizer every 6 hours  amLODIPine   Tablet 10 milliGRAM(s) Oral daily  atorvastatin 20 milliGRAM(s) Oral at bedtime  chlorhexidine 4% Liquid 1 Application(s) Topical <User Schedule>  dextrose 40% Gel 15 Gram(s) Oral once  dextrose 5%. 1000 milliLiter(s) (50 mL/Hr) IV Continuous <Continuous>  dextrose 5%. 1000 milliLiter(s) (100 mL/Hr) IV Continuous <Continuous>  dextrose 50% Injectable 25 Gram(s) IV Push once  dextrose 50% Injectable 12.5 Gram(s) IV Push once  dextrose 50% Injectable 25 Gram(s) IV Push once  enoxaparin Injectable 40 milliGRAM(s) SubCutaneous every 24 hours  furosemide    Tablet 20 milliGRAM(s) Oral every 24 hours  glucagon  Injectable 1 milliGRAM(s) IntraMuscular once  insulin lispro (ADMELOG) corrective regimen sliding scale   SubCutaneous every 6 hours  mirtazapine 15 milliGRAM(s) Oral daily  nystatin Cream 1 Application(s) Topical two times a day    MEDICATIONS  (PRN):  acetaminophen   Tablet .. 650 milliGRAM(s) Oral every 6 hours PRN Mild Pain (1 - 3), Moderate Pain (4 - 6)      Allergies    iodine containing compounds (Unknown)  lisinopril (Unknown)    Intolerances          Vital Signs Last 24 Hrs  T(C): 36.7 (14 Sep 2021 16:13), Max: 37.4 (14 Sep 2021 06:54)  T(F): 98.1 (14 Sep 2021 16:13), Max: 99.3 (14 Sep 2021 06:54)  HR: 82 (14 Sep 2021 16:13) (67 - 95)  BP: 120/83 (14 Sep 2021 16:13) (118/76 - 135/79)  BP(mean): --  RR: 16 (14 Sep 2021 16:13) (16 - 18)  SpO2: 97% (14 Sep 2021 16:13) (93% - 97%)  CAPILLARY BLOOD GLUCOSE      POCT Blood Glucose.: 103 mg/dL (14 Sep 2021 12:31)  POCT Blood Glucose.: 112 mg/dL (14 Sep 2021 07:18)  POCT Blood Glucose.: 79 mg/dL (13 Sep 2021 23:59)  POCT Blood Glucose.: 87 mg/dL (13 Sep 2021 17:27)        Physical Exam:  (earlier today)  Daily     Daily   General:  non-toxic appearing in NAD  HEENT:  MMM  CV:  RRR, no JVD, no S3  Lungs:  CTA B/L anteriorly; normal WOB on 2L NC  Abdomen:  soft NT ND  Extremities:  no edema B/L LE  Skin:  WWP  :  +PrimaFit  Neuro:  AAOx2-3    LABS:                        9.3    5.32  )-----------( 200      ( 14 Sep 2021 08:04 )             32.2     09-14    142  |  97  |  29<H>  ----------------------------<  129<H>  4.0   |  32<H>  |  0.70    Ca    9.8      14 Sep 2021 08:04  Phos  2.9     09-14  Mg     1.7     09-14    TPro  8.0  /  Alb  2.8<L>  /  TBili  0.2  /  DBili  x   /  AST  16  /  ALT  10  /  AlkPhos  77  09-14

## 2021-09-14 NOTE — CONSULT NOTE ADULT - PROBLEM SELECTOR RECOMMENDATION 3
.  PPSV = 40%, requires assistance for most ADLs  -PT recommending WANDA  -c/w supportive care, OOB, encourage movement

## 2021-09-14 NOTE — CONSULT NOTE ADULT - PROBLEM SELECTOR RECOMMENDATION 2
.  Multifactorial but improving   -family still feels that the patient is not at her pre-hospital baseline  -began managing expectations given lengthy, complicated hospital stay in elderly patient

## 2021-09-14 NOTE — PROGRESS NOTE ADULT - PROBLEM SELECTOR PLAN 1
d/t hypervolemia, in setting of moderate pHTN; no e/o CHF on TTE; now s/p extubation; cont. Lasix 20mg PO qday, monitor volume status; appreciate Pulm recs, outpatient f/u arranged, may need additional work-up; wean off O2 as tolerated; cont. dysphagia diet w/ aspiration precautions, per SLP recs

## 2021-09-14 NOTE — PROGRESS NOTE ADULT - SUBJECTIVE AND OBJECTIVE BOX
*INCOMPLETE*    OVERNIGHT EVENTS: Patient c/o pain. Given tylenol. Slightly hypoglycemic 79, encouraged PO juice.     SUBJECTIVE / INTERVAL HPI: Patient seen and examined at bedside. She denies any fever/chills, CP, SOB, cough.     ROS negative except if stated in HPI.      MEDICATIONS  (STANDING):  albuterol/ipratropium for Nebulization 3 milliLiter(s) Nebulizer every 6 hours  amLODIPine   Tablet 10 milliGRAM(s) Oral daily  atorvastatin 20 milliGRAM(s) Oral at bedtime  chlorhexidine 4% Liquid 1 Application(s) Topical <User Schedule>  dextrose 40% Gel 15 Gram(s) Oral once  dextrose 5%. 1000 milliLiter(s) (50 mL/Hr) IV Continuous <Continuous>  dextrose 5%. 1000 milliLiter(s) (100 mL/Hr) IV Continuous <Continuous>  dextrose 50% Injectable 25 Gram(s) IV Push once  dextrose 50% Injectable 12.5 Gram(s) IV Push once  dextrose 50% Injectable 25 Gram(s) IV Push once  enoxaparin Injectable 40 milliGRAM(s) SubCutaneous every 24 hours  furosemide    Tablet 20 milliGRAM(s) Oral every 24 hours  glucagon  Injectable 1 milliGRAM(s) IntraMuscular once  insulin lispro (ADMELOG) corrective regimen sliding scale   SubCutaneous every 6 hours  mirtazapine 15 milliGRAM(s) Oral daily  nystatin Cream 1 Application(s) Topical two times a day    MEDICATIONS  (PRN):  acetaminophen   Tablet .. 650 milliGRAM(s) Oral every 6 hours PRN Mild Pain (1 - 3), Moderate Pain (4 - 6)    Allergies    iodine containing compounds (Unknown)  lisinopril (Unknown)    Intolerances        VITAL SIGNS:  Vital Signs Last 24 Hrs  T(C): 36.8 (13 Sep 2021 20:51), Max: 36.8 (13 Sep 2021 20:51)  T(F): 98.3 (13 Sep 2021 20:51), Max: 98.3 (13 Sep 2021 20:51)  HR: 84 (13 Sep 2021 20:51) (79 - 84)  BP: 118/76 (13 Sep 2021 20:51) (118/76 - 137/70)  BP(mean): --  RR: 18 (13 Sep 2021 20:51) (16 - 38)  SpO2: 96% (13 Sep 2021 20:51) (89% - 98%)        PHYSICAL EXAM:  General: Elderly female laying comfortably in bed, NAD  Neuro: AAOx3, follows commands, motor and sensation intact.  HEENT: NC/AT; PERRL, clear conjunctiva, dentures   Neck: supple, trachea midline  Respiratory: CTAB, no use of accessory muscles.   Cardiovascular: +S1/S2; RRR  Abdomen: soft, NT/ND; +BS x4, small umbilical hernia  Extremities: WWP, 2+ peripheral pulses b/l; no LE edema  Skin: +Stage 2 sacral decubitus ulcer dressing c/d/i, +healed ulcer on upper thoracic, normal color and turgor; no rashes    LABS:                        9.3    4.41  )-----------( 183      ( 12 Sep 2021 07:47 )             32.6     09-12    137  |  98  |  13  ----------------------------<  80  4.0   |  28  |  0.45<L>    Ca    9.2      12 Sep 2021 07:47  Phos  3.1     09-12  Mg     1.6     09-12    TPro  7.5  /  Alb  2.8<L>  /  TBili  0.2  /  DBili  x   /  AST  19  /  ALT  9<L>  /  AlkPhos  68  09-12        CAPILLARY BLOOD GLUCOSE      POCT Blood Glucose.: 79 mg/dL (13 Sep 2021 23:59)  POCT Blood Glucose.: 87 mg/dL (13 Sep 2021 17:27)  POCT Blood Glucose.: 81 mg/dL (13 Sep 2021 13:25)  POCT Blood Glucose.: 87 mg/dL (13 Sep 2021 06:34)          RADIOLOGY & ADDITIONAL TESTS: Reviewed. OVERNIGHT EVENTS: Patient c/o pain. Given tylenol. Slightly hypoglycemic 79, encouraged PO juice.     SUBJECTIVE / INTERVAL HPI: Patient seen and examined at bedside. Pt alert and interactive. She is in no agitation. She reports no complaints. She denies any fever/chills, CP, SOB, cough.     ROS negative except if stated in HPI.      MEDICATIONS  (STANDING):  albuterol/ipratropium for Nebulization 3 milliLiter(s) Nebulizer every 6 hours  amLODIPine   Tablet 10 milliGRAM(s) Oral daily  atorvastatin 20 milliGRAM(s) Oral at bedtime  chlorhexidine 4% Liquid 1 Application(s) Topical <User Schedule>  dextrose 40% Gel 15 Gram(s) Oral once  dextrose 5%. 1000 milliLiter(s) (50 mL/Hr) IV Continuous <Continuous>  dextrose 5%. 1000 milliLiter(s) (100 mL/Hr) IV Continuous <Continuous>  dextrose 50% Injectable 25 Gram(s) IV Push once  dextrose 50% Injectable 12.5 Gram(s) IV Push once  dextrose 50% Injectable 25 Gram(s) IV Push once  enoxaparin Injectable 40 milliGRAM(s) SubCutaneous every 24 hours  furosemide    Tablet 20 milliGRAM(s) Oral every 24 hours  glucagon  Injectable 1 milliGRAM(s) IntraMuscular once  insulin lispro (ADMELOG) corrective regimen sliding scale   SubCutaneous every 6 hours  mirtazapine 15 milliGRAM(s) Oral daily  nystatin Cream 1 Application(s) Topical two times a day    MEDICATIONS  (PRN):  acetaminophen   Tablet .. 650 milliGRAM(s) Oral every 6 hours PRN Mild Pain (1 - 3), Moderate Pain (4 - 6)    Allergies    iodine containing compounds (Unknown)  lisinopril (Unknown)    Intolerances        VITAL SIGNS:  Vital Signs Last 24 Hrs  T(C): 36.8 (13 Sep 2021 20:51), Max: 36.8 (13 Sep 2021 20:51)  T(F): 98.3 (13 Sep 2021 20:51), Max: 98.3 (13 Sep 2021 20:51)  HR: 84 (13 Sep 2021 20:51) (79 - 84)  BP: 118/76 (13 Sep 2021 20:51) (118/76 - 137/70)  BP(mean): --  RR: 18 (13 Sep 2021 20:51) (16 - 38)  SpO2: 96% (13 Sep 2021 20:51) (89% - 98%)        PHYSICAL EXAM:  General: Elderly female laying comfortably in bed, NAD  Neuro: AAOx3, follows commands, motor and sensation intact.  HEENT: NC/AT; PERRL, clear conjunctiva, dentures   Neck: supple, trachea midline  Respiratory: CTAB, no use of accessory muscles.   Cardiovascular: +S1/S2; RRR  Abdomen: soft, NT/ND; +BS x4, small umbilical hernia  Extremities: WWP, 2+ peripheral pulses b/l; no LE edema    LABS:                        9.3    4.41  )-----------( 183      ( 12 Sep 2021 07:47 )             32.6     09-12    137  |  98  |  13  ----------------------------<  80  4.0   |  28  |  0.45<L>    Ca    9.2      12 Sep 2021 07:47  Phos  3.1     09-12  Mg     1.6     09-12    TPro  7.5  /  Alb  2.8<L>  /  TBili  0.2  /  DBili  x   /  AST  19  /  ALT  9<L>  /  AlkPhos  68  09-12        CAPILLARY BLOOD GLUCOSE      POCT Blood Glucose.: 79 mg/dL (13 Sep 2021 23:59)  POCT Blood Glucose.: 87 mg/dL (13 Sep 2021 17:27)  POCT Blood Glucose.: 81 mg/dL (13 Sep 2021 13:25)  POCT Blood Glucose.: 87 mg/dL (13 Sep 2021 06:34)          RADIOLOGY & ADDITIONAL TESTS: Reviewed.

## 2021-09-14 NOTE — CONSULT NOTE ADULT - PROBLEM SELECTOR RECOMMENDATION 5
.  Complex medical decision making / symptom management in the setting of advanced illness.    Palliative Care will SIGN OFF: Goals are established.  Emotional support provided, questions answered.    For new or uncontrolled symptoms, please call Palliative Care at [phone]. The service is available 24/7 (including nights & weekends) to provide symptom management recommendations over the phone as appropriate

## 2021-09-14 NOTE — CONSULT NOTE ADULT - PROBLEM SELECTOR RECOMMENDATION 9
.  -recommend Tylenol 650mg PO q8h ATC  -consider Oxy IR 2.5mg PO q6h PRN for Severe Pain    Patient and niece endore consistent back pain but has only received 1 PRN in past 24hrs.

## 2021-09-14 NOTE — CONSULT NOTE ADULT - ASSESSMENT
Full Note to Follow    ·	consider Tylenol 650mg PO q8h ATC as patient ensures consistent back pain but has only received 1 PRN in past 24hrs  ·	patient is planned for discharge back to LTC tomorrow, will introduce the role of hospice services given the patient's bedbound status -> would not affect discharge plan as patient would need to be evaluated at her facility Full Note to Follow    ·	consider Tylenol 650mg PO q8h ATC as patient ensures consistent back pain but has only received 1 PRN in past 24hrs  ·	consider Oxy IR 2.5mg PO q6h PRN for Severe Pain  ·	patient is planned for discharge back to LTC tomorrow -> would not affect discharge plan as patient would need to be evaluated at her facility  ·	introduced the role of hospice services but family states that patient has been ambulatory with assistance which would affect her hospice eligibility Full Note to Follow    ·	consider Tylenol 650mg PO q8h ATC as patient ensures consistent back pain but has only received 1 PRN in past 24hrs  ·	consider Oxy IR 2.5mg PO q6h PRN for Severe Pain  ·	patient is planned for discharge back to LTC tomorrow -> would not affect discharge plan as patient would need to be evaluated at her facility  ·	introduced the role of hospice services but family states that patient has been ambulatory with assistance which would preclude her hospice eligibility Full Note to Follow    ·	recommend Tylenol 650mg PO q8h ATC as patient ensures consistent back pain but has only received 1 PRN in past 24hrs  ·	consider Oxy IR 2.5mg PO q6h PRN for Severe Pain  ·	patient is planned for discharge back to LTC tomorrow; introduced the role of hospice services but family is not ready for this 89yo F with PMH of HTN, HLD, and OA p/w respiratory failure requiring intubation. Palliative consulted for complex medical decision making in the setting of advanced illness.    ·	recommend Tylenol 650mg PO q8h ATC as patient ensures consistent back pain but has only received 1 PRN in past 24hrs  ·	consider Oxy IR 2.5mg PO q6h PRN for Severe Pain  ·	patient is planned for discharge back to LTC tomorrow; introduced the role of hospice services but family is not ready for this

## 2021-09-15 VITALS
RESPIRATION RATE: 16 BRPM | SYSTOLIC BLOOD PRESSURE: 136 MMHG | OXYGEN SATURATION: 99 % | HEART RATE: 60 BPM | TEMPERATURE: 98 F | DIASTOLIC BLOOD PRESSURE: 70 MMHG

## 2021-09-15 DIAGNOSIS — M54.5 LOW BACK PAIN: ICD-10-CM

## 2021-09-15 LAB
GLUCOSE BLDC GLUCOMTR-MCNC: 144 MG/DL — HIGH (ref 70–99)
GLUCOSE BLDC GLUCOMTR-MCNC: 98 MG/DL — SIGNIFICANT CHANGE UP (ref 70–99)

## 2021-09-15 PROCEDURE — 93005 ELECTROCARDIOGRAM TRACING: CPT

## 2021-09-15 PROCEDURE — 85730 THROMBOPLASTIN TIME PARTIAL: CPT

## 2021-09-15 PROCEDURE — 82803 BLOOD GASES ANY COMBINATION: CPT

## 2021-09-15 PROCEDURE — 84484 ASSAY OF TROPONIN QUANT: CPT

## 2021-09-15 PROCEDURE — 71250 CT THORAX DX C-: CPT | Mod: MA

## 2021-09-15 PROCEDURE — 84300 ASSAY OF URINE SODIUM: CPT

## 2021-09-15 PROCEDURE — 85025 COMPLETE CBC W/AUTO DIFF WBC: CPT

## 2021-09-15 PROCEDURE — 87449 NOS EACH ORGANISM AG IA: CPT

## 2021-09-15 PROCEDURE — 80053 COMPREHEN METABOLIC PANEL: CPT

## 2021-09-15 PROCEDURE — 82962 GLUCOSE BLOOD TEST: CPT

## 2021-09-15 PROCEDURE — 96375 TX/PRO/DX INJ NEW DRUG ADDON: CPT

## 2021-09-15 PROCEDURE — 99292 CRITICAL CARE ADDL 30 MIN: CPT

## 2021-09-15 PROCEDURE — 97161 PT EVAL LOW COMPLEX 20 MIN: CPT

## 2021-09-15 PROCEDURE — 94002 VENT MGMT INPAT INIT DAY: CPT

## 2021-09-15 PROCEDURE — 83540 ASSAY OF IRON: CPT

## 2021-09-15 PROCEDURE — 84100 ASSAY OF PHOSPHORUS: CPT

## 2021-09-15 PROCEDURE — 83605 ASSAY OF LACTIC ACID: CPT

## 2021-09-15 PROCEDURE — 82553 CREATINE MB FRACTION: CPT

## 2021-09-15 PROCEDURE — 36415 COLL VENOUS BLD VENIPUNCTURE: CPT

## 2021-09-15 PROCEDURE — 87640 STAPH A DNA AMP PROBE: CPT

## 2021-09-15 PROCEDURE — 70450 CT HEAD/BRAIN W/O DYE: CPT | Mod: MA

## 2021-09-15 PROCEDURE — 86769 SARS-COV-2 COVID-19 ANTIBODY: CPT

## 2021-09-15 PROCEDURE — 82728 ASSAY OF FERRITIN: CPT

## 2021-09-15 PROCEDURE — 84145 PROCALCITONIN (PCT): CPT

## 2021-09-15 PROCEDURE — 82550 ASSAY OF CK (CPK): CPT

## 2021-09-15 PROCEDURE — 94640 AIRWAY INHALATION TREATMENT: CPT

## 2021-09-15 PROCEDURE — 82330 ASSAY OF CALCIUM: CPT

## 2021-09-15 PROCEDURE — 87635 SARS-COV-2 COVID-19 AMP PRB: CPT

## 2021-09-15 PROCEDURE — 87040 BLOOD CULTURE FOR BACTERIA: CPT

## 2021-09-15 PROCEDURE — 99239 HOSP IP/OBS DSCHRG MGMT >30: CPT

## 2021-09-15 PROCEDURE — 92526 ORAL FUNCTION THERAPY: CPT

## 2021-09-15 PROCEDURE — 86900 BLOOD TYPING SEROLOGIC ABO: CPT

## 2021-09-15 PROCEDURE — 86850 RBC ANTIBODY SCREEN: CPT

## 2021-09-15 PROCEDURE — 83935 ASSAY OF URINE OSMOLALITY: CPT

## 2021-09-15 PROCEDURE — 82570 ASSAY OF URINE CREATININE: CPT

## 2021-09-15 PROCEDURE — 99291 CRITICAL CARE FIRST HOUR: CPT | Mod: 25

## 2021-09-15 PROCEDURE — 82746 ASSAY OF FOLIC ACID SERUM: CPT

## 2021-09-15 PROCEDURE — 31500 INSERT EMERGENCY AIRWAY: CPT

## 2021-09-15 PROCEDURE — 84295 ASSAY OF SERUM SODIUM: CPT

## 2021-09-15 PROCEDURE — 83735 ASSAY OF MAGNESIUM: CPT

## 2021-09-15 PROCEDURE — 71045 X-RAY EXAM CHEST 1 VIEW: CPT

## 2021-09-15 PROCEDURE — 80048 BASIC METABOLIC PNL TOTAL CA: CPT

## 2021-09-15 PROCEDURE — 87899 AGENT NOS ASSAY W/OPTIC: CPT

## 2021-09-15 PROCEDURE — 83036 HEMOGLOBIN GLYCOSYLATED A1C: CPT

## 2021-09-15 PROCEDURE — 87086 URINE CULTURE/COLONY COUNT: CPT

## 2021-09-15 PROCEDURE — 83930 ASSAY OF BLOOD OSMOLALITY: CPT

## 2021-09-15 PROCEDURE — 81001 URINALYSIS AUTO W/SCOPE: CPT

## 2021-09-15 PROCEDURE — 84443 ASSAY THYROID STIM HORMONE: CPT

## 2021-09-15 PROCEDURE — 97530 THERAPEUTIC ACTIVITIES: CPT

## 2021-09-15 PROCEDURE — 83550 IRON BINDING TEST: CPT

## 2021-09-15 PROCEDURE — 87070 CULTURE OTHR SPECIMN AEROBIC: CPT

## 2021-09-15 PROCEDURE — 93306 TTE W/DOPPLER COMPLETE: CPT

## 2021-09-15 PROCEDURE — 96374 THER/PROPH/DIAG INJ IV PUSH: CPT

## 2021-09-15 PROCEDURE — 84132 ASSAY OF SERUM POTASSIUM: CPT

## 2021-09-15 PROCEDURE — 87641 MR-STAPH DNA AMP PROBE: CPT

## 2021-09-15 PROCEDURE — 85027 COMPLETE CBC AUTOMATED: CPT

## 2021-09-15 PROCEDURE — 86901 BLOOD TYPING SEROLOGIC RH(D): CPT

## 2021-09-15 PROCEDURE — 92610 EVALUATE SWALLOWING FUNCTION: CPT

## 2021-09-15 PROCEDURE — 85610 PROTHROMBIN TIME: CPT

## 2021-09-15 PROCEDURE — 82607 VITAMIN B-12: CPT

## 2021-09-15 RX ORDER — OXYCODONE HYDROCHLORIDE 5 MG/1
2.5 TABLET ORAL EVERY 6 HOURS
Refills: 0 | Status: DISCONTINUED | OUTPATIENT
Start: 2021-09-15 | End: 2021-09-15

## 2021-09-15 RX ORDER — ACETAMINOPHEN 500 MG
2 TABLET ORAL
Qty: 0 | Refills: 0 | DISCHARGE
Start: 2021-09-15

## 2021-09-15 RX ORDER — ACETAMINOPHEN 500 MG
650 TABLET ORAL EVERY 8 HOURS
Refills: 0 | Status: DISCONTINUED | OUTPATIENT
Start: 2021-09-15 | End: 2021-09-15

## 2021-09-15 RX ADMIN — AMLODIPINE BESYLATE 10 MILLIGRAM(S): 2.5 TABLET ORAL at 06:41

## 2021-09-15 RX ADMIN — OXYCODONE HYDROCHLORIDE 2.5 MILLIGRAM(S): 5 TABLET ORAL at 10:30

## 2021-09-15 RX ADMIN — Medication 20 MILLIGRAM(S): at 09:34

## 2021-09-15 RX ADMIN — NYSTATIN CREAM 1 APPLICATION(S): 100000 CREAM TOPICAL at 06:42

## 2021-09-15 RX ADMIN — OXYCODONE HYDROCHLORIDE 2.5 MILLIGRAM(S): 5 TABLET ORAL at 09:34

## 2021-09-15 RX ADMIN — Medication 3 MILLILITER(S): at 06:41

## 2021-09-15 NOTE — PROGRESS NOTE ADULT - SUBJECTIVE AND OBJECTIVE BOX
Patient is a 89y old  Female who presents with a chief complaint of Hypercarbic respiratory failure, Hyponatremia (14 Sep 2021 16:59)      INTERVAL HPI/OVERNIGHT EVENTS:    Pt. seen and examined earlier today  Pt. feels well, has no complaints  Pt. denies F/C, CP, SOB, cough    Review of Systems: 12 point review of systems otherwise negative    MEDICATIONS  (STANDING):  acetaminophen   Tablet .. 650 milliGRAM(s) Oral every 8 hours  albuterol/ipratropium for Nebulization 3 milliLiter(s) Nebulizer every 6 hours  amLODIPine   Tablet 10 milliGRAM(s) Oral daily  atorvastatin 20 milliGRAM(s) Oral at bedtime  chlorhexidine 4% Liquid 1 Application(s) Topical <User Schedule>  dextrose 40% Gel 15 Gram(s) Oral once  dextrose 5%. 1000 milliLiter(s) (50 mL/Hr) IV Continuous <Continuous>  dextrose 5%. 1000 milliLiter(s) (100 mL/Hr) IV Continuous <Continuous>  dextrose 50% Injectable 25 Gram(s) IV Push once  dextrose 50% Injectable 12.5 Gram(s) IV Push once  dextrose 50% Injectable 25 Gram(s) IV Push once  enoxaparin Injectable 40 milliGRAM(s) SubCutaneous every 24 hours  furosemide    Tablet 20 milliGRAM(s) Oral every 24 hours  glucagon  Injectable 1 milliGRAM(s) IntraMuscular once  insulin lispro (ADMELOG) corrective regimen sliding scale   SubCutaneous every 6 hours  mirtazapine 15 milliGRAM(s) Oral daily  nystatin Cream 1 Application(s) Topical two times a day    MEDICATIONS  (PRN):  oxyCODONE    IR 2.5 milliGRAM(s) Oral every 6 hours PRN Severe Pain (7 - 10)      Allergies    iodine containing compounds (Unknown)  lisinopril (Unknown)    Intolerances          Vital Signs Last 24 Hrs  T(C): 36.7 (15 Sep 2021 05:31), Max: 36.7 (14 Sep 2021 16:13)  T(F): 98.1 (15 Sep 2021 05:31), Max: 98.1 (14 Sep 2021 16:13)  HR: 60 (15 Sep 2021 05:31) (60 - 82)  BP: 136/70 (15 Sep 2021 05:31) (120/52 - 136/70)  BP(mean): --  RR: 16 (15 Sep 2021 05:31) (16 - 17)  SpO2: 99% (15 Sep 2021 05:31) (94% - 99%)  CAPILLARY BLOOD GLUCOSE      POCT Blood Glucose.: 98 mg/dL (15 Sep 2021 06:54)  POCT Blood Glucose.: 137 mg/dL (14 Sep 2021 23:51)  POCT Blood Glucose.: 161 mg/dL (14 Sep 2021 21:45)  POCT Blood Glucose.: 151 mg/dL (14 Sep 2021 17:54)  POCT Blood Glucose.: 103 mg/dL (14 Sep 2021 12:31)        Physical Exam:  (earlier today)  Daily     Daily   General:  non-toxic appearing in NAD  HEENT:  MMM  CV:  RRR, no JVD, no S3  Lungs:  CTA B/L anteriorly; normal WOB on 2L NC  Abdomen:  soft NT ND  Extremities:  no edema B/L LE  Skin:  WWP  :  +PrimaFit  Neuro:  AAOx2-3      LABS:                        9.3    5.32  )-----------( 200      ( 14 Sep 2021 08:04 )             32.2     09-14    142  |  97  |  29<H>  ----------------------------<  129<H>  4.0   |  32<H>  |  0.70    Ca    9.8      14 Sep 2021 08:04  Phos  2.9     09-14  Mg     1.7     09-14    TPro  8.0  /  Alb  2.8<L>  /  TBili  0.2  /  DBili  x   /  AST  16  /  ALT  10  /  AlkPhos  77  09-14

## 2021-09-15 NOTE — PROGRESS NOTE ADULT - PROBLEM SELECTOR PROBLEM 6
Candida rash of groin
Candida rash of groin
Prophylactic measure
Chronic low back pain
UTI (urinary tract infection)
Candida rash of groin

## 2021-09-15 NOTE — PROGRESS NOTE ADULT - TIME BILLING
Dispo: Pt. from [facility], but nieces would like to take her home when ready  if going home, will need to wean off O2 (vs. assess eligibility for home O2)  Palliative Care consult  DNR, not DNI (trial of intubation)  [facility] discussions ongoing
Dispo: Palliative Care consult  DNR, not DNI (trial of intubation)  [facility] discussions ongoing  Pt. from [facility], nieces would like to take her home when ready
Dispo: PT consult
Dispo: Palliative Care consult  DNR, not DNI (trial of intubation)  [facility] discussions ongoing  Pt. from [facility], nieces would like to take her home when ready
Dispo: medically-clear for d/c to [facility]  DNR, not DNI (trial of intubation)  Pt.'s nieces updated by housestaff
Dispo: medically-clear for d/c to [facility]  DNR, not DNI (trial of intubation)  Palliative Care following

## 2021-09-15 NOTE — PROGRESS NOTE ADULT - PROBLEM SELECTOR PLAN 7
no e/o bleeding; monitor CBC
cont. Nystatin
F: none  E: monitor, replete to K>4 and Mg>2  N: Puree diet with thin liquids  A: Lovenox  GI ppx: protonix   DISPO: RMF  CODE STATUS: DNR with trial intubation
no e/o bleeding; monitor CBC

## 2021-09-15 NOTE — PROGRESS NOTE ADULT - PROBLEM SELECTOR PROBLEM 1
Acute respiratory failure with hypoxia and hypercapnia
Metabolic encephalopathy
Acute respiratory failure with hypoxia and hypercapnia
Metabolic encephalopathy
Acute respiratory failure with hypoxia and hypercapnia

## 2021-09-15 NOTE — DISCHARGE NOTE NURSING/CASE MANAGEMENT/SOCIAL WORK - PATIENT PORTAL LINK FT
You can access the FollowMyHealth Patient Portal offered by [facility] by registering at the following website: [email or web address]. By joining [employer]’s FollowMyHealth portal, you will also be able to view your health information using other applications (apps) compatible with our system.

## 2021-09-15 NOTE — PROGRESS NOTE ADULT - PROBLEM SELECTOR PROBLEM 7
Anemia, unspecified
Anemia, unspecified
Prophylactic measure
Anemia, unspecified
Anemia, unspecified
Candida rash of groin
Anemia, unspecified

## 2021-09-15 NOTE — PROGRESS NOTE ADULT - PROBLEM SELECTOR PLAN 8
no e/o bleeding; monitor CBC
likely d/t diuresis; resolved; monitor BMP
likely d/t diuresis; # normalizing; monitor BMP
likely d/t diuresis; # normalizing; monitor BMP

## 2021-09-15 NOTE — DISCHARGE NOTE NURSING/CASE MANAGEMENT/SOCIAL WORK - NSDCFUADDAPPT_GEN_ALL_CORE_FT
You should receive a call from the office of Dr Opal Whitley (pulmonologist)([phone]) within the next two weeks for a follow-up appointment.

## 2021-09-15 NOTE — PROGRESS NOTE ADULT - REASON FOR ADMISSION
Hypercarbic respiratory failure, Hyponatremia

## 2021-09-29 DIAGNOSIS — J96.02 ACUTE RESPIRATORY FAILURE WITH HYPERCAPNIA: ICD-10-CM

## 2021-09-29 DIAGNOSIS — R13.10 DYSPHAGIA, UNSPECIFIED: ICD-10-CM

## 2021-09-29 DIAGNOSIS — Z20.822 CONTACT WITH AND (SUSPECTED) EXPOSURE TO COVID-19: ICD-10-CM

## 2021-09-29 DIAGNOSIS — E87.70 FLUID OVERLOAD, UNSPECIFIED: ICD-10-CM

## 2021-09-29 DIAGNOSIS — J90 PLEURAL EFFUSION, NOT ELSEWHERE CLASSIFIED: ICD-10-CM

## 2021-09-29 DIAGNOSIS — I47.2 VENTRICULAR TACHYCARDIA: ICD-10-CM

## 2021-09-29 DIAGNOSIS — Z66 DO NOT RESUSCITATE: ICD-10-CM

## 2021-09-29 DIAGNOSIS — Z74.01 BED CONFINEMENT STATUS: ICD-10-CM

## 2021-09-29 DIAGNOSIS — B37.2 CANDIDIASIS OF SKIN AND NAIL: ICD-10-CM

## 2021-09-29 DIAGNOSIS — J96.01 ACUTE RESPIRATORY FAILURE WITH HYPOXIA: ICD-10-CM

## 2021-09-29 DIAGNOSIS — I08.1 RHEUMATIC DISORDERS OF BOTH MITRAL AND TRICUSPID VALVES: ICD-10-CM

## 2021-09-29 DIAGNOSIS — I10 ESSENTIAL (PRIMARY) HYPERTENSION: ICD-10-CM

## 2021-09-29 DIAGNOSIS — E87.3 ALKALOSIS: ICD-10-CM

## 2021-09-29 DIAGNOSIS — J81.1 CHRONIC PULMONARY EDEMA: ICD-10-CM

## 2021-09-29 DIAGNOSIS — E78.5 HYPERLIPIDEMIA, UNSPECIFIED: ICD-10-CM

## 2021-09-29 DIAGNOSIS — I27.20 PULMONARY HYPERTENSION, UNSPECIFIED: ICD-10-CM

## 2021-09-29 DIAGNOSIS — N39.0 URINARY TRACT INFECTION, SITE NOT SPECIFIED: ICD-10-CM

## 2021-09-29 DIAGNOSIS — E87.1 HYPO-OSMOLALITY AND HYPONATREMIA: ICD-10-CM

## 2021-09-29 DIAGNOSIS — G92 TOXIC ENCEPHALOPATHY: ICD-10-CM

## 2021-09-29 DIAGNOSIS — R21 RASH AND OTHER NONSPECIFIC SKIN ERUPTION: ICD-10-CM

## 2021-10-12 NOTE — ED ADULT NURSE REASSESSMENT NOTE - NS ED NURSE REASSESS COMMENT FT1
assisted pt with bed changing, placed prima fit on the pt, started NS 0.9% 1liter on started, RT by bedside started BIPAP. 31976 Comprehensive

## 2021-11-24 ENCOUNTER — INPATIENT (INPATIENT)
Facility: HOSPITAL | Age: 86
LOS: 14 days | Discharge: NURSING, REHAB OR LONG-TERM CARE | DRG: 853 | End: 2021-12-09
Attending: INTERNAL MEDICINE | Admitting: SURGERY
Payer: MEDICARE

## 2021-11-24 VITALS
HEART RATE: 98 BPM | DIASTOLIC BLOOD PRESSURE: 71 MMHG | SYSTOLIC BLOOD PRESSURE: 106 MMHG | OXYGEN SATURATION: 90 % | HEIGHT: 64 IN | TEMPERATURE: 99 F | RESPIRATION RATE: 16 BRPM

## 2021-11-24 PROBLEM — E78.5 HYPERLIPIDEMIA, UNSPECIFIED: Chronic | Status: ACTIVE | Noted: 2021-09-05

## 2021-11-24 PROBLEM — M19.90 UNSPECIFIED OSTEOARTHRITIS, UNSPECIFIED SITE: Chronic | Status: ACTIVE | Noted: 2021-09-05

## 2021-11-24 PROBLEM — I10 ESSENTIAL (PRIMARY) HYPERTENSION: Chronic | Status: ACTIVE | Noted: 2021-09-05

## 2021-11-24 PROBLEM — R41.82 ALTERED MENTAL STATUS, UNSPECIFIED: Chronic | Status: ACTIVE | Noted: 2021-09-07

## 2021-11-24 PROBLEM — N39.0 URINARY TRACT INFECTION, SITE NOT SPECIFIED: Chronic | Status: ACTIVE | Noted: 2021-09-07

## 2021-11-24 PROBLEM — E87.1 HYPO-OSMOLALITY AND HYPONATREMIA: Chronic | Status: ACTIVE | Noted: 2021-09-07

## 2021-11-24 LAB
ALBUMIN SERPL ELPH-MCNC: 2.6 G/DL — LOW (ref 3.3–5)
ALBUMIN SERPL ELPH-MCNC: 3.3 G/DL — SIGNIFICANT CHANGE UP (ref 3.3–5)
ALBUMIN SERPL ELPH-MCNC: 3.6 G/DL — SIGNIFICANT CHANGE UP (ref 3.3–5)
ALBUMIN SERPL ELPH-MCNC: 3.6 G/DL — SIGNIFICANT CHANGE UP (ref 3.3–5)
ALP SERPL-CCNC: 68 U/L — SIGNIFICANT CHANGE UP (ref 40–120)
ALP SERPL-CCNC: 80 U/L — SIGNIFICANT CHANGE UP (ref 40–120)
ALP SERPL-CCNC: 85 U/L — SIGNIFICANT CHANGE UP (ref 40–120)
ALP SERPL-CCNC: 94 U/L — SIGNIFICANT CHANGE UP (ref 40–120)
ALT FLD-CCNC: 10 U/L — SIGNIFICANT CHANGE UP (ref 10–45)
ALT FLD-CCNC: 7 U/L — LOW (ref 10–45)
ALT FLD-CCNC: SIGNIFICANT CHANGE UP U/L (ref 10–45)
ALT FLD-CCNC: SIGNIFICANT CHANGE UP U/L (ref 10–45)
ANION GAP SERPL CALC-SCNC: 15 MMOL/L — SIGNIFICANT CHANGE UP (ref 5–17)
ANION GAP SERPL CALC-SCNC: 20 MMOL/L — HIGH (ref 5–17)
ANION GAP SERPL CALC-SCNC: 21 MMOL/L — HIGH (ref 5–17)
ANION GAP SERPL CALC-SCNC: 22 MMOL/L — HIGH (ref 5–17)
ANISOCYTOSIS BLD QL: SIGNIFICANT CHANGE UP
APPEARANCE UR: CLEAR — SIGNIFICANT CHANGE UP
APTT BLD: 25.6 SEC — LOW (ref 27.5–35.5)
AST SERPL-CCNC: 23 U/L — SIGNIFICANT CHANGE UP (ref 10–40)
AST SERPL-CCNC: 24 U/L — SIGNIFICANT CHANGE UP (ref 10–40)
AST SERPL-CCNC: SIGNIFICANT CHANGE UP U/L (ref 10–40)
AST SERPL-CCNC: SIGNIFICANT CHANGE UP U/L (ref 10–40)
BACTERIA # UR AUTO: PRESENT /HPF
BASE EXCESS BLDA CALC-SCNC: 1.4 MMOL/L — SIGNIFICANT CHANGE UP (ref -2–3)
BASE EXCESS BLDA CALC-SCNC: 4.9 MMOL/L — HIGH (ref -2–3)
BASE EXCESS BLDA CALC-SCNC: 6.2 MMOL/L — HIGH (ref -2–3)
BASE EXCESS BLDA CALC-SCNC: 7.1 MMOL/L — HIGH (ref -2–3)
BASE EXCESS BLDA CALC-SCNC: 7.9 MMOL/L — HIGH (ref -2–3)
BASE EXCESS BLDV CALC-SCNC: 7.9 MMOL/L — HIGH (ref -2–3)
BASOPHILS # BLD AUTO: 0 K/UL — SIGNIFICANT CHANGE UP (ref 0–0.2)
BASOPHILS NFR BLD AUTO: 0 % — SIGNIFICANT CHANGE UP (ref 0–2)
BILIRUB SERPL-MCNC: 0.4 MG/DL — SIGNIFICANT CHANGE UP (ref 0.2–1.2)
BILIRUB SERPL-MCNC: 0.5 MG/DL — SIGNIFICANT CHANGE UP (ref 0.2–1.2)
BILIRUB UR-MCNC: NEGATIVE — SIGNIFICANT CHANGE UP
BUN SERPL-MCNC: 72 MG/DL — HIGH (ref 7–23)
BUN SERPL-MCNC: 73 MG/DL — HIGH (ref 7–23)
BUN SERPL-MCNC: 77 MG/DL — HIGH (ref 7–23)
BUN SERPL-MCNC: 78 MG/DL — HIGH (ref 7–23)
C DIFF GDH STL QL: NEGATIVE — SIGNIFICANT CHANGE UP
C DIFF GDH STL QL: SIGNIFICANT CHANGE UP
CA-I BLDA-SCNC: 1.04 MMOL/L — LOW (ref 1.15–1.33)
CA-I BLDA-SCNC: 1.19 MMOL/L — SIGNIFICANT CHANGE UP (ref 1.15–1.33)
CA-I SERPL-SCNC: 1.06 MMOL/L — LOW (ref 1.15–1.33)
CALCIUM SERPL-MCNC: 8.8 MG/DL — SIGNIFICANT CHANGE UP (ref 8.4–10.5)
CALCIUM SERPL-MCNC: 9.1 MG/DL — SIGNIFICANT CHANGE UP (ref 8.4–10.5)
CALCIUM SERPL-MCNC: 9.5 MG/DL — SIGNIFICANT CHANGE UP (ref 8.4–10.5)
CALCIUM SERPL-MCNC: 9.8 MG/DL — SIGNIFICANT CHANGE UP (ref 8.4–10.5)
CHLORIDE SERPL-SCNC: 82 MMOL/L — LOW (ref 96–108)
CHLORIDE SERPL-SCNC: 85 MMOL/L — LOW (ref 96–108)
CHLORIDE SERPL-SCNC: 88 MMOL/L — LOW (ref 96–108)
CHLORIDE SERPL-SCNC: 94 MMOL/L — LOW (ref 96–108)
CO2 BLDA-SCNC: 30 MMOL/L — HIGH (ref 19–24)
CO2 BLDA-SCNC: 31 MMOL/L — HIGH (ref 19–24)
CO2 BLDA-SCNC: 32 MMOL/L — HIGH (ref 19–24)
CO2 BLDV-SCNC: 37.9 MMOL/L — HIGH (ref 22–26)
CO2 SERPL-SCNC: 29 MMOL/L — SIGNIFICANT CHANGE UP (ref 22–31)
CO2 SERPL-SCNC: 30 MMOL/L — SIGNIFICANT CHANGE UP (ref 22–31)
CO2 SERPL-SCNC: 31 MMOL/L — SIGNIFICANT CHANGE UP (ref 22–31)
CO2 SERPL-SCNC: 32 MMOL/L — HIGH (ref 22–31)
COHGB MFR BLDA: 1.5 % — SIGNIFICANT CHANGE UP
COHGB MFR BLDA: 1.5 % — SIGNIFICANT CHANGE UP
COLOR SPEC: YELLOW — SIGNIFICANT CHANGE UP
CREAT SERPL-MCNC: 1.62 MG/DL — HIGH (ref 0.5–1.3)
CREAT SERPL-MCNC: 1.73 MG/DL — HIGH (ref 0.5–1.3)
CREAT SERPL-MCNC: 1.9 MG/DL — HIGH (ref 0.5–1.3)
CREAT SERPL-MCNC: 1.91 MG/DL — HIGH (ref 0.5–1.3)
DIFF PNL FLD: NEGATIVE — SIGNIFICANT CHANGE UP
EOSINOPHIL # BLD AUTO: 0 K/UL — SIGNIFICANT CHANGE UP (ref 0–0.5)
EOSINOPHIL NFR BLD AUTO: 0 % — SIGNIFICANT CHANGE UP (ref 0–6)
EPI CELLS # UR: SIGNIFICANT CHANGE UP /HPF (ref 0–5)
FLUAV AG NPH QL: SIGNIFICANT CHANGE UP
FLUBV AG NPH QL: SIGNIFICANT CHANGE UP
GAS PNL BLDA: SIGNIFICANT CHANGE UP
GAS PNL BLDV: 133 MMOL/L — LOW (ref 136–145)
GAS PNL BLDV: SIGNIFICANT CHANGE UP
GIANT PLATELETS BLD QL SMEAR: PRESENT — SIGNIFICANT CHANGE UP
GLUCOSE BLDC GLUCOMTR-MCNC: 101 MG/DL — HIGH (ref 70–99)
GLUCOSE BLDC GLUCOMTR-MCNC: 101 MG/DL — HIGH (ref 70–99)
GLUCOSE BLDC GLUCOMTR-MCNC: 75 MG/DL — SIGNIFICANT CHANGE UP (ref 70–99)
GLUCOSE BLDC GLUCOMTR-MCNC: 90 MG/DL — SIGNIFICANT CHANGE UP (ref 70–99)
GLUCOSE SERPL-MCNC: 128 MG/DL — HIGH (ref 70–99)
GLUCOSE SERPL-MCNC: 143 MG/DL — HIGH (ref 70–99)
GLUCOSE SERPL-MCNC: 171 MG/DL — HIGH (ref 70–99)
GLUCOSE SERPL-MCNC: 98 MG/DL — SIGNIFICANT CHANGE UP (ref 70–99)
GLUCOSE UR QL: NEGATIVE — SIGNIFICANT CHANGE UP
HCO3 BLDA-SCNC: 28 MMOL/L — SIGNIFICANT CHANGE UP (ref 21–28)
HCO3 BLDA-SCNC: 29 MMOL/L — HIGH (ref 21–28)
HCO3 BLDA-SCNC: 29 MMOL/L — HIGH (ref 21–28)
HCO3 BLDA-SCNC: 30 MMOL/L — HIGH (ref 21–28)
HCO3 BLDA-SCNC: 31 MMOL/L — HIGH (ref 21–28)
HCO3 BLDV-SCNC: 36 MMOL/L — HIGH (ref 22–29)
HCT VFR BLD CALC: 28.6 % — LOW (ref 34.5–45)
HCT VFR BLD CALC: 38.7 % — SIGNIFICANT CHANGE UP (ref 34.5–45)
HGB BLD-MCNC: 11.8 G/DL — SIGNIFICANT CHANGE UP (ref 11.5–15.5)
HGB BLD-MCNC: 9.2 G/DL — LOW (ref 11.5–15.5)
HGB BLDA-MCNC: 9 G/DL — LOW (ref 11.7–16.1)
HGB BLDA-MCNC: 9.3 G/DL — LOW (ref 11.7–16.1)
HYPOCHROMIA BLD QL: SLIGHT — SIGNIFICANT CHANGE UP
INR BLD: 1 — SIGNIFICANT CHANGE UP (ref 0.88–1.16)
KETONES UR-MCNC: 15 MG/DL
LACTATE SERPL-SCNC: 2 MMOL/L — SIGNIFICANT CHANGE UP (ref 0.5–2)
LACTATE SERPL-SCNC: 3.2 MMOL/L — HIGH (ref 0.5–2)
LACTATE SERPL-SCNC: 5.8 MMOL/L — CRITICAL HIGH (ref 0.5–2)
LEUKOCYTE ESTERASE UR-ACNC: NEGATIVE — SIGNIFICANT CHANGE UP
LYMPHOCYTES # BLD AUTO: 0.57 K/UL — LOW (ref 1–3.3)
LYMPHOCYTES # BLD AUTO: 12.6 % — LOW (ref 13–44)
MACROCYTES BLD QL: SIGNIFICANT CHANGE UP
MAGNESIUM SERPL-MCNC: 2.4 MG/DL — SIGNIFICANT CHANGE UP (ref 1.6–2.6)
MAGNESIUM SERPL-MCNC: 3.2 MG/DL — HIGH (ref 1.6–2.6)
MANUAL SMEAR VERIFICATION: SIGNIFICANT CHANGE UP
MCHC RBC-ENTMCNC: 28.1 PG — SIGNIFICANT CHANGE UP (ref 27–34)
MCHC RBC-ENTMCNC: 28.9 PG — SIGNIFICANT CHANGE UP (ref 27–34)
MCHC RBC-ENTMCNC: 30.5 GM/DL — LOW (ref 32–36)
MCHC RBC-ENTMCNC: 32.2 GM/DL — SIGNIFICANT CHANGE UP (ref 32–36)
MCV RBC AUTO: 89.9 FL — SIGNIFICANT CHANGE UP (ref 80–100)
MCV RBC AUTO: 92.1 FL — SIGNIFICANT CHANGE UP (ref 80–100)
METAMYELOCYTES # FLD: 6.3 % — HIGH (ref 0–0)
METHGB MFR BLDA: 0.8 % — SIGNIFICANT CHANGE UP
METHGB MFR BLDA: 1.1 % — SIGNIFICANT CHANGE UP
MONOCYTES # BLD AUTO: 0.61 K/UL — SIGNIFICANT CHANGE UP (ref 0–0.9)
MONOCYTES NFR BLD AUTO: 13.5 % — SIGNIFICANT CHANGE UP (ref 2–14)
NEUTROPHILS # BLD AUTO: 2.99 K/UL — SIGNIFICANT CHANGE UP (ref 1.8–7.4)
NEUTROPHILS NFR BLD AUTO: 46.9 % — SIGNIFICANT CHANGE UP (ref 43–77)
NEUTS BAND # BLD: 19.8 % — HIGH (ref 0–8)
NITRITE UR-MCNC: NEGATIVE — SIGNIFICANT CHANGE UP
NRBC # BLD: 0 /100 WBCS — SIGNIFICANT CHANGE UP (ref 0–0)
NT-PROBNP SERPL-SCNC: 2396 PG/ML — HIGH (ref 0–300)
OVALOCYTES BLD QL SMEAR: SLIGHT — SIGNIFICANT CHANGE UP
OXYHGB MFR BLDA: 97.3 % — HIGH (ref 90–95)
OXYHGB MFR BLDA: 97.6 % — HIGH (ref 90–95)
PCO2 BLDA: 36 MMHG — HIGH (ref 32–35)
PCO2 BLDA: 41 MMHG — HIGH (ref 32–35)
PCO2 BLDA: 53 MMHG — HIGH (ref 32–35)
PCO2 BLDV: 65 MMHG — HIGH (ref 39–42)
PH BLDA: 7.33 — LOW (ref 7.35–7.45)
PH BLDA: 7.46 — HIGH (ref 7.35–7.45)
PH BLDA: 7.52 — HIGH (ref 7.35–7.45)
PH BLDA: 7.53 — HIGH (ref 7.35–7.45)
PH BLDA: 7.54 — HIGH (ref 7.35–7.45)
PH BLDV: 7.35 — SIGNIFICANT CHANGE UP (ref 7.32–7.43)
PH UR: 5 — SIGNIFICANT CHANGE UP (ref 5–8)
PHOSPHATE SERPL-MCNC: 12.9 MG/DL — HIGH (ref 2.5–4.5)
PHOSPHATE SERPL-MCNC: 8.2 MG/DL — HIGH (ref 2.5–4.5)
PLAT MORPH BLD: ABNORMAL
PLATELET # BLD AUTO: 222 K/UL — SIGNIFICANT CHANGE UP (ref 150–400)
PLATELET # BLD AUTO: 280 K/UL — SIGNIFICANT CHANGE UP (ref 150–400)
PO2 BLDA: 100 MMHG — SIGNIFICANT CHANGE UP (ref 83–108)
PO2 BLDA: 136 MMHG — HIGH (ref 83–108)
PO2 BLDA: 197 MMHG — HIGH (ref 83–108)
PO2 BLDA: 205 MMHG — HIGH (ref 83–108)
PO2 BLDA: 260 MMHG — HIGH (ref 83–108)
PO2 BLDV: 34 MMHG — SIGNIFICANT CHANGE UP (ref 25–45)
POIKILOCYTOSIS BLD QL AUTO: SLIGHT — SIGNIFICANT CHANGE UP
POTASSIUM BLDA-SCNC: 4.6 MMOL/L — SIGNIFICANT CHANGE UP (ref 3.5–5.1)
POTASSIUM BLDA-SCNC: 5.4 MMOL/L — HIGH (ref 3.5–5.1)
POTASSIUM BLDV-SCNC: 6.5 MMOL/L — CRITICAL HIGH (ref 3.5–5.1)
POTASSIUM SERPL-MCNC: 4.4 MMOL/L — SIGNIFICANT CHANGE UP (ref 3.5–5.3)
POTASSIUM SERPL-MCNC: 5.2 MMOL/L — SIGNIFICANT CHANGE UP (ref 3.5–5.3)
POTASSIUM SERPL-MCNC: SIGNIFICANT CHANGE UP MMOL/L (ref 3.5–5.3)
POTASSIUM SERPL-MCNC: SIGNIFICANT CHANGE UP MMOL/L (ref 3.5–5.3)
POTASSIUM SERPL-SCNC: 4.4 MMOL/L — SIGNIFICANT CHANGE UP (ref 3.5–5.3)
POTASSIUM SERPL-SCNC: 5.2 MMOL/L — SIGNIFICANT CHANGE UP (ref 3.5–5.3)
POTASSIUM SERPL-SCNC: SIGNIFICANT CHANGE UP MMOL/L (ref 3.5–5.3)
POTASSIUM SERPL-SCNC: SIGNIFICANT CHANGE UP MMOL/L (ref 3.5–5.3)
PROMYELOCYTES # FLD: 0.9 % — HIGH (ref 0–0)
PROT SERPL-MCNC: 10.4 G/DL — HIGH (ref 6–8.3)
PROT SERPL-MCNC: 7.2 G/DL — SIGNIFICANT CHANGE UP (ref 6–8.3)
PROT SERPL-MCNC: 8.3 G/DL — SIGNIFICANT CHANGE UP (ref 6–8.3)
PROT SERPL-MCNC: 9.1 G/DL — HIGH (ref 6–8.3)
PROT UR-MCNC: 30 MG/DL
PROTHROM AB SERPL-ACNC: 12 SEC — SIGNIFICANT CHANGE UP (ref 10.6–13.6)
RBC # BLD: 3.18 M/UL — LOW (ref 3.8–5.2)
RBC # BLD: 4.2 M/UL — SIGNIFICANT CHANGE UP (ref 3.8–5.2)
RBC # FLD: 15.9 % — HIGH (ref 10.3–14.5)
RBC # FLD: 16.4 % — HIGH (ref 10.3–14.5)
RBC BLD AUTO: ABNORMAL
RBC CASTS # UR COMP ASSIST: < 5 /HPF — SIGNIFICANT CHANGE UP
RSV RNA NPH QL NAA+NON-PROBE: SIGNIFICANT CHANGE UP
SAO2 % BLDA: 98.5 % — HIGH (ref 94–98)
SAO2 % BLDA: 99.4 % — HIGH (ref 94–98)
SAO2 % BLDA: 99.8 % — HIGH (ref 94–98)
SAO2 % BLDA: 99.9 % — HIGH (ref 94–98)
SAO2 % BLDA: 99.9 % — HIGH (ref 94–98)
SAO2 % BLDV: 52 % — LOW (ref 67–88)
SARS-COV-2 RNA SPEC QL NAA+PROBE: SIGNIFICANT CHANGE UP
SODIUM BLDA-SCNC: 135 MMOL/L — LOW (ref 136–145)
SODIUM BLDA-SCNC: 135 MMOL/L — LOW (ref 136–145)
SODIUM SERPL-SCNC: 134 MMOL/L — LOW (ref 135–145)
SODIUM SERPL-SCNC: 138 MMOL/L — SIGNIFICANT CHANGE UP (ref 135–145)
SODIUM SERPL-SCNC: 138 MMOL/L — SIGNIFICANT CHANGE UP (ref 135–145)
SODIUM SERPL-SCNC: 139 MMOL/L — SIGNIFICANT CHANGE UP (ref 135–145)
SP GR SPEC: 1.02 — SIGNIFICANT CHANGE UP (ref 1–1.03)
TROPONIN T SERPL-MCNC: 0.02 NG/ML — HIGH (ref 0–0.01)
TROPONIN T SERPL-MCNC: 0.03 NG/ML — HIGH (ref 0–0.01)
UROBILINOGEN FLD QL: 0.2 E.U./DL — SIGNIFICANT CHANGE UP
WBC # BLD: 4.49 K/UL — SIGNIFICANT CHANGE UP (ref 3.8–10.5)
WBC # BLD: 5.46 K/UL — SIGNIFICANT CHANGE UP (ref 3.8–10.5)
WBC # FLD AUTO: 4.49 K/UL — SIGNIFICANT CHANGE UP (ref 3.8–10.5)
WBC # FLD AUTO: 5.46 K/UL — SIGNIFICANT CHANGE UP (ref 3.8–10.5)
WBC UR QL: ABNORMAL /HPF

## 2021-11-24 PROCEDURE — 93010 ELECTROCARDIOGRAM REPORT: CPT

## 2021-11-24 PROCEDURE — 49561: CPT | Mod: GC

## 2021-11-24 PROCEDURE — 99291 CRITICAL CARE FIRST HOUR: CPT

## 2021-11-24 PROCEDURE — 74176 CT ABD & PELVIS W/O CONTRAST: CPT | Mod: 26,MD

## 2021-11-24 PROCEDURE — 71045 X-RAY EXAM CHEST 1 VIEW: CPT | Mod: 26

## 2021-11-24 PROCEDURE — 99223 1ST HOSP IP/OBS HIGH 75: CPT | Mod: GC

## 2021-11-24 RX ORDER — MORPHINE SULFATE 50 MG/1
2 CAPSULE, EXTENDED RELEASE ORAL ONCE
Refills: 0 | Status: DISCONTINUED | OUTPATIENT
Start: 2021-11-24 | End: 2021-11-24

## 2021-11-24 RX ORDER — IPRATROPIUM/ALBUTEROL SULFATE 18-103MCG
3 AEROSOL WITH ADAPTER (GRAM) INHALATION EVERY 6 HOURS
Refills: 0 | Status: DISCONTINUED | OUTPATIENT
Start: 2021-11-24 | End: 2021-12-09

## 2021-11-24 RX ORDER — DICLOFENAC SODIUM 30 MG/G
0 GEL TOPICAL
Qty: 0 | Refills: 0 | DISCHARGE

## 2021-11-24 RX ORDER — GLUCAGON INJECTION, SOLUTION 0.5 MG/.1ML
1 INJECTION, SOLUTION SUBCUTANEOUS ONCE
Refills: 0 | Status: DISCONTINUED | OUTPATIENT
Start: 2021-11-24 | End: 2021-12-09

## 2021-11-24 RX ORDER — SODIUM CHLORIDE 9 MG/ML
1000 INJECTION, SOLUTION INTRAVENOUS
Refills: 0 | Status: DISCONTINUED | OUTPATIENT
Start: 2021-11-24 | End: 2021-12-09

## 2021-11-24 RX ORDER — PROPOFOL 10 MG/ML
5 INJECTION, EMULSION INTRAVENOUS
Qty: 1000 | Refills: 0 | Status: DISCONTINUED | OUTPATIENT
Start: 2021-11-24 | End: 2021-11-25

## 2021-11-24 RX ORDER — ONDANSETRON 8 MG/1
4 TABLET, FILM COATED ORAL ONCE
Refills: 0 | Status: COMPLETED | OUTPATIENT
Start: 2021-11-24 | End: 2021-11-24

## 2021-11-24 RX ORDER — CEFTRIAXONE 500 MG/1
1000 INJECTION, POWDER, FOR SOLUTION INTRAMUSCULAR; INTRAVENOUS EVERY 24 HOURS
Refills: 0 | Status: DISCONTINUED | OUTPATIENT
Start: 2021-11-24 | End: 2021-11-24

## 2021-11-24 RX ORDER — ACETAMINOPHEN 500 MG
1000 TABLET ORAL ONCE
Refills: 0 | Status: COMPLETED | OUTPATIENT
Start: 2021-11-24 | End: 2021-11-24

## 2021-11-24 RX ORDER — INSULIN LISPRO 100/ML
VIAL (ML) SUBCUTANEOUS
Refills: 0 | Status: DISCONTINUED | OUTPATIENT
Start: 2021-11-24 | End: 2021-12-01

## 2021-11-24 RX ORDER — DEXTROSE 50 % IN WATER 50 %
12.5 SYRINGE (ML) INTRAVENOUS ONCE
Refills: 0 | Status: DISCONTINUED | OUTPATIENT
Start: 2021-11-24 | End: 2021-12-09

## 2021-11-24 RX ORDER — DEXTROSE 50 % IN WATER 50 %
25 SYRINGE (ML) INTRAVENOUS ONCE
Refills: 0 | Status: DISCONTINUED | OUTPATIENT
Start: 2021-11-24 | End: 2021-12-09

## 2021-11-24 RX ORDER — CEFTRIAXONE 500 MG/1
1000 INJECTION, POWDER, FOR SOLUTION INTRAMUSCULAR; INTRAVENOUS ONCE
Refills: 0 | Status: COMPLETED | OUTPATIENT
Start: 2021-11-24 | End: 2021-11-24

## 2021-11-24 RX ORDER — ONDANSETRON 8 MG/1
4 TABLET, FILM COATED ORAL EVERY 6 HOURS
Refills: 0 | Status: DISCONTINUED | OUTPATIENT
Start: 2021-11-24 | End: 2021-12-09

## 2021-11-24 RX ORDER — MENTHOL AND METHYL SALICYLATE 10; 30 G/100G; G/100G
0 STICK TOPICAL
Qty: 0 | Refills: 0 | DISCHARGE

## 2021-11-24 RX ORDER — IOHEXOL 300 MG/ML
30 INJECTION, SOLUTION INTRAVENOUS ONCE
Refills: 0 | Status: COMPLETED | OUTPATIENT
Start: 2021-11-24 | End: 2021-11-24

## 2021-11-24 RX ORDER — FENTANYL CITRATE 50 UG/ML
0.5 INJECTION INTRAVENOUS
Qty: 2500 | Refills: 0 | Status: DISCONTINUED | OUTPATIENT
Start: 2021-11-24 | End: 2021-11-27

## 2021-11-24 RX ORDER — HEPARIN SODIUM 5000 [USP'U]/ML
5000 INJECTION INTRAVENOUS; SUBCUTANEOUS EVERY 8 HOURS
Refills: 0 | Status: DISCONTINUED | OUTPATIENT
Start: 2021-11-24 | End: 2021-12-09

## 2021-11-24 RX ORDER — SODIUM CHLORIDE 9 MG/ML
1700 INJECTION INTRAMUSCULAR; INTRAVENOUS; SUBCUTANEOUS ONCE
Refills: 0 | Status: COMPLETED | OUTPATIENT
Start: 2021-11-24 | End: 2021-11-24

## 2021-11-24 RX ORDER — CHLORHEXIDINE GLUCONATE 213 G/1000ML
1 SOLUTION TOPICAL
Refills: 0 | Status: DISCONTINUED | OUTPATIENT
Start: 2021-11-24 | End: 2021-12-06

## 2021-11-24 RX ORDER — METRONIDAZOLE 500 MG
500 TABLET ORAL EVERY 8 HOURS
Refills: 0 | Status: DISCONTINUED | OUTPATIENT
Start: 2021-11-24 | End: 2021-11-26

## 2021-11-24 RX ORDER — SODIUM CHLORIDE 9 MG/ML
1000 INJECTION INTRAMUSCULAR; INTRAVENOUS; SUBCUTANEOUS
Refills: 0 | Status: DISCONTINUED | OUTPATIENT
Start: 2021-11-24 | End: 2021-11-25

## 2021-11-24 RX ORDER — VANCOMYCIN HCL 1 G
1000 VIAL (EA) INTRAVENOUS ONCE
Refills: 0 | Status: COMPLETED | OUTPATIENT
Start: 2021-11-24 | End: 2021-11-24

## 2021-11-24 RX ORDER — DIPHENHYDRAMINE HCL 50 MG
50 CAPSULE ORAL ONCE
Refills: 0 | Status: COMPLETED | OUTPATIENT
Start: 2021-11-24 | End: 2021-11-24

## 2021-11-24 RX ORDER — CHLORHEXIDINE GLUCONATE 213 G/1000ML
15 SOLUTION TOPICAL EVERY 12 HOURS
Refills: 0 | Status: DISCONTINUED | OUTPATIENT
Start: 2021-11-24 | End: 2021-11-28

## 2021-11-24 RX ORDER — CEFTRIAXONE 500 MG/1
2000 INJECTION, POWDER, FOR SOLUTION INTRAMUSCULAR; INTRAVENOUS EVERY 24 HOURS
Refills: 0 | Status: DISCONTINUED | OUTPATIENT
Start: 2021-11-25 | End: 2021-11-26

## 2021-11-24 RX ORDER — DEXTROSE 50 % IN WATER 50 %
15 SYRINGE (ML) INTRAVENOUS ONCE
Refills: 0 | Status: DISCONTINUED | OUTPATIENT
Start: 2021-11-24 | End: 2021-12-09

## 2021-11-24 RX ORDER — PIPERACILLIN AND TAZOBACTAM 4; .5 G/20ML; G/20ML
3.38 INJECTION, POWDER, LYOPHILIZED, FOR SOLUTION INTRAVENOUS ONCE
Refills: 0 | Status: COMPLETED | OUTPATIENT
Start: 2021-11-24 | End: 2021-11-24

## 2021-11-24 RX ORDER — PANTOPRAZOLE SODIUM 20 MG/1
40 TABLET, DELAYED RELEASE ORAL DAILY
Refills: 0 | Status: DISCONTINUED | OUTPATIENT
Start: 2021-11-24 | End: 2021-12-08

## 2021-11-24 RX ORDER — CEFTRIAXONE 500 MG/1
2000 INJECTION, POWDER, FOR SOLUTION INTRAMUSCULAR; INTRAVENOUS EVERY 24 HOURS
Refills: 0 | Status: DISCONTINUED | OUTPATIENT
Start: 2021-11-24 | End: 2021-11-24

## 2021-11-24 RX ADMIN — PIPERACILLIN AND TAZOBACTAM 200 GRAM(S): 4; .5 INJECTION, POWDER, LYOPHILIZED, FOR SOLUTION INTRAVENOUS at 08:11

## 2021-11-24 RX ADMIN — ONDANSETRON 4 MILLIGRAM(S): 8 TABLET, FILM COATED ORAL at 08:00

## 2021-11-24 RX ADMIN — Medication 3 MILLILITER(S): at 23:33

## 2021-11-24 RX ADMIN — PANTOPRAZOLE SODIUM 40 MILLIGRAM(S): 20 TABLET, DELAYED RELEASE ORAL at 19:56

## 2021-11-24 RX ADMIN — MORPHINE SULFATE 2 MILLIGRAM(S): 50 CAPSULE, EXTENDED RELEASE ORAL at 08:01

## 2021-11-24 RX ADMIN — Medication 250 MILLIGRAM(S): at 09:25

## 2021-11-24 RX ADMIN — SODIUM CHLORIDE 500 MILLILITER(S): 9 INJECTION INTRAMUSCULAR; INTRAVENOUS; SUBCUTANEOUS at 23:15

## 2021-11-24 RX ADMIN — Medication 400 MILLIGRAM(S): at 22:30

## 2021-11-24 RX ADMIN — CEFTRIAXONE 100 MILLIGRAM(S): 500 INJECTION, POWDER, FOR SOLUTION INTRAMUSCULAR; INTRAVENOUS at 21:35

## 2021-11-24 RX ADMIN — SODIUM CHLORIDE 100 MILLILITER(S): 9 INJECTION INTRAMUSCULAR; INTRAVENOUS; SUBCUTANEOUS at 22:27

## 2021-11-24 RX ADMIN — Medication 50 MILLIGRAM(S): at 09:47

## 2021-11-24 RX ADMIN — Medication 40 MILLIGRAM(S): at 08:01

## 2021-11-24 RX ADMIN — CEFTRIAXONE 100 MILLIGRAM(S): 500 INJECTION, POWDER, FOR SOLUTION INTRAMUSCULAR; INTRAVENOUS at 19:28

## 2021-11-24 RX ADMIN — FENTANYL CITRATE 3.61 MICROGRAM(S)/KG/HR: 50 INJECTION INTRAVENOUS at 21:19

## 2021-11-24 RX ADMIN — Medication 1000 MILLIGRAM(S): at 23:30

## 2021-11-24 RX ADMIN — Medication 100 MILLIGRAM(S): at 22:27

## 2021-11-24 RX ADMIN — SODIUM CHLORIDE 1700 MILLILITER(S): 9 INJECTION INTRAMUSCULAR; INTRAVENOUS; SUBCUTANEOUS at 07:54

## 2021-11-24 NOTE — ED ADULT TRIAGE NOTE - CHIEF COMPLAINT QUOTE
Per EMS, pt sent from [facility] for "confirmed stool impaction." Vomiting since Monday. Abdomen distended.

## 2021-11-24 NOTE — H&P ADULT - NSHPPHYSICALEXAM_GEN_ALL_CORE
GEN: NAD, resting comfortably in bed   CV: NSR   Pulm: no respiratory distress on RA   Abd: soft, distended, TTP diffusely, incarcerated umbilical hernia with overlying erythema and TTP, no rebound or guarding   Ext: WWP, mild b/l LE pitting edema   Neuro: A&Ox0, motor/sensory grossly intact GEN: NAD, resting comfortably in bed   HEENT: NGT placed, 1700cc brown feculent liquid output   CV: NSR   Pulm: no respiratory distress on RA   Abd: soft, distended, TTP diffusely, incarcerated umbilical hernia with overlying erythema and TTP, no rebound or guarding   : bahena catheter in place, with clear yellow urine    Ext: WWP, mild b/l LE pitting edema   Neuro: A&Ox0, motor/sensory grossly intact

## 2021-11-24 NOTE — H&P ADULT - ASSESSMENT
89yoF bedbound, DNR with PMH HTN, HLD, OA, CAD on Asa, HF, chronic constipation, recent admission to [facility] for metabolic encephalopathy, acute and chronic resp failure with hypercapnia requiring intubation (9/5-9/15) CAD (on Aspirin) and no PSH who presents from [facility] for constipation and emesis. Afebrile, HD stable, WBC wnl, BLADIMIR with Cr 77/1.91, Trop 0.03, BNP 2396, lactate 5.8, cdiff neg. CT with high-grade small bowel obstruction at site of periumbilical hernia. NGT placed in ED with brown feculent output, 1700 immediate. Concerning for strangulated umbilical hernia with possible bowel ischemia.     Admit to Gen Surg, Team 4, SICU, Dr. Nickerson   NPO/IVF  NGT to [facility]PH[facility]   Pain/nausea control PRN  Pre-op   Add on Class 2 for exlap, possible SBR    Plan discussed with attending and chief resident.   ____________________________________________________  KP Elham - Resident   Surgery

## 2021-11-24 NOTE — ED ADULT NURSE REASSESSMENT NOTE - NS ED NURSE REASSESS COMMENT FT1
Surgery at bedside. NG tube placed by surgery 1700 cc of brown stomach contents suctioned out. Pt informed of plan of care.

## 2021-11-24 NOTE — H&P ADULT - ATTENDING COMMENTS
Patient seen and examined. History, physical examination and imaging are consistent with intestinal obstruction with possible compromised bowel in setting of patient with advanced age and multiple medical comorbid conditions and frailty. She requires exploration. Risks, benefits discussed via proxy by our team. Will proceed to urgent exploration. Prognosis is guarded.

## 2021-11-24 NOTE — CONSULT NOTE ADULT - SUBJECTIVE AND OBJECTIVE BOX
HPI:  89yoF bedbound, prior DNR with PMH HTN, HLD, OA, CAD on Asa, HFpEF (65-70%), chronic constipation, recent admission to [facility] for metabolic encephalopathy, acute and chronic resp failure with hypercapnia requiring intubation (-9/15) and no PSH per report who presents from [facility] for constipation and emesis. Per nursing home report to ED, patient has been groaning in pain and pointing to L side, emesis since Monday nonbloody. Patient is responsive to voice and touch, oriented to person and time but not place or full situation. Able to localized pain to abdomen. Unable to detail last BM or flatus. Per Paulette over phone, abdominal pain since Saturday, began emesis on Monday. As far as family knows, normal BMs once every 2d until Saturday but then less frequent. States that patient is typically is very alert and oriented. Was at baseline mental status until last night and called to tell nieces she is excruciating pain followed by decline in mental status, tx to [facility] for further evaluation. In ED, multiple brown, nonbloody liquid BMs and no masses or impacted stool on WAYNE by ED.    In ED, febrile 100.8F, HD stable with mild intermittent tachy, WBC wnl, BLADIMIR with Cr 77/1.91, Trop 0.03, BNP 2396, lactate 5.8 decreased to 3.2 after IVF, cdiff neg. CT w/o PO or IV contrast (concern for aspiration/PNA) small L pleural effusion, cardiomegaly, PAH, severe coronary artery calcification, moderate AV calcification, severe stomach and small bowel dilation with high grade SBO at umbilical hernia site, extensive large bowel stool, perirectal edema, enlarged myomatous uterus and 4.5 cm L adnexal cyst.     Spoke with HCP Paulette and Shannon Robinson (niece) [phone]   **Despite MOLST form, family wishes to revoke DNR and continue trial of intubation, they are amenable to surgery and want "everything under the sun" done.     PMH: HTN, HLD, OA, CAD on Asa, HFrEF (65-70%), chronic constipation, recent admission to [facility] for metabolic encephalopathy, acute and chronic resp failure with hypercapnia requiring intubation (-9/15) CAD (on Aspirin)   PSH: None (per report)   Meds: Amlodipine 10mg QD, Asa 81, Atorvastatin 20mg QHS, Furosemide 20mg QD, Ipratropium/Albuterol 0.5/3mg/3L q6hr PRN, Mirtazapine 15mg QHS, Miralax, Voltaren, Zinc, Methanol, Vit C, Vit D, MVI, Tylenol  All: IV contrast, lisinopril (per chart review)   SHX: nonsmoker, no ETOH, no drugs, lives in nursing home  FHX: no CRC or IBD   (2021 14:12)      PAST MEDICAL & SURGICAL HISTORY:  HTN (hypertension)    HLD (hyperlipidemia)    Osteoarthritis    Hyponatremia    Altered mental status    Urinary tract infection    HF (heart failure)        ROS: See HPI    MEDICATIONS  (STANDING):  albuterol/ipratropium for Nebulization 3 milliLiter(s) Nebulizer every 6 hours  cefTRIAXone   IVPB 1000 milliGRAM(s) IV Intermittent once  chlorhexidine 0.12% Liquid 15 milliLiter(s) Oral Mucosa every 12 hours  chlorhexidine 4% Liquid 1 Application(s) Topical <User Schedule>  dextrose 40% Gel 15 Gram(s) Oral once  dextrose 5%. 1000 milliLiter(s) (50 mL/Hr) IV Continuous <Continuous>  dextrose 5%. 1000 milliLiter(s) (100 mL/Hr) IV Continuous <Continuous>  dextrose 50% Injectable 25 Gram(s) IV Push once  dextrose 50% Injectable 12.5 Gram(s) IV Push once  dextrose 50% Injectable 25 Gram(s) IV Push once  fentaNYL   Infusion. 0.5 MICROgram(s)/kG/Hr (3.61 mL/Hr) IV Continuous <Continuous>  glucagon  Injectable 1 milliGRAM(s) IntraMuscular once  heparin   Injectable 5000 Unit(s) SubCutaneous every 8 hours  insulin lispro (ADMELOG) corrective regimen sliding scale   SubCutaneous Before meals and at bedtime  metroNIDAZOLE  IVPB 500 milliGRAM(s) IV Intermittent every 8 hours  pantoprazole  Injectable 40 milliGRAM(s) IV Push daily  propofol Infusion 5 MICROgram(s)/kG/Min (2.17 mL/Hr) IV Continuous <Continuous>  sodium chloride 0.9%. 1000 milliLiter(s) (100 mL/Hr) IV Continuous <Continuous>    MEDICATIONS  (PRN):  ondansetron Injectable 4 milliGRAM(s) IV Push every 6 hours PRN Nausea      Allergies    iodine containing compounds (Unknown)  lisinopril (Unknown)    Intolerances        SOCIAL HISTORY:  Smoke: Never Smoker  EtOH: occasional    FAMILY HISTORY:      Vital Signs Last 24 Hrs  T(C): 37.9 (2021 18:39), Max: 38.2 (2021 07:58)  T(F): 100.3 (2021 18:39), Max: 100.8 (2021 07:58)  HR: 108 (2021 20:00) (75 - 110)  BP: 119/55 (2021 18:54) (88/51 - 168/84)  BP(mean): 79 (2021 18:54) (61 - 102)  RR: 16 (2021 20:00) (15 - 18)  SpO2: 98% (2021 20:00) (90% - 100%)    PHYSICAL EXAM    Gen: NAD   Neuro: Sedated intubated grimacing to pain.   HEENT: NGT in place draining feculent material   CV: RRR reg s1s2 no M  Pulm: CTA B/L no w/w/r + Decrease BS in bases R> L  Abd: Soft, ND + Tenderness at incision sites. + Midline incision c/d/i   Ext: No C/C/E warm well perfused,  Skin: No rashes erythema or ecchymosis  MSK: No joint swelling noted  Psych: Unable to assess     LABS:                        9.2    5.46  )-----------( 222      ( 2021 18:51 )             28.6     11-24    138  |  94<L>  |  73<H>  ----------------------------<  98  4.4   |  29  |  1.62<H>    Ca    9.1      2021 18:51  Phos  8.2     11-24  Mg     2.4     11-24    TPro  7.2  /  Alb  2.6<L>  /  TBili  0.4  /  DBili  x   /  AST  23  /  ALT  7<L>  /  AlkPhos  68  11-24    PT/INR - ( 2021 07:51 )   PT: 12.0 sec;   INR: 1.00          PTT - ( 2021 07:51 )  PTT:25.6 sec  Urinalysis Basic - ( 2021 09:41 )    Color: Yellow / Appearance: Clear / S.025 / pH: x  Gluc: x / Ketone: 15 mg/dL  / Bili: Negative / Urobili: 0.2 E.U./dL   Blood: x / Protein: 30 mg/dL / Nitrite: NEGATIVE   Leuk Esterase: NEGATIVE / RBC: < 5 /HPF / WBC 5-10 /HPF   Sq Epi: x / Non Sq Epi: 0-5 /HPF / Bacteria: Present /HPF        RADIOLOGY & ADDITIONAL STUDIES:    Assessment and Plan:  89yoF bedbound, DNR with PMH HTN, HLD, OA, CAD on Asa, HF, chronic constipation, recent admission to [facility] for metabolic encephalopathy, acute hypercapneic resp failure and pulmonary edema requiring intubation (-9/15) CAD (on Aspirin) and no PSH who presents from [facility] for constipation and emesis. Afebrile, HD stable, WBC wnl, BLADIMIR with Cr 77/1.91, Trop 0.03, BNP 2396, lactate 5.8, cdiff neg. CT with high-grade small bowel obstruction at site of periumbilical hernia. NGT placed in ED with brown feculent output, 1700 immediate. Concerning for strangulated umbilical hernia with possible bowel ischemia. Taken to OR on  for Exlap and Open repair of Umbilical l Hernia. EBL min, IVF: 2.1L, uo: 100.    Neuro: Propofol Fentanyl Home Remeron on hold while NPO  CV: HD stable H/o CAD, HTN, HFpEF (65-70%), Norvasc Lasix Lipitor and asa held F/u Trops EKG F/u post op LA.  Pulm: Intubated on 40%/ 440/ 12/5 Duonebs F/u Post op CXR Possible Extubation in am   GI: NPO NGT Protonix  : Liriano   ID: Ceftriaxone 2gm( -) , flagyl  ( -)   Endo: ISS  PPx: SCD SQH   Lines: [place] left brachial ( -), PIV   Wounds: Midline incision   PT/OT: Not ordered.

## 2021-11-24 NOTE — ED ADULT NURSE NOTE - CHIEF COMPLAINT QUOTE
Per EMS, pt sent from [facility] for "confirmed stool impaction." Vomiting since Monday. Abdomen distended.

## 2021-11-24 NOTE — ED PROVIDER NOTE - GASTROINTESTINAL, MLM
Abdomen distended and diffusely tender. Rectal exam: loose yellow stool -pt with diarrhea in diaper. No stool impaction.

## 2021-11-24 NOTE — CHART NOTE - NSCHARTNOTEFT_GEN_A_CORE
Senior Addendum:    Patient seen and examined.    In brief, 89 yo F bedbound, with history of HTN, HLD, OA, CAD on ASA, pulmonary hypertension, chronic constipation, recent admission to [facility] for metabolic encephalopathy, acute and chronic resp failure with hypercapnia requiring intubation (9/5-9/15) and no PSH who presents from [facility] for constipation and emesis. Febrile to 100.8F in ED, HD stable in ER. Presents with radiographic and clinic signs of small bowel obstruction with incarceration at site of umbilical hernia, also with BLADIMIR (Cr 1.91 from normal on prior discharge in september) lactatic acidosis with LA 5.8. NGT placed in ED with brown feculent output, 1700 immediate decompression. High concern for strangulation of bowel.    Discussed with health care proxy (arjun Caldwell phone number [phone]) who states that the patient and family would want to proceed with surgical intervention for this disease process which is treatable. Discussed that the operation would entail exploratory laparotomy, possible small bowel resection, possible ostomy, and possible hernia repair with mesh. In addition, discussed risks including but not limited to risk of bleeding, infection, damage to surrounding structures, and death. Also discussed that patient may remain intubated after the surgery for unknown length of time during recovery, in the SICU postoperatively. She states that these would be acceptable to patient if she were able to make her own decisions. At this time, patient is confused an unable to consent for self, [facility]lette gives permission to proceed.     Of note, on prior hospitalization in September, Palliative team had discussed with Paulette that she does need to provide the official HCP documentation for our file here. She states that she has documentation of this at home, and will provide it asap. At present time, this operation is emergent, and we will proceed to operating room as class 2, with her permission. Senior Addendum:    Patient seen and examined.    In brief, 89 yo F bedbound, with history of HTN, HLD, OA, CAD on ASA, pulmonary hypertension, chronic constipation, recent admission to [facility] for metabolic encephalopathy, acute and chronic resp failure with hypercapnia requiring intubation (9/5-9/15) and no PSH who presents from [facility] for constipation and emesis. Febrile to 100.8F in ED, HD stable in ER. Presents with radiographic and clinic signs of small bowel obstruction with incarceration at site of umbilical hernia, also with BLADIMIR (Cr 1.91 from normal on prior discharge in september) lactatic acidosis with LA 5.8. NGT placed in ED with brown feculent output, 1700 immediate decompression. High concern for strangulation of bowel.    Discussed with health care proxy (arjun Caldwell phone number [phone]) who states that the patient and family would want to proceed with surgical intervention for this disease process which is treatable. Discussed that the operation would entail exploratory laparotomy, possible small bowel resection, possible ostomy, and possible hernia repair with possible mesh (less likely, biologic if needed). In addition, discussed risks including but not limited to risk of bleeding, infection, damage to surrounding structures, and death. Also discussed that patient may remain intubated after the surgery for unknown length of time during recovery, in the SICU postoperatively. She states that these would be acceptable to patient if she were able to make her own decisions. At this time, patient is confused an unable to consent for self, hence heatl care proxy Paulette gives permission to proceed.     Of note, on prior hospitalization in September, Palliative team had discussed with Paulette that she does need to provide the official HCP documentation for our file here. She states that she has documentation of this at home, and will provide it asap. At present time, this operation is emergent, and we will proceed to operating room as class 2, with her permission.

## 2021-11-24 NOTE — ED ADULT NURSE REASSESSMENT NOTE - NS ED NURSE REASSESS COMMENT FT1
Report received from Tori WALDEN. Pt has had 2 liquid bowel movements since 8am. Pt cleaned and changed into a new gown. Pt reports RUQ abdominal pain and nausea. Pt poor historian. Pt awaiting CT scan.

## 2021-11-24 NOTE — ED ADULT NURSE NOTE - OBJECTIVE STATEMENT
pt sent from [facility] for "confirmed stool impaction." Vomiting since Monday. Abdomen distended

## 2021-11-24 NOTE — ED PROVIDER NOTE - OBJECTIVE STATEMENT
89 yo F with PMH of HTN, HLD, metabolic encephalopathy, acute and chronic resp failure with hypercapnia and intubation on  her last admission to [facility] 2.5 months ago, CAD (on Aspirin), heart failure, arthritis, constipation presents from [facility], Per EMS, sent from [facility] for "confirmed stool impaction." Pt has been Vomiting since Monday. Abdomen distended. Unable to obtain any hx from pt. Pt is groaning with pain. Very poor hiustoria., C/o abd pain and pointing ti left side of abd 89 yo F with PMH of HTN, HLD, metabolic encephalopathy, acute and chronic resp failure with hypercapnia and intubation on  her last admission to [facility] 2.5 months ago, CAD (on Aspirin), heart failure, arthritis, constipation presents from [facility], Per EMS, sent from [facility] for "confirmed stool impaction." Pt has been Vomiting since Monday. Abdomen distended. Unable to obtain any hx from pt. Pt is groaning with pain. Very poor historian. C/o abd pain and pointing to left side of abdomen. Appears uncomfortable.

## 2021-11-24 NOTE — H&P ADULT - HISTORY OF PRESENT ILLNESS
89yoF bedbound, prior DNR with PMH HTN, HLD, OA, CAD on Asa, HFpEF (65-70%), chronic constipation, recent admission to [facility] for metabolic encephalopathy, acute and chronic resp failure with hypercapnia requiring intubation (9/5-9/15) and no PSH per report who presents from [facility] for constipation and emesis. Per nursing home report to ED, patient has been groaning in pain and pointing to L side, emesis since Monday nonbloody. Patient is responsive to voice and touch, oriented to person and time but not place or full situation. Able to localized pain to abdomen. Unable to detail last BM or flatus. Per Paulette over phone, abdominal pain since Saturday, began emesis on Monday. As far as family knows, normal BMs once every 2d until Saturday but then less frequent. States that patient is typically is very alert and oriented. Was at baseline mental status until last night and called to tell nieces she is excruciating pain followed by decline in mental status, tx to [facility] for further evaluation. In ED, multiple brown, nonbloody liquid BMs and no masses or impacted stool on WAYNE by ED.    In ED, febrile 100.8F, HD stable with mild intermittent tachy, WBC wnl, BLADIMIR with Cr 77/1.91, Trop 0.03, BNP 2396, lactate 5.8 decreased to 3.2 after IVF, cdiff neg. CT w/o PO or IV contrast (concern for aspiration/PNA) small L pleural effusion, cardiomegaly, PAH, severe coronary artery calcification, moderate AV calcification, severe stomach and small bowel dilation with high grade SBO at umbilical hernia site, extensive large bowel stool, perirectal edema, enlarged myomatous uterus and 4.5 cm L adnexal cyst.     Spoke with HCP Paulette and Shannon Velasquezvo (niece) [phone]   **Despite MOLST form, family wishes to revoke DNR and continue trial of intubation, they are amenable to surgery and want "everything under the sun" done.     PMH: HTN, HLD, OA, CAD on Asa, HFrEF (65-70%), chronic constipation, recent admission to [facility] for metabolic encephalopathy, acute and chronic resp failure with hypercapnia requiring intubation (9/5-9/15) CAD (on Aspirin)   PSH: None (per report)   Meds: Amlodipine 10mg QD, Asa 81, Atorvastatin 20mg QHS, Furosemide 20mg QD, Ipratropium/Albuterol 0.5/3mg/3L q6hr PRN, Mirtazapine 15mg QHS, Miralax, Voltaren, Zinc, Methanol, Vit C, Vit D, MVI, Tylenol  All: IV contrast, lisinopril (per chart review)   SHX: nonsmoker, no ETOH, no drugs, lives in nursing home  FHX: no CRC or IBD

## 2021-11-24 NOTE — H&P ADULT - NSHPLABSRESULTS_GEN_ALL_CORE
11.8   4.49  )-----------( 280      ( 2021 07:51 )             38.7         138  |  88<L>  |  77<H>  ----------------------------<  171<H>  5.2   |  30  |  1.91<H>    Ca    8.8      2021 10:35  Phos  12.9       Mg     3.2         TPro  8.3  /  Alb  3.3  /  TBili  0.4  /  DBili  x   /  AST  24  /  ALT  10  /  AlkPhos  85  -    LIVER FUNCTIONS - ( 2021 10:35 )  Alb: 3.3 g/dL / Pro: 8.3 g/dL / ALK PHOS: 85 U/L / ALT: 10 U/L / AST: 24 U/L / GGT: x           PT/INR - ( 2021 07:51 )   PT: 12.0 sec;   INR: 1.00          PTT - ( 2021 07:51 )  PTT:25.6 sec  CAPILLARY BLOOD GLUCOSE        CARDIAC MARKERS ( 2021 07:51 )  x     / 0.03 ng/mL / x     / x     / x          Urinalysis Basic - ( 2021 09:41 )    Color: Yellow / Appearance: Clear / S.025 / pH: x  Gluc: x / Ketone: 15 mg/dL  / Bili: Negative / Urobili: 0.2 E.U./dL   Blood: x / Protein: 30 mg/dL / Nitrite: NEGATIVE   Leuk Esterase: NEGATIVE / RBC: < 5 /HPF / WBC 5-10 /HPF   Sq Epi: x / Non Sq Epi: 0-5 /HPF / Bacteria: Present /HPF       < from: CT Abdomen and Pelvis No Cont (21 @ 11:25) >      EXAM:  CT ABDOMEN AND PELVIS                          PROCEDURE DATE:  2021          INTERPRETATION:  CLINICAL HISTORY: 89-year-old with nausea and vomiting.          FINDINGS: CT of the abdomen and pelvis was performed without the administration of intravenous contrast. Reconstructions were performed in the sagittal and coronal planes. No prior studies are available for comparison.    Evaluation of the lower chest demonstrates small left pleural effusion. Bibasilar atelectasis. Cardiomegaly. Enlargement of the main pulmonary artery consistent with pulmonary arterial hypertension. Severe coronary arterial calcification. Moderate calcification of the aortic valve, which correlate with degree of aortic stenosis.    Evaluation of the abdomen demonstrates the unenhanced liver, spleen, pancreas and bilateral adrenal glands are unremarkable. Evaluation of the gastrointestinal tract demonstrates severe dilatation of the stomach and small bowel. There is a periumbilical hernia with decompressed loops located distal to the exiting loop and dilatation of loops proximally to the entering loop. There is extensive stool throughout the large bowel. There are extensive diverticula.    Evaluation of the pelvis demonstrates the uterus is unremarkable. There is a left adnexal cyst measuring 4.5 cm. Evaluation of the pelvis demonstrates the bladder is collapsed around Liriano catheter balloon. Enlarged myomatous uterus. Large volume of stool within the large bowel. Edematous infiltration within the perirectal region. There is severe aortic and iliac vascular calcification. There is degenerative change of the bilateral sacroiliac joints. There is no destructive osseous lesion.            IMPRESSION:    High-grade small bowel obstruction at site of periumbilical hernia.    Left adnexal cyst, which meets size criteria for sonographic correlation, when clinically appropriate          Dr. Victorina Roy can be reached at [email or web address] with questions regarding this report.    --- End of Report ---          Thank you for the opportunity to participate in the care of this patient.      VICTORINA ROY MD; Attending Radiologist  This document has been electronically signed. 2021 12:09PM    < end of copied text >

## 2021-11-24 NOTE — H&P ADULT - NSICDXPASTMEDICALHX_GEN_ALL_CORE_FT
PAST MEDICAL HISTORY:  Altered mental status     HF (heart failure)     HLD (hyperlipidemia)     HTN (hypertension)     Hyponatremia     Osteoarthritis     Urinary tract infection

## 2021-11-24 NOTE — ED PROVIDER NOTE - CLINICAL SUMMARY MEDICAL DECISION MAKING FREE TEXT BOX
89 yo F with PMH of HTN, HLD, metabolic encephalopathy, acute and chronic resp failure with hypercapnia and intubation on  her last admission to [facility] 2.5 months ago, CAD (on Aspirin), heart failure, arthritis, constipation presents from [facility], Per EMS, sent from [facility] for "confirmed stool impaction." Pt has been Vomiting since Monday. Abdomen distended. Unable to obtain any hx from pt. Pt is groaning with pain. Very poor historian. C/o abd pain and pointing to left side of abdomen. Appears uncomfortable.  ED course: VS noted. Pt afebrile. Few episodes of loose yellow diarrhea while in ED. Labs noted and with elevated lactate and WBC. IVF given. CT done and with high grade obstruction. Surgery consulted and NG tube placed. Pt admitted to SICU. Stable in ED. 89 yo F with PMH of HTN, HLD, metabolic encephalopathy, acute and chronic resp failure with hypercapnia and intubation on  her last admission to [facility] 2.5 months ago, CAD (on Aspirin), heart failure, arthritis, constipation presents from [facility], Per EMS, sent from [facility] for "confirmed stool impaction." Pt has been Vomiting since Monday. Abdomen distended. Unable to obtain any hx from pt. Pt is groaning with pain. Very poor historian. C/o abd pain and pointing to left side of abdomen. Appears uncomfortable.  ED course: VS noted. Pt afebrile. Few episodes of loose yellow diarrhea while in ED. WBC WNL. Elevated lactate. Rest of labs/ studies noted. IVF given. CT A/P non-contrast done and with high grade bowel obstruction. Pt with elevated Cre and not tolerating po contrast. Surgery consulted and NG tube placed by Surgery. Pt admitted to SICU. Stable in ED.

## 2021-11-24 NOTE — ED ADULT NURSE NOTE - NSFALLRSKHARMRISK_ED_ALL_ED
Physical Therapy Daily Progress Note  Visits:5    Subjective : Salvatore Breen reports:  Have been feeling a lot better. I am able to pull and  objects with no problems, before I came here it bothered me. I go to my doctor on Monday for a follow up.    Objective:   See Exercise, Manual, and Modality Logs for complete treatment.     Assessment/Plan:Pt tolerated treatment session well today. We were able to perform more advanced stability exercises that pt responded to with no complaints. Pt reported ability to perform activities with decrease in pain that were giving him trouble prior to the injury. It experiences discomfort every so often but is happy with his progression. Will continue to make progression with stability activities.    Progress per Plan of Care and Progress strengthening /stabilization /functional activity         Manual Therapy:         mins  01173;  Therapeutic Exercise:   45      mins  11200;     Neuromuscular Nakita:    8    mins  32982;    Therapeutic Activity:          mins  15544;     Gait Training:           mins  23313;     Ultrasound:          mins  24621;    Electrical Stimulation:         mins  60397 ( );  Dry Needling          mins self-pay    Timed Treatment:   53   mins   Total Treatment:     60   mins    Hansel Hollingsworth, Student PT    I have reviewed and approved all documentation provided in this note.  All treatment has been provided under the direct supervision of physical therapist.      Indio Teixeira PT  [place] License #[id number]    Physical Therapist  
yes

## 2021-11-25 LAB
A1C WITH ESTIMATED AVERAGE GLUCOSE RESULT: 5.6 % — SIGNIFICANT CHANGE UP (ref 4–5.6)
ANION GAP SERPL CALC-SCNC: 14 MMOL/L — SIGNIFICANT CHANGE UP (ref 5–17)
BASE EXCESS BLDA CALC-SCNC: 4 MMOL/L — HIGH (ref -2–3)
BUN SERPL-MCNC: 81 MG/DL — HIGH (ref 7–23)
CALCIUM SERPL-MCNC: 8.2 MG/DL — LOW (ref 8.4–10.5)
CHLORIDE SERPL-SCNC: 97 MMOL/L — SIGNIFICANT CHANGE UP (ref 96–108)
CO2 BLDA-SCNC: 31 MMOL/L — HIGH (ref 19–24)
CO2 SERPL-SCNC: 28 MMOL/L — SIGNIFICANT CHANGE UP (ref 22–31)
COVID-19 NUCLEOCAPSID GAM AB INTERP: NEGATIVE — SIGNIFICANT CHANGE UP
COVID-19 NUCLEOCAPSID TOTAL GAM ANTIBODY RESULT: 0.15 INDEX — SIGNIFICANT CHANGE UP
COVID-19 SPIKE DOMAIN AB INTERP: POSITIVE
COVID-19 SPIKE DOMAIN ANTIBODY RESULT: >250 U/ML — HIGH
CREAT ?TM UR-MCNC: 60 MG/DL — SIGNIFICANT CHANGE UP
CREAT SERPL-MCNC: 1.69 MG/DL — HIGH (ref 0.5–1.3)
CULTURE RESULTS: NO GROWTH — SIGNIFICANT CHANGE UP
ESTIMATED AVERAGE GLUCOSE: 114 MG/DL — SIGNIFICANT CHANGE UP (ref 68–114)
GAS PNL BLDA: SIGNIFICANT CHANGE UP
GLUCOSE BLDC GLUCOMTR-MCNC: 109 MG/DL — HIGH (ref 70–99)
GLUCOSE BLDC GLUCOMTR-MCNC: 96 MG/DL — SIGNIFICANT CHANGE UP (ref 70–99)
GLUCOSE BLDC GLUCOMTR-MCNC: 99 MG/DL — SIGNIFICANT CHANGE UP (ref 70–99)
GLUCOSE SERPL-MCNC: 108 MG/DL — HIGH (ref 70–99)
HCO3 BLDA-SCNC: 30 MMOL/L — HIGH (ref 21–28)
HCT VFR BLD CALC: 28.1 % — LOW (ref 34.5–45)
HGB BLD-MCNC: 9 G/DL — LOW (ref 11.5–15.5)
LACTATE SERPL-SCNC: 1.4 MMOL/L — SIGNIFICANT CHANGE UP (ref 0.5–2)
MAGNESIUM SERPL-MCNC: 2.6 MG/DL — SIGNIFICANT CHANGE UP (ref 1.6–2.6)
MCHC RBC-ENTMCNC: 29 PG — SIGNIFICANT CHANGE UP (ref 27–34)
MCHC RBC-ENTMCNC: 32 GM/DL — SIGNIFICANT CHANGE UP (ref 32–36)
MCV RBC AUTO: 90.6 FL — SIGNIFICANT CHANGE UP (ref 80–100)
NRBC # BLD: 0 /100 WBCS — SIGNIFICANT CHANGE UP (ref 0–0)
OSMOLALITY UR: 451 MOSM/KG — SIGNIFICANT CHANGE UP (ref 300–900)
PCO2 BLDA: 48 MMHG — HIGH (ref 32–35)
PH BLDA: 7.4 — SIGNIFICANT CHANGE UP (ref 7.35–7.45)
PHOSPHATE SERPL-MCNC: 8.1 MG/DL — HIGH (ref 2.5–4.5)
PLATELET # BLD AUTO: 226 K/UL — SIGNIFICANT CHANGE UP (ref 150–400)
PO2 BLDA: 121 MMHG — HIGH (ref 83–108)
POTASSIUM SERPL-MCNC: 4.5 MMOL/L — SIGNIFICANT CHANGE UP (ref 3.5–5.3)
POTASSIUM SERPL-SCNC: 4.5 MMOL/L — SIGNIFICANT CHANGE UP (ref 3.5–5.3)
RBC # BLD: 3.1 M/UL — LOW (ref 3.8–5.2)
RBC # FLD: 16.2 % — HIGH (ref 10.3–14.5)
SAO2 % BLDA: 100 % — HIGH (ref 94–98)
SARS-COV-2 IGG+IGM SERPL QL IA: 0.15 INDEX — SIGNIFICANT CHANGE UP
SARS-COV-2 IGG+IGM SERPL QL IA: >250 U/ML — HIGH
SARS-COV-2 IGG+IGM SERPL QL IA: NEGATIVE — SIGNIFICANT CHANGE UP
SARS-COV-2 IGG+IGM SERPL QL IA: POSITIVE
SODIUM SERPL-SCNC: 139 MMOL/L — SIGNIFICANT CHANGE UP (ref 135–145)
SODIUM UR-SCNC: <20 MMOL/L — SIGNIFICANT CHANGE UP
SPECIMEN SOURCE: SIGNIFICANT CHANGE UP
WBC # BLD: 7.8 K/UL — SIGNIFICANT CHANGE UP (ref 3.8–10.5)
WBC # FLD AUTO: 7.8 K/UL — SIGNIFICANT CHANGE UP (ref 3.8–10.5)

## 2021-11-25 PROCEDURE — 71045 X-RAY EXAM CHEST 1 VIEW: CPT | Mod: 26,77

## 2021-11-25 PROCEDURE — 71045 X-RAY EXAM CHEST 1 VIEW: CPT | Mod: 26

## 2021-11-25 PROCEDURE — 99291 CRITICAL CARE FIRST HOUR: CPT

## 2021-11-25 RX ORDER — SODIUM CHLORIDE 9 MG/ML
500 INJECTION INTRAMUSCULAR; INTRAVENOUS; SUBCUTANEOUS ONCE
Refills: 0 | Status: COMPLETED | OUTPATIENT
Start: 2021-11-25 | End: 2021-11-25

## 2021-11-25 RX ORDER — DEXMEDETOMIDINE HYDROCHLORIDE IN 0.9% SODIUM CHLORIDE 4 UG/ML
0.2 INJECTION INTRAVENOUS
Qty: 400 | Refills: 0 | Status: DISCONTINUED | OUTPATIENT
Start: 2021-11-25 | End: 2021-11-27

## 2021-11-25 RX ORDER — SODIUM CHLORIDE 9 MG/ML
1000 INJECTION INTRAMUSCULAR; INTRAVENOUS; SUBCUTANEOUS
Refills: 0 | Status: DISCONTINUED | OUTPATIENT
Start: 2021-11-25 | End: 2021-11-26

## 2021-11-25 RX ORDER — ACETAMINOPHEN 500 MG
1000 TABLET ORAL ONCE
Refills: 0 | Status: COMPLETED | OUTPATIENT
Start: 2021-11-25 | End: 2021-11-25

## 2021-11-25 RX ORDER — SODIUM CHLORIDE 9 MG/ML
500 INJECTION INTRAMUSCULAR; INTRAVENOUS; SUBCUTANEOUS ONCE
Refills: 0 | Status: COMPLETED | OUTPATIENT
Start: 2021-11-25 | End: 2021-11-24

## 2021-11-25 RX ORDER — PROPOFOL 10 MG/ML
5.01 INJECTION, EMULSION INTRAVENOUS
Qty: 1000 | Refills: 0 | Status: DISCONTINUED | OUTPATIENT
Start: 2021-11-25 | End: 2021-11-26

## 2021-11-25 RX ADMIN — Medication 400 MILLIGRAM(S): at 12:21

## 2021-11-25 RX ADMIN — SODIUM CHLORIDE 500 MILLILITER(S): 9 INJECTION INTRAMUSCULAR; INTRAVENOUS; SUBCUTANEOUS at 03:00

## 2021-11-25 RX ADMIN — Medication 400 MILLIGRAM(S): at 22:55

## 2021-11-25 RX ADMIN — Medication 3 MILLILITER(S): at 22:31

## 2021-11-25 RX ADMIN — Medication 3 MILLILITER(S): at 09:17

## 2021-11-25 RX ADMIN — CHLORHEXIDINE GLUCONATE 15 MILLILITER(S): 213 SOLUTION TOPICAL at 17:50

## 2021-11-25 RX ADMIN — Medication 1000 MILLIGRAM(S): at 23:10

## 2021-11-25 RX ADMIN — HEPARIN SODIUM 5000 UNIT(S): 5000 INJECTION INTRAVENOUS; SUBCUTANEOUS at 13:05

## 2021-11-25 RX ADMIN — SODIUM CHLORIDE 2000 MILLILITER(S): 9 INJECTION INTRAMUSCULAR; INTRAVENOUS; SUBCUTANEOUS at 13:19

## 2021-11-25 RX ADMIN — Medication 1000 MILLIGRAM(S): at 13:19

## 2021-11-25 RX ADMIN — Medication 100 MILLIGRAM(S): at 22:31

## 2021-11-25 RX ADMIN — HEPARIN SODIUM 5000 UNIT(S): 5000 INJECTION INTRAVENOUS; SUBCUTANEOUS at 22:31

## 2021-11-25 RX ADMIN — PROPOFOL 2.17 MICROGRAM(S)/KG/MIN: 10 INJECTION, EMULSION INTRAVENOUS at 03:00

## 2021-11-25 RX ADMIN — PANTOPRAZOLE SODIUM 40 MILLIGRAM(S): 20 TABLET, DELAYED RELEASE ORAL at 11:08

## 2021-11-25 RX ADMIN — CHLORHEXIDINE GLUCONATE 1 APPLICATION(S): 213 SOLUTION TOPICAL at 06:43

## 2021-11-25 RX ADMIN — Medication 100 MILLIGRAM(S): at 13:04

## 2021-11-25 RX ADMIN — DEXMEDETOMIDINE HYDROCHLORIDE IN 0.9% SODIUM CHLORIDE 3.61 MICROGRAM(S)/KG/HR: 4 INJECTION INTRAVENOUS at 09:49

## 2021-11-25 RX ADMIN — HEPARIN SODIUM 5000 UNIT(S): 5000 INJECTION INTRAVENOUS; SUBCUTANEOUS at 06:41

## 2021-11-25 RX ADMIN — SODIUM CHLORIDE 100 MILLILITER(S): 9 INJECTION INTRAMUSCULAR; INTRAVENOUS; SUBCUTANEOUS at 16:03

## 2021-11-25 RX ADMIN — Medication 100 MILLIGRAM(S): at 06:41

## 2021-11-25 RX ADMIN — Medication 3 MILLILITER(S): at 05:43

## 2021-11-25 RX ADMIN — CHLORHEXIDINE GLUCONATE 15 MILLILITER(S): 213 SOLUTION TOPICAL at 06:41

## 2021-11-25 RX ADMIN — CEFTRIAXONE 100 MILLIGRAM(S): 500 INJECTION, POWDER, FOR SOLUTION INTRAMUSCULAR; INTRAVENOUS at 22:31

## 2021-11-25 RX ADMIN — SODIUM CHLORIDE 1000 MILLILITER(S): 9 INJECTION INTRAMUSCULAR; INTRAVENOUS; SUBCUTANEOUS at 23:30

## 2021-11-25 RX ADMIN — Medication 3 MILLILITER(S): at 16:08

## 2021-11-25 NOTE — DIETITIAN INITIAL EVALUATION ADULT. - ADD RECOMMEND
1. If able to extubate, recommend dysphagia screen prior to starting diet 2. If unable to advance diet within 24-48hrs consult RD to recs 3. Monitor GI recs 4. Trend wts 5. MVI, zinc, vit C for wound healing

## 2021-11-25 NOTE — PROGRESS NOTE ADULT - ASSESSMENT
89yoF with PMH HTN, HLD, OA, CAD on Asa, HF, chronic constipation, recent admission to [facility] for metabolic encephalopathy, acute hypercapneic resp failure and pulmonary edema requiring intubation (9/5-9/15) CAD (on Aspirin) presents w/ high-grade small bowel obstruction at site of periumbilical hernia now s/p 11/24 for Exlap and Open repair of Umbilical l Hernia, no SBR required.    Consider extubation today  Cont abx  Rest of plan per SICU 89yoF with PMH HTN, HLD, OA, CAD on Asa, HF, chronic constipation, recent admission to [facility] for metabolic encephalopathy, acute hypercapneic resp failure and pulmonary edema requiring intubation (9/5-9/15) CAD (on Aspirin) presents w/ high-grade small bowel obstruction at site of periumbilical hernia now s/p 11/24 for Exlap and Open repair of Umbilical l Hernia, no SBR required.    CPAP today  Careful fluid resuscitation  Cont abx  Rest of plan per SICU

## 2021-11-25 NOTE — PROGRESS NOTE ADULT - SUBJECTIVE AND OBJECTIVE BOX
Progress Note: SICU    S: No new issues/events overnight, no new med c/o    O: ICU Vital Signs Last 24 Hrs  T(F): 100.2 (21 @ 09:54), Max: 100.9 (21 @ 22:20)  HR: 107 (21 @ 10:00) (88 - 110)  BP: 129/59 (21 @ 09:00) (88/51 - 133/70)  BP(mean): 85 (21 @ 09:00) (61 - 102)  ABP: 106/43 (21 @ 10:00)  RR: 12 (21 @ 10:00) (12 - 18)  SpO2: 94% (21 @ 10:00) (93% - 100%)    PHYSICAL EXAM:   Neurological: AAOx3, CNII-XII intact,  strength 5/5 b/l  ENT: mucus membrane moist  Cardiovascular: RRR  Respiratory: CTA  Gastrointestinal: soft, NT, ND, BS+  Extremities: warm, no dependent edema  Vascular: no cyanosis/erythema  Skin: no rashes  MSK: no joint swelling.     LABS:        139  |  97  |  81<H>  ----------------------------<  108<H>  4.5   |  28  |  1.69<H>    Ca    8.2<L>      2021 05:36  Phos  8.1       Mg     2.6         TPro  7.2  /  Alb  2.6<L>  /  TBili  0.4  /  DBili  x   /  AST  23  /  ALT  7<L>  /  AlkPhos  68    LIVER FUNCTIONS - ( 2021 18:51 )  Alb: 2.6 g/dL / Pro: 7.2 g/dL / ALK PHOS: 68 U/L / ALT: 7 U/L / AST: 23 U/L / GGT: x                               9.0    7.80  )-----------( 226      ( 2021 05:36 )             28.1   PT/INR - ( 2021 07:51 )   PT: 12.0 sec;   INR: 1.00          PTT - ( 2021 07:51 )  PTT:25.6 secCARDIAC MARKERS ( 2021 18:51 )  x     / 0.02 ng/mL / x     / x     / x      CARDIAC MARKERS ( 2021 07:51 )  x     / 0.03 ng/mL / x     / x     / x        ABG - ( 2021 05:59 )  pH, Arterial: 7.40  pH, Blood: x     /  pCO2: 48    /  pO2: 121   / HCO3: 30    / Base Excess: 4.0   /  SaO2: 100.0           Urinalysis Basic - ( 2021 09:41 )    Color: Yellow / Appearance: Clear / S.025 / pH: x  Gluc: x / Ketone: 15 mg/dL  / Bili: Negative / Urobili: 0.2 E.U./dL   Blood: x / Protein: 30 mg/dL / Nitrite: NEGATIVE   Leuk Esterase: NEGATIVE / RBC: < 5 /HPF / WBC 5-10 /HPF   Sq Epi: x / Non Sq Epi: 0-5 /HPF / Bacteria: Present /HPF    CAPILLARY BLOOD GLUCOSE      POCT Blood Glucose.: 109 mg/dL (2021 06:48)  POCT Blood Glucose.: 101 mg/dL (2021 22:30)  POCT Blood Glucose.: 90 mg/dL (2021 18:40)  POCT Blood Glucose.: 75 mg/dL (2021 18:13)  POCT Blood Glucose.: 101 mg/dL (2021 16:40)    MEDICATIONS  (STANDING):  albuterol/ipratropium for Nebulization 3 milliLiter(s) Nebulizer every 6 hours  cefTRIAXone   IVPB 2000 milliGRAM(s) IV Intermittent every 24 hours  chlorhexidine 0.12% Liquid 15 milliLiter(s) Oral Mucosa every 12 hours  chlorhexidine 4% Liquid 1 Application(s) Topical <User Schedule>  dexMEDEtomidine Infusion 0.2 MICROgram(s)/kG/Hr (3.61 mL/Hr) IV Continuous <Continuous>  dextrose 40% Gel 15 Gram(s) Oral once  dextrose 5%. 1000 milliLiter(s) (50 mL/Hr) IV Continuous <Continuous>  dextrose 5%. 1000 milliLiter(s) (100 mL/Hr) IV Continuous <Continuous>  dextrose 50% Injectable 25 Gram(s) IV Push once  dextrose 50% Injectable 12.5 Gram(s) IV Push once  dextrose 50% Injectable 25 Gram(s) IV Push once  fentaNYL   Infusion. 0.5 MICROgram(s)/kG/Hr (3.61 mL/Hr) IV Continuous <Continuous>  glucagon  Injectable 1 milliGRAM(s) IntraMuscular once  heparin   Injectable 5000 Unit(s) SubCutaneous every 8 hours  insulin lispro (ADMELOG) corrective regimen sliding scale   SubCutaneous Before meals and at bedtime  metroNIDAZOLE  IVPB 500 milliGRAM(s) IV Intermittent every 8 hours  pantoprazole  Injectable 40 milliGRAM(s) IV Push daily  propofol Infusion 5.009 MICROgram(s)/kG/Min (2.17 mL/Hr) IV Continuous <Continuous>    MEDICATIONS  (PRN):  ondansetron Injectable 4 milliGRAM(s) IV Push every 6 hours PRN Nausea      Liriano:	  [ ] None	[x] Daily Liriano Order Placed	   Indication:	  [x] Strict I and O's    [ ] Obstruction     [ ] Incontinence + Stage 3 or 4 Decubitus  Central Line:  [ ] None	   [ ]  Medication / TPN Administration     [ ] No Peripheral IV        Progress Note: SICU    S: LA 2.0 post op and repeat 1.4 in am, post op Hb 9.2 from 11, Trop 0.02 from 0.03. SQH started. Family rescinded DNR. ABG showing metabolic alk- resp rate adjusted with improvement on ABG to 7.46. temp 100.8 bld cx's drawn earlier in day. UO decreased to 10 @MN & @2am given 500 cc bolus x2 with improved uo.     O: ICU Vital Signs Last 24 Hrs  T(F): 100.2 (21 @ 09:54), Max: 100.9 (21 @ 22:20)  HR: 107 (21 @ 10:00) (88 - 110)  BP: 129/59 (21 @ 09:00) (88/51 - 133/70)  BP(mean): 85 (21 @ 09:00) (61 - 102)  ABP: 106/43 (21 @ 10:00)  RR: 12 (21 @ 10:00) (12 - 18)  SpO2: 94% (21 @ 10:00) (93% - 100%)    PHYSICAL EXAM:   Gen: NAD   Neuro: Sedated intubated grimacing to pain.   HEENT: NGT in place draining bilious material  CV: RRR reg s1s2 no M  Pulm: CTA B/L no w/w/r + Decrease BS in bases R> L  Abd: Soft, ND + Tenderness at incision sites. + Midline incision c/d/i   Ext: No C/C/E warm well perfused,  Skin: No rashes erythema or ecchymosis  MSK: No joint swelling noted  Psych: Unable to assess     LABS:        139  |  97  |  81<H>  ----------------------------<  108<H>  4.5   |  28  |  1.69<H>    Ca    8.2<L>      2021 05:36  Phos  8.1       Mg     2.6         TPro  7.2  /  Alb  2.6<L>  /  TBili  0.4  /  DBili  x   /  AST  23  /  ALT  7<L>  /  AlkPhos  68  -24  LIVER FUNCTIONS - ( 2021 18:51 )  Alb: 2.6 g/dL / Pro: 7.2 g/dL / ALK PHOS: 68 U/L / ALT: 7 U/L / AST: 23 U/L / GGT: x                               9.0    7.80  )-----------( 226      ( 2021 05:36 )             28.1   PT/INR - ( 2021 07:51 )   PT: 12.0 sec;   INR: 1.00          PTT - ( 2021 07:51 )  PTT:25.6 secCARDIAC MARKERS ( 2021 18:51 )  x     / 0.02 ng/mL / x     / x     / x      CARDIAC MARKERS ( 2021 07:51 )  x     / 0.03 ng/mL / x     / x     / x        ABG - ( 2021 05:59 )  pH, Arterial: 7.40  pH, Blood: x     /  pCO2: 48    /  pO2: 121   / HCO3: 30    / Base Excess: 4.0   /  SaO2: 100.0           Urinalysis Basic - ( 2021 09:41 )    Color: Yellow / Appearance: Clear / S.025 / pH: x  Gluc: x / Ketone: 15 mg/dL  / Bili: Negative / Urobili: 0.2 E.U./dL   Blood: x / Protein: 30 mg/dL / Nitrite: NEGATIVE   Leuk Esterase: NEGATIVE / RBC: < 5 /HPF / WBC 5-10 /HPF   Sq Epi: x / Non Sq Epi: 0-5 /HPF / Bacteria: Present /HPF    CAPILLARY BLOOD GLUCOSE      POCT Blood Glucose.: 109 mg/dL (2021 06:48)  POCT Blood Glucose.: 101 mg/dL (2021 22:30)  POCT Blood Glucose.: 90 mg/dL (2021 18:40)  POCT Blood Glucose.: 75 mg/dL (2021 18:13)  POCT Blood Glucose.: 101 mg/dL (2021 16:40)    MEDICATIONS  (STANDING):  albuterol/ipratropium for Nebulization 3 milliLiter(s) Nebulizer every 6 hours  cefTRIAXone   IVPB 2000 milliGRAM(s) IV Intermittent every 24 hours  chlorhexidine 0.12% Liquid 15 milliLiter(s) Oral Mucosa every 12 hours  chlorhexidine 4% Liquid 1 Application(s) Topical <User Schedule>  dexMEDEtomidine Infusion 0.2 MICROgram(s)/kG/Hr (3.61 mL/Hr) IV Continuous <Continuous>  dextrose 40% Gel 15 Gram(s) Oral once  dextrose 5%. 1000 milliLiter(s) (50 mL/Hr) IV Continuous <Continuous>  dextrose 5%. 1000 milliLiter(s) (100 mL/Hr) IV Continuous <Continuous>  dextrose 50% Injectable 25 Gram(s) IV Push once  dextrose 50% Injectable 12.5 Gram(s) IV Push once  dextrose 50% Injectable 25 Gram(s) IV Push once  fentaNYL   Infusion. 0.5 MICROgram(s)/kG/Hr (3.61 mL/Hr) IV Continuous <Continuous>  glucagon  Injectable 1 milliGRAM(s) IntraMuscular once  heparin   Injectable 5000 Unit(s) SubCutaneous every 8 hours  insulin lispro (ADMELOG) corrective regimen sliding scale   SubCutaneous Before meals and at bedtime  metroNIDAZOLE  IVPB 500 milliGRAM(s) IV Intermittent every 8 hours  pantoprazole  Injectable 40 milliGRAM(s) IV Push daily  propofol Infusion 5.009 MICROgram(s)/kG/Min (2.17 mL/Hr) IV Continuous <Continuous>    MEDICATIONS  (PRN):  ondansetron Injectable 4 milliGRAM(s) IV Push every 6 hours PRN Nausea      Liriano:	  [ ] None	[x] Daily Liraino Order Placed	   Indication:	  [x] Strict I and O's    [ ] Obstruction     [ ] Incontinence + Stage 3 or 4 Decubitus  Central Line:  [ ] None	   [ ]  Medication / TPN Administration     [ ] No Peripheral IV

## 2021-11-25 NOTE — PROGRESS NOTE ADULT - SUBJECTIVE AND OBJECTIVE BOX
SUBJECTIVE:      MEDICATIONS  (STANDING):  albuterol/ipratropium for Nebulization 3 milliLiter(s) Nebulizer every 6 hours  cefTRIAXone   IVPB 2000 milliGRAM(s) IV Intermittent every 24 hours  chlorhexidine 0.12% Liquid 15 milliLiter(s) Oral Mucosa every 12 hours  chlorhexidine 4% Liquid 1 Application(s) Topical <User Schedule>  dextrose 40% Gel 15 Gram(s) Oral once  dextrose 5%. 1000 milliLiter(s) (50 mL/Hr) IV Continuous <Continuous>  dextrose 5%. 1000 milliLiter(s) (100 mL/Hr) IV Continuous <Continuous>  dextrose 50% Injectable 25 Gram(s) IV Push once  dextrose 50% Injectable 12.5 Gram(s) IV Push once  dextrose 50% Injectable 25 Gram(s) IV Push once  fentaNYL   Infusion. 0.5 MICROgram(s)/kG/Hr (3.61 mL/Hr) IV Continuous <Continuous>  glucagon  Injectable 1 milliGRAM(s) IntraMuscular once  heparin   Injectable 5000 Unit(s) SubCutaneous every 8 hours  insulin lispro (ADMELOG) corrective regimen sliding scale   SubCutaneous Before meals and at bedtime  metroNIDAZOLE  IVPB 500 milliGRAM(s) IV Intermittent every 8 hours  pantoprazole  Injectable 40 milliGRAM(s) IV Push daily  propofol Infusion 5 MICROgram(s)/kG/Min (2.17 mL/Hr) IV Continuous <Continuous>  sodium chloride 0.9% Bolus 500 milliLiter(s) IV Bolus once  sodium chloride 0.9% Bolus 500 milliLiter(s) IV Bolus once  sodium chloride 0.9%. 1000 milliLiter(s) (100 mL/Hr) IV Continuous <Continuous>    MEDICATIONS  (PRN):  ondansetron Injectable 4 milliGRAM(s) IV Push every 6 hours PRN Nausea      Vital Signs Last 24 Hrs  T(C): 37.6 (2021 05:08), Max: 38.3 (2021 22:20)  T(F): 99.7 (2021 05:08), Max: 100.9 (2021 22:20)  HR: 95 (2021 06:00) (75 - 110)  BP: 110/57 (2021 05:00) (88/51 - 168/84)  BP(mean): 80 (2021 05:00) (61 - 102)  RR: 12 (2021 06:00) (12 - 18)  SpO2: 98% (2021 06:00) (90% - 100%)    Physical Exam:  General: NAD, resting comfortably in bed  Pulmonary: Nonlabored breathing, no respiratory distress  Cardiovascular: NSR  Abdominal: soft, NT/ND  Extremities: WWP, normal strength  Neuro: A/O x 3, CNs II-XII grossly intact, no focal deficits    I&O's Summary    2021 07:01  -  2021 06:27  --------------------------------------------------------  IN: 2366.8 mL / OUT: 1515 mL / NET: 851.8 mL        LABS:                        9.0    7.80  )-----------( 226      ( 2021 05:36 )             28.1     11-    139  |  97  |  x   ----------------------------<  x   4.5   |  28  |  x     Ca    9.1      2021 18:51  Phos  8.2     11-24  Mg     2.6         TPro  7.2  /  Alb  2.6<L>  /  TBili  0.4  /  DBili  x   /  AST  23  /  ALT  7<L>  /  AlkPhos  68  11-24    PT/INR - ( 2021 07:51 )   PT: 12.0 sec;   INR: 1.00          PTT - ( 2021 07:51 )  PTT:25.6 sec  Urinalysis Basic - ( 2021 09:41 )    Color: Yellow / Appearance: Clear / S.025 / pH: x  Gluc: x / Ketone: 15 mg/dL  / Bili: Negative / Urobili: 0.2 E.U./dL   Blood: x / Protein: 30 mg/dL / Nitrite: NEGATIVE   Leuk Esterase: NEGATIVE / RBC: < 5 /HPF / WBC 5-10 /HPF   Sq Epi: x / Non Sq Epi: 0-5 /HPF / Bacteria: Present /HPF      CAPILLARY BLOOD GLUCOSE      POCT Blood Glucose.: 101 mg/dL (2021 22:30)  POCT Blood Glucose.: 90 mg/dL (2021 18:40)  POCT Blood Glucose.: 75 mg/dL (2021 18:13)  POCT Blood Glucose.: 101 mg/dL (2021 16:40)    LIVER FUNCTIONS - ( 2021 18:51 )  Alb: 2.6 g/dL / Pro: 7.2 g/dL / ALK PHOS: 68 U/L / ALT: 7 U/L / AST: 23 U/L / GGT: x             RADIOLOGY & ADDITIONAL STUDIES:       SUBJECTIVE: Febrile ON. Family rescinded DNR. 500cc x2 bolus.      MEDICATIONS  (STANDING):  albuterol/ipratropium for Nebulization 3 milliLiter(s) Nebulizer every 6 hours  cefTRIAXone   IVPB 2000 milliGRAM(s) IV Intermittent every 24 hours  chlorhexidine 0.12% Liquid 15 milliLiter(s) Oral Mucosa every 12 hours  chlorhexidine 4% Liquid 1 Application(s) Topical <User Schedule>  dextrose 40% Gel 15 Gram(s) Oral once  dextrose 5%. 1000 milliLiter(s) (50 mL/Hr) IV Continuous <Continuous>  dextrose 5%. 1000 milliLiter(s) (100 mL/Hr) IV Continuous <Continuous>  dextrose 50% Injectable 25 Gram(s) IV Push once  dextrose 50% Injectable 12.5 Gram(s) IV Push once  dextrose 50% Injectable 25 Gram(s) IV Push once  fentaNYL   Infusion. 0.5 MICROgram(s)/kG/Hr (3.61 mL/Hr) IV Continuous <Continuous>  glucagon  Injectable 1 milliGRAM(s) IntraMuscular once  heparin   Injectable 5000 Unit(s) SubCutaneous every 8 hours  insulin lispro (ADMELOG) corrective regimen sliding scale   SubCutaneous Before meals and at bedtime  metroNIDAZOLE  IVPB 500 milliGRAM(s) IV Intermittent every 8 hours  pantoprazole  Injectable 40 milliGRAM(s) IV Push daily  propofol Infusion 5 MICROgram(s)/kG/Min (2.17 mL/Hr) IV Continuous <Continuous>  sodium chloride 0.9% Bolus 500 milliLiter(s) IV Bolus once  sodium chloride 0.9% Bolus 500 milliLiter(s) IV Bolus once  sodium chloride 0.9%. 1000 milliLiter(s) (100 mL/Hr) IV Continuous <Continuous>    MEDICATIONS  (PRN):  ondansetron Injectable 4 milliGRAM(s) IV Push every 6 hours PRN Nausea      Vital Signs Last 24 Hrs  T(C): 37.6 (2021 05:08), Max: 38.3 (2021 22:20)  T(F): 99.7 (2021 05:08), Max: 100.9 (2021 22:20)  HR: 95 (2021 06:00) (75 - 110)  BP: 110/57 (2021 05:00) (88/51 - 168/84)  BP(mean): 80 (2021 05:00) (61 - 102)  RR: 12 (2021 06:00) (12 - 18)  SpO2: 98% (2021 06:00) (90% - 100%)    Physical Exam:  General: NAD, resting comfortably in bed  Pulmonary: Nonlabored breathing, no respiratory distress  Cardiovascular: NSR  Abdominal: soft, NT/ND, rectal tube in place  Extremities: WWP, normal strength  Neuro: A/O x 3, CNs II-XII grossly intact, no focal deficits    I&O's Summary    2021 07:01  -  2021 06:27  --------------------------------------------------------  IN: 2366.8 mL / OUT: 1515 mL / NET: 851.8 mL        LABS:                        9.0    7.80  )-----------( 226      ( 2021 05:36 )             28.1     11-25    139  |  97  |  x   ----------------------------<  x   4.5   |  28  |  x     Ca    9.1      2021 18:51  Phos  8.2     11-24  Mg     2.6     -25    TPro  7.2  /  Alb  2.6<L>  /  TBili  0.4  /  DBili  x   /  AST  23  /  ALT  7<L>  /  AlkPhos  68  11-24    PT/INR - ( 2021 07:51 )   PT: 12.0 sec;   INR: 1.00          PTT - ( 2021 07:51 )  PTT:25.6 sec  Urinalysis Basic - ( 2021 09:41 )    Color: Yellow / Appearance: Clear / S.025 / pH: x  Gluc: x / Ketone: 15 mg/dL  / Bili: Negative / Urobili: 0.2 E.U./dL   Blood: x / Protein: 30 mg/dL / Nitrite: NEGATIVE   Leuk Esterase: NEGATIVE / RBC: < 5 /HPF / WBC 5-10 /HPF   Sq Epi: x / Non Sq Epi: 0-5 /HPF / Bacteria: Present /HPF      CAPILLARY BLOOD GLUCOSE      POCT Blood Glucose.: 101 mg/dL (2021 22:30)  POCT Blood Glucose.: 90 mg/dL (2021 18:40)  POCT Blood Glucose.: 75 mg/dL (2021 18:13)  POCT Blood Glucose.: 101 mg/dL (2021 16:40)    LIVER FUNCTIONS - ( 2021 18:51 )  Alb: 2.6 g/dL / Pro: 7.2 g/dL / ALK PHOS: 68 U/L / ALT: 7 U/L / AST: 23 U/L / GGT: x             RADIOLOGY & ADDITIONAL STUDIES:

## 2021-11-25 NOTE — PROGRESS NOTE ADULT - ASSESSMENT
89yoF bedbound, prior DNR with PMH HTN, HLD, OA, CAD on Asa, HF, chronic constipation, recent admission to [facility] for metabolic encephalopathy, acute hypercapneic resp failure and pulmonary edema requiring intubation (9/5-9/15) CAD (on Aspirin) and no PSH who presents from [facility] for constipation and emesis. Afebrile, HD stable, WBC wnl, BLADIMIR with Cr 77/1.91, Trop 0.03, BNP 2396, lactate 5.8, cdiff neg. CT with high-grade small bowel obstruction at site of periumbilical hernia. NGT placed in ED with 1.7 L brown feculent output. Concerning for strangulated umbilical hernia with possible bowel ischemia. Taken to OR on 11/24 for Exlap and Open repair of Umbilical l Hernia. EBL min, IVF: 2.1L, uo: 100.    Neuro: Precedex, Fentanyl. Home Remeron on hold while NPO  CV: HD stable H/o CAD, HTN, HFpEF (65-70%), Norvasc Lasix Lipitor and asa held F/u Trops EKG F/u post op LA.  Pulm: Intubated on 40%/ 440/12/5 Duonebs F/u Post op CXR Possible Extubation in am   GI: NPO, SLIV, NGT Protonix.  : Liriano   ID: Ceftriaxone 2gm( 11/24-) , flagyl  ( 11/24-)   Endo: ISS  PPx: SCD SQH   Lines: [place] left brachial ( 11/24-), PIV   Wounds: Midline incision   PT/OT: Not ordered.    89yoF bedbound, prior DNR with PMH HTN, HLD, OA, CAD on Asa, HF, chronic constipation, recent admission to [facility] for metabolic encephalopathy, acute hypercapneic resp failure and pulmonary edema requiring intubation (9/5-9/15) CAD (on Aspirin) and no PSH who presents from [facility] for constipation and emesis. Afebrile, HD stable, WBC wnl, BLADIMIR with Cr 77/1.91, Trop 0.03, BNP 2396, lactate 5.8, cdiff neg. CT with high-grade small bowel obstruction at site of periumbilical hernia. NGT placed in ED with 1.7 L brown feculent output. Concerning for strangulated umbilical hernia with possible bowel ischemia. Taken to OR on 11/24 for Exlap and Open repair of Umbilical l Hernia. EBL min, IVF: 2.1L, uo: 100.    Neuro: Precedex, Fentanyl. Home Remeron on hold while NPO  CV: HD stable H/o CAD, HTN, HFpEF (65-70%), Norvasc Lasix Lipitor and asa held F/u Trops EKG F/u post op LA.  Pulm: Intubated on 40%/ 440/12/5 Duonebs F/u Post op CXR Possible Extubation in am   GI: NPO, SLIV, NGT Protonix.  : Bahena   ID: Ceftriaxone 2gm( 11/24-) , flagyl  ( 11/24-)   Endo: ISS  PPx: SCD SQH   Lines: [place] left brachial ( 11/24-), PIV   Wounds: Midline incision, bahena, FMS, PIVx2, NG  PT/OT: Not ordered.

## 2021-11-25 NOTE — DIETITIAN INITIAL EVALUATION ADULT. - OTHER INFO
89yoF bedbound, prior DNR with PMH HTN, HLD, OA, CAD on Asa, HF, chronic constipation, recent admission to [facility] for metabolic encephalopathy, acute hypercapneic resp failure and pulmonary edema requiring intubation (9/5-9/15) CAD (on Aspirin) and no PSH who presents from [facility] for constipation and emesis. Afebrile, HD stable, WBC wnl, BLADIMIR with Cr 77/1.91, Trop 0.03, BNP 2396, lactate 5.8, cdiff neg. CT with high-grade small bowel obstruction at site of periumbilical hernia. NGT placed in ED with 1.7 L brown feculent output. Concerning for strangulated umbilical hernia with possible bowel ischemia. Taken to OR on 11/24 for Exlap and Open repair of Umbilical l Hernia    Pt seen in room, intubated and sedated. Propofol running at 8.7ml/hr (provides 210kcals x24hrs). MAP 63. Pt had BM today 11/25. Skin with stage 2 pressure injury to right buttock, stage 1 pressure injury to right heel, sacrum. Per team, possible extubation today. Please see full recs below. Will continue to follow per RD protocol.

## 2021-11-26 LAB
ANION GAP SERPL CALC-SCNC: 15 MMOL/L — SIGNIFICANT CHANGE UP (ref 5–17)
APPEARANCE UR: CLEAR — SIGNIFICANT CHANGE UP
BACTERIA # UR AUTO: PRESENT /HPF
BILIRUB UR-MCNC: ABNORMAL
BUN SERPL-MCNC: 77 MG/DL — HIGH (ref 7–23)
CALCIUM SERPL-MCNC: 7.8 MG/DL — LOW (ref 8.4–10.5)
CHLORIDE SERPL-SCNC: 105 MMOL/L — SIGNIFICANT CHANGE UP (ref 96–108)
CO2 SERPL-SCNC: 24 MMOL/L — SIGNIFICANT CHANGE UP (ref 22–31)
COLOR SPEC: YELLOW — SIGNIFICANT CHANGE UP
CREAT SERPL-MCNC: 1.38 MG/DL — HIGH (ref 0.5–1.3)
CULTURE RESULTS: SIGNIFICANT CHANGE UP
DIFF PNL FLD: ABNORMAL
EPI CELLS # UR: SIGNIFICANT CHANGE UP /HPF (ref 0–5)
GLUCOSE BLDC GLUCOMTR-MCNC: 145 MG/DL — HIGH (ref 70–99)
GLUCOSE BLDC GLUCOMTR-MCNC: 71 MG/DL — SIGNIFICANT CHANGE UP (ref 70–99)
GLUCOSE BLDC GLUCOMTR-MCNC: 82 MG/DL — SIGNIFICANT CHANGE UP (ref 70–99)
GLUCOSE BLDC GLUCOMTR-MCNC: 83 MG/DL — SIGNIFICANT CHANGE UP (ref 70–99)
GLUCOSE SERPL-MCNC: 84 MG/DL — SIGNIFICANT CHANGE UP (ref 70–99)
GLUCOSE UR QL: NEGATIVE — SIGNIFICANT CHANGE UP
GRAM STN FLD: SIGNIFICANT CHANGE UP
HCT VFR BLD CALC: 24.6 % — LOW (ref 34.5–45)
HCT VFR BLD CALC: 25.1 % — LOW (ref 34.5–45)
HCT VFR BLD CALC: 25.3 % — LOW (ref 34.5–45)
HGB BLD-MCNC: 7.7 G/DL — LOW (ref 11.5–15.5)
HGB BLD-MCNC: 7.8 G/DL — LOW (ref 11.5–15.5)
HGB BLD-MCNC: 7.9 G/DL — LOW (ref 11.5–15.5)
KETONES UR-MCNC: 15 MG/DL
LEUKOCYTE ESTERASE UR-ACNC: NEGATIVE — SIGNIFICANT CHANGE UP
MAGNESIUM SERPL-MCNC: 2.6 MG/DL — SIGNIFICANT CHANGE UP (ref 1.6–2.6)
MCHC RBC-ENTMCNC: 28.7 PG — SIGNIFICANT CHANGE UP (ref 27–34)
MCHC RBC-ENTMCNC: 29 PG — SIGNIFICANT CHANGE UP (ref 27–34)
MCHC RBC-ENTMCNC: 29.3 PG — SIGNIFICANT CHANGE UP (ref 27–34)
MCHC RBC-ENTMCNC: 31.1 GM/DL — LOW (ref 32–36)
MCHC RBC-ENTMCNC: 31.2 GM/DL — LOW (ref 32–36)
MCHC RBC-ENTMCNC: 31.3 GM/DL — LOW (ref 32–36)
MCV RBC AUTO: 92 FL — SIGNIFICANT CHANGE UP (ref 80–100)
MCV RBC AUTO: 93.3 FL — SIGNIFICANT CHANGE UP (ref 80–100)
MCV RBC AUTO: 93.5 FL — SIGNIFICANT CHANGE UP (ref 80–100)
NITRITE UR-MCNC: NEGATIVE — SIGNIFICANT CHANGE UP
NRBC # BLD: 0 /100 WBCS — SIGNIFICANT CHANGE UP (ref 0–0)
PH UR: 5.5 — SIGNIFICANT CHANGE UP (ref 5–8)
PHOSPHATE SERPL-MCNC: 5.7 MG/DL — HIGH (ref 2.5–4.5)
PLATELET # BLD AUTO: 184 K/UL — SIGNIFICANT CHANGE UP (ref 150–400)
PLATELET # BLD AUTO: 199 K/UL — SIGNIFICANT CHANGE UP (ref 150–400)
PLATELET # BLD AUTO: 207 K/UL — SIGNIFICANT CHANGE UP (ref 150–400)
POTASSIUM SERPL-MCNC: 3.4 MMOL/L — LOW (ref 3.5–5.3)
POTASSIUM SERPL-SCNC: 3.4 MMOL/L — LOW (ref 3.5–5.3)
PROCALCITONIN SERPL-MCNC: 9.14 NG/ML — HIGH (ref 0.02–0.1)
PROT UR-MCNC: 30 MG/DL
RBC # BLD: 2.63 M/UL — LOW (ref 3.8–5.2)
RBC # BLD: 2.69 M/UL — LOW (ref 3.8–5.2)
RBC # BLD: 2.75 M/UL — LOW (ref 3.8–5.2)
RBC # FLD: 16.7 % — HIGH (ref 10.3–14.5)
RBC CASTS # UR COMP ASSIST: < 5 /HPF — SIGNIFICANT CHANGE UP
SODIUM SERPL-SCNC: 144 MMOL/L — SIGNIFICANT CHANGE UP (ref 135–145)
SP GR SPEC: 1.02 — SIGNIFICANT CHANGE UP (ref 1–1.03)
SPECIMEN SOURCE: SIGNIFICANT CHANGE UP
SPECIMEN SOURCE: SIGNIFICANT CHANGE UP
UROBILINOGEN FLD QL: 0.2 E.U./DL — SIGNIFICANT CHANGE UP
WBC # BLD: 10.94 K/UL — HIGH (ref 3.8–10.5)
WBC # BLD: 12.07 K/UL — HIGH (ref 3.8–10.5)
WBC # BLD: 12.17 K/UL — HIGH (ref 3.8–10.5)
WBC # FLD AUTO: 10.94 K/UL — HIGH (ref 3.8–10.5)
WBC # FLD AUTO: 12.07 K/UL — HIGH (ref 3.8–10.5)
WBC # FLD AUTO: 12.17 K/UL — HIGH (ref 3.8–10.5)
WBC UR QL: < 5 /HPF — SIGNIFICANT CHANGE UP

## 2021-11-26 PROCEDURE — 99291 CRITICAL CARE FIRST HOUR: CPT

## 2021-11-26 PROCEDURE — 88302 TISSUE EXAM BY PATHOLOGIST: CPT | Mod: 26

## 2021-11-26 RX ORDER — PIPERACILLIN AND TAZOBACTAM 4; .5 G/20ML; G/20ML
2.25 INJECTION, POWDER, LYOPHILIZED, FOR SOLUTION INTRAVENOUS ONCE
Refills: 0 | Status: COMPLETED | OUTPATIENT
Start: 2021-11-26 | End: 2021-11-26

## 2021-11-26 RX ORDER — HYDROMORPHONE HYDROCHLORIDE 2 MG/ML
0.5 INJECTION INTRAMUSCULAR; INTRAVENOUS; SUBCUTANEOUS EVERY 4 HOURS
Refills: 0 | Status: DISCONTINUED | OUTPATIENT
Start: 2021-11-26 | End: 2021-11-27

## 2021-11-26 RX ORDER — POTASSIUM CHLORIDE 20 MEQ
10 PACKET (EA) ORAL
Refills: 0 | Status: COMPLETED | OUTPATIENT
Start: 2021-11-26 | End: 2021-11-26

## 2021-11-26 RX ORDER — POTASSIUM CHLORIDE 20 MEQ
10 PACKET (EA) ORAL
Refills: 0 | Status: DISCONTINUED | OUTPATIENT
Start: 2021-11-26 | End: 2021-11-26

## 2021-11-26 RX ORDER — SODIUM CHLORIDE 9 MG/ML
1000 INJECTION, SOLUTION INTRAVENOUS
Refills: 0 | Status: DISCONTINUED | OUTPATIENT
Start: 2021-11-26 | End: 2021-11-27

## 2021-11-26 RX ORDER — LIDOCAINE 4 G/100G
1 CREAM TOPICAL EVERY 24 HOURS
Refills: 0 | Status: DISCONTINUED | OUTPATIENT
Start: 2021-11-26 | End: 2021-12-09

## 2021-11-26 RX ORDER — HYDROMORPHONE HYDROCHLORIDE 2 MG/ML
0.5 INJECTION INTRAMUSCULAR; INTRAVENOUS; SUBCUTANEOUS ONCE
Refills: 0 | Status: DISCONTINUED | OUTPATIENT
Start: 2021-11-26 | End: 2021-11-26

## 2021-11-26 RX ORDER — PIPERACILLIN AND TAZOBACTAM 4; .5 G/20ML; G/20ML
2.25 INJECTION, POWDER, LYOPHILIZED, FOR SOLUTION INTRAVENOUS EVERY 6 HOURS
Refills: 0 | Status: DISCONTINUED | OUTPATIENT
Start: 2021-11-26 | End: 2021-11-30

## 2021-11-26 RX ADMIN — Medication 3 MILLILITER(S): at 10:18

## 2021-11-26 RX ADMIN — Medication 3 MILLILITER(S): at 16:13

## 2021-11-26 RX ADMIN — HYDROMORPHONE HYDROCHLORIDE 0.5 MILLIGRAM(S): 2 INJECTION INTRAMUSCULAR; INTRAVENOUS; SUBCUTANEOUS at 11:04

## 2021-11-26 RX ADMIN — PANTOPRAZOLE SODIUM 40 MILLIGRAM(S): 20 TABLET, DELAYED RELEASE ORAL at 12:30

## 2021-11-26 RX ADMIN — LIDOCAINE 1 PATCH: 4 CREAM TOPICAL at 11:30

## 2021-11-26 RX ADMIN — CHLORHEXIDINE GLUCONATE 1 APPLICATION(S): 213 SOLUTION TOPICAL at 06:36

## 2021-11-26 RX ADMIN — HEPARIN SODIUM 5000 UNIT(S): 5000 INJECTION INTRAVENOUS; SUBCUTANEOUS at 21:14

## 2021-11-26 RX ADMIN — Medication 100 MILLIGRAM(S): at 05:15

## 2021-11-26 RX ADMIN — HEPARIN SODIUM 5000 UNIT(S): 5000 INJECTION INTRAVENOUS; SUBCUTANEOUS at 14:07

## 2021-11-26 RX ADMIN — PIPERACILLIN AND TAZOBACTAM 200 GRAM(S): 4; .5 INJECTION, POWDER, LYOPHILIZED, FOR SOLUTION INTRAVENOUS at 21:14

## 2021-11-26 RX ADMIN — Medication 100 MILLIEQUIVALENT(S): at 09:59

## 2021-11-26 RX ADMIN — Medication 3 MILLILITER(S): at 05:14

## 2021-11-26 RX ADMIN — DEXMEDETOMIDINE HYDROCHLORIDE IN 0.9% SODIUM CHLORIDE 3.61 MICROGRAM(S)/KG/HR: 4 INJECTION INTRAVENOUS at 21:14

## 2021-11-26 RX ADMIN — Medication 100 MILLIEQUIVALENT(S): at 07:52

## 2021-11-26 RX ADMIN — CHLORHEXIDINE GLUCONATE 15 MILLILITER(S): 213 SOLUTION TOPICAL at 05:15

## 2021-11-26 RX ADMIN — DEXMEDETOMIDINE HYDROCHLORIDE IN 0.9% SODIUM CHLORIDE 3.61 MICROGRAM(S)/KG/HR: 4 INJECTION INTRAVENOUS at 05:17

## 2021-11-26 RX ADMIN — SODIUM CHLORIDE 100 MILLILITER(S): 9 INJECTION INTRAMUSCULAR; INTRAVENOUS; SUBCUTANEOUS at 00:11

## 2021-11-26 RX ADMIN — HYDROMORPHONE HYDROCHLORIDE 0.5 MILLIGRAM(S): 2 INJECTION INTRAMUSCULAR; INTRAVENOUS; SUBCUTANEOUS at 17:39

## 2021-11-26 RX ADMIN — Medication 3 MILLILITER(S): at 23:07

## 2021-11-26 RX ADMIN — Medication 100 MILLIEQUIVALENT(S): at 09:15

## 2021-11-26 RX ADMIN — HEPARIN SODIUM 5000 UNIT(S): 5000 INJECTION INTRAVENOUS; SUBCUTANEOUS at 05:16

## 2021-11-26 RX ADMIN — Medication 100 MILLIGRAM(S): at 14:06

## 2021-11-26 NOTE — PROGRESS NOTE ADULT - SUBJECTIVE AND OBJECTIVE BOX
24 Hour Events: POD#2 Ex-lap, open umbilical hernia repair. Low SBP x2, responsive to IVF boluses. Changed from propofol to precedex. Trial of CPAP yesterday evening, however pulling low tidal volumes. Up trending fever curve overnight, Tm 101.1F rectally, surveillance blood cultures sent, blood cultures without growth to date.       PAST MEDICAL & SURGICAL HISTORY:  HTN (hypertension)  HLD (hyperlipidemia)  Osteoarthritis  Hyponatremia  Altered mental status  Urinary tract infection  HF (heart failure)      Allergies  iodine containing compounds (Unknown)  lisinopril (Unknown)      MEDICATIONS  (STANDING):  albuterol/ipratropium for Nebulization 3 milliLiter(s) Nebulizer every 6 hours  cefTRIAXone   IVPB 2000 milliGRAM(s) IV Intermittent every 24 hours  chlorhexidine 0.12% Liquid 15 milliLiter(s) Oral Mucosa every 12 hours  chlorhexidine 4% Liquid 1 Application(s) Topical <User Schedule>  dexMEDEtomidine Infusion 0.2 MICROgram(s)/kG/Hr (3.61 mL/Hr) IV Continuous <Continuous>  dextrose 40% Gel 15 Gram(s) Oral once  dextrose 5%. 1000 milliLiter(s) (50 mL/Hr) IV Continuous <Continuous>  dextrose 5%. 1000 milliLiter(s) (100 mL/Hr) IV Continuous <Continuous>  dextrose 50% Injectable 25 Gram(s) IV Push once  dextrose 50% Injectable 12.5 Gram(s) IV Push once  dextrose 50% Injectable 25 Gram(s) IV Push once  fentaNYL   Infusion. 0.5 MICROgram(s)/kG/Hr (3.61 mL/Hr) IV Continuous <Continuous>  glucagon  Injectable 1 milliGRAM(s) IntraMuscular once  heparin   Injectable 5000 Unit(s) SubCutaneous every 8 hours  insulin lispro (ADMELOG) corrective regimen sliding scale   SubCutaneous Before meals and at bedtime  metroNIDAZOLE  IVPB 500 milliGRAM(s) IV Intermittent every 8 hours  pantoprazole  Injectable 40 milliGRAM(s) IV Push daily  potassium chloride  10 mEq/100 mL IVPB 10 milliEquivalent(s) IV Intermittent every 1 hour  sodium chloride 0.9%. 1000 milliLiter(s) (100 mL/Hr) IV Continuous <Continuous>    MEDICATIONS  (PRN):  ondansetron Injectable 4 milliGRAM(s) IV Push every 6 hours PRN Nausea        Physical Exam:   General: Frail elderly female, appears stated age, resting comfortably in bed in no acute distress   Neuro: Grossly intact bilaterally   HEENT: Normocephalic, atraumatic, trachea midline, no JVD   Heart: Regular S1/S2, no murmurs rubs or gallops   Lungs: Unlabored breathing on ventilator; Clear to auscultation bilaterally, no adventitious sounds   Abdomen: Softly distended, tenderness to deep palpation in all 4 quadrants without rebound, guarding or rigidity; lower abdominal midline incision closed with staples is clean/dry/intacts  Upper Extremities: No edema, freely mobile bilaterally   Lower Extremities: No edema, SCDs in place, feet warm bilaterally   Skin: Warm, non-diaphoretic throughout       Labs:                         7.9    10.94 )-----------( 207      ( 2021 05:48 )             25.3         144  |  105  |  77<H>  ----------------------------<  84  3.4<L>   |  24  |  1.38<H>    Ca    7.8<L>      2021 05:48  Phos  5.7       Mg     2.6         TPro  7.2  /  Alb  2.6<L>  /  TBili  0.4  /  DBili  x   /  AST  23  /  ALT  7<L>  /  AlkPhos  68      CAPILLARY BLOOD GLUCOSE  POCT Blood Glucose.: 82 mg/dL (2021 23:58)  POCT Blood Glucose.: 96 mg/dL (2021 15:47)  POCT Blood Glucose.: 99 mg/dL (2021 11:06)    CARDIAC MARKERS ( 2021 18:51 )  x     / 0.02 ng/mL / x     / x     / x        COVID-19 PCR: NotDetec (12 Sep 2021 07:01)  COVID-19 PCR: Negative (05 Sep 2021 00:30)    Urinalysis Basic - ( 2021 09:41   Color: Yellow / Appearance: Clear / S.025 / pH: x  Gluc: x / Ketone: 15 mg/dL  / Bili: Negative / Urobili: 0.2 E.U./dL   Blood: x / Protein: 30 mg/dL / Nitrite: NEGATIVE   Leuk Esterase: NEGATIVE / RBC: < 5 /HPF / WBC 5-10 /HPF   Sq Epi: x / Non Sq Epi: 0-5 /HPF / Bacteria: Present /HPF      Culture - Stool (collected 2021 11:53)  Source: .Stool Feces  Preliminary Report (2021 09:49):    No enteric pathogens to date: Final culture pending    Culture - Blood (collected 2021 11:37)  Source: .Blood Blood-Peripheral  Preliminary Report (2021 12:00):    No growth at 1 day.    Culture - Blood (collected 2021 11:37)  Source: .Blood Blood-Peripheral  Preliminary Report (2021 12:00):    No growth at 1 day.    Culture - Urine (collected 2021 09:23)  Source: Clean Catch Clean Catch (Midstream)  Final Report (2021 10:01):    No growth      Vital Signs:   Vital Signs Last 24 Hrs  T(C): 37.8 (2021 05:00), Max: 38.4 (2021 22:45)  T(F): 100 (2021 05:00), Max: 101.1 (2021 22:45)  HR: 77 (2021 07:00) (74 - 119)  BP: 100/55 (2021 07:00) (85/50 - 134/66)  BP(mean): 75 (:) (63 - 90)  RR: 13 (2021 07:00) (11 - 22)  SpO2: 100% (2021 07:00) (94% - 100%)  Mode: AC/ CMV (Assist Control/ Continuous Mandatory Ventilation), RR (machine): 12, TV (machine): 440, FiO2: 40, PEEP: 5, ITime: 1, MAP: 8.5, PIP: 20    Input/Output:   I&O's Detail    2021 07:01  -  2021 07:00  --------------------------------------------------------  IN:    Dexmedetomidine: 65.4 mL    FentaNYL: 28.8 mL    IV PiggyBack: 100 mL    Propofol: 15.1 mL    Propofol: 8.6 mL    sodium chloride 0.9%: 2100 mL    sodium chloride 0.9%: 100 mL    Sodium Chloride 0.9% Bolus: 500 mL  Total IN: 2917.9 mL    OUT:    Indwelling Catheter - Urethral (mL): 909 mL    Voided (mL): 150 mL  Total OUT: 1059 mL    Total NET: 1858.9 mL        Daily     Daily

## 2021-11-26 NOTE — PROGRESS NOTE ADULT - ASSESSMENT
89yoF with PMH HTN, HLD, OA, CAD on Asa, HF, chronic constipation, recent admission to [facility] for metabolic encephalopathy, acute hypercapneic resp failure and pulmonary edema requiring intubation (9/5-9/15) CAD (on Aspirin) presents w/ high-grade small bowel obstruction at site of periumbilical hernia now s/p 11/24 for Exlap and Open repair of Umbilical l Hernia, no SBR required.    CPAP today  Careful fluid resuscitation  Cont abx  Rest of plan per SICU 89yoF with PMH HTN, HLD, OA, CAD on Asa, HF, chronic constipation, recent admission to [facility] for metabolic encephalopathy, acute hypercapneic resp failure and pulmonary edema requiring intubation (9/5-9/15) CAD (on Aspirin) presents w/ high-grade small bowel obstruction at site of periumbilical hernia now s/p 11/24 for Exlap and Open repair of Umbilical l Hernia, no SBR required. Of note, rectal tube has output.     CPAP today  Can start trickle feed (10cc/hour)  Careful fluid resuscitation  Cont abx  Rest of plan per SICU    Pt seen with Dr. Gee and Chief resident.

## 2021-11-26 NOTE — PROGRESS NOTE ADULT - ASSESSMENT
Assessment: 89 y/o F bedbound, prior DNR with PMH HTN, HLD, OA, CAD on Asa, HF, chronic constipation, recent admission to [facility] for metabolic encephalopathy, acute hypercapneic resp failure and pulmonary edema requiring intubation (9/5-9/15) CAD (on Aspirin) and no PSH who presents from [facility] for constipation and emesis. Afebrile, HD stable, WBC wnl, BLADIMIR with Cr 77/1.91, Trop 0.03, BNP 2396, lactate 5.8, cdiff neg. CT with high-grade small bowel obstruction at site of periumbilical hernia. NGT placed in ED with 1.7 L brown feculent output. Concerning for strangulated umbilical hernia with possible bowel ischemia. Taken to OR on 11/24 for Exlap and Open repair of Umbilical hernia repair. Transferred to SICU post-op intubated for hemodynamic monitoring. Remains intubated for airway protection, uptrending fever curve however cultures with no growth to date. Plan for repeat CPAP trial and possible extubation today.     Plan:   Neuro: Precedex, Fentanyl. Home Remeron on hold while NPO  CV: Transient hypotension likely 2/2 sepsis vs hypovolemia, fluid responsive; H/o CAD, HTN, HFpEF (65-70%), Norvasc, Lasix, Lipitor, and ASA held   Pulm: Intubated on 40%/440/12/5 Duonebs; Possible Extubation in am   GI: NPO, NS@100 per surgeon; NGT to LIWS; Protonix.  : Pre-renal BLADIMIR; Liriano, strict I&Os  ID: ?GI translocation: Ceftriaxone (11/24-) Flagyl (11/24-)   Endo: ISS  PPx: SCDs, SQH   Lines: [place] left brachial (11/24-), PIVs  Wounds: Midline incision   PT/OT: Not ordered.

## 2021-11-26 NOTE — PROGRESS NOTE ADULT - SUBJECTIVE AND OBJECTIVE BOX
INTERVAL HPI/OVERNIGHT EVENTS:  Pt evaluated at bedside. Pt was febrile o/n, improved with tylenol. Received 500cc NS bolus x 1.      MEDICATIONS  (STANDING):  albuterol/ipratropium for Nebulization 3 milliLiter(s) Nebulizer every 6 hours  cefTRIAXone   IVPB 2000 milliGRAM(s) IV Intermittent every 24 hours  chlorhexidine 0.12% Liquid 15 milliLiter(s) Oral Mucosa every 12 hours  chlorhexidine 4% Liquid 1 Application(s) Topical <User Schedule>  dexMEDEtomidine Infusion 0.2 MICROgram(s)/kG/Hr (3.61 mL/Hr) IV Continuous <Continuous>  dextrose 40% Gel 15 Gram(s) Oral once  dextrose 5%. 1000 milliLiter(s) (50 mL/Hr) IV Continuous <Continuous>  dextrose 5%. 1000 milliLiter(s) (100 mL/Hr) IV Continuous <Continuous>  dextrose 50% Injectable 25 Gram(s) IV Push once  dextrose 50% Injectable 12.5 Gram(s) IV Push once  dextrose 50% Injectable 25 Gram(s) IV Push once  fentaNYL   Infusion. 0.5 MICROgram(s)/kG/Hr (3.61 mL/Hr) IV Continuous <Continuous>  glucagon  Injectable 1 milliGRAM(s) IntraMuscular once  heparin   Injectable 5000 Unit(s) SubCutaneous every 8 hours  insulin lispro (ADMELOG) corrective regimen sliding scale   SubCutaneous Before meals and at bedtime  metroNIDAZOLE  IVPB 500 milliGRAM(s) IV Intermittent every 8 hours  pantoprazole  Injectable 40 milliGRAM(s) IV Push daily  potassium chloride  10 mEq/100 mL IVPB 10 milliEquivalent(s) IV Intermittent every 1 hour  sodium chloride 0.9%. 1000 milliLiter(s) (100 mL/Hr) IV Continuous <Continuous>    MEDICATIONS  (PRN):  ondansetron Injectable 4 milliGRAM(s) IV Push every 6 hours PRN Nausea      Vital Signs Last 24 Hrs  T(C): 37.8 (2021 05:00), Max: 38.4 (2021 22:45)  T(F): 100 (2021 05:00), Max: 101.1 (2021 22:45)  HR: 78 (2021 08:00) (74 - 119)  BP: 100/51 (2021 08:00) (85/50 - 134/66)  BP(mean): 73 (2021 08:00) (63 - 90)  RR: 10 (2021 08:00) (10 - 22)  SpO2: 100% (2021 08:00) (94% - 100%)    PHYSICAL EXAM:  General: NAD, resting comfortably in bed  Pulmonary: Nonlabored breathing, no respiratory distress  Cardiovascular: NSR  Abdominal: soft, NT/ND, rectal tube in place  Extremities: WWP, normal strength  Neuro: A/O x 3, CNs II-XII grossly intact, no focal deficits            I&O's Detail    2021 07:01  -  2021 07:00  --------------------------------------------------------  IN:    Dexmedetomidine: 65.4 mL    FentaNYL: 28.8 mL    IV PiggyBack: 200 mL    Propofol: 15.1 mL    Propofol: 8.6 mL    sodium chloride 0.9%: 100 mL    sodium chloride 0.9%: 2100 mL    Sodium Chloride 0.9% Bolus: 500 mL  Total IN: 3017.9 mL    OUT:    Indwelling Catheter - Urethral (mL): 909 mL    Nasogastric/Oral tube (mL): 300 mL    Voided (mL): 150 mL  Total OUT: 1359 mL    Total NET: 1658.9 mL      2021 07:01  -  2021 08:07  --------------------------------------------------------  IN:    Dexmedetomidine: 3.6 mL    IV PiggyBack: 100 mL    sodium chloride 0.9%: 100 mL  Total IN: 203.6 mL    OUT:    Indwelling Catheter - Urethral (mL): 45 mL  Total OUT: 45 mL    Total NET: 158.6 mL          LABS:                        7.9    10.94 )-----------( 207      ( 2021 05:48 )             25.3     11-    144  |  105  |  77<H>  ----------------------------<  84  3.4<L>   |  24  |  1.38<H>    Ca    7.8<L>      2021 05:48  Phos  5.7       Mg     2.6         TPro  7.2  /  Alb  2.6<L>  /  TBili  0.4  /  DBili  x   /  AST  23  /  ALT  7<L>  /  AlkPhos  68  11-24      Urinalysis Basic - ( 2021 09:41 )    Color: Yellow / Appearance: Clear / S.025 / pH: x  Gluc: x / Ketone: 15 mg/dL  / Bili: Negative / Urobili: 0.2 E.U./dL   Blood: x / Protein: 30 mg/dL / Nitrite: NEGATIVE   Leuk Esterase: NEGATIVE / RBC: < 5 /HPF / WBC 5-10 /HPF   Sq Epi: x / Non Sq Epi: 0-5 /HPF / Bacteria: Present /HPF        RADIOLOGY & ADDITIONAL STUDIES:

## 2021-11-27 LAB
ALBUMIN SERPL ELPH-MCNC: 2.1 G/DL — LOW (ref 3.3–5)
ALBUMIN SERPL ELPH-MCNC: 2.1 G/DL — LOW (ref 3.3–5)
ALP SERPL-CCNC: 108 U/L — SIGNIFICANT CHANGE UP (ref 40–120)
ALP SERPL-CCNC: 166 U/L — HIGH (ref 40–120)
ALT FLD-CCNC: 8 U/L — LOW (ref 10–45)
ALT FLD-CCNC: 9 U/L — LOW (ref 10–45)
ANION GAP SERPL CALC-SCNC: 10 MMOL/L — SIGNIFICANT CHANGE UP (ref 5–17)
AST SERPL-CCNC: 20 U/L — SIGNIFICANT CHANGE UP (ref 10–40)
AST SERPL-CCNC: 25 U/L — SIGNIFICANT CHANGE UP (ref 10–40)
BILIRUB DIRECT SERPL-MCNC: 0.2 MG/DL — SIGNIFICANT CHANGE UP (ref 0–0.3)
BILIRUB INDIRECT FLD-MCNC: 0 MG/DL — LOW (ref 0.2–1)
BILIRUB SERPL-MCNC: 0.2 MG/DL — SIGNIFICANT CHANGE UP (ref 0.2–1.2)
BILIRUB SERPL-MCNC: 0.2 MG/DL — SIGNIFICANT CHANGE UP (ref 0.2–1.2)
BLD GP AB SCN SERPL QL: NEGATIVE — SIGNIFICANT CHANGE UP
BUN SERPL-MCNC: 71 MG/DL — HIGH (ref 7–23)
CALCIUM SERPL-MCNC: 8.5 MG/DL — SIGNIFICANT CHANGE UP (ref 8.4–10.5)
CHLORIDE SERPL-SCNC: 108 MMOL/L — SIGNIFICANT CHANGE UP (ref 96–108)
CO2 SERPL-SCNC: 26 MMOL/L — SIGNIFICANT CHANGE UP (ref 22–31)
CREAT SERPL-MCNC: 1.07 MG/DL — SIGNIFICANT CHANGE UP (ref 0.5–1.3)
GLUCOSE BLDC GLUCOMTR-MCNC: 120 MG/DL — HIGH (ref 70–99)
GLUCOSE BLDC GLUCOMTR-MCNC: 121 MG/DL — HIGH (ref 70–99)
GLUCOSE BLDC GLUCOMTR-MCNC: 133 MG/DL — HIGH (ref 70–99)
GLUCOSE BLDC GLUCOMTR-MCNC: 190 MG/DL — HIGH (ref 70–99)
GLUCOSE SERPL-MCNC: 200 MG/DL — HIGH (ref 70–99)
GRAM STN FLD: SIGNIFICANT CHANGE UP
HCT VFR BLD CALC: 22.9 % — LOW (ref 34.5–45)
HGB BLD-MCNC: 7.3 G/DL — LOW (ref 11.5–15.5)
MAGNESIUM SERPL-MCNC: 2.4 MG/DL — SIGNIFICANT CHANGE UP (ref 1.6–2.6)
MCHC RBC-ENTMCNC: 29.3 PG — SIGNIFICANT CHANGE UP (ref 27–34)
MCHC RBC-ENTMCNC: 31.9 GM/DL — LOW (ref 32–36)
MCV RBC AUTO: 92 FL — SIGNIFICANT CHANGE UP (ref 80–100)
NRBC # BLD: 0 /100 WBCS — SIGNIFICANT CHANGE UP (ref 0–0)
PHOSPHATE SERPL-MCNC: 3 MG/DL — SIGNIFICANT CHANGE UP (ref 2.5–4.5)
PLATELET # BLD AUTO: 179 K/UL — SIGNIFICANT CHANGE UP (ref 150–400)
POTASSIUM SERPL-MCNC: 3.2 MMOL/L — LOW (ref 3.5–5.3)
POTASSIUM SERPL-SCNC: 3.2 MMOL/L — LOW (ref 3.5–5.3)
PROT SERPL-MCNC: 5.9 G/DL — LOW (ref 6–8.3)
PROT SERPL-MCNC: 6.1 G/DL — SIGNIFICANT CHANGE UP (ref 6–8.3)
RBC # BLD: 2.49 M/UL — LOW (ref 3.8–5.2)
RBC # FLD: 16.7 % — HIGH (ref 10.3–14.5)
RH IG SCN BLD-IMP: POSITIVE — SIGNIFICANT CHANGE UP
SODIUM SERPL-SCNC: 144 MMOL/L — SIGNIFICANT CHANGE UP (ref 135–145)
SPECIMEN SOURCE: SIGNIFICANT CHANGE UP
WBC # BLD: 13.16 K/UL — HIGH (ref 3.8–10.5)
WBC # FLD AUTO: 13.16 K/UL — HIGH (ref 3.8–10.5)

## 2021-11-27 PROCEDURE — 71045 X-RAY EXAM CHEST 1 VIEW: CPT | Mod: 26

## 2021-11-27 PROCEDURE — 99291 CRITICAL CARE FIRST HOUR: CPT

## 2021-11-27 RX ORDER — ACETAMINOPHEN 500 MG
1000 TABLET ORAL ONCE
Refills: 0 | Status: COMPLETED | OUTPATIENT
Start: 2021-11-27 | End: 2021-11-27

## 2021-11-27 RX ORDER — ALBUMIN HUMAN 25 %
50 VIAL (ML) INTRAVENOUS EVERY 8 HOURS
Refills: 0 | Status: DISCONTINUED | OUTPATIENT
Start: 2021-11-27 | End: 2021-11-28

## 2021-11-27 RX ORDER — ACETYLCYSTEINE 200 MG/ML
4 VIAL (ML) MISCELLANEOUS THREE TIMES A DAY
Refills: 0 | Status: COMPLETED | OUTPATIENT
Start: 2021-11-27 | End: 2021-11-27

## 2021-11-27 RX ORDER — POTASSIUM CHLORIDE 20 MEQ
20 PACKET (EA) ORAL
Refills: 0 | Status: COMPLETED | OUTPATIENT
Start: 2021-11-27 | End: 2021-11-27

## 2021-11-27 RX ORDER — FENTANYL CITRATE 50 UG/ML
25 INJECTION INTRAVENOUS ONCE
Refills: 0 | Status: DISCONTINUED | OUTPATIENT
Start: 2021-11-27 | End: 2021-11-27

## 2021-11-27 RX ORDER — FENTANYL CITRATE 50 UG/ML
25 INJECTION INTRAVENOUS
Refills: 0 | Status: DISCONTINUED | OUTPATIENT
Start: 2021-11-27 | End: 2021-11-27

## 2021-11-27 RX ORDER — DEXMEDETOMIDINE HYDROCHLORIDE IN 0.9% SODIUM CHLORIDE 4 UG/ML
0.1 INJECTION INTRAVENOUS
Qty: 400 | Refills: 0 | Status: DISCONTINUED | OUTPATIENT
Start: 2021-11-27 | End: 2021-11-28

## 2021-11-27 RX ORDER — IPRATROPIUM/ALBUTEROL SULFATE 18-103MCG
3 AEROSOL WITH ADAPTER (GRAM) INHALATION ONCE
Refills: 0 | Status: COMPLETED | OUTPATIENT
Start: 2021-11-27 | End: 2021-11-27

## 2021-11-27 RX ORDER — FENTANYL CITRATE 50 UG/ML
25 INJECTION INTRAVENOUS
Refills: 0 | Status: DISCONTINUED | OUTPATIENT
Start: 2021-11-27 | End: 2021-12-03

## 2021-11-27 RX ADMIN — Medication 3 MILLILITER(S): at 05:39

## 2021-11-27 RX ADMIN — PIPERACILLIN AND TAZOBACTAM 200 GRAM(S): 4; .5 INJECTION, POWDER, LYOPHILIZED, FOR SOLUTION INTRAVENOUS at 03:10

## 2021-11-27 RX ADMIN — FENTANYL CITRATE 25 MICROGRAM(S): 50 INJECTION INTRAVENOUS at 09:46

## 2021-11-27 RX ADMIN — PIPERACILLIN AND TAZOBACTAM 200 GRAM(S): 4; .5 INJECTION, POWDER, LYOPHILIZED, FOR SOLUTION INTRAVENOUS at 21:14

## 2021-11-27 RX ADMIN — Medication 4 MILLILITER(S): at 14:00

## 2021-11-27 RX ADMIN — FENTANYL CITRATE 25 MICROGRAM(S): 50 INJECTION INTRAVENOUS at 06:10

## 2021-11-27 RX ADMIN — DEXMEDETOMIDINE HYDROCHLORIDE IN 0.9% SODIUM CHLORIDE 1.81 MICROGRAM(S)/KG/HR: 4 INJECTION INTRAVENOUS at 21:14

## 2021-11-27 RX ADMIN — Medication 3 MILLILITER(S): at 11:26

## 2021-11-27 RX ADMIN — Medication 1000 MILLIGRAM(S): at 21:11

## 2021-11-27 RX ADMIN — FENTANYL CITRATE 25 MICROGRAM(S): 50 INJECTION INTRAVENOUS at 22:15

## 2021-11-27 RX ADMIN — LIDOCAINE 1 PATCH: 4 CREAM TOPICAL at 06:31

## 2021-11-27 RX ADMIN — CHLORHEXIDINE GLUCONATE 1 APPLICATION(S): 213 SOLUTION TOPICAL at 06:35

## 2021-11-27 RX ADMIN — Medication 3 MILLILITER(S): at 16:11

## 2021-11-27 RX ADMIN — PIPERACILLIN AND TAZOBACTAM 200 GRAM(S): 4; .5 INJECTION, POWDER, LYOPHILIZED, FOR SOLUTION INTRAVENOUS at 14:00

## 2021-11-27 RX ADMIN — HEPARIN SODIUM 5000 UNIT(S): 5000 INJECTION INTRAVENOUS; SUBCUTANEOUS at 06:33

## 2021-11-27 RX ADMIN — Medication 400 MILLIGRAM(S): at 20:54

## 2021-11-27 RX ADMIN — PIPERACILLIN AND TAZOBACTAM 200 GRAM(S): 4; .5 INJECTION, POWDER, LYOPHILIZED, FOR SOLUTION INTRAVENOUS at 11:25

## 2021-11-27 RX ADMIN — Medication 400 MILLIGRAM(S): at 04:26

## 2021-11-27 RX ADMIN — FENTANYL CITRATE 25 MICROGRAM(S): 50 INJECTION INTRAVENOUS at 06:30

## 2021-11-27 RX ADMIN — HYDROMORPHONE HYDROCHLORIDE 0.5 MILLIGRAM(S): 2 INJECTION INTRAMUSCULAR; INTRAVENOUS; SUBCUTANEOUS at 04:20

## 2021-11-27 RX ADMIN — Medication 3 MILLILITER(S): at 10:16

## 2021-11-27 RX ADMIN — HEPARIN SODIUM 5000 UNIT(S): 5000 INJECTION INTRAVENOUS; SUBCUTANEOUS at 14:00

## 2021-11-27 RX ADMIN — Medication 4 MILLILITER(S): at 21:24

## 2021-11-27 RX ADMIN — SODIUM CHLORIDE 100 MILLILITER(S): 9 INJECTION, SOLUTION INTRAVENOUS at 02:36

## 2021-11-27 RX ADMIN — LIDOCAINE 1 PATCH: 4 CREAM TOPICAL at 12:31

## 2021-11-27 RX ADMIN — CHLORHEXIDINE GLUCONATE 15 MILLILITER(S): 213 SOLUTION TOPICAL at 06:35

## 2021-11-27 RX ADMIN — FENTANYL CITRATE 25 MICROGRAM(S): 50 INJECTION INTRAVENOUS at 21:53

## 2021-11-27 RX ADMIN — Medication 1000 MILLIGRAM(S): at 04:45

## 2021-11-27 RX ADMIN — Medication 400 MILLIGRAM(S): at 13:34

## 2021-11-27 RX ADMIN — CHLORHEXIDINE GLUCONATE 15 MILLILITER(S): 213 SOLUTION TOPICAL at 17:45

## 2021-11-27 RX ADMIN — LIDOCAINE 1 PATCH: 4 CREAM TOPICAL at 00:13

## 2021-11-27 RX ADMIN — FENTANYL CITRATE 25 MICROGRAM(S): 50 INJECTION INTRAVENOUS at 11:25

## 2021-11-27 RX ADMIN — FENTANYL CITRATE 25 MICROGRAM(S): 50 INJECTION INTRAVENOUS at 08:12

## 2021-11-27 RX ADMIN — Medication 1000 MILLIGRAM(S): at 13:56

## 2021-11-27 RX ADMIN — FENTANYL CITRATE 25 MICROGRAM(S): 50 INJECTION INTRAVENOUS at 20:00

## 2021-11-27 RX ADMIN — HYDROMORPHONE HYDROCHLORIDE 0.5 MILLIGRAM(S): 2 INJECTION INTRAMUSCULAR; INTRAVENOUS; SUBCUTANEOUS at 04:00

## 2021-11-27 RX ADMIN — Medication 50 MILLILITER(S): at 14:00

## 2021-11-27 RX ADMIN — FENTANYL CITRATE 25 MICROGRAM(S): 50 INJECTION INTRAVENOUS at 07:46

## 2021-11-27 RX ADMIN — Medication 3 MILLILITER(S): at 21:24

## 2021-11-27 RX ADMIN — PANTOPRAZOLE SODIUM 40 MILLIGRAM(S): 20 TABLET, DELAYED RELEASE ORAL at 11:26

## 2021-11-27 RX ADMIN — HEPARIN SODIUM 5000 UNIT(S): 5000 INJECTION INTRAVENOUS; SUBCUTANEOUS at 21:14

## 2021-11-27 RX ADMIN — Medication 50 MILLIEQUIVALENT(S): at 06:38

## 2021-11-27 RX ADMIN — Medication 4 MILLILITER(S): at 11:26

## 2021-11-27 RX ADMIN — FENTANYL CITRATE 25 MICROGRAM(S): 50 INJECTION INTRAVENOUS at 10:00

## 2021-11-27 RX ADMIN — FENTANYL CITRATE 25 MICROGRAM(S): 50 INJECTION INTRAVENOUS at 12:32

## 2021-11-27 RX ADMIN — Medication 50 MILLIEQUIVALENT(S): at 11:29

## 2021-11-27 RX ADMIN — Medication 2: at 07:23

## 2021-11-27 RX ADMIN — Medication 50 MILLILITER(S): at 21:53

## 2021-11-27 RX ADMIN — Medication 50 MILLIEQUIVALENT(S): at 08:27

## 2021-11-27 RX ADMIN — FENTANYL CITRATE 25 MICROGRAM(S): 50 INJECTION INTRAVENOUS at 19:41

## 2021-11-27 NOTE — PROGRESS NOTE ADULT - SUBJECTIVE AND OBJECTIVE BOX
24 Hour Events: POD#3 Ex-lap, open umbilical hernia repair. Tachypneic with RR 30-35 while on CPAP yesterday. Up trending fever curve, procalcitonin 9.14- cultures negative, UA negative, changed to Zosyn for empiric coverage. Bradycardic to 40s while on Precedex, changed to Fentanyl PRN for pain/comfort.     PAST MEDICAL & SURGICAL HISTORY:  HTN (hypertension)  HLD (hyperlipidemia)  Osteoarthritis  Hyponatremia  Altered mental status  Urinary tract infection  HF (heart failure)      Allergies  iodine containing compounds (Unknown)  lisinopril (Unknown)      MEDICATIONS  (STANDING):  acetaminophen   IVPB .. 1000 milliGRAM(s) IV Intermittent once  acetaminophen   IVPB .. 1000 milliGRAM(s) IV Intermittent once  albumin human 25% IVPB 50 milliLiter(s) IV Intermittent every 8 hours  albuterol/ipratropium for Nebulization 3 milliLiter(s) Nebulizer every 6 hours  chlorhexidine 0.12% Liquid 15 milliLiter(s) Oral Mucosa every 12 hours  chlorhexidine 4% Liquid 1 Application(s) Topical <User Schedule>  dextrose 40% Gel 15 Gram(s) Oral once  dextrose 5%. 1000 milliLiter(s) (50 mL/Hr) IV Continuous <Continuous>  dextrose 5%. 1000 milliLiter(s) (100 mL/Hr) IV Continuous <Continuous>  dextrose 50% Injectable 25 Gram(s) IV Push once  dextrose 50% Injectable 12.5 Gram(s) IV Push once  dextrose 50% Injectable 25 Gram(s) IV Push once  glucagon  Injectable 1 milliGRAM(s) IntraMuscular once  heparin   Injectable 5000 Unit(s) SubCutaneous every 8 hours  insulin lispro (ADMELOG) corrective regimen sliding scale   SubCutaneous Before meals and at bedtime  lidocaine   4% Patch 1 Patch Transdermal every 24 hours  pantoprazole  Injectable 40 milliGRAM(s) IV Push daily  piperacillin/tazobactam IVPB.. 2.25 Gram(s) IV Intermittent every 6 hours  potassium chloride  20 mEq/100 mL IVPB 20 milliEquivalent(s) IV Intermittent every 1 hour    MEDICATIONS  (PRN):  fentaNYL    Injectable 25 MICROGram(s) IV Push every 2 hours PRN Severe Pain (7 - 10)  ondansetron Injectable 4 milliGRAM(s) IV Push every 6 hours PRN Nausea        Physical Exam:   General: Frail elderly female, appears stated age, resting comfortably in bed in no acute distress   Neuro: Grossly intact bilaterally   HEENT: Normocephalic, atraumatic, trachea midline, no JVD   Heart: Regular S1/S2, no murmurs rubs or gallops   Lungs: Unlabored breathing on ventilator; Clear to auscultation bilaterally, no adventitious sounds   Abdomen: Softly distended, tenderness to deep palpation in all 4 quadrants without rebound, guarding or rigidity; lower abdominal midline incision closed with staples is clean/dry/intacts  Upper Extremities: No edema, freely mobile bilaterally   Lower Extremities: No edema, SCDs in place, feet warm bilaterally   Skin: Warm, non-diaphoretic throughout         Labs:                         7.3    13.16 )-----------( 179      ( 2021 05:38 )             22.9         144  |  108  |  71<H>  ----------------------------<  200<H>  3.2<L>   |  26  |  1.07    Ca    8.5      2021 05:38  Phos  3.0       Mg     2.4         TPro  6.1  /  Alb  2.1<L>  /  TBili  0.2  /  DBili  0.2  /  AST  25  /  ALT  9<L>  /  AlkPhos  108      CAPILLARY BLOOD GLUCOSE  POCT Blood Glucose.: 190 mg/dL (2021 07:20)  POCT Blood Glucose.: 145 mg/dL (2021 21:41)  POCT Blood Glucose.: 83 mg/dL (2021 15:18)  POCT Blood Glucose.: 71 mg/dL (2021 12:28)      COVID-19 PCR: NotDetec (12 Sep 2021 07:01)  COVID-19 PCR: Negative (05 Sep 2021 00:30)    Urinalysis Basic - ( 2021 19:18 )  Color: Yellow / Appearance: Clear / S.025 / pH: x  Gluc: x / Ketone: 15 mg/dL  / Bili: Small / Urobili: 0.2 E.U./dL   Blood: x / Protein: 30 mg/dL / Nitrite: NEGATIVE   Leuk Esterase: NEGATIVE / RBC: < 5 /HPF / WBC < 5 /HPF   Sq Epi: x / Non Sq Epi: 0-5 /HPF / Bacteria: Present /HPF      Culture - Sputum (collected 2021 13:09)  Source: .Sputum Sputum  Gram Stain (2021 17:53):    Rare epithelial cells    Rare WBC's    No organisms seen  Preliminary Report (2021 08:48):    Culture in progress    Culture - Blood (collected 2021 01:43)  Source: .Blood Blood  Preliminary Report (2021 02:00):    No growth at 1 day.    Culture - Stool (collected 2021 11:53)  Source: .Stool Feces  Final Report (2021 11:33):    No enteric pathogens recovered. Specimen screened for    Salmonella, Shigella, Campylobacter, E.Coli 0157:H7 and Shiga-like    producing organisms.    Culture - Blood (collected 2021 11:37)  Source: .Blood Blood-Peripheral  Preliminary Report (2021 12:00):    No growth at 2 days.    Culture - Blood (collected 2021 11:37)  Source: .Blood Blood-Peripheral  Preliminary Report (2021 12:00):    No growth at 2 days.      Vital Signs:   Vital Signs Last 24 Hrs  T(C): 37 (2021 09:00), Max: 38.1 (2021 14:00)  T(F): 98.6 (2021 09:00), Max: 100.6 (2021 14:00)  HR: 74 (2021 10:00) (59 - 88)  BP: 93/55 (2021 08:00) (81/43 - 142/51)  BP(mean): 73 (2021 08:00) (57 - 95)  RR: 12 (2021 10:00) (10 - 46)  SpO2: 100% (2021 10:00) (98% - 100%)  Mode: AC/ CMV (Assist Control/ Continuous Mandatory Ventilation), RR (machine): 12, TV (machine): 440, FiO2: 40, PEEP: 5, ITime: 1, MAP: 9, PIP: 20    Input/Output:   I&O's Detail    2021 07:01  -  2021 07:00  --------------------------------------------------------  IN:    Dexmedetomidine: 75.6 mL    dextrose 5% + lactated ringers: 1500 mL    IV PiggyBack: 750 mL    sodium chloride 0.9%: 600 mL  Total IN: 2925.6 mL    OUT:    Indwelling Catheter - Urethral (mL): 965 mL    Nasogastric/Oral tube (mL): 250 mL  Total OUT: 1215 mL    Total NET: 1710.6 mL      2021 07:01  -  2021 10:02  --------------------------------------------------------  IN:    IV PiggyBack: 50 mL  Total IN: 50 mL    OUT:    Indwelling Catheter - Urethral (mL): 170 mL  Total OUT: 170 mL    Total NET: -120 mL        Daily     Daily Weight in k.6 (2021 10:00)

## 2021-11-27 NOTE — PROGRESS NOTE ADULT - SUBJECTIVE AND OBJECTIVE BOX
SUBJECTIVE:  Flatus: [ ] YES [ ] NO             Bowel Movement: [ ] YES [ ] NO  Pain (0-10):            Pain Control Adequate: [ ] YES [ ] NO  Nausea: [ ] YES [ ] NO            Vomiting: [ ] YES [ ] NO  Diarrhea: [ ] YES [ ] NO         Constipation: [ ] YES [ ] NO     Chest Pain: [ ] YES [ ] NO    SOB:  [ ] YES [ ] NO    MEDICATIONS  (STANDING):  acetaminophen   IVPB .. 1000 milliGRAM(s) IV Intermittent once  acetaminophen   IVPB .. 1000 milliGRAM(s) IV Intermittent once  albumin human 25% IVPB 50 milliLiter(s) IV Intermittent every 8 hours  albuterol/ipratropium for Nebulization 3 milliLiter(s) Nebulizer every 6 hours  chlorhexidine 0.12% Liquid 15 milliLiter(s) Oral Mucosa every 12 hours  chlorhexidine 4% Liquid 1 Application(s) Topical <User Schedule>  dexMEDEtomidine Infusion 0.2 MICROgram(s)/kG/Hr (3.61 mL/Hr) IV Continuous <Continuous>  dextrose 40% Gel 15 Gram(s) Oral once  dextrose 5%. 1000 milliLiter(s) (50 mL/Hr) IV Continuous <Continuous>  dextrose 5%. 1000 milliLiter(s) (100 mL/Hr) IV Continuous <Continuous>  dextrose 50% Injectable 25 Gram(s) IV Push once  dextrose 50% Injectable 12.5 Gram(s) IV Push once  dextrose 50% Injectable 25 Gram(s) IV Push once  glucagon  Injectable 1 milliGRAM(s) IntraMuscular once  heparin   Injectable 5000 Unit(s) SubCutaneous every 8 hours  insulin lispro (ADMELOG) corrective regimen sliding scale   SubCutaneous Before meals and at bedtime  lidocaine   4% Patch 1 Patch Transdermal every 24 hours  pantoprazole  Injectable 40 milliGRAM(s) IV Push daily  piperacillin/tazobactam IVPB.. 2.25 Gram(s) IV Intermittent every 6 hours  potassium chloride  20 mEq/100 mL IVPB 20 milliEquivalent(s) IV Intermittent every 1 hour    MEDICATIONS  (PRN):  fentaNYL    Injectable 25 MICROGram(s) IV Push every 3 hours PRN Severe Pain (7 - 10)  ondansetron Injectable 4 milliGRAM(s) IV Push every 6 hours PRN Nausea      Vital Signs Last 24 Hrs  T(C): 37.3 (2021 05:41), Max: 38.1 (2021 14:00)  T(F): 99.1 (2021 05:41), Max: 100.6 (2021 14:00)  HR: 67 (2021 08:) (59 - 88)  BP: 93/55 (2021 08:00) (81/43 - 142/51)  BP(mean): 73 (:) (57 - 95)  RR: 12 (:) (10 - 46)  SpO2: 99% (:) (99% - 100%)    Physical Exam:  General: NAD, resting comfortably in bed  Pulmonary: Nonlabored breathing, no respiratory distress  Cardiovascular: NSR  Abdominal: soft, NT/ND  Extremities: WWP, normal strength  Neuro: A/O x 3, CNs II-XII grossly intact, no focal deficits, normal motor/sensation  Pulses: palpable distal pulses    I&O's Summary    2021 07:  -  2021 07:00  --------------------------------------------------------  IN: 2925.6 mL / OUT: 1215 mL / NET: 1710.6 mL    2021 07:01  -  2021 08:23  --------------------------------------------------------  IN: 50 mL / OUT: 45 mL / NET: 5 mL        LABS:                        7.3    13.16 )-----------( 179      ( 2021 05:38 )             22.9         144  |  108  |  71<H>  ----------------------------<  200<H>  3.2<L>   |  26  |  1.07    Ca    8.5      2021 05:38  Phos  3.0       Mg     2.4         TPro  6.1  /  Alb  2.1<L>  /  TBili  0.2  /  DBili  0.2  /  AST  25  /  ALT  9<L>  /  AlkPhos  108        Urinalysis Basic - ( 2021 19:18 )    Color: Yellow / Appearance: Clear / S.025 / pH: x  Gluc: x / Ketone: 15 mg/dL  / Bili: Small / Urobili: 0.2 E.U./dL   Blood: x / Protein: 30 mg/dL / Nitrite: NEGATIVE   Leuk Esterase: NEGATIVE / RBC: < 5 /HPF / WBC < 5 /HPF   Sq Epi: x / Non Sq Epi: 0-5 /HPF / Bacteria: Present /HPF      CAPILLARY BLOOD GLUCOSE      POCT Blood Glucose.: 190 mg/dL (2021 07:20)  POCT Blood Glucose.: 145 mg/dL (2021 21:41)  POCT Blood Glucose.: 83 mg/dL (2021 15:18)  POCT Blood Glucose.: 71 mg/dL (2021 12:28)    LIVER FUNCTIONS - ( 2021 05:38 )  Alb: 2.1 g/dL / Pro: 6.1 g/dL / ALK PHOS: 108 U/L / ALT: 9 U/L / AST: 25 U/L / GGT: x             RADIOLOGY & ADDITIONAL STUDIES:   SUBJECTIVE: Patient visited with [facility] resident bedside. POD#3 Ex-lap, open umbilical hernia repair. Tachypneic with RR 30-35 while on CPAP yesterday. Up trending fever curve, procalcitonin 9.14- cultures negative, UA negative, changed to Zosyn for empiric coverage. Bradycardic to 40s while on Precedex, changed to Fentanyl PRN for pain/comfort.     MEDICATIONS  (STANDING):  acetaminophen   IVPB .. 1000 milliGRAM(s) IV Intermittent once  acetaminophen   IVPB .. 1000 milliGRAM(s) IV Intermittent once  albumin human 25% IVPB 50 milliLiter(s) IV Intermittent every 8 hours  albuterol/ipratropium for Nebulization 3 milliLiter(s) Nebulizer every 6 hours  chlorhexidine 0.12% Liquid 15 milliLiter(s) Oral Mucosa every 12 hours  chlorhexidine 4% Liquid 1 Application(s) Topical <User Schedule>  dexMEDEtomidine Infusion 0.2 MICROgram(s)/kG/Hr (3.61 mL/Hr) IV Continuous <Continuous>  dextrose 40% Gel 15 Gram(s) Oral once  dextrose 5%. 1000 milliLiter(s) (50 mL/Hr) IV Continuous <Continuous>  dextrose 5%. 1000 milliLiter(s) (100 mL/Hr) IV Continuous <Continuous>  dextrose 50% Injectable 25 Gram(s) IV Push once  dextrose 50% Injectable 12.5 Gram(s) IV Push once  dextrose 50% Injectable 25 Gram(s) IV Push once  glucagon  Injectable 1 milliGRAM(s) IntraMuscular once  heparin   Injectable 5000 Unit(s) SubCutaneous every 8 hours  insulin lispro (ADMELOG) corrective regimen sliding scale   SubCutaneous Before meals and at bedtime  lidocaine   4% Patch 1 Patch Transdermal every 24 hours  pantoprazole  Injectable 40 milliGRAM(s) IV Push daily  piperacillin/tazobactam IVPB.. 2.25 Gram(s) IV Intermittent every 6 hours  potassium chloride  20 mEq/100 mL IVPB 20 milliEquivalent(s) IV Intermittent every 1 hour    MEDICATIONS  (PRN):  fentaNYL    Injectable 25 MICROGram(s) IV Push every 3 hours PRN Severe Pain (7 - 10)  ondansetron Injectable 4 milliGRAM(s) IV Push every 6 hours PRN Nausea    Vital Signs Last 24 Hrs  T(C): 37.3 (2021 05:41), Max: 38.1 (2021 14:00)  T(F): 99.1 (2021 05:41), Max: 100.6 (2021 14:00)  HR: 67 (2021 08:) (59 - 88)  BP: 93/55 (2021 08:00) (81/43 - 142/51)  BP(mean): 73 (2021 08:) (57 - 95)  RR: 12 (2021 08:) (10 - 46)  SpO2: 99% (2021 08:) (99% - 100%)    Physical Exam:   General: Frail elderly female, appears stated age, resting comfortably in bed in no acute distress   Neuro: Grossly intact bilaterally   HEENT: Normocephalic, atraumatic, trachea midline, no JVD   Heart: Regular S1/S2, no murmurs rubs or gallops   Lungs: Unlabored breathing on ventilator; Clear to auscultation bilaterally, no adventitious sounds   Abdomen: Soft, nontender lower abdominal midline incision closed with staples is clean/dry/intacts  Upper Extremities: No edema, freely mobile bilaterally   Lower Extremities: No edema, SCDs in place, feet warm bilaterally   Skin: Warm, non-diaphoretic throughout     I&O's Summary    2021 07:01  -  2021 07:00  --------------------------------------------------------  IN: 2925.6 mL / OUT: 1215 mL / NET: 1710.6 mL    2021 07:01  -  2021 08:23  --------------------------------------------------------  IN: 50 mL / OUT: 45 mL / NET: 5 mL        LABS:                        7.3    13.16 )-----------( 179      ( 2021 05:38 )             22.9         144  |  108  |  71<H>  ----------------------------<  200<H>  3.2<L>   |  26  |  1.07    Ca    8.5      2021 05:38  Phos  3.0       Mg     2.4         TPro  6.1  /  Alb  2.1<L>  /  TBili  0.2  /  DBili  0.2  /  AST  25  /  ALT  9<L>  /  AlkPhos  108        Urinalysis Basic - ( 2021 19:18 )    Color: Yellow / Appearance: Clear / S.025 / pH: x  Gluc: x / Ketone: 15 mg/dL  / Bili: Small / Urobili: 0.2 E.U./dL   Blood: x / Protein: 30 mg/dL / Nitrite: NEGATIVE   Leuk Esterase: NEGATIVE / RBC: < 5 /HPF / WBC < 5 /HPF   Sq Epi: x / Non Sq Epi: 0-5 /HPF / Bacteria: Present /HPF      CAPILLARY BLOOD GLUCOSE      POCT Blood Glucose.: 190 mg/dL (2021 07:20)  POCT Blood Glucose.: 145 mg/dL (2021 21:41)  POCT Blood Glucose.: 83 mg/dL (2021 15:18)  POCT Blood Glucose.: 71 mg/dL (2021 12:28)    LIVER FUNCTIONS - ( 2021 05:38 )  Alb: 2.1 g/dL / Pro: 6.1 g/dL / ALK PHOS: 108 U/L / ALT: 9 U/L / AST: 25 U/L / GGT: x             RADIOLOGY & ADDITIONAL STUDIES:

## 2021-11-27 NOTE — PROGRESS NOTE ADULT - ASSESSMENT
89 y/o F bedbound, prior DNR with PMH HTN, HLD, OA, CAD on Asa, HF, chronic constipation, recent admission to [facility] for metabolic encephalopathy, acute hypercapneic resp failure and pulmonary edema requiring intubation (9/5-9/15) CAD (on Aspirin) and no PSH who [facility] and em[facility]. Afebrile, HD stable, WBC wnl, BLADIMIR with Cr 77/1.91, Trop 0.03, BNP 2396, lactate 5.8, cdiff neg. CT with high-grade small bowel obstruction at site of periumbilical hernia. NGT placed in ED with 1.7 L brown feculent output. Concerning for strangulated umbilical hernia with possible bowel ischemia. Taken to OR on 11/24 for Exlap and Open repair of Umbilical hernia repair. Transferred to SICU post-op intubated for hemodynamic monitoring. Remains intubated for airway protection. Changed antibiotics for broader coverage with Zosyn, with improvement in fever curve. Plan for repeat CPAP trial and extubation today. Rest of care per SICU team

## 2021-11-27 NOTE — PROGRESS NOTE ADULT - ASSESSMENT
Assessment: 89 y/o F bedbound, prior DNR with PMH HTN, HLD, OA, CAD on Asa, HF, chronic constipation, recent admission to [facility] for metabolic encephalopathy, acute hypercapneic resp failure and pulmonary edema requiring intubation (9/5-9/15) CAD (on Aspirin) and no PSH who presents from [facility] for constipation and emesis. Afebrile, HD stable, WBC wnl, BLADIMIR with Cr 77/1.91, Trop 0.03, BNP 2396, lactate 5.8, cdiff neg. CT with high-grade small bowel obstruction at site of periumbilical hernia. NGT placed in ED with 1.7 L brown feculent output. Concerning for strangulated umbilical hernia with possible bowel ischemia. Taken to OR on 11/24 for Exlap and Open repair of Umbilical hernia repair. Transferred to SICU post-op intubated for hemodynamic monitoring. Remains intubated for airway protection. Changed antibiotics for broader coverage with Zosyn, with improvement in fever curve. Plan for repeat CPAP trial today.      Plan:   Neuro: Fentanyl. PRN; Home Remeron on hold while NPO  CV: Transient hypotension likely 2/2 sepsis vs hypovolemia- improved H/o CAD, HTN, HFpEF (65-70%), Norvasc, Lasix, Lipitor, and ASA held   Pulm: Intubated on 40%/440/12/5; Daily SBT; Duonebs; Possible Extubation in am   GI: NPO, NGT to LIWS; Protonix.  : Pre-renal BLADIMIR; Liriano, strict I&Os  ID: ?GI translocation: Zosyn (11/26--) :// DC: Ceftriaxone (11/24-11/26) Flagyl (11/24-11/26)   Endo: ISS  PPx: SCDs, SQH   Lines: Samantha left brachial (11/24-), PIVs  Wounds: Midline incision   PT/OT: Not ordered.

## 2021-11-28 LAB
ALBUMIN SERPL ELPH-MCNC: 2.6 G/DL — LOW (ref 3.3–5)
ALP SERPL-CCNC: 77 U/L — SIGNIFICANT CHANGE UP (ref 40–120)
ALT FLD-CCNC: 8 U/L — LOW (ref 10–45)
ANION GAP SERPL CALC-SCNC: 9 MMOL/L — SIGNIFICANT CHANGE UP (ref 5–17)
APPEARANCE UR: CLEAR — SIGNIFICANT CHANGE UP
AST SERPL-CCNC: 25 U/L — SIGNIFICANT CHANGE UP (ref 10–40)
BACTERIA # UR AUTO: PRESENT /HPF
BILIRUB DIRECT SERPL-MCNC: 0.2 MG/DL — SIGNIFICANT CHANGE UP (ref 0–0.3)
BILIRUB INDIRECT FLD-MCNC: 0.1 MG/DL — LOW (ref 0.2–1)
BILIRUB SERPL-MCNC: 0.3 MG/DL — SIGNIFICANT CHANGE UP (ref 0.2–1.2)
BILIRUB UR-MCNC: NEGATIVE — SIGNIFICANT CHANGE UP
BUN SERPL-MCNC: 34 MG/DL — HIGH (ref 7–23)
BUN SERPL-MCNC: 43 MG/DL — HIGH (ref 7–23)
BUN SERPL-MCNC: 44 MG/DL — HIGH (ref 7–23)
CALCIUM SERPL-MCNC: 8.5 MG/DL — SIGNIFICANT CHANGE UP (ref 8.4–10.5)
CALCIUM SERPL-MCNC: 8.7 MG/DL — SIGNIFICANT CHANGE UP (ref 8.4–10.5)
CALCIUM SERPL-MCNC: 8.9 MG/DL — SIGNIFICANT CHANGE UP (ref 8.4–10.5)
CHLORIDE SERPL-SCNC: 114 MMOL/L — HIGH (ref 96–108)
CHLORIDE SERPL-SCNC: 115 MMOL/L — HIGH (ref 96–108)
CHLORIDE SERPL-SCNC: 116 MMOL/L — HIGH (ref 96–108)
CK MB CFR SERPL CALC: 1.1 NG/ML — SIGNIFICANT CHANGE UP (ref 0–6.7)
CK SERPL-CCNC: 46 U/L — SIGNIFICANT CHANGE UP (ref 25–170)
CO2 SERPL-SCNC: 26 MMOL/L — SIGNIFICANT CHANGE UP (ref 22–31)
CO2 SERPL-SCNC: 27 MMOL/L — SIGNIFICANT CHANGE UP (ref 22–31)
CO2 SERPL-SCNC: 28 MMOL/L — SIGNIFICANT CHANGE UP (ref 22–31)
COLOR SPEC: YELLOW — SIGNIFICANT CHANGE UP
CREAT SERPL-MCNC: 0.57 MG/DL — SIGNIFICANT CHANGE UP (ref 0.5–1.3)
CREAT SERPL-MCNC: 0.64 MG/DL — SIGNIFICANT CHANGE UP (ref 0.5–1.3)
CREAT SERPL-MCNC: 0.66 MG/DL — SIGNIFICANT CHANGE UP (ref 0.5–1.3)
CULTURE RESULTS: SIGNIFICANT CHANGE UP
DIFF PNL FLD: ABNORMAL
EPI CELLS # UR: SIGNIFICANT CHANGE UP /HPF (ref 0–5)
GLUCOSE BLDC GLUCOMTR-MCNC: 124 MG/DL — HIGH (ref 70–99)
GLUCOSE BLDC GLUCOMTR-MCNC: 92 MG/DL — SIGNIFICANT CHANGE UP (ref 70–99)
GLUCOSE SERPL-MCNC: 102 MG/DL — HIGH (ref 70–99)
GLUCOSE SERPL-MCNC: 109 MG/DL — HIGH (ref 70–99)
GLUCOSE SERPL-MCNC: 97 MG/DL — SIGNIFICANT CHANGE UP (ref 70–99)
GLUCOSE UR QL: NEGATIVE — SIGNIFICANT CHANGE UP
HCT VFR BLD CALC: 23.7 % — LOW (ref 34.5–45)
HGB BLD-MCNC: 7.2 G/DL — LOW (ref 11.5–15.5)
KETONES UR-MCNC: 15 MG/DL
LEUKOCYTE ESTERASE UR-ACNC: NEGATIVE — SIGNIFICANT CHANGE UP
MAGNESIUM SERPL-MCNC: 1.9 MG/DL — SIGNIFICANT CHANGE UP (ref 1.6–2.6)
MAGNESIUM SERPL-MCNC: 2.1 MG/DL — SIGNIFICANT CHANGE UP (ref 1.6–2.6)
MCHC RBC-ENTMCNC: 28.5 PG — SIGNIFICANT CHANGE UP (ref 27–34)
MCHC RBC-ENTMCNC: 30.4 GM/DL — LOW (ref 32–36)
MCV RBC AUTO: 93.7 FL — SIGNIFICANT CHANGE UP (ref 80–100)
NITRITE UR-MCNC: NEGATIVE — SIGNIFICANT CHANGE UP
NRBC # BLD: 0 /100 WBCS — SIGNIFICANT CHANGE UP (ref 0–0)
PH UR: 6 — SIGNIFICANT CHANGE UP (ref 5–8)
PHOSPHATE SERPL-MCNC: 1.5 MG/DL — LOW (ref 2.5–4.5)
PHOSPHATE SERPL-MCNC: 1.8 MG/DL — LOW (ref 2.5–4.5)
PLATELET # BLD AUTO: 174 K/UL — SIGNIFICANT CHANGE UP (ref 150–400)
POTASSIUM SERPL-MCNC: 3.5 MMOL/L — SIGNIFICANT CHANGE UP (ref 3.5–5.3)
POTASSIUM SERPL-MCNC: 3.5 MMOL/L — SIGNIFICANT CHANGE UP (ref 3.5–5.3)
POTASSIUM SERPL-MCNC: 3.7 MMOL/L — SIGNIFICANT CHANGE UP (ref 3.5–5.3)
POTASSIUM SERPL-SCNC: 3.5 MMOL/L — SIGNIFICANT CHANGE UP (ref 3.5–5.3)
POTASSIUM SERPL-SCNC: 3.5 MMOL/L — SIGNIFICANT CHANGE UP (ref 3.5–5.3)
POTASSIUM SERPL-SCNC: 3.7 MMOL/L — SIGNIFICANT CHANGE UP (ref 3.5–5.3)
PROT SERPL-MCNC: 6.6 G/DL — SIGNIFICANT CHANGE UP (ref 6–8.3)
PROT UR-MCNC: 30 MG/DL
RBC # BLD: 2.53 M/UL — LOW (ref 3.8–5.2)
RBC # FLD: 16.9 % — HIGH (ref 10.3–14.5)
RBC CASTS # UR COMP ASSIST: < 5 /HPF — SIGNIFICANT CHANGE UP
SODIUM SERPL-SCNC: 151 MMOL/L — HIGH (ref 135–145)
SP GR SPEC: 1.02 — SIGNIFICANT CHANGE UP (ref 1–1.03)
SPECIMEN SOURCE: SIGNIFICANT CHANGE UP
TROPONIN T SERPL-MCNC: 0.01 NG/ML — SIGNIFICANT CHANGE UP (ref 0–0.01)
URATE CRY FLD QL MICRO: ABNORMAL /HPF
UROBILINOGEN FLD QL: 0.2 E.U./DL — SIGNIFICANT CHANGE UP
WBC # BLD: 11.73 K/UL — HIGH (ref 3.8–10.5)
WBC # FLD AUTO: 11.73 K/UL — HIGH (ref 3.8–10.5)
WBC UR QL: < 5 /HPF — SIGNIFICANT CHANGE UP

## 2021-11-28 PROCEDURE — 71045 X-RAY EXAM CHEST 1 VIEW: CPT | Mod: 26

## 2021-11-28 PROCEDURE — 99291 CRITICAL CARE FIRST HOUR: CPT

## 2021-11-28 RX ORDER — CHLORHEXIDINE GLUCONATE 213 G/1000ML
15 SOLUTION TOPICAL EVERY 12 HOURS
Refills: 0 | Status: DISCONTINUED | OUTPATIENT
Start: 2021-11-28 | End: 2021-12-05

## 2021-11-28 RX ORDER — PROPOFOL 10 MG/ML
10 INJECTION, EMULSION INTRAVENOUS
Qty: 1000 | Refills: 0 | Status: DISCONTINUED | OUTPATIENT
Start: 2021-11-28 | End: 2021-11-29

## 2021-11-28 RX ORDER — SODIUM CHLORIDE 9 MG/ML
1000 INJECTION, SOLUTION INTRAVENOUS
Refills: 0 | Status: DISCONTINUED | OUTPATIENT
Start: 2021-11-28 | End: 2021-11-28

## 2021-11-28 RX ORDER — CHLORHEXIDINE GLUCONATE 213 G/1000ML
15 SOLUTION TOPICAL EVERY 12 HOURS
Refills: 0 | Status: DISCONTINUED | OUTPATIENT
Start: 2021-11-28 | End: 2021-11-28

## 2021-11-28 RX ORDER — FENTANYL CITRATE 50 UG/ML
25 INJECTION INTRAVENOUS ONCE
Refills: 0 | Status: DISCONTINUED | OUTPATIENT
Start: 2021-11-28 | End: 2021-11-28

## 2021-11-28 RX ORDER — ACETAMINOPHEN 500 MG
1000 TABLET ORAL ONCE
Refills: 0 | Status: COMPLETED | OUTPATIENT
Start: 2021-11-28 | End: 2021-11-28

## 2021-11-28 RX ORDER — POTASSIUM PHOSPHATE, MONOBASIC POTASSIUM PHOSPHATE, DIBASIC 236; 224 MG/ML; MG/ML
30 INJECTION, SOLUTION INTRAVENOUS ONCE
Refills: 0 | Status: COMPLETED | OUTPATIENT
Start: 2021-11-28 | End: 2021-11-28

## 2021-11-28 RX ORDER — SODIUM CHLORIDE 9 MG/ML
1000 INJECTION, SOLUTION INTRAVENOUS
Refills: 0 | Status: DISCONTINUED | OUTPATIENT
Start: 2021-11-28 | End: 2021-11-29

## 2021-11-28 RX ORDER — POTASSIUM CHLORIDE 20 MEQ
10 PACKET (EA) ORAL
Refills: 0 | Status: COMPLETED | OUTPATIENT
Start: 2021-11-28 | End: 2021-11-28

## 2021-11-28 RX ADMIN — Medication 3 MILLILITER(S): at 16:02

## 2021-11-28 RX ADMIN — POTASSIUM PHOSPHATE, MONOBASIC POTASSIUM PHOSPHATE, DIBASIC 83.33 MILLIMOLE(S): 236; 224 INJECTION, SOLUTION INTRAVENOUS at 17:29

## 2021-11-28 RX ADMIN — FENTANYL CITRATE 25 MICROGRAM(S): 50 INJECTION INTRAVENOUS at 17:45

## 2021-11-28 RX ADMIN — FENTANYL CITRATE 25 MICROGRAM(S): 50 INJECTION INTRAVENOUS at 16:14

## 2021-11-28 RX ADMIN — CHLORHEXIDINE GLUCONATE 15 MILLILITER(S): 213 SOLUTION TOPICAL at 06:26

## 2021-11-28 RX ADMIN — FENTANYL CITRATE 25 MICROGRAM(S): 50 INJECTION INTRAVENOUS at 10:09

## 2021-11-28 RX ADMIN — PIPERACILLIN AND TAZOBACTAM 200 GRAM(S): 4; .5 INJECTION, POWDER, LYOPHILIZED, FOR SOLUTION INTRAVENOUS at 08:40

## 2021-11-28 RX ADMIN — FENTANYL CITRATE 25 MICROGRAM(S): 50 INJECTION INTRAVENOUS at 16:45

## 2021-11-28 RX ADMIN — FENTANYL CITRATE 25 MICROGRAM(S): 50 INJECTION INTRAVENOUS at 23:06

## 2021-11-28 RX ADMIN — Medication 100 MILLIEQUIVALENT(S): at 10:08

## 2021-11-28 RX ADMIN — LIDOCAINE 1 PATCH: 4 CREAM TOPICAL at 20:31

## 2021-11-28 RX ADMIN — SODIUM CHLORIDE 50 MILLILITER(S): 9 INJECTION, SOLUTION INTRAVENOUS at 17:29

## 2021-11-28 RX ADMIN — Medication 3 MILLILITER(S): at 21:54

## 2021-11-28 RX ADMIN — PANTOPRAZOLE SODIUM 40 MILLIGRAM(S): 20 TABLET, DELAYED RELEASE ORAL at 11:50

## 2021-11-28 RX ADMIN — PIPERACILLIN AND TAZOBACTAM 200 GRAM(S): 4; .5 INJECTION, POWDER, LYOPHILIZED, FOR SOLUTION INTRAVENOUS at 14:06

## 2021-11-28 RX ADMIN — Medication 100 MILLIEQUIVALENT(S): at 11:00

## 2021-11-28 RX ADMIN — FENTANYL CITRATE 25 MICROGRAM(S): 50 INJECTION INTRAVENOUS at 15:17

## 2021-11-28 RX ADMIN — HEPARIN SODIUM 5000 UNIT(S): 5000 INJECTION INTRAVENOUS; SUBCUTANEOUS at 21:21

## 2021-11-28 RX ADMIN — Medication 100 MILLIEQUIVALENT(S): at 08:40

## 2021-11-28 RX ADMIN — HEPARIN SODIUM 5000 UNIT(S): 5000 INJECTION INTRAVENOUS; SUBCUTANEOUS at 14:06

## 2021-11-28 RX ADMIN — Medication 50 MILLILITER(S): at 05:08

## 2021-11-28 RX ADMIN — PIPERACILLIN AND TAZOBACTAM 200 GRAM(S): 4; .5 INJECTION, POWDER, LYOPHILIZED, FOR SOLUTION INTRAVENOUS at 21:21

## 2021-11-28 RX ADMIN — FENTANYL CITRATE 25 MICROGRAM(S): 50 INJECTION INTRAVENOUS at 10:35

## 2021-11-28 RX ADMIN — Medication 400 MILLIGRAM(S): at 15:17

## 2021-11-28 RX ADMIN — HEPARIN SODIUM 5000 UNIT(S): 5000 INJECTION INTRAVENOUS; SUBCUTANEOUS at 05:07

## 2021-11-28 RX ADMIN — FENTANYL CITRATE 25 MICROGRAM(S): 50 INJECTION INTRAVENOUS at 16:28

## 2021-11-28 RX ADMIN — FENTANYL CITRATE 25 MICROGRAM(S): 50 INJECTION INTRAVENOUS at 22:32

## 2021-11-28 RX ADMIN — FENTANYL CITRATE 25 MICROGRAM(S): 50 INJECTION INTRAVENOUS at 17:29

## 2021-11-28 RX ADMIN — LIDOCAINE 1 PATCH: 4 CREAM TOPICAL at 10:59

## 2021-11-28 RX ADMIN — PIPERACILLIN AND TAZOBACTAM 200 GRAM(S): 4; .5 INJECTION, POWDER, LYOPHILIZED, FOR SOLUTION INTRAVENOUS at 02:26

## 2021-11-28 RX ADMIN — LIDOCAINE 1 PATCH: 4 CREAM TOPICAL at 22:06

## 2021-11-28 RX ADMIN — CHLORHEXIDINE GLUCONATE 15 MILLILITER(S): 213 SOLUTION TOPICAL at 17:29

## 2021-11-28 RX ADMIN — Medication 1000 MILLIGRAM(S): at 15:52

## 2021-11-28 RX ADMIN — Medication 3 MILLILITER(S): at 10:09

## 2021-11-28 NOTE — PROGRESS NOTE ADULT - SUBJECTIVE AND OBJECTIVE BOX
24 Hour Events: POD#4 Ex-lap, open umbilical hernia repair. Trial of CPAP yesterday , however tachypneic to 30 with tidal volume of about 150mL. Trial of CPAP 10/, with improvement in tidal volumes, however became increasing tachypneic, decided to leave on AC. Increased suctioning requirements, repeat sputum culture sent, chest PT and duonebs ordered.       PAST MEDICAL & SURGICAL HISTORY:  HTN (hypertension)  HLD (hyperlipidemia)  Osteoarthritis  Hyponatremia  Altered mental status  Urinary tract infection  HF (heart failure)      Allergies  iodine containing compounds (Unknown)  lisinopril (Unknown)      MEDICATIONS  (STANDING):  albuterol/ipratropium for Nebulization 3 milliLiter(s) Nebulizer every 6 hours  chlorhexidine 0.12% Liquid 15 milliLiter(s) Oral Mucosa every 12 hours  chlorhexidine 4% Liquid 1 Application(s) Topical <User Schedule>  dextrose 40% Gel 15 Gram(s) Oral once  dextrose 5%. 1000 milliLiter(s) (50 mL/Hr) IV Continuous <Continuous>  dextrose 5%. 1000 milliLiter(s) (100 mL/Hr) IV Continuous <Continuous>  dextrose 50% Injectable 25 Gram(s) IV Push once  dextrose 50% Injectable 12.5 Gram(s) IV Push once  dextrose 50% Injectable 25 Gram(s) IV Push once  glucagon  Injectable 1 milliGRAM(s) IntraMuscular once  heparin   Injectable 5000 Unit(s) SubCutaneous every 8 hours  insulin lispro (ADMELOG) corrective regimen sliding scale   SubCutaneous Before meals and at bedtime  lidocaine   4% Patch 1 Patch Transdermal every 24 hours  pantoprazole  Injectable 40 milliGRAM(s) IV Push daily  piperacillin/tazobactam IVPB.. 2.25 Gram(s) IV Intermittent every 6 hours  potassium chloride  10 mEq/100 mL IVPB 10 milliEquivalent(s) IV Intermittent every 1 hour    MEDICATIONS  (PRN):  fentaNYL    Injectable 25 MICROGram(s) IV Push every 2 hours PRN Severe Pain (7 - 10)  ondansetron Injectable 4 milliGRAM(s) IV Push every 6 hours PRN Nausea      Physical Exam:   General: Frail elderly female, appears stated age, resting comfortably in bed in no acute distress   Neuro: Grossly intact bilaterally; responds to verbal commands    HEENT: Normocephalic, atraumatic, trachea midline, no JVD   Heart: Regular S1/S2, no murmurs rubs or gallops   Lungs: Unlabored breathing on ventilator; Clear to auscultation bilaterally, no adventitious sounds   Abdomen: Softly distended, tenderness to deep palpation in all 4 quadrants without rebound, guarding or rigidity; lower abdominal midline incision closed with staples is clean/dry/intact  Upper Extremities: No edema, freely mobile bilaterally   Lower Extremities: No edema, SCDs in place, feet warm bilaterally   Skin: Warm, non-diaphoretic throughout    Labs:                  7.2    11.73 )-----------( 174      ( 2021 06:03 )             23.7         151<H>  |  115<H>  |  43<H>  ----------------------------<  102<H>  3.5   |  27  |  0.64    Ca    8.9      2021 07:09  Phos  1.8       Mg     2.1         TPro  6.6  /  Alb  2.6<L>  /  TBili  0.3  /  DBili  0.2  /  AST  25  /  ALT  8<L>  /  AlkPhos  77      CAPILLARY BLOOD GLUCOSE  POCT Blood Glucose.: 121 mg/dL (2021 21:50)  POCT Blood Glucose.: 133 mg/dL (2021 16:05)  POCT Blood Glucose.: 120 mg/dL (2021 11:31)    COVID-19 PCR: NotDetec (12 Sep 2021 07:01)  COVID-19 PCR: Negative (05 Sep 2021 00:30)    Urinalysis Basic - ( 2021 19:18 )  Color: Yellow / Appearance: Clear / S.025 / pH: x  Gluc: x / Ketone: 15 mg/dL  / Bili: Small / Urobili: 0.2 E.U./dL   Blood: x / Protein: 30 mg/dL / Nitrite: NEGATIVE   Leuk Esterase: NEGATIVE / RBC: < 5 /HPF / WBC < 5 /HPF   Sq Epi: x / Non Sq Epi: 0-5 /HPF / Bacteria: Present /HPF      Culture - Sputum (collected 2021 16:04)  Source: .Sputum Sputum  Gram Stain (2021 21:31):    No epithelial cells seen    Rare WBC's    No organisms seen    Culture - Sputum (collected 2021 13:09)  Source: .Sputum Sputum  Gram Stain (2021 17:53):    Rare epithelial cells    Rare WBC's    No organisms seen  Preliminary Report (2021 08:48):    Culture in progress    Culture - Blood (collected 2021 01:43)  Source: .Blood Blood  Preliminary Report (2021 02:00):    No growth at 2 days.        Vital Signs:   Vital Signs Last 24 Hrs  T(C): 37.4 (2021 09:00), Max: 37.9 (2021 01:32)  T(F): 99.3 (2021 09:00), Max: 100.2 (2021 01:32)  HR: 81 (:) (56 - 90)  BP: --  BP(mean): --  RR: 20 (2021 08:) (8 - 42)  SpO2: 100% (:) (88% - 100%)  Mode: CPAP with PS, FiO2: 40, PEEP: 5, PS: 10, MAP: 8.3, PIP: 19    Input/Output:   I&O's Detail    2021 07:01  -  2021 07:00  --------------------------------------------------------  IN:    Dexmedetomidine: 18.9 mL    IV PiggyBack: 900 mL  Total IN: 918.9 mL    OUT:    Indwelling Catheter - Urethral (mL): 1485 mL  Total OUT: 1485 mL    Total NET: -566.1 mL      2021 07:01  -  2021 09:00  --------------------------------------------------------  IN:    IV PiggyBack: 200 mL  Total IN: 200 mL    OUT:    Indwelling Catheter - Urethral (mL): 125 mL  Total OUT: 125 mL    Total NET: 75 mL        Daily     Daily Weight in k.5 (2021 05:00)

## 2021-11-28 NOTE — PROGRESS NOTE ADULT - SUBJECTIVE AND OBJECTIVE BOX
INTERVAL HPI/OVERNIGHT EVENTS:  Pt evaluated at bedside w/ chief resident. POD 4 s/p ex lap, open umbilical hernia repair. Remains intubated. Received CPAP this AM. Pt had low grade temp o/n, improved in AM.       MEDICATIONS  (STANDING):  albuterol/ipratropium for Nebulization 3 milliLiter(s) Nebulizer every 6 hours  chlorhexidine 0.12% Liquid 15 milliLiter(s) Oral Mucosa every 12 hours  chlorhexidine 4% Liquid 1 Application(s) Topical <User Schedule>  dextrose 40% Gel 15 Gram(s) Oral once  dextrose 5%. 1000 milliLiter(s) (50 mL/Hr) IV Continuous <Continuous>  dextrose 5%. 1000 milliLiter(s) (100 mL/Hr) IV Continuous <Continuous>  dextrose 50% Injectable 25 Gram(s) IV Push once  dextrose 50% Injectable 12.5 Gram(s) IV Push once  dextrose 50% Injectable 25 Gram(s) IV Push once  glucagon  Injectable 1 milliGRAM(s) IntraMuscular once  heparin   Injectable 5000 Unit(s) SubCutaneous every 8 hours  insulin lispro (ADMELOG) corrective regimen sliding scale   SubCutaneous Before meals and at bedtime  lidocaine   4% Patch 1 Patch Transdermal every 24 hours  pantoprazole  Injectable 40 milliGRAM(s) IV Push daily  piperacillin/tazobactam IVPB.. 2.25 Gram(s) IV Intermittent every 6 hours  potassium chloride  10 mEq/100 mL IVPB 10 milliEquivalent(s) IV Intermittent every 1 hour    MEDICATIONS  (PRN):  acetaminophen   IVPB .. 1000 milliGRAM(s) IV Intermittent once PRN Temp greater or equal to 38C (100.4F), Mild Pain (1 - 3)  fentaNYL    Injectable 25 MICROGram(s) IV Push every 2 hours PRN Severe Pain (7 - 10)  ondansetron Injectable 4 milliGRAM(s) IV Push every 6 hours PRN Nausea      Vital Signs Last 24 Hrs  T(C): 37.4 (2021 09:00), Max: 37.9 (2021 01:32)  T(F): 99.3 (2021 09:00), Max: 100.2 (2021 01:32)  HR: 74 (2021 10:00) (56 - 90)  BP: --  BP(mean): --  RR: 22 (2021 10:00) (8 - 42)  SpO2: 99% (2021 10:00) (88% - 100%)    PHYSICAL EXAM:  General: Frail elderly female, appears stated age, resting comfortably in bed in no acute distress   Neuro: Grossly intact bilaterally   HEENT: Normocephalic, atraumatic, trachea midline, no JVD   Heart: Regular S1/S2, no murmurs rubs or gallops   Lungs: Unlabored breathing on ventilator; Clear to auscultation bilaterally, no adventitious sounds   Abdomen: Soft, nontender lower abdominal midline incision closed with staples is clean/dry/intacts  Extrem: no edema, SCDs for LE b/l          I&O's Detail    2021 07:01  -  2021 07:00  --------------------------------------------------------  IN:    Dexmedetomidine: 18.9 mL    IV PiggyBack: 900 mL  Total IN: 918.9 mL    OUT:    Indwelling Catheter - Urethral (mL): 1485 mL  Total OUT: 1485 mL    Total NET: -566.1 mL      2021 07:01  -  2021 10:41  --------------------------------------------------------  IN:    IV PiggyBack: 300 mL  Total IN: 300 mL    OUT:    Indwelling Catheter - Urethral (mL): 200 mL  Total OUT: 200 mL    Total NET: 100 mL          LABS:                        7.2    11.73 )-----------( 174      ( 2021 06:03 )             23.7         151<H>  |  115<H>  |  43<H>  ----------------------------<  102<H>  3.5   |  27  |  0.64    Ca    8.9      2021 07:09  Phos  1.8       Mg     2.1         TPro  6.6  /  Alb  2.6<L>  /  TBili  0.3  /  DBili  0.2  /  AST  25  /  ALT  8<L>  /  AlkPhos  77        Urinalysis Basic - ( 2021 19:18 )    Color: Yellow / Appearance: Clear / S.025 / pH: x  Gluc: x / Ketone: 15 mg/dL  / Bili: Small / Urobili: 0.2 E.U./dL   Blood: x / Protein: 30 mg/dL / Nitrite: NEGATIVE   Leuk Esterase: NEGATIVE / RBC: < 5 /HPF / WBC < 5 /HPF   Sq Epi: x / Non Sq Epi: 0-5 /HPF / Bacteria: Present /HPF        RADIOLOGY & ADDITIONAL STUDIES:

## 2021-11-28 NOTE — PROGRESS NOTE ADULT - ASSESSMENT
89yoF with PMH HTN, HLD, OA, CAD on Asa, HF, chronic constipation, recent admission to [facility] for metabolic encephalopathy, acute hypercapneic resp failure and pulmonary edema requiring intubation (9/5-9/15) CAD (on Aspirin) presents w/ high-grade small bowel obstruction at site of periumbilical hernia now s/p 11/24 for Exlap and Open repair of Umbilical l Hernia, no SBR required.     CPAP  Wean to extubate as tolerated, would start tube feeds if unable to extubate today.  Careful fluid resuscitation  Rest of plan per SICU    Pt seen w/ Chief resident.

## 2021-11-28 NOTE — PROGRESS NOTE ADULT - SUBJECTIVE AND OBJECTIVE BOX
Covering for Dr. Nickerson.  Patient seen and examined at bedside in SICU.  Tolerating CPAP better today.    Abdomen soft, NT ND.  Incision clean/dry/intact.

## 2021-11-28 NOTE — PROGRESS NOTE ADULT - ASSESSMENT
Assessment: 89 y/o F bedbound, prior DNR with PMH HTN, HLD, OA, CAD on Asa, HF, chronic constipation, recent admission to [facility] for metabolic encephalopathy, acute hypercapneic resp failure and pulmonary edema requiring intubation (9/5-9/15) CAD (on Aspirin) and no PSH who presents from [facility] for constipation and emesis. Afebrile, HD stable, WBC wnl, BLADIMIR with Cr 77/1.91, Trop 0.03, BNP 2396, lactate 5.8, cdiff neg. CT with high-grade small bowel obstruction at site of periumbilical hernia. NGT placed in ED with 1.7 L brown feculent output. Concerning for strangulated umbilical hernia with possible bowel ischemia. Taken to OR on 11/24 for Exlap and Open repair of Umbilical hernia repair. Transferred to SICU post-op intubated for hemodynamic monitoring. Remains intubated for airway protection. Changed antibiotics for broader coverage with Zosyn, with improvement in fever curve. Plan for repeat CPAP trial today.      Plan:   Neuro: Fentanyl. PRN; Home Remeron on hold while NPO  CV: Transient hypotension likely 2/2 sepsis vs hypovolemia- improved H/o CAD, HTN, HFpEF (65-70%), Norvasc, Lasix, Lipitor, and ASA held   Pulm: Intubated on 40%/440/12/5; Daily SBT on CPAP 10/5, wean 5/5; Duonebs; Possible Extubation   GI: NPO; Will dc NGT and placed DHT to start trickle TFs; Protonix.  : Pre-renal BLADIMIR; Liriano, strict I&Os  ID: ?GI translocation: Zosyn (11/26--) // DC: Ceftriaxone (11/24-11/26) Flagyl (11/24-11/26)   Endo: ISS  PPx: SCDs, SQH   Lines: Samantha left brachial (11/24-), PIVs  Wounds: Midline incision   PT/OT: Not ordered.

## 2021-11-28 NOTE — PROGRESS NOTE ADULT - ASSESSMENT
89F s/p ex lap and ventral hernia repair.  -Supportive care as per SICU team  -Wean to extubate as tolerated  -If unable to be extubated today, would start tube feeds

## 2021-11-29 LAB
ANION GAP SERPL CALC-SCNC: 10 MMOL/L — SIGNIFICANT CHANGE UP (ref 5–17)
ANISOCYTOSIS BLD QL: SLIGHT — SIGNIFICANT CHANGE UP
BASOPHILS # BLD AUTO: 0 K/UL — SIGNIFICANT CHANGE UP (ref 0–0.2)
BASOPHILS NFR BLD AUTO: 0 % — SIGNIFICANT CHANGE UP (ref 0–2)
BUN SERPL-MCNC: 24 MG/DL — HIGH (ref 7–23)
CALCIUM SERPL-MCNC: 8.1 MG/DL — LOW (ref 8.4–10.5)
CHLORIDE SERPL-SCNC: 110 MMOL/L — HIGH (ref 96–108)
CO2 SERPL-SCNC: 25 MMOL/L — SIGNIFICANT CHANGE UP (ref 22–31)
CREAT SERPL-MCNC: 0.51 MG/DL — SIGNIFICANT CHANGE UP (ref 0.5–1.3)
CULTURE RESULTS: SIGNIFICANT CHANGE UP
EOSINOPHIL # BLD AUTO: 0 K/UL — SIGNIFICANT CHANGE UP (ref 0–0.5)
EOSINOPHIL NFR BLD AUTO: 0 % — SIGNIFICANT CHANGE UP (ref 0–6)
GIANT PLATELETS BLD QL SMEAR: PRESENT — SIGNIFICANT CHANGE UP
GLUCOSE BLDC GLUCOMTR-MCNC: 104 MG/DL — HIGH (ref 70–99)
GLUCOSE BLDC GLUCOMTR-MCNC: 105 MG/DL — HIGH (ref 70–99)
GLUCOSE BLDC GLUCOMTR-MCNC: 88 MG/DL — SIGNIFICANT CHANGE UP (ref 70–99)
GLUCOSE SERPL-MCNC: 111 MG/DL — HIGH (ref 70–99)
HCT VFR BLD CALC: 24.1 % — LOW (ref 34.5–45)
HGB BLD-MCNC: 7.3 G/DL — LOW (ref 11.5–15.5)
HYPOCHROMIA BLD QL: SIGNIFICANT CHANGE UP
LYMPHOCYTES # BLD AUTO: 1.28 K/UL — SIGNIFICANT CHANGE UP (ref 1–3.3)
LYMPHOCYTES # BLD AUTO: 11.5 % — LOW (ref 13–44)
MACROCYTES BLD QL: SLIGHT — SIGNIFICANT CHANGE UP
MAGNESIUM SERPL-MCNC: 1.8 MG/DL — SIGNIFICANT CHANGE UP (ref 1.6–2.6)
MANUAL SMEAR VERIFICATION: SIGNIFICANT CHANGE UP
MCHC RBC-ENTMCNC: 28 PG — SIGNIFICANT CHANGE UP (ref 27–34)
MCHC RBC-ENTMCNC: 30.3 GM/DL — LOW (ref 32–36)
MCV RBC AUTO: 92.3 FL — SIGNIFICANT CHANGE UP (ref 80–100)
METAMYELOCYTES # FLD: 0.9 % — HIGH (ref 0–0)
MICROCYTES BLD QL: SLIGHT — SIGNIFICANT CHANGE UP
MONOCYTES # BLD AUTO: 0.39 K/UL — SIGNIFICANT CHANGE UP (ref 0–0.9)
MONOCYTES NFR BLD AUTO: 3.5 % — SIGNIFICANT CHANGE UP (ref 2–14)
NEUTROPHILS # BLD AUTO: 9.38 K/UL — HIGH (ref 1.8–7.4)
NEUTROPHILS NFR BLD AUTO: 84.1 % — HIGH (ref 43–77)
NRBC # BLD: 0 /100 WBCS — SIGNIFICANT CHANGE UP (ref 0–0)
OVALOCYTES BLD QL SMEAR: SLIGHT — SIGNIFICANT CHANGE UP
PHOSPHATE SERPL-MCNC: 2.4 MG/DL — LOW (ref 2.5–4.5)
PLAT MORPH BLD: ABNORMAL
PLATELET # BLD AUTO: 192 K/UL — SIGNIFICANT CHANGE UP (ref 150–400)
POLYCHROMASIA BLD QL SMEAR: SLIGHT — SIGNIFICANT CHANGE UP
POTASSIUM SERPL-MCNC: 3.7 MMOL/L — SIGNIFICANT CHANGE UP (ref 3.5–5.3)
POTASSIUM SERPL-SCNC: 3.7 MMOL/L — SIGNIFICANT CHANGE UP (ref 3.5–5.3)
PROCALCITONIN SERPL-MCNC: 1.4 NG/ML — HIGH (ref 0.02–0.1)
RBC # BLD: 2.61 M/UL — LOW (ref 3.8–5.2)
RBC # FLD: 17.2 % — HIGH (ref 10.3–14.5)
RBC BLD AUTO: ABNORMAL
SCHISTOCYTES BLD QL AUTO: SLIGHT — SIGNIFICANT CHANGE UP
SMUDGE CELLS # BLD: PRESENT — SIGNIFICANT CHANGE UP
SODIUM SERPL-SCNC: 145 MMOL/L — SIGNIFICANT CHANGE UP (ref 135–145)
SPECIMEN SOURCE: SIGNIFICANT CHANGE UP
SPHEROCYTES BLD QL SMEAR: SLIGHT — SIGNIFICANT CHANGE UP
TSH SERPL-MCNC: 0.77 UIU/ML — SIGNIFICANT CHANGE UP (ref 0.27–4.2)
WBC # BLD: 11.15 K/UL — HIGH (ref 3.8–10.5)
WBC # FLD AUTO: 11.15 K/UL — HIGH (ref 3.8–10.5)

## 2021-11-29 PROCEDURE — 71045 X-RAY EXAM CHEST 1 VIEW: CPT | Mod: 26

## 2021-11-29 PROCEDURE — 99223 1ST HOSP IP/OBS HIGH 75: CPT

## 2021-11-29 PROCEDURE — 74018 RADEX ABDOMEN 1 VIEW: CPT | Mod: 26

## 2021-11-29 PROCEDURE — 93306 TTE W/DOPPLER COMPLETE: CPT | Mod: 26

## 2021-11-29 PROCEDURE — 99233 SBSQ HOSP IP/OBS HIGH 50: CPT | Mod: GC

## 2021-11-29 RX ORDER — METOCLOPRAMIDE HCL 10 MG
5 TABLET ORAL EVERY 6 HOURS
Refills: 0 | Status: DISCONTINUED | OUTPATIENT
Start: 2021-11-29 | End: 2021-12-09

## 2021-11-29 RX ORDER — POTASSIUM PHOSPHATE, MONOBASIC POTASSIUM PHOSPHATE, DIBASIC 236; 224 MG/ML; MG/ML
30 INJECTION, SOLUTION INTRAVENOUS ONCE
Refills: 0 | Status: COMPLETED | OUTPATIENT
Start: 2021-11-29 | End: 2021-11-29

## 2021-11-29 RX ORDER — MAGNESIUM SULFATE 500 MG/ML
2 VIAL (ML) INJECTION ONCE
Refills: 0 | Status: COMPLETED | OUTPATIENT
Start: 2021-11-29 | End: 2021-11-29

## 2021-11-29 RX ORDER — METOPROLOL TARTRATE 50 MG
2.5 TABLET ORAL EVERY 6 HOURS
Refills: 0 | Status: DISCONTINUED | OUTPATIENT
Start: 2021-11-29 | End: 2021-11-30

## 2021-11-29 RX ORDER — PROPOFOL 10 MG/ML
11.54 INJECTION, EMULSION INTRAVENOUS
Qty: 1000 | Refills: 0 | Status: DISCONTINUED | OUTPATIENT
Start: 2021-11-29 | End: 2021-11-30

## 2021-11-29 RX ADMIN — Medication 3 MILLILITER(S): at 21:50

## 2021-11-29 RX ADMIN — Medication 3 MILLILITER(S): at 15:13

## 2021-11-29 RX ADMIN — Medication 3 MILLILITER(S): at 11:16

## 2021-11-29 RX ADMIN — CHLORHEXIDINE GLUCONATE 15 MILLILITER(S): 213 SOLUTION TOPICAL at 17:06

## 2021-11-29 RX ADMIN — POTASSIUM PHOSPHATE, MONOBASIC POTASSIUM PHOSPHATE, DIBASIC 83.33 MILLIMOLE(S): 236; 224 INJECTION, SOLUTION INTRAVENOUS at 08:44

## 2021-11-29 RX ADMIN — PIPERACILLIN AND TAZOBACTAM 200 GRAM(S): 4; .5 INJECTION, POWDER, LYOPHILIZED, FOR SOLUTION INTRAVENOUS at 09:51

## 2021-11-29 RX ADMIN — HEPARIN SODIUM 5000 UNIT(S): 5000 INJECTION INTRAVENOUS; SUBCUTANEOUS at 14:28

## 2021-11-29 RX ADMIN — Medication 5 MILLIGRAM(S): at 23:05

## 2021-11-29 RX ADMIN — PIPERACILLIN AND TAZOBACTAM 200 GRAM(S): 4; .5 INJECTION, POWDER, LYOPHILIZED, FOR SOLUTION INTRAVENOUS at 21:16

## 2021-11-29 RX ADMIN — FENTANYL CITRATE 25 MICROGRAM(S): 50 INJECTION INTRAVENOUS at 23:59

## 2021-11-29 RX ADMIN — Medication 2.5 MILLIGRAM(S): at 14:29

## 2021-11-29 RX ADMIN — Medication 3 MILLILITER(S): at 05:09

## 2021-11-29 RX ADMIN — HEPARIN SODIUM 5000 UNIT(S): 5000 INJECTION INTRAVENOUS; SUBCUTANEOUS at 21:17

## 2021-11-29 RX ADMIN — LIDOCAINE 1 PATCH: 4 CREAM TOPICAL at 23:04

## 2021-11-29 RX ADMIN — LIDOCAINE 1 PATCH: 4 CREAM TOPICAL at 16:06

## 2021-11-29 RX ADMIN — FENTANYL CITRATE 25 MICROGRAM(S): 50 INJECTION INTRAVENOUS at 21:33

## 2021-11-29 RX ADMIN — PIPERACILLIN AND TAZOBACTAM 200 GRAM(S): 4; .5 INJECTION, POWDER, LYOPHILIZED, FOR SOLUTION INTRAVENOUS at 14:36

## 2021-11-29 RX ADMIN — CHLORHEXIDINE GLUCONATE 15 MILLILITER(S): 213 SOLUTION TOPICAL at 06:09

## 2021-11-29 RX ADMIN — FENTANYL CITRATE 25 MICROGRAM(S): 50 INJECTION INTRAVENOUS at 04:10

## 2021-11-29 RX ADMIN — FENTANYL CITRATE 25 MICROGRAM(S): 50 INJECTION INTRAVENOUS at 03:22

## 2021-11-29 RX ADMIN — PIPERACILLIN AND TAZOBACTAM 200 GRAM(S): 4; .5 INJECTION, POWDER, LYOPHILIZED, FOR SOLUTION INTRAVENOUS at 03:00

## 2021-11-29 RX ADMIN — FENTANYL CITRATE 25 MICROGRAM(S): 50 INJECTION INTRAVENOUS at 22:05

## 2021-11-29 RX ADMIN — FENTANYL CITRATE 25 MICROGRAM(S): 50 INJECTION INTRAVENOUS at 23:32

## 2021-11-29 RX ADMIN — Medication 2.5 MILLIGRAM(S): at 23:04

## 2021-11-29 RX ADMIN — HEPARIN SODIUM 5000 UNIT(S): 5000 INJECTION INTRAVENOUS; SUBCUTANEOUS at 06:10

## 2021-11-29 RX ADMIN — LIDOCAINE 1 PATCH: 4 CREAM TOPICAL at 11:08

## 2021-11-29 RX ADMIN — Medication 5 MILLIGRAM(S): at 14:29

## 2021-11-29 RX ADMIN — PANTOPRAZOLE SODIUM 40 MILLIGRAM(S): 20 TABLET, DELAYED RELEASE ORAL at 11:33

## 2021-11-29 RX ADMIN — PROPOFOL 5 MICROGRAM(S)/KG/MIN: 10 INJECTION, EMULSION INTRAVENOUS at 23:07

## 2021-11-29 RX ADMIN — Medication 50 GRAM(S): at 07:45

## 2021-11-29 NOTE — CONSULT NOTE ADULT - SUBJECTIVE AND OBJECTIVE BOX
HPI:  89 year old female bedbound, prior DNR with HTN, HLD, OA, CAD on Asa, HFpEF (65-70%), metabolic encephalopathy, acute and chronic resp failure with hypercapnia requiring intubation (-9/15), who was admitted with an SBO, strangulated umbilical hernia s/p surgery - now intubated with short bursts of SVT.    History obtained from chart as patient is intubated /sedated.  s/p surgery for a SBO obstruction . strangulated hernia on .  Still intubated /sedated with difficulty extubating.  EP called for short bursts of SVT.  BP WNL.  No documented history of SVT.        PAST MEDICAL & SURGICAL HISTORY:  HTN (hypertension)    HLD (hyperlipidemia)    Osteoarthritis    Hyponatremia    Altered mental status    Urinary tract infection    HF (heart failure)      Social History: no drugs        Inpatient Medications:   albuterol/ipratropium for Nebulization 3 milliLiter(s) Nebulizer every 6 hours  fentaNYL    Injectable 25 MICROGram(s) IV Push every 2 hours PRN  heparin   Injectable 5000 Unit(s) SubCutaneous every 8 hours  insulin lispro (ADMELOG) corrective regimen sliding scale   SubCutaneous Before meals and at bedtime  lidocaine   4% Patch 1 Patch Transdermal every 24 hours  metoclopramide Injectable 5 milliGRAM(s) IV Push every 6 hours  ondansetron Injectable 4 milliGRAM(s) IV Push every 6 hours PRN  pantoprazole  Injectable 40 milliGRAM(s) IV Push daily  piperacillin/tazobactam IVPB.. 2.25 Gram(s) IV Intermittent every 6 hours      Allergies: iodine containing compounds (Unknown)  lisinopril (Unknown)      ROS:   intubated/sedated    PHYSICAL:  T(C): 37.8 (21 @ 12:45), Max: 38.1 (21 @ 20:00)  HR: 86 (21 @ 13:00) (55 - 96)  BP: 106/43 (21 @ 12:45) (106/43 - 112/53)  RR: 40 (21 @ 13:00) (10 - 40)  SpO2: 100% (21 @ 13:00) (98% - 100%)    Appearance: No acute distress, well developed - intubated /sedated   Eyes: normal appearing eyelids  Cardiovascular: Normal S1 S2   Respiratory: Lungs clear    Gastrointestinal:  Soft, NT/ND 	  Neurologic:  intubated / sedated   Psych:  intubated / sedated   Musculoskeletal: no deformities noted   Skin: no rash noted, normal color and pigmentation.        LABS:                        7.3    11.15 )-----------( 192      ( 2021 06:07 )             24.1         145  |  110<H>  |  24<H>  ----------------------------<  111<H>  3.7   |  25  |  0.51    Ca    8.1<L>      2021 06:07  Phos  2.4       Mg     1.8       TPro  6.6  /  Alb  2.6<L>  /  TBili  0.3  /  DBili  0.2  /  AST  25  /  ALT  8<L>  /  AlkPhos  77     TSH 0.77      EK21 sinus tach at 107 with blocked APC    Telemetry: NSR 70-90 with short bursts of AT    ECHO:  < from: TTE Echo Limited or F/U (21 @ 10:27) >   1. Normal left and right ventricular size and systolic function.   2. Grade II left ventricular diastolic dysfunction.   3. Moderately dilated left atrium.   4. Mild aortic stenosis.   5. Moderate tricuspid regurgitation.   6. Pulmonary hypertension present, pulmonary artery systolic pressure is 72 mmHg.   7. Trivial pericardial effusion.        Assessment Plan:  89 year old female bedbound, prior DNR with HTN, HLD, OA, CAD on Asa, HFpEF (65-70%), metabolic encephalopathy, acute and chronic resp failure with hypercapnia requiring intubation (-9/15), who was admitted with an SBO, strangulated umbilical hernia s/p surgery - now intubated with short bursts of SVT.  Short bursts of AT nothing longer then a few seconds.  Intubated / sedated.  No indication for anticoagulation.  Hemodynamically stable as per blood pressures.  Can start Metoprolol to decrease episodes on tele - but again hemodynamically tolerated and unclear if patient has symptoms.  Normal EF.  IF any decrease in BP in setting of SVT can consider amiodarone - but currently not indicated.

## 2021-11-29 NOTE — PROGRESS NOTE ADULT - SUBJECTIVE AND OBJECTIVE BOX
24 Hour Events: ***      PAST MEDICAL & SURGICAL HISTORY:  HTN (hypertension)    HLD (hyperlipidemia)    Osteoarthritis    Hyponatremia    Altered mental status    Urinary tract infection    HF (heart failure)      Allergies    iodine containing compounds (Unknown)  lisinopril (Unknown)    Intolerances      MEDICATIONS  (STANDING):  albuterol/ipratropium for Nebulization 3 milliLiter(s) Nebulizer every 6 hours  chlorhexidine 0.12% Liquid 15 milliLiter(s) Oral Mucosa every 12 hours  chlorhexidine 4% Liquid 1 Application(s) Topical <User Schedule>  dextrose 40% Gel 15 Gram(s) Oral once  dextrose 5%. 1000 milliLiter(s) (50 mL/Hr) IV Continuous <Continuous>  dextrose 5%. 1000 milliLiter(s) (100 mL/Hr) IV Continuous <Continuous>  dextrose 50% Injectable 25 Gram(s) IV Push once  dextrose 50% Injectable 12.5 Gram(s) IV Push once  dextrose 50% Injectable 25 Gram(s) IV Push once  glucagon  Injectable 1 milliGRAM(s) IntraMuscular once  heparin   Injectable 5000 Unit(s) SubCutaneous every 8 hours  insulin lispro (ADMELOG) corrective regimen sliding scale   SubCutaneous Before meals and at bedtime  lidocaine   4% Patch 1 Patch Transdermal every 24 hours  magnesium sulfate  IVPB 2 Gram(s) IV Intermittent once  pantoprazole  Injectable 40 milliGRAM(s) IV Push daily  piperacillin/tazobactam IVPB.. 2.25 Gram(s) IV Intermittent every 6 hours  potassium phosphate IVPB 30 milliMole(s) IV Intermittent once  propofol Infusion 10 MICROgram(s)/kG/Min (4.33 mL/Hr) IV Continuous <Continuous>    MEDICATIONS  (PRN):  fentaNYL    Injectable 25 MICROGram(s) IV Push every 2 hours PRN Severe Pain (7 - 10)  ondansetron Injectable 4 milliGRAM(s) IV Push every 6 hours PRN Nausea        Physical Exam:   General: Well apperaing, resting comfortably in bed in no acute distress   Neuro: Grossly intact bilaterally   HEENT: Normocephalic, atraumatic, trachea midline, no JVD   Chest:   Heart: Regular S1/S2, no murmurs rubs or gallops   Lungs: Unlabored breathing on ***; Clear to auscultation bilaterally, no adventitious sounds   Abdomen: Soft, non-distended, normoactive bowel sounds throughout, no tenderness to palpation in all 4 quadrants   Upper Extremities: No edema, freely mobile bilaterally   Lower Extremities: No edema, SCDs in place, feet warm bilaterally   Skin: Warm, non-diaphoretic throughout       Labs:                         7.3    11.15 )-----------( 192      ( 2021 06:07 )             24.1         145  |  110<H>  |  24<H>  ----------------------------<  111<H>  3.7   |  25  |  0.51    Ca    8.1<L>      2021 06:07  Phos  2.4       Mg     1.8         TPro  6.6  /  Alb  2.6<L>  /  TBili  0.3  /  DBili  0.2  /  AST  25  /  ALT  8<L>  /  AlkPhos  77      CAPILLARY BLOOD GLUCOSE      POCT Blood Glucose.: 124 mg/dL (2021 21:44)  POCT Blood Glucose.: 92 mg/dL (2021 11:03)    CARDIAC MARKERS ( 2021 15:15 )  x     / 0.01 ng/mL / 46 U/L / x     / 1.1 ng/mL        COVID-19 PCR: NotDetec (12 Sep 2021 07:01)  COVID-19 PCR: Negative (05 Sep 2021 00:30)    Urinalysis Basic - ( 2021 17:50 )    Color: Yellow / Appearance: Clear / S.020 / pH: x  Gluc: x / Ketone: 15 mg/dL  / Bili: Negative / Urobili: 0.2 E.U./dL   Blood: x / Protein: 30 mg/dL / Nitrite: NEGATIVE   Leuk Esterase: NEGATIVE / RBC: < 5 /HPF / WBC < 5 /HPF   Sq Epi: x / Non Sq Epi: 0-5 /HPF / Bacteria: Present /HPF        Culture - Blood (collected 2021 18:04)  Source: .Blood Blood-Peripheral  Preliminary Report (2021 07:01):    No growth at 12 hours    Culture - Blood (collected 2021 18:04)  Source: .Blood Blood-Peripheral  Preliminary Report (2021 07:01):    No growth at 12 hours    Culture - Sputum (collected 2021 16:04)  Source: .Sputum Sputum  Gram Stain (2021 21:31):    No epithelial cells seen    Rare WBC's    No organisms seen  Preliminary Report (2021 09:10):    Culture in progress    Culture - Sputum (collected 2021 13:09)  Source: .Sputum Sputum  Gram Stain (2021 17:53):    Rare epithelial cells    Rare WBC's    No organisms seen  Final Report (2021 09:21):    Normal Respiratory Natalia present        Vital Signs:   Vital Signs Last 24 Hrs  T(C): 37.8 (2021 05:00), Max: 38.1 (2021 20:00)  T(F): 100 (2021 05:00), Max: 100.6 (2021 20:00)  HR: 66 (:) (55 - 85)  BP: --  BP(mean): --  RR: 16 (:) (10 - 25)  SpO2: 100% (:) (98% - 100%)  Mode: AC/ CMV (Assist Control/ Continuous Mandatory Ventilation), RR (machine): 12, TV (machine): 440, FiO2: 35, PEEP: 5, ITime: 1, MAP: 8, PIP: 20    Input/Output:   I&O's Detail    2021 07:  -  2021 07:00  --------------------------------------------------------  IN:    dextrose 5%: 1250 mL    Free Water: 750 mL    IV PiggyBack: 500 mL    IV PiggyBack: 700 mL    Jevity 1.2: 200 mL  Total IN: 3400 mL    OUT:    Indwelling Catheter - Urethral (mL): 1245 mL    Nasogastric/Oral tube (mL): 700 mL  Total OUT: 1945 mL    Total NET: 1455 mL      2021 07:01  -  2021 07:42  --------------------------------------------------------  IN:    dextrose 5%: 50 mL    Jevity 1.2: 10 mL  Total IN: 60 mL    OUT:  Total OUT: 0 mL    Total NET: 60 mL        Daily     Daily Weight in k (2021 06:35)     24 Hour Events: POD#4 Ex-lap, open umbilical hernia repair. Trial of CPAP yesterday , however tachypneic to 30 with tidal volume of about 150mL. Trial of CPAP 10/, with improvement in tidal volumes, however became increasing tachypneic, decided to leave on AC. Increased suctioning requirements, repeat sputum culture sent, chest PT and duonebs ordered.       PAST MEDICAL & SURGICAL HISTORY:  HTN (hypertension)  HLD (hyperlipidemia)  Osteoarthritis  Hyponatremia  Altered mental status  Urinary tract infection  HF (heart failure)      Allergies  iodine containing compounds (Unknown)  lisinopril (Unknown)      MEDICATIONS  (STANDING):  albuterol/ipratropium for Nebulization 3 milliLiter(s) Nebulizer every 6 hours  chlorhexidine 0.12% Liquid 15 milliLiter(s) Oral Mucosa every 12 hours  chlorhexidine 4% Liquid 1 Application(s) Topical <User Schedule>  dextrose 40% Gel 15 Gram(s) Oral once  dextrose 5%. 1000 milliLiter(s) (50 mL/Hr) IV Continuous <Continuous>  dextrose 5%. 1000 milliLiter(s) (100 mL/Hr) IV Continuous <Continuous>  dextrose 50% Injectable 25 Gram(s) IV Push once  dextrose 50% Injectable 12.5 Gram(s) IV Push once  dextrose 50% Injectable 25 Gram(s) IV Push once  glucagon  Injectable 1 milliGRAM(s) IntraMuscular once  heparin   Injectable 5000 Unit(s) SubCutaneous every 8 hours  insulin lispro (ADMELOG) corrective regimen sliding scale   SubCutaneous Before meals and at bedtime  lidocaine   4% Patch 1 Patch Transdermal every 24 hours  magnesium sulfate  IVPB 2 Gram(s) IV Intermittent once  pantoprazole  Injectable 40 milliGRAM(s) IV Push daily  piperacillin/tazobactam IVPB.. 2.25 Gram(s) IV Intermittent every 6 hours  potassium phosphate IVPB 30 milliMole(s) IV Intermittent once  propofol Infusion 10 MICROgram(s)/kG/Min (4.33 mL/Hr) IV Continuous <Continuous>    MEDICATIONS  (PRN):  fentaNYL    Injectable 25 MICROGram(s) IV Push every 2 hours PRN Severe Pain (7 - 10)  ondansetron Injectable 4 milliGRAM(s) IV Push every 6 hours PRN Nausea      Physical Exam:   General: Frail elderly female, appears stated age, resting comfortably in bed in no acute distress   Neuro: Grossly intact bilaterally; responds to verbal commands    HEENT: Normocephalic, atraumatic, trachea midline, no JVD   Heart: Regular S1/S2, no murmurs rubs or gallops   Lungs: Unlabored breathing on ventilator; Clear to auscultation bilaterally, no adventitious sounds   Abdomen: Softly distended, tenderness to deep palpation in all 4 quadrants without rebound, guarding or rigidity; lower abdominal midline incision closed with staples is clean/dry/intact  Upper Extremities: No edema, freely mobile bilaterally   Lower Extremities: No edema, SCDs in place, feet warm bilaterally   Skin: Warm, non-diaphoretic throughout      Labs:                         7.3    11.15 )-----------( 192      ( 2021 06:07 )             24.1         145  |  110<H>  |  24<H>  ----------------------------<  111<H>  3.7   |  25  |  0.51    Ca    8.1<L>      2021 06:07  Phos  2.4       Mg     1.8         TPro  6.6  /  Alb  2.6<L>  /  TBili  0.3  /  DBili  0.2  /  AST  25  /  ALT  8<L>  /  AlkPhos  77      CAPILLARY BLOOD GLUCOSE  POCT Blood Glucose.: 124 mg/dL (2021 21:44)  POCT Blood Glucose.: 92 mg/dL (2021 11:03)    CARDIAC MARKERS ( 2021 15:15 )  x     / 0.01 ng/mL / 46 U/L / x     / 1.1 ng/mL    COVID-19 PCR: NotDetec (12 Sep 2021 07:01)  COVID-19 PCR: Negative (05 Sep 2021 00:30)    Urinalysis Basic - ( 2021 17:50 )  Color: Yellow / Appearance: Clear / S.020 / pH: x  Gluc: x / Ketone: 15 mg/dL  / Bili: Negative / Urobili: 0.2 E.U./dL   Blood: x / Protein: 30 mg/dL / Nitrite: NEGATIVE   Leuk Esterase: NEGATIVE / RBC: < 5 /HPF / WBC < 5 /HPF   Sq Epi: x / Non Sq Epi: 0-5 /HPF / Bacteria: Present /HPF      Culture - Blood (collected 2021 18:04)  Source: .Blood Blood-Peripheral  Preliminary Report (2021 07:01):    No growth at 12 hours    Culture - Blood (collected 2021 18:04)  Source: .Blood Blood-Peripheral  Preliminary Report (2021 07:01):    No growth at 12 hours    Culture - Sputum (collected 2021 16:04)  Source: .Sputum Sputum  Gram Stain (2021 21:31):    No epithelial cells seen    Rare WBC's    No organisms seen  Preliminary Report (2021 09:10):    Culture in progress    Culture - Sputum (collected 2021 13:09)  Source: .Sputum Sputum  Gram Stain (2021 17:53):    Rare epithelial cells    Rare WBC's    No organisms seen  Final Report (2021 09:21):    Normal Respiratory Natalia present        Vital Signs:   Vital Signs Last 24 Hrs  T(C): 37.8 (2021 05:00), Max: 38.1 (2021 20:00)  T(F): 100 (2021 05:00), Max: 100.6 (2021 20:00)  HR: 66 (2021 07:00) (55 - 85)  BP: --  BP(mean): --  RR: 16 (2021 07:) (10 - 25)  SpO2: 100% (2021 07:00) (98% - 100%)  Mode: AC/ CMV (Assist Control/ Continuous Mandatory Ventilation), RR (machine): 12, TV (machine): 440, FiO2: 35, PEEP: 5, ITime: 1, MAP: 8, PIP: 20    Input/Output:   I&O's Detail    2021 07:01  -  2021 07:00  --------------------------------------------------------  IN:    dextrose 5%: 1250 mL    Free Water: 750 mL    IV PiggyBack: 500 mL    IV PiggyBack: 700 mL    Jevity 1.2: 200 mL  Total IN: 3400 mL    OUT:    Indwelling Catheter - Urethral (mL): 1245 mL    Nasogastric/Oral tube (mL): 700 mL  Total OUT: 1945 mL    Total NET: 1455 mL      2021 07:01  -  2021 07:42  --------------------------------------------------------  IN:    dextrose 5%: 50 mL    Jevity 1.2: 10 mL  Total IN: 60 mL    OUT:  Total OUT: 0 mL    Total NET: 60 mL        Daily     Daily Weight in k (2021 06:35)

## 2021-11-29 NOTE — PROGRESS NOTE ADULT - SUBJECTIVE AND OBJECTIVE BOX
SUBJECTIVE: Patient seen and examined bedside.    heparin   Injectable 5000 Unit(s) SubCutaneous every 8 hours  piperacillin/tazobactam IVPB.. 2.25 Gram(s) IV Intermittent every 6 hours      Vital Signs Last 24 Hrs  T(C): 37.8 (2021 05:00), Max: 38.1 (2021 20:00)  T(F): 100 (2021 05:00), Max: 100.6 (2021 20:00)  HR: 80 (:00) (55 - 85)  BP: --  BP(mean): --  RR: 14 (2021 06:00) (10 - 25)  SpO2: 100% (:00) (98% - 100%)  I&O's Detail    2021 07:01  -  2021 07:00  --------------------------------------------------------  IN:    dextrose 5%: 1250 mL    Free Water: 750 mL    IV PiggyBack: 500 mL    IV PiggyBack: 700 mL    Jevity 1.2: 200 mL  Total IN: 3400 mL    OUT:    Indwelling Catheter - Urethral (mL): 1215 mL    Nasogastric/Oral tube (mL): 700 mL  Total OUT: 1915 mL    Total NET: 1485 mL          General: NAD, resting comfortably in bed  C/V: NSR  Pulm: Nonlabored breathing, no respiratory distress  Abd: soft, NT/ND.  Extrem: WWP, no edema, SCDs in place        LABS:                        7.3    11.15 )-----------( 192      ( 2021 06:07 )             24.1         145  |  110<H>  |  24<H>  ----------------------------<  111<H>  3.7   |  25  |  0.51    Ca    8.1<L>      2021 06:07  Phos  2.4       Mg     1.8         TPro  6.6  /  Alb  2.6<L>  /  TBili  0.3  /  DBili  0.2  /  AST  25  /  ALT  8<L>  /  AlkPhos  77  11-28      Urinalysis Basic - ( 2021 17:50 )    Color: Yellow / Appearance: Clear / S.020 / pH: x  Gluc: x / Ketone: 15 mg/dL  / Bili: Negative / Urobili: 0.2 E.U./dL   Blood: x / Protein: 30 mg/dL / Nitrite: NEGATIVE   Leuk Esterase: NEGATIVE / RBC: < 5 /HPF / WBC < 5 /HPF   Sq Epi: x / Non Sq Epi: 0-5 /HPF / Bacteria: Present /HPF        RADIOLOGY & ADDITIONAL STUDIES:   O/N: propofol not started, stayed calm  : NGT removed, dobhoff placed, confirmed on CXR, Sputum Cx w normal resp deborah, trace WBC, Trickle TF started; free water [id number] started for hypernatremia(free water deficit 2.5L); On CPAP 10, down to 8/5 but still tachypneic with RR 20s; ?AFib on tele- EKG w/ p-waves NSR. BCx NG 4 days; self-limiting SVT with HR 190s x 40sec, asymptomatic, while nurse was suctioning ETT- STAT labs, Na still 151- [id number] @50/hr; Tm 100.6F after tylenol- UA, Bcx sent    SUBJECTIVE: Patient seen and examined bedside. Patient has unable to be extubated over the weekend, she is can tolerate CPAP for maximum of 2 hours. She also had some febrile episodes BCx and UA sent. She had self-limiting SVT. Hypernatremia managed with free water and D5W. On restrains, she gets altered. Not complain of abdominal pain. Started on tube feeds. Patient had bowel movements.     heparin   Injectable 5000 Unit(s) SubCutaneous every 8 hours  piperacillin/tazobactam IVPB.. 2.25 Gram(s) IV Intermittent every 6 hours      Vital Signs Last 24 Hrs  T(C): 37.8 (2021 05:00), Max: 38.1 (2021 20:00)  T(F): 100 (2021 05:00), Max: 100.6 (2021 20:00)  HR: 80 (2021 06:00) (55 - 85)  BP: --  BP(mean): --  RR: 14 (2021 06:00) (10 - 25)  SpO2: 100% (2021 06:00) (98% - 100%)  I&O's Detail    2021 07:01  -  2021 07:00  --------------------------------------------------------  IN:    dextrose 5%: 1250 mL    Free Water: 750 mL    IV PiggyBack: 500 mL    IV PiggyBack: 700 mL    Jevity 1.2: 200 mL  Total IN: 3400 mL    OUT:    Indwelling Catheter - Urethral (mL): 1215 mL    Nasogastric/Oral tube (mL): 700 mL  Total OUT: 1915 mL    Total NET: 1485 mL      General: NAD, resting comfortably in bed. Intubated and on restrain  C/V: NSR  Pulm: Nonlabored breathing, no respiratory distress  Abd: soft, NT/ND. Midline incision with secretion on the bottom. Ecchymosis around incision.   Extrem: WWP, no edema, SCDs in place        LABS:                        7.3    11.15 )-----------( 192      ( 2021 06:07 )             24.1         145  |  110<H>  |  24<H>  ----------------------------<  111<H>  3.7   |  25  |  0.51    Ca    8.1<L>      2021 06:07  Phos  2.4       Mg     1.8         TPro  6.6  /  Alb  2.6<L>  /  TBili  0.3  /  DBili  0.2  /  AST  25  /  ALT  8<L>  /  AlkPhos  77        Urinalysis Basic - ( 2021 17:50 )    Color: Yellow / Appearance: Clear / S.020 / pH: x  Gluc: x / Ketone: 15 mg/dL  / Bili: Negative / Urobili: 0.2 E.U./dL   Blood: x / Protein: 30 mg/dL / Nitrite: NEGATIVE   Leuk Esterase: NEGATIVE / RBC: < 5 /HPF / WBC < 5 /HPF   Sq Epi: x / Non Sq Epi: 0-5 /HPF / Bacteria: Present /HPF        RADIOLOGY & ADDITIONAL STUDIES:

## 2021-11-29 NOTE — PROGRESS NOTE ADULT - ASSESSMENT
Assessment: 89 y/o F bedbound, prior DNR with PMH HTN, HLD, OA, CAD on Asa, HF, chronic constipation, recent admission to [facility] for metabolic encephalopathy, acute hypercapneic resp failure and pulmonary edema requiring intubation (9/5-9/15) CAD (on Aspirin) and no PSH who presents from [facility] for constipation and emesis. Afebrile, HD stable, WBC wnl, BLADIMIR with Cr 77/1.91, Trop 0.03, BNP 2396, lactate 5.8, cdiff neg. CT with high-grade small bowel obstruction at site of periumbilical hernia. NGT placed in ED with 1.7 L brown feculent output. Concerning for strangulated umbilical hernia with possible bowel ischemia. Taken to OR on 11/24 for Exlap and Open repair of Umbilical hernia repair. Transferred to SICU post-op intubated for hemodynamic monitoring. Remains intubated for airway protection, repeat CPAP trial today, possible diuresis.     Plan:   Neuro: Fentanyl PRN; Home Remeron on hold while NPO  CV: Transient hypotension likely 2/2 sepsis vs hypovolemia- improved H/o CAD, HTN, HFpEF (65-70%), Norvasc, Lasix, Lipitor, and ASA held   Pulm: Intubated on 40%/440/12/5; Daily SBT on CPAP 10/5, wean 5/5; Duonebs; Possible Extubation   GI: NPO; Will dc NGT and placed DHT to start trickle TFs; Protonix.  : Pre-renal BLADIMIR; Liriano, strict I&Os  ID: ?GI translocation: Zosyn (11/26--) // DC: Ceftriaxone (11/24-11/26) Flagyl (11/24-11/26)   Endo: ISS  PPx: SCDs, SQH   Lines: Samantha left brachial (11/24-), PIVs  Wounds: Midline incision   PT/OT: Not ordered.    Assessment: 89 y/o F bedbound, prior DNR with PMH HTN, HLD, OA, CAD on Asa, HF, chronic constipation, recent admission to [facility] for metabolic encephalopathy, acute hypercapneic resp failure and pulmonary edema requiring intubation (9/5-9/15) CAD (on Aspirin) and no PSH who presents from [facility] for constipation and emesis. Afebrile, HD stable, WBC wnl, BLADIMIR with Cr 77/1.91, Trop 0.03, BNP 2396, lactate 5.8, cdiff neg. CT with high-grade small bowel obstruction at site of periumbilical hernia. NGT placed in ED with 1.7 L brown feculent output. Concerning for strangulated umbilical hernia with possible bowel ischemia. Taken to OR on 11/24 for Exlap and Open repair of Umbilical hernia repair. Transferred to SICU post-op intubated for hemodynamic monitoring. Remains intubated for airway protection, repeat CPAP trial today and blood transfusion. Will consult EP for tachyarrhythmias.     Plan:   Neuro: Fentanyl PRN; Home Remeron on hold while NPO  CV: Transient hypotension likely 2/2 sepsis vs hypovolemia- improved H/o CAD, HTN, HFpEF (65-70%), Norvasc, Lasix, Lipitor, and ASA held   Pulm: Intubated on 40%/440/12/5; Daily SBT on CPAP; Duonebs; Possible Extubation   GI: NPO; DHT/TF trickle w/ Jevity 1.2@10/hr, Free Water 250ml q6h; Protonix, Reglan  : Pre-renal BLADIMIR; Liriano, strict I&Os  ID: ?GI translocation: Zosyn (11/26--) // DC: Ceftriaxone (11/24-11/26) Flagyl (11/24-11/26)   Endo: ISS  PPx: SCDs, SQH   Lines: Weed left brachial (11/24-), PIVs  Wounds: Midline incision   PT/OT: Not ordered.    Assessment: 89 y/o F bedbound, prior DNR with PMH HTN, HLD, OA, CAD on Asa, HF, chronic constipation, recent admission to [facility] for metabolic encephalopathy, acute hypercapneic resp failure and pulmonary edema requiring intubation (9/5-9/15) CAD (on Aspirin) and no PSH who presents from [facility] for constipation and emesis. Afebrile, HD stable, WBC wnl, BLADIMIR with Cr 77/1.91, Trop 0.03, BNP 2396, lactate 5.8, cdiff neg. CT with high-grade small bowel obstruction at site of periumbilical hernia. NGT placed in ED with 1.7 L brown feculent output. Concerning for strangulated umbilical hernia with possible bowel ischemia. Taken to OR on 11/24 for Exlap and Open repair of Umbilical hernia repair. Transferred to SICU post-op intubated for hemodynamic monitoring. Remains intubated for airway protection, repeat CPAP trial today and blood transfusion. Will consult EP for tachyarrhythmias.     Plan:   Neuro: Fentanyl PRN; Home Remeron on hold while NPO  CV: Transient hypotension likely 2/2 sepsis vs hypovolemia- improved H/o CAD, HTN, HFpEF (65-70%), Norvasc, Lasix, Lipitor, and ASA held   Pulm: Intubated on 40%/440/12/5; Daily SBT on CPAP; Duonebs; Possible Extubation   GI: NPO; DHT/TF trickle w/ Jevity 1.2@10/hr, Free Water 250ml q6h; Protonix, Reglan  : Pre-renal BLADIMIR; kasi Liriano I&Os  ID: ?pna: Zosyn (11/26--) // DC: Ceftriaxone (11/24-11/26) Flagyl (11/24-11/26)   Endo: ISS  PPx: SCDs, SQH   Lines: Samantha left brachial (11/24-), PIVs  Wounds: Midline incision   PT/OT: Not ordered.

## 2021-11-29 NOTE — PROGRESS NOTE ADULT - NUTRITIONAL ASSESSMENT
Diet, MENA (11-24-21 @ 14:36) [Active]
Diet, NPO with Tube Feed:   Tube Feeding Modality: Nasogastric  Jevity 1.2 Josr (JEVITY1.2RTH)  Total Volume for 24 Hours (mL): 240  Total Number of Cans: 1  Continuous  Starting Tube Feed Rate {mL per Hour}: 10  Until Goal Tube Feed Rate (mL per Hour): 10  Tube Feed Duration (in Hours): 24  Tube Feed Start Time: 10:00  Free Water Flush  Bolus   Total Volume per Flush (mL): 250   Frequency: Every 6 Hours    Start Time: 10:00 (11-28-21 @ 09:49) [Active]

## 2021-11-29 NOTE — PROGRESS NOTE ADULT - ASSESSMENT
89yoF with PMH HTN, HLD, OA, CAD on Asa, HF, chronic constipation, recent admission to [facility] for metabolic encephalopathy, acute hypercapneic resp failure and pulmonary edema requiring intubation (9/5-9/15) CAD (on Aspirin) presents w/ high-grade small bowel obstruction at site of periumbilical hernia now s/p 11/24 for Exlap and Open repair of Umbilical l Hernia, no SBR required.     Patient has been hemodinamically stable, tolerating TF     Echo   CPAP trial   Extubate per ICU criteria  Careful fluid resuscitation  Rest of plan per SICU    Pt seen w/ Chief resident. 89yoF with PMH HTN, HLD, OA, CAD on Asa, HF, chronic constipation, recent admission to [facility] for metabolic encephalopathy, acute hypercapneic resp failure and pulmonary edema requiring intubation (9/5-9/15) CAD (on Aspirin) presents w/ high-grade small bowel obstruction at site of periumbilical hernia now s/p 11/24 for Exlap and Open repair of Umbilical l Hernia, no SBR required.     Patient has been hemodynamically stable, tolerating TF, having bowel function. She had self-limiting SVT. Still intubated. Corrected hyponatremia.     Recommendations:   Echocardiogram due to  self-limiting SVT   Continue tube feeds  CPAP trial   Extubate per ICU criteria  Careful fluid resuscitation  Rest of plan per SICU    Pt seen w/ Chief resident.

## 2021-11-30 LAB
ANION GAP SERPL CALC-SCNC: 10 MMOL/L — SIGNIFICANT CHANGE UP (ref 5–17)
APPEARANCE UR: CLEAR — SIGNIFICANT CHANGE UP
BACTERIA # UR AUTO: ABNORMAL /HPF
BILIRUB UR-MCNC: NEGATIVE — SIGNIFICANT CHANGE UP
BLD GP AB SCN SERPL QL: NEGATIVE — SIGNIFICANT CHANGE UP
BUN SERPL-MCNC: 18 MG/DL — SIGNIFICANT CHANGE UP (ref 7–23)
CALCIUM SERPL-MCNC: 8.1 MG/DL — LOW (ref 8.4–10.5)
CHLORIDE SERPL-SCNC: 109 MMOL/L — HIGH (ref 96–108)
CO2 SERPL-SCNC: 24 MMOL/L — SIGNIFICANT CHANGE UP (ref 22–31)
COLOR SPEC: YELLOW — SIGNIFICANT CHANGE UP
COMMENT - URINE: SIGNIFICANT CHANGE UP
CREAT SERPL-MCNC: 0.54 MG/DL — SIGNIFICANT CHANGE UP (ref 0.5–1.3)
DIFF PNL FLD: ABNORMAL
EPI CELLS # UR: SIGNIFICANT CHANGE UP /HPF (ref 0–5)
GLUCOSE BLDC GLUCOMTR-MCNC: 127 MG/DL — HIGH (ref 70–99)
GLUCOSE BLDC GLUCOMTR-MCNC: 173 MG/DL — HIGH (ref 70–99)
GLUCOSE BLDC GLUCOMTR-MCNC: 72 MG/DL — SIGNIFICANT CHANGE UP (ref 70–99)
GLUCOSE BLDC GLUCOMTR-MCNC: 83 MG/DL — SIGNIFICANT CHANGE UP (ref 70–99)
GLUCOSE BLDC GLUCOMTR-MCNC: 83 MG/DL — SIGNIFICANT CHANGE UP (ref 70–99)
GLUCOSE SERPL-MCNC: 87 MG/DL — SIGNIFICANT CHANGE UP (ref 70–99)
GLUCOSE UR QL: NEGATIVE — SIGNIFICANT CHANGE UP
HCT VFR BLD CALC: 26.5 % — LOW (ref 34.5–45)
HGB BLD-MCNC: 8.4 G/DL — LOW (ref 11.5–15.5)
KETONES UR-MCNC: ABNORMAL MG/DL
LEUKOCYTE ESTERASE UR-ACNC: NEGATIVE — SIGNIFICANT CHANGE UP
MAGNESIUM SERPL-MCNC: 1.9 MG/DL — SIGNIFICANT CHANGE UP (ref 1.6–2.6)
MCHC RBC-ENTMCNC: 28.6 PG — SIGNIFICANT CHANGE UP (ref 27–34)
MCHC RBC-ENTMCNC: 31.7 GM/DL — LOW (ref 32–36)
MCV RBC AUTO: 90.1 FL — SIGNIFICANT CHANGE UP (ref 80–100)
NITRITE UR-MCNC: NEGATIVE — SIGNIFICANT CHANGE UP
NRBC # BLD: 0 /100 WBCS — SIGNIFICANT CHANGE UP (ref 0–0)
PH UR: 5.5 — SIGNIFICANT CHANGE UP (ref 5–8)
PHOSPHATE SERPL-MCNC: 2.6 MG/DL — SIGNIFICANT CHANGE UP (ref 2.5–4.5)
PLATELET # BLD AUTO: 204 K/UL — SIGNIFICANT CHANGE UP (ref 150–400)
POTASSIUM SERPL-MCNC: 4 MMOL/L — SIGNIFICANT CHANGE UP (ref 3.5–5.3)
POTASSIUM SERPL-SCNC: 4 MMOL/L — SIGNIFICANT CHANGE UP (ref 3.5–5.3)
PROT UR-MCNC: 30 MG/DL
RBC # BLD: 2.94 M/UL — LOW (ref 3.8–5.2)
RBC # FLD: 17.8 % — HIGH (ref 10.3–14.5)
RBC CASTS # UR COMP ASSIST: > 10 /HPF
RH IG SCN BLD-IMP: POSITIVE — SIGNIFICANT CHANGE UP
SODIUM SERPL-SCNC: 143 MMOL/L — SIGNIFICANT CHANGE UP (ref 135–145)
SP GR SPEC: >=1.03 — SIGNIFICANT CHANGE UP (ref 1–1.03)
URATE CRY FLD QL MICRO: ABNORMAL /HPF
UROBILINOGEN FLD QL: 0.2 E.U./DL — SIGNIFICANT CHANGE UP
WBC # BLD: 9.79 K/UL — SIGNIFICANT CHANGE UP (ref 3.8–10.5)
WBC # FLD AUTO: 9.79 K/UL — SIGNIFICANT CHANGE UP (ref 3.8–10.5)
WBC UR QL: ABNORMAL /HPF

## 2021-11-30 PROCEDURE — 71045 X-RAY EXAM CHEST 1 VIEW: CPT | Mod: 26

## 2021-11-30 PROCEDURE — 71045 X-RAY EXAM CHEST 1 VIEW: CPT | Mod: 26,77,76

## 2021-11-30 PROCEDURE — 99291 CRITICAL CARE FIRST HOUR: CPT

## 2021-11-30 RX ORDER — SODIUM CHLORIDE 9 MG/ML
1000 INJECTION, SOLUTION INTRAVENOUS
Refills: 0 | Status: DISCONTINUED | OUTPATIENT
Start: 2021-11-30 | End: 2021-12-01

## 2021-11-30 RX ORDER — SODIUM CHLORIDE 9 MG/ML
500 INJECTION, SOLUTION INTRAVENOUS ONCE
Refills: 0 | Status: COMPLETED | OUTPATIENT
Start: 2021-11-30 | End: 2021-11-30

## 2021-11-30 RX ORDER — ACETAMINOPHEN 500 MG
1000 TABLET ORAL ONCE
Refills: 0 | Status: COMPLETED | OUTPATIENT
Start: 2021-11-30 | End: 2021-11-30

## 2021-11-30 RX ORDER — DEXTROSE 50 % IN WATER 50 %
25 SYRINGE (ML) INTRAVENOUS ONCE
Refills: 0 | Status: COMPLETED | OUTPATIENT
Start: 2021-11-30 | End: 2021-11-30

## 2021-11-30 RX ORDER — CEFTRIAXONE 500 MG/1
1000 INJECTION, POWDER, FOR SOLUTION INTRAMUSCULAR; INTRAVENOUS EVERY 24 HOURS
Refills: 0 | Status: DISCONTINUED | OUTPATIENT
Start: 2021-11-30 | End: 2021-12-02

## 2021-11-30 RX ORDER — DEXMEDETOMIDINE HYDROCHLORIDE IN 0.9% SODIUM CHLORIDE 4 UG/ML
0.2 INJECTION INTRAVENOUS
Qty: 400 | Refills: 0 | Status: DISCONTINUED | OUTPATIENT
Start: 2021-11-30 | End: 2021-12-05

## 2021-11-30 RX ADMIN — Medication 5 MILLIGRAM(S): at 06:20

## 2021-11-30 RX ADMIN — LIDOCAINE 1 PATCH: 4 CREAM TOPICAL at 17:22

## 2021-11-30 RX ADMIN — FENTANYL CITRATE 25 MICROGRAM(S): 50 INJECTION INTRAVENOUS at 05:30

## 2021-11-30 RX ADMIN — Medication 3 MILLILITER(S): at 21:25

## 2021-11-30 RX ADMIN — Medication 3 MILLILITER(S): at 05:00

## 2021-11-30 RX ADMIN — FENTANYL CITRATE 25 MICROGRAM(S): 50 INJECTION INTRAVENOUS at 05:10

## 2021-11-30 RX ADMIN — PIPERACILLIN AND TAZOBACTAM 200 GRAM(S): 4; .5 INJECTION, POWDER, LYOPHILIZED, FOR SOLUTION INTRAVENOUS at 03:25

## 2021-11-30 RX ADMIN — PANTOPRAZOLE SODIUM 40 MILLIGRAM(S): 20 TABLET, DELAYED RELEASE ORAL at 11:50

## 2021-11-30 RX ADMIN — PIPERACILLIN AND TAZOBACTAM 200 GRAM(S): 4; .5 INJECTION, POWDER, LYOPHILIZED, FOR SOLUTION INTRAVENOUS at 08:57

## 2021-11-30 RX ADMIN — Medication 1000 MILLIGRAM(S): at 01:01

## 2021-11-30 RX ADMIN — HEPARIN SODIUM 5000 UNIT(S): 5000 INJECTION INTRAVENOUS; SUBCUTANEOUS at 22:09

## 2021-11-30 RX ADMIN — HEPARIN SODIUM 5000 UNIT(S): 5000 INJECTION INTRAVENOUS; SUBCUTANEOUS at 06:19

## 2021-11-30 RX ADMIN — CEFTRIAXONE 100 MILLIGRAM(S): 500 INJECTION, POWDER, FOR SOLUTION INTRAMUSCULAR; INTRAVENOUS at 10:37

## 2021-11-30 RX ADMIN — LIDOCAINE 1 PATCH: 4 CREAM TOPICAL at 10:55

## 2021-11-30 RX ADMIN — Medication 5 MILLIGRAM(S): at 17:22

## 2021-11-30 RX ADMIN — Medication 25 GRAM(S): at 22:15

## 2021-11-30 RX ADMIN — SODIUM CHLORIDE 70 MILLILITER(S): 9 INJECTION, SOLUTION INTRAVENOUS at 09:45

## 2021-11-30 RX ADMIN — SODIUM CHLORIDE 2000 MILLILITER(S): 9 INJECTION, SOLUTION INTRAVENOUS at 01:02

## 2021-11-30 RX ADMIN — Medication 400 MILLIGRAM(S): at 00:31

## 2021-11-30 RX ADMIN — Medication 3 MILLILITER(S): at 11:26

## 2021-11-30 RX ADMIN — Medication 5 MILLIGRAM(S): at 11:50

## 2021-11-30 RX ADMIN — DEXMEDETOMIDINE HYDROCHLORIDE IN 0.9% SODIUM CHLORIDE 3.61 MICROGRAM(S)/KG/HR: 4 INJECTION INTRAVENOUS at 12:14

## 2021-11-30 RX ADMIN — SODIUM CHLORIDE 500 MILLILITER(S): 9 INJECTION, SOLUTION INTRAVENOUS at 06:23

## 2021-11-30 RX ADMIN — CHLORHEXIDINE GLUCONATE 15 MILLILITER(S): 213 SOLUTION TOPICAL at 17:28

## 2021-11-30 RX ADMIN — Medication 2.5 MILLIGRAM(S): at 06:20

## 2021-11-30 RX ADMIN — Medication 3 MILLILITER(S): at 17:21

## 2021-11-30 RX ADMIN — CHLORHEXIDINE GLUCONATE 15 MILLILITER(S): 213 SOLUTION TOPICAL at 06:19

## 2021-11-30 RX ADMIN — CHLORHEXIDINE GLUCONATE 1 APPLICATION(S): 213 SOLUTION TOPICAL at 06:20

## 2021-11-30 RX ADMIN — HEPARIN SODIUM 5000 UNIT(S): 5000 INJECTION INTRAVENOUS; SUBCUTANEOUS at 13:54

## 2021-11-30 RX ADMIN — PROPOFOL 5 MICROGRAM(S)/KG/MIN: 10 INJECTION, EMULSION INTRAVENOUS at 08:57

## 2021-11-30 NOTE — PROGRESS NOTE ADULT - SUBJECTIVE AND OBJECTIVE BOX
O/N: xray showed ileus and lots of colonic air, Rectal tube inserted, agitated, propofol started at low dose, tylenol given for pain, bolused 500cc for low UOP  : Na 145- dc'ed D5W; rpt Procal 1.4, EP consulted for SVT, arrhythmias- rec starting Metoprolol for AT; TSH wnl, no eosinophils, 84% neutrophils; TTE- nl LV/RV size/systolic function, grade II LV diastolic dysfunct, mod TR, PASP 72, dilated LA    SUBJECTIVE: Patient seen and examined bedside. Patient continues to be intubated. Says that she is having pain. She is passing gas. Per nursing. Urine output is worsening and getting lower.      heparin   Injectable 5000 Unit(s) SubCutaneous every 8 hours  metoprolol tartrate Injectable 2.5 milliGRAM(s) IV Push every 6 hours  piperacillin/tazobactam IVPB.. 2.25 Gram(s) IV Intermittent every 6 hours      Vital Signs Last 24 Hrs  T(C): 37.5 (2021 05:13), Max: 38 (2021 22:26)  T(F): 99.5 (2021 05:13), Max: 100.4 (2021 22:26)  HR: 63 (2021 06:00) (63 - 96)  BP: 113/58 (2021 06:00) (105/58 - 134/92)  BP(mean): 83 (2021 06:00) (72 - 110)  RR: 12 (2021 06:00) (11 - 40)  SpO2: 100% (2021 06:00) (98% - 100%)  I&O's Detail    2021 07:01  -  2021 07:00  --------------------------------------------------------  IN:    dextrose 5%: 1250 mL    Free Water: 750 mL    IV PiggyBack: 500 mL    IV PiggyBack: 700 mL    Jevity 1.2: 200 mL  Total IN: 3400 mL    OUT:    Indwelling Catheter - Urethral (mL): 1245 mL    Nasogastric/Oral tube (mL): 700 mL  Total OUT: 1945 mL    Total NET: 1455 mL      2021 07:01  -  2021 06:58  --------------------------------------------------------  IN:    Free Water: 250 mL    IV PiggyBack: 1500 mL    IV PiggyBack: 499.8 mL    Jevity 1.2: 110 mL    PRBCs (Packed Red Blood Cells): 325 mL    Propofol: 62.8 mL  Total IN: 2747.6 mL    OUT:    Indwelling Catheter - Urethral (mL): 931 mL    Nasogastric/Oral tube (mL): 350 mL  Total OUT: 1281 mL    Total NET: 1466.6 mL        General: Intubated, NG tube in place. Mildly altered.   C/V: NSR  Pulm: Nonlabored breathing, no respiratory distress  Abd: soft, NT/ND. Soft. Low midline incision with staples, mild ecchymosis.   Extrem: WWP, no edema, SCDs in place        LABS:                        8.4    9.79  )-----------( 204      ( 2021 05:28 )             26.5     11-30    143  |  109<H>  |  18  ----------------------------<  87  4.0   |  24  |  0.54    Ca    8.1<L>      2021 05:28  Phos  2.6       Mg     1.9     30    Urinalysis Basic - ( 2021 17:50 )    Color: Yellow / Appearance: Clear / S.020 / pH: x  Gluc: x / Ketone: 15 mg/dL  / Bili: Negative / Urobili: 0.2 E.U./dL   Blood: x / Protein: 30 mg/dL / Nitrite: NEGATIVE   Leuk Esterase: NEGATIVE / RBC: < 5 /HPF / WBC < 5 /HPF   Sq Epi: x / Non Sq Epi: 0-5 /HPF / Bacteria: Present /HPF      RADIOLOGY & ADDITIONAL STUDIES:  EXAM: XR ABDOMEN PORTABLE URGENT 1V    PROCEDURE DATE: 2021        INTERPRETATION: TECHNIQUE: 2 portable view of the abdomen.    COMPARISON: CT dated 2021    CLINICAL indications:ileus?    FINDINGS:    NG tube tip below the hemidiaphragm region of the stomach. Right of midline abdominal cutaneous staples.    Dilated loops of small bowel suggesting ileus versus partial obstruction. Consider follow-up CT or small bowel follow-through.    IMPRESSION: Small bowel ileus versus partial obstruction, mildly improved.   O/N: xray showed ileus and lots of colonic air, Rectal tube inserted, agitated, propofol started at low dose, tylenol given for pain, bolused 500cc for low UOP  : Na 145- dc'ed D5W; rpt Procal 1.4, EP consulted for SVT, arrhythmias- rec starting Metoprolol for AT; TSH wnl, no eosinophils, 84% neutrophils; TTE- nl LV/RV size/systolic function, grade II LV diastolic dysfunct, mod TR, PASP 72, dilated LA    SUBJECTIVE: Patient seen and examined bedside. Patient continues to be intubated. Says that she is having pain. She is passing gas. Per nursing. Urine output is worsening and getting lower.      heparin   Injectable 5000 Unit(s) SubCutaneous every 8 hours  metoprolol tartrate Injectable 2.5 milliGRAM(s) IV Push every 6 hours  piperacillin/tazobactam IVPB.. 2.25 Gram(s) IV Intermittent every 6 hours      Vital Signs Last 24 Hrs  T(C): 37.5 (2021 05:13), Max: 38 (2021 22:26)  T(F): 99.5 (2021 05:13), Max: 100.4 (2021 22:26)  HR: 63 (2021 06:00) (63 - 96)  BP: 113/58 (2021 06:00) (105/58 - 134/92)  BP(mean): 83 (2021 06:00) (72 - 110)  RR: 12 (2021 06:00) (11 - 40)  SpO2: 100% (2021 06:00) (98% - 100%)  I&O's Detail    2021 07:01  -  2021 07:00  --------------------------------------------------------  IN:    dextrose 5%: 1250 mL    Free Water: 750 mL    IV PiggyBack: 500 mL    IV PiggyBack: 700 mL    Jevity 1.2: 200 mL  Total IN: 3400 mL    OUT:    Indwelling Catheter - Urethral (mL): 1245 mL    Nasogastric/Oral tube (mL): 700 mL  Total OUT: 1945 mL    Total NET: 1455 mL      2021 07:01  -  2021 06:58  --------------------------------------------------------  IN:    Free Water: 250 mL    IV PiggyBack: 1500 mL    IV PiggyBack: 499.8 mL    Jevity 1.2: 110 mL    PRBCs (Packed Red Blood Cells): 325 mL    Propofol: 62.8 mL  Total IN: 2747.6 mL    OUT:    Indwelling Catheter - Urethral (mL): 931 mL    Nasogastric/Oral tube (mL): 350 mL  Total OUT: 1281 mL    Total NET: 1466.6 mL        General: Intubated, NG tube in place. Mildly altered.   C/V: NSR  Pulm: Nonlabored breathing, no respiratory distress  Abd: soft, Mild tenderness to palpation No Peritoneal signs Low midline incision with staples, mild ecchymosis.   Extrem: WWP, no edema, SCDs in place        LABS:                        8.4    9.79  )-----------( 204      ( 2021 05:28 )             26.5     11-30    143  |  109<H>  |  18  ----------------------------<  87  4.0   |  24  |  0.54    Ca    8.1<L>      2021 05:28  Phos  2.6       Mg     1.9     30    Urinalysis Basic - ( 2021 17:50 )    Color: Yellow / Appearance: Clear / S.020 / pH: x  Gluc: x / Ketone: 15 mg/dL  / Bili: Negative / Urobili: 0.2 E.U./dL   Blood: x / Protein: 30 mg/dL / Nitrite: NEGATIVE   Leuk Esterase: NEGATIVE / RBC: < 5 /HPF / WBC < 5 /HPF   Sq Epi: x / Non Sq Epi: 0-5 /HPF / Bacteria: Present /HPF      RADIOLOGY & ADDITIONAL STUDIES:  EXAM: XR ABDOMEN PORTABLE URGENT 1V    PROCEDURE DATE: 2021        INTERPRETATION: TECHNIQUE: 2 portable view of the abdomen.    COMPARISON: CT dated 2021    CLINICAL indications:ileus?    FINDINGS:    NG tube tip below the hemidiaphragm region of the stomach. Right of midline abdominal cutaneous staples.    Dilated loops of small bowel suggesting ileus versus partial obstruction. Consider follow-up CT or small bowel follow-through.    IMPRESSION: Small bowel ileus versus partial obstruction, mildly improved.

## 2021-11-30 NOTE — PROGRESS NOTE ADULT - ASSESSMENT
89yoF with PMH HTN, HLD, OA, CAD on Asa, HF, chronic constipation, recent admission to [facility] for metabolic encephalopathy, acute hypercapneic resp failure and pulmonary edema requiring intubation (9/5-9/15) CAD (on Aspirin) presents w/ high-grade small bowel obstruction at site of periumbilical hernia now s/p 11/24 for Exlap and Open repair of Umbilical l Hernia, no SBR required. Echo 11/30 normal L and R ventricular systolic function, Grade II left ventricle dysfunction.     Patient afebrile. No need of pressor requirement. However unable to extubate. Rectal tube placed.     Recommendations:   Chest PT  Continue tube feeds  CPAP trial   Extubate per ICU criteria  Careful fluid resuscitation  Rest of plan per SICU    Pt seen w/ Chief resident. 89yoF with PMH HTN, HLD, OA, CAD on Asa, HF, chronic constipation, recent admission to [facility] for metabolic encephalopathy, acute hypercapneic resp failure and pulmonary edema requiring intubation (9/5-9/15) CAD (on Aspirin) presents w/ high-grade small bowel obstruction at site of periumbilical hernia now s/p 11/24 for Exlap and Open repair of Umbilical l Hernia, no SBR required. Echo 11/30 normal L and R ventricular systolic function, Grade II left ventricle dysfunction.     Patient afebrile. No need of pressor requirement. However unable to extubate. Rectal tube placed.     Recommendations:   NG tube sump  Remove rectal tube   Chest PT  CPAP trial   Extubate per ICU criteria  Careful fluid resuscitation  Rest of plan per SICU    Plan discussed with Dr. Nickerson and chief resident

## 2021-11-30 NOTE — CHART NOTE - NSCHARTNOTEFT_GEN_A_CORE
Admitting Diagnosis:   Patient is a 89y old  Female who presents with a chief complaint of SBO, strangulated umbilical hernia (2021 06:57)      PAST MEDICAL & SURGICAL HISTORY:  HTN (hypertension)    HLD (hyperlipidemia)    Osteoarthritis    Hyponatremia    Altered mental status    Urinary tract infection    HF (heart failure)        Current Nutrition Order:  Diet, NPO (21 @ 19:07)      PO Intake: Good (%) [   ]  Fair (50-75%) [   ] Poor (<25%) [   ] -NPO    GI Issues:   Fecal incontinence +BM  (loose stool)  RN reported pt having large residual:  390ml-  350ml  (green bile)  700ml-    Pain: non-verbal indicator    Skin Integrity: Surgical incision to mid-abdomen  Stage 1 pressure injury to sacrum  Stage 2 pressure injury to right buttock    Labs:       143  |  109<H>  |  18  ----------------------------<  87  4.0   |  24  |  0.54    Ca    8.1<L>      2021 05:28  Phos  2.6     -  Mg     1.9     -30      CAPILLARY BLOOD GLUCOSE      POCT Blood Glucose.: 104 mg/dL (2021 21:22)  POCT Blood Glucose.: 105 mg/dL (2021 16:03)  POCT Blood Glucose.: 88 mg/dL (2021 11:24)      Medications:  MEDICATIONS  (STANDING):  albuterol/ipratropium for Nebulization 3 milliLiter(s) Nebulizer every 6 hours  cefTRIAXone   IVPB 1000 milliGRAM(s) IV Intermittent every 24 hours  chlorhexidine 0.12% Liquid 15 milliLiter(s) Oral Mucosa every 12 hours  chlorhexidine 4% Liquid 1 Application(s) Topical <User Schedule>  dextrose 40% Gel 15 Gram(s) Oral once  dextrose 5%. 1000 milliLiter(s) (50 mL/Hr) IV Continuous <Continuous>  dextrose 5%. 1000 milliLiter(s) (100 mL/Hr) IV Continuous <Continuous>  dextrose 50% Injectable 25 Gram(s) IV Push once  dextrose 50% Injectable 12.5 Gram(s) IV Push once  dextrose 50% Injectable 25 Gram(s) IV Push once  glucagon  Injectable 1 milliGRAM(s) IntraMuscular once  heparin   Injectable 5000 Unit(s) SubCutaneous every 8 hours  insulin lispro (ADMELOG) corrective regimen sliding scale   SubCutaneous Before meals and at bedtime  lactated ringers. 1000 milliLiter(s) (70 mL/Hr) IV Continuous <Continuous>  lidocaine   4% Patch 1 Patch Transdermal every 24 hours  metoclopramide Injectable 5 milliGRAM(s) IV Push every 6 hours  metoprolol tartrate Injectable 2.5 milliGRAM(s) IV Push every 6 hours  pantoprazole  Injectable 40 milliGRAM(s) IV Push daily  propofol Infusion 11.542 MICROgram(s)/kG/Min (5 mL/Hr) IV Continuous <Continuous>    MEDICATIONS  (PRN):  fentaNYL    Injectable 25 MICROGram(s) IV Push every 2 hours PRN Severe Pain (7 - 10)  ondansetron Injectable 4 milliGRAM(s) IV Push every 6 hours PRN Nausea      Anthropometrics:  Height: 60.4"    Daily Weight in k.3 (2021 06:00)/ 166 lbs  IBW: 100 lbs +/-10%/ (45kg)  % IBW: 166%  BMI: 31.9    Weight Change:   Weight on admit (): 72.2 kg (158.8 lbs)  Weight : 75.3 kg (166 lbs) +4.3% in 1 week     Nutrition Focused Physical Exam: Completed [ X  ]  Not Pertinent [   ] - No fat, muscle wasting noted  Please refer to initial RD assessment     Estimated energy needs: IBW used for calculations as pt >100% of IBW (166%), adjusted for age, vent, fluids per team  Calories: 25-30 kcal/kg-->0710-8395 kcal/d  Protein: 1.4-1.6 g/d--> 63.4 - 72g/d  Fluid: per team    Subjective:   89 year old female bedbound, prior DNR with HTN, HLD, OA, CAD on Asa, HFpEF (65-70%), metabolic encephalopathy, acute and chronic resp failure with hypercapnia requiring intubation (-9/15), who was admitted with an SBO, strangulated umbilical hernia s/p surgery - now intubated with short bursts of SVT. History obtained from chart as patient is intubated /sedated.  s/p surgery for a SBO obstruction . strangulated hernia on .  Still intubated /sedated with difficulty extubating. Changed antibiotics for broader coverage with Zosyn, with improvement in fever curve. Plan for repeat CPAP trial today.       Pt seen in CCU, no family member present in the room. Pt intubated, on VC-AC vent. Propofol runs at low dose 8.6ml/hr (provides 227kcal), LR@ 70ml/hr,and tazobactam. Afibrile 99F, /56. MAP 86. lytes WNL, Na at 145, D5 was held at this time. NGT in place, currently NPO d/t high residual output. RN reports 350-700ml. Rectal tube in place. +BM . No reported vomiting. Skin: Surgical incision to mid-abdomen, st 1 pressure injury to sacrum, st 2 pressure injury to right buttock. Please see additional nutrition recs below.    Previous Nutrition Diagnosis:  Inadequate energy intake RT NPO status AEB meeting 0% EER at present.    Active [ X  ]  Resolved [   ]    Goal: Pt to start nutrition in 24-48 hours via appropriate route when medically feasible.    Enteral Recommendations:   Formula: Jevity 1.2 @ 38ml/hr-->provides 1065 kcal, 50g pro, 735ml FWF   Add 1LPS QD ---> 1165 kcal, 65g pro  Starting rate @10ml/hr, increase 10ml by 4-6 hours until reach goal rate 38ml/hr  fluids per team   -Maintain aspiration precaution at all times    Additional recommendation:  1.NPO  2. NG tube in place. If pt to start nutrition support, please see enteral nutrition recs above.  3. Peripheral line in placed, please contact RD for PPN recs.  4. If able to extubate, recommend SLP evaluation prior to PO intake to assess for safest  5. Monitor GI recs   6. Monitor lytes (Na, K, phos, mag) for refeeding syndrome  7. Trend wts  8. MVI, zinc, vit C for wound healing    Education: Deferred at this time    Risk Level: High [ X ] Moderate [   ] Low [   ] Admitting Diagnosis:   Patient is a 89y old  Female who presents with a chief complaint of SBO, strangulated umbilical hernia (2021 06:57)      PAST MEDICAL & SURGICAL HISTORY:  HTN (hypertension)    HLD (hyperlipidemia)    Osteoarthritis    Hyponatremia    Altered mental status    Urinary tract infection    HF (heart failure)        Current Nutrition Order:  Diet, NPO (21 @ 19:07)      PO Intake: Good (%) [   ]  Fair (50-75%) [   ] Poor (<25%) [   ] -NPO    GI Issues:   Fecal incontinence +BM  (loose stool)-Rectal tube in place  RN reported pt having large residual from NGT:  390ml-  350ml  (green bile)  700ml-    Pain: non-verbal indicator    Skin Integrity: Surgical incision to mid-abdomen  Stage 1 pressure injury to sacrum  Stage 2 pressure injury to right buttock    Labs:       143  |  109<H>  |  18  ----------------------------<  87  4.0   |  24  |  0.54    Ca    8.1<L>      2021 05:28  Phos  2.6       Mg     1.9           CAPILLARY BLOOD GLUCOSE      POCT Blood Glucose.: 104 mg/dL (2021 21:22)  POCT Blood Glucose.: 105 mg/dL (2021 16:03)  POCT Blood Glucose.: 88 mg/dL (2021 11:24)      Medications:  MEDICATIONS  (STANDING):  albuterol/ipratropium for Nebulization 3 milliLiter(s) Nebulizer every 6 hours  cefTRIAXone   IVPB 1000 milliGRAM(s) IV Intermittent every 24 hours  chlorhexidine 0.12% Liquid 15 milliLiter(s) Oral Mucosa every 12 hours  chlorhexidine 4% Liquid 1 Application(s) Topical <User Schedule>  dextrose 40% Gel 15 Gram(s) Oral once  dextrose 5%. 1000 milliLiter(s) (50 mL/Hr) IV Continuous <Continuous>  dextrose 5%. 1000 milliLiter(s) (100 mL/Hr) IV Continuous <Continuous>  dextrose 50% Injectable 25 Gram(s) IV Push once  dextrose 50% Injectable 12.5 Gram(s) IV Push once  dextrose 50% Injectable 25 Gram(s) IV Push once  glucagon  Injectable 1 milliGRAM(s) IntraMuscular once  heparin   Injectable 5000 Unit(s) SubCutaneous every 8 hours  insulin lispro (ADMELOG) corrective regimen sliding scale   SubCutaneous Before meals and at bedtime  lactated ringers. 1000 milliLiter(s) (70 mL/Hr) IV Continuous <Continuous>  lidocaine   4% Patch 1 Patch Transdermal every 24 hours  metoclopramide Injectable 5 milliGRAM(s) IV Push every 6 hours  metoprolol tartrate Injectable 2.5 milliGRAM(s) IV Push every 6 hours  pantoprazole  Injectable 40 milliGRAM(s) IV Push daily  propofol Infusion 11.542 MICROgram(s)/kG/Min (5 mL/Hr) IV Continuous <Continuous>    MEDICATIONS  (PRN):  fentaNYL    Injectable 25 MICROGram(s) IV Push every 2 hours PRN Severe Pain (7 - 10)  ondansetron Injectable 4 milliGRAM(s) IV Push every 6 hours PRN Nausea      Anthropometrics:  Height: 60.4"    Daily Weight in k.3 (2021 06:00)/ 166 lbs  IBW: 100 lbs +/-10%/ (45kg)  % IBW: 166%  BMI: 31.9    Weight Change:   Weight on admit (): 72.2 kg (158.8 lbs)  Weight : 75.3 kg (166 lbs) +4.3% in 1 week     Nutrition Focused Physical Exam: Completed [ X  ]  Not Pertinent [   ] - No fat, muscle wasting noted  Please refer to initial RD assessment     Estimated energy needs: IBW used for calculations as pt >100% of IBW (166%), adjusted for age, vent, fluids per team  Calories: 25-30 kcal/kg-->0867-8830 kcal/d  Protein: 1.4-1.6 g/d--> 63.4 - 72g/d  Fluid: per team    Subjective:   89 year old female bedbound, prior DNR with HTN, HLD, OA, CAD on Asa, HFpEF (65-70%), metabolic encephalopathy, acute and chronic resp failure with hypercapnia requiring intubation (-9/15), who was admitted with an SBO, strangulated umbilical hernia s/p surgery - now intubated with short bursts of SVT. History obtained from chart as patient is intubated /sedated.  s/p surgery for a SBO obstruction . strangulated hernia on .  Still intubated /sedated with difficulty extubating. Changed antibiotics for broader coverage with Zosyn, with improvement in fever curve. Plan for repeat CPAP trial today.       Pt seen in CCU, no family member present in the room. Pt intubated, on VC-AC vent. Propofol runs at low dose 8.6ml/hr (provides 227kcal), LR@ 70ml/hr, and tazobactam. Afibrile 99F, /56. MAP 86. lytes WNL, Na at 145, D5 was held at this time. Currently NPO. NGT in place with high residual output, on Reglan. RN reports 350-700ml output from NGT suction. Rectal tube in place. No reported vomiting. Skin: Surgical incision to mid-abdomen, st 1 pressure injury to sacrum, st 2 pressure injury to right buttock. Please see additional nutrition recs below.    Previous Nutrition Diagnosis:  Inadequate energy intake RT NPO status AEB meeting 0% EER at present.    Active [ X  ]  Resolved [   ]    Goal: Pt to start nutrition in 24-48 hours via appropriate route when medically feasible.    Enteral Recommendations:   Formula: Jevity 1.2 @ 38ml/hr-->provides 1065 kcal, 50g pro, 735ml FWF   Propofol ---> provides 227 kcal  Add 1LPS QD ---> 1392 kcal, 65g pro  Starting rate @10ml/hr, increase 10ml by 4-6 hours until reach goal rate 38ml/hr  fluids per team   -Maintain aspiration precaution at all times    Additional recommendation:  1. NPO  2. If pt to start nutrition support, please see enteral nutrition recs above. maintain aspiration precaution at all times.  3. Peripheral line in placed, please contact RD for PPN recs  4. If able to extubate, recommend SLP evaluation prior to PO intake to assess for safest food texture  5. Monitor GI recs   6. Monitor lytes (Na, K, phos, mag) for refeeding syndrome  7. Trend wts  8. MVI, zinc, vit C for wound healing    Education: Deferred at this time    Risk Level: High [ X ] Moderate [   ] Low [   ]

## 2021-11-30 NOTE — PROGRESS NOTE ADULT - ASSESSMENT
89 y/o F bedbound, prior DNR with PMH HTN, HLD, OA, CAD on Asa, HF, chronic constipation, recent admission to [facility] for metabolic encephalopathy, acute hypercapneic resp failure and pulmonary edema requiring intubation (9/5-9/15) CAD (on Aspirin) and no PSH who [facility] for constipation and emesis. CT with high-grade small bowel obstruction at site of periumbilical hernia. NGT placed in ED with 1.7 L brown feculent output. Concerning for strangulated umbilical hernia with possible bowel ischemia. Taken to OR on 11/24 for Exlap and Open repair of Umbilical hernia repair. Transferred to SICU post-op intubated for hemodynamic monitoring. Remains intubated for airway protection. post-op course includes episodes of SVT's (afib vs atrial tach), ileus     Neuro: Fentanyl PRN; Home Remeron on hold while NPO, zofran prn, awake alert off propofol gtt  CV: Hx CAD, HTN, HFpEF; (TTE 11/29, LVEF 60-65%, mod TR, PASP 72), episodes of SVT, afib vs atrial tach, EP consulted. recomended Metoprolol 2.5 q6h if needed; pre-op meds of Norvasc, Lasix, Lipitor, and ASA held   Pulm: Intubated since 11/24 AC 40%/440/12/5; duonebs. persistently failed CPAP trial past few days with tachypnea. might be related to body habitus  GI: NPO; high residual while on trickle TF, AXR with dilated bowel, concern for ileus. started on reglan standing and inserted rectal tube for large bowel decompression. Protonix.   : Liriano, strict I&Os, watch UO.   ID: coverage for translocation of bacteria: switch from Zosyn (11/26-11/30) to Ceftriaxone (11/24-11/26, 11/30--)  // DC: s/p Flagyl (11/24-11/26)   Endo: ISS  PPx: SCDs, SQH   Lines: Parsons left brachial (11/24-), PIVs  Wounds: Midline incision   PT/OT: unable to participate due to intermittent sedation.

## 2021-11-30 NOTE — PROGRESS NOTE ADULT - SUBJECTIVE AND OBJECTIVE BOX
S: UO borderline low overnight, given bolus 500cc x 2,   otherwise No new issues/events overnight, no new med c/o    O: ICU Vital Signs Last 24 Hrs  T(F): 99.1 (11-30-21 @ 11:00), Max: 100.4 (11-29-21 @ 22:26)  HR: 63 (11-30-21 @ 11:00) (57 - 87)  BP: 113/54 (11-30-21 @ 11:00) (105/58 - 134/92)  BP(mean): 78 (11-30-21 @ 11:00) (72 - 110)  ABP: 116/44 (11-30-21 @ 11:00)  RR: 12 (11-30-21 @ 11:00) (11 - 40)  SpO2: 100% (11-30-21 @ 11:00) (98% - 100%)    PHYSICAL EXAM:   Neurological: awake alert, gesturing with hands, CNII-XII grossly intact,  strength 5/5 b/l  ENT: mucus membrane moist  Cardiovascular: RRR  Respiratory: CTA  Gastrointestinal: soft, NT, minimal distension, BS+  Extremities: warm, no dependent edema  Vascular: no cyanosis/erythema  Skin: no rashes  MSK: no joint swelling.     LABS:    11-30    143  |  109<H>  |  18  ----------------------------<  87  4.0   |  24  |  0.54    Ca    8.1<L>      30 Nov 2021 05:28  Phos  2.6     11-30  Mg     1.9     11-30                          8.4    9.79  )-----------( 204      ( 30 Nov 2021 05:28 )             26.5   CARDIAC MARKERS ( 28 Nov 2021 15:15 )  x     / 0.01 ng/mL / 46 U/L / x     / 1.1 ng/mL    Urinalysis Basic - ( 30 Nov 2021 08:58 )    Color: Yellow / Appearance: Clear / SG: >=1.030 / pH: x  Gluc: x / Ketone: Trace mg/dL  / Bili: Negative / Urobili: 0.2 E.U./dL   Blood: x / Protein: 30 mg/dL / Nitrite: NEGATIVE   Leuk Esterase: NEGATIVE / RBC: > 10 /HPF / WBC 5-10 /HPF   Sq Epi: x / Non Sq Epi: 0-5 /HPF / Bacteria: Many /HPF    CAPILLARY BLOOD GLUCOSE      POCT Blood Glucose.: 83 mg/dL (30 Nov 2021 10:56)  POCT Blood Glucose.: 104 mg/dL (29 Nov 2021 21:22)  POCT Blood Glucose.: 105 mg/dL (29 Nov 2021 16:03)    MEDICATIONS  (STANDING):  albuterol/ipratropium for Nebulization 3 milliLiter(s) Nebulizer every 6 hours  cefTRIAXone   IVPB 1000 milliGRAM(s) IV Intermittent every 24 hours  chlorhexidine 0.12% Liquid 15 milliLiter(s) Oral Mucosa every 12 hours  chlorhexidine 4% Liquid 1 Application(s) Topical <User Schedule>  glucagon  Injectable 1 milliGRAM(s) IntraMuscular once  heparin   Injectable 5000 Unit(s) SubCutaneous every 8 hours  insulin lispro (ADMELOG) corrective regimen sliding scale   SubCutaneous Before meals and at bedtime  lactated ringers. 1000 milliLiter(s) (70 mL/Hr) IV Continuous <Continuous>  lidocaine   4% Patch 1 Patch Transdermal every 24 hours  metoclopramide Injectable 5 milliGRAM(s) IV Push every 6 hours  metoprolol tartrate Injectable 2.5 milliGRAM(s) IV Push every 6 hours  pantoprazole  Injectable 40 milliGRAM(s) IV Push daily  propofol Infusion 11.542 MICROgram(s)/kG/Min (5 mL/Hr) IV Continuous <Continuous>    MEDICATIONS  (PRN):  fentaNYL    Injectable 25 MICROGram(s) IV Push every 2 hours PRN Severe Pain (7 - 10)  ondansetron Injectable 4 milliGRAM(s) IV Push every 6 hours PRN Nausea      Liriano:	  [ ] None	[x ] Daily Liriano Order Placed	   Indication:	  [x ] Strict I and O's    [ ] Obstruction     [ ] Incontinence + Stage 3 or 4 Decubitus  Central Line:  [x ] None	   [ ]  Medication / TPN Administration     [ ] No Peripheral IV

## 2021-12-01 LAB
ANION GAP SERPL CALC-SCNC: 9 MMOL/L — SIGNIFICANT CHANGE UP (ref 5–17)
APPEARANCE UR: CLEAR — SIGNIFICANT CHANGE UP
BACTERIA # UR AUTO: ABNORMAL /HPF
BILIRUB UR-MCNC: ABNORMAL
BUN SERPL-MCNC: 13 MG/DL — SIGNIFICANT CHANGE UP (ref 7–23)
CALCIUM SERPL-MCNC: 8.3 MG/DL — LOW (ref 8.4–10.5)
CHLORIDE SERPL-SCNC: 111 MMOL/L — HIGH (ref 96–108)
CO2 SERPL-SCNC: 24 MMOL/L — SIGNIFICANT CHANGE UP (ref 22–31)
COLOR SPEC: YELLOW — SIGNIFICANT CHANGE UP
COMMENT - URINE: SIGNIFICANT CHANGE UP
CREAT SERPL-MCNC: 0.45 MG/DL — LOW (ref 0.5–1.3)
CULTURE RESULTS: SIGNIFICANT CHANGE UP
DIFF PNL FLD: ABNORMAL
EPI CELLS # UR: SIGNIFICANT CHANGE UP /HPF (ref 0–5)
GLUCOSE BLDC GLUCOMTR-MCNC: 92 MG/DL — SIGNIFICANT CHANGE UP (ref 70–99)
GLUCOSE BLDC GLUCOMTR-MCNC: 97 MG/DL — SIGNIFICANT CHANGE UP (ref 70–99)
GLUCOSE SERPL-MCNC: 101 MG/DL — HIGH (ref 70–99)
GLUCOSE UR QL: NEGATIVE — SIGNIFICANT CHANGE UP
GRAM STN FLD: SIGNIFICANT CHANGE UP
GRAM STN FLD: SIGNIFICANT CHANGE UP
GRAN CASTS # UR COMP ASSIST: ABNORMAL /LPF
HCT VFR BLD CALC: 24.3 % — LOW (ref 34.5–45)
HGB BLD-MCNC: 7.4 G/DL — LOW (ref 11.5–15.5)
KETONES UR-MCNC: ABNORMAL MG/DL
LEUKOCYTE ESTERASE UR-ACNC: NEGATIVE — SIGNIFICANT CHANGE UP
MAGNESIUM SERPL-MCNC: 1.6 MG/DL — SIGNIFICANT CHANGE UP (ref 1.6–2.6)
MCHC RBC-ENTMCNC: 28 PG — SIGNIFICANT CHANGE UP (ref 27–34)
MCHC RBC-ENTMCNC: 30.5 GM/DL — LOW (ref 32–36)
MCV RBC AUTO: 92 FL — SIGNIFICANT CHANGE UP (ref 80–100)
NITRITE UR-MCNC: NEGATIVE — SIGNIFICANT CHANGE UP
NRBC # BLD: 0 /100 WBCS — SIGNIFICANT CHANGE UP (ref 0–0)
PH UR: 5.5 — SIGNIFICANT CHANGE UP (ref 5–8)
PHOSPHATE SERPL-MCNC: 2.2 MG/DL — LOW (ref 2.5–4.5)
PLATELET # BLD AUTO: 201 K/UL — SIGNIFICANT CHANGE UP (ref 150–400)
POTASSIUM SERPL-MCNC: 3.7 MMOL/L — SIGNIFICANT CHANGE UP (ref 3.5–5.3)
POTASSIUM SERPL-SCNC: 3.7 MMOL/L — SIGNIFICANT CHANGE UP (ref 3.5–5.3)
PROT UR-MCNC: 30 MG/DL
RBC # BLD: 2.64 M/UL — LOW (ref 3.8–5.2)
RBC # FLD: 17.3 % — HIGH (ref 10.3–14.5)
RBC CASTS # UR COMP ASSIST: < 5 /HPF — SIGNIFICANT CHANGE UP
SARS-COV-2 RNA SPEC QL NAA+PROBE: SIGNIFICANT CHANGE UP
SODIUM SERPL-SCNC: 144 MMOL/L — SIGNIFICANT CHANGE UP (ref 135–145)
SP GR SPEC: >=1.03 — SIGNIFICANT CHANGE UP (ref 1–1.03)
SPECIMEN SOURCE: SIGNIFICANT CHANGE UP
URATE CRY FLD QL MICRO: ABNORMAL /HPF
UROBILINOGEN FLD QL: 0.2 E.U./DL — SIGNIFICANT CHANGE UP
WBC # BLD: 9.11 K/UL — SIGNIFICANT CHANGE UP (ref 3.8–10.5)
WBC # FLD AUTO: 9.11 K/UL — SIGNIFICANT CHANGE UP (ref 3.8–10.5)
WBC UR QL: < 5 /HPF — SIGNIFICANT CHANGE UP

## 2021-12-01 PROCEDURE — 99291 CRITICAL CARE FIRST HOUR: CPT

## 2021-12-01 PROCEDURE — 71045 X-RAY EXAM CHEST 1 VIEW: CPT | Mod: 26,76

## 2021-12-01 PROCEDURE — 31645 BRNCHSC W/THER ASPIR 1ST: CPT | Mod: GC

## 2021-12-01 PROCEDURE — 99221 1ST HOSP IP/OBS SF/LOW 40: CPT | Mod: 25

## 2021-12-01 RX ORDER — ALBUMIN HUMAN 25 %
250 VIAL (ML) INTRAVENOUS ONCE
Refills: 0 | Status: COMPLETED | OUTPATIENT
Start: 2021-12-01 | End: 2021-12-01

## 2021-12-01 RX ORDER — LIDOCAINE HCL 20 MG/ML
20 VIAL (ML) INJECTION ONCE
Refills: 0 | Status: COMPLETED | OUTPATIENT
Start: 2021-12-01 | End: 2021-12-01

## 2021-12-01 RX ORDER — ALBUMIN HUMAN 25 %
100 VIAL (ML) INTRAVENOUS ONCE
Refills: 0 | Status: COMPLETED | OUTPATIENT
Start: 2021-12-01 | End: 2021-12-01

## 2021-12-01 RX ORDER — ACETAMINOPHEN 500 MG
1000 TABLET ORAL ONCE
Refills: 0 | Status: COMPLETED | OUTPATIENT
Start: 2021-12-01 | End: 2021-12-01

## 2021-12-01 RX ORDER — MAGNESIUM SULFATE 500 MG/ML
2 VIAL (ML) INJECTION ONCE
Refills: 0 | Status: COMPLETED | OUTPATIENT
Start: 2021-12-01 | End: 2021-12-01

## 2021-12-01 RX ORDER — DEXTROSE 10 % IN WATER 10 %
1000 INTRAVENOUS SOLUTION INTRAVENOUS
Refills: 0 | Status: DISCONTINUED | OUTPATIENT
Start: 2021-12-01 | End: 2021-12-02

## 2021-12-01 RX ORDER — INSULIN LISPRO 100/ML
VIAL (ML) SUBCUTANEOUS EVERY 6 HOURS
Refills: 0 | Status: DISCONTINUED | OUTPATIENT
Start: 2021-12-01 | End: 2021-12-04

## 2021-12-01 RX ORDER — FUROSEMIDE 40 MG
20 TABLET ORAL ONCE
Refills: 0 | Status: COMPLETED | OUTPATIENT
Start: 2021-12-01 | End: 2021-12-01

## 2021-12-01 RX ORDER — FENTANYL CITRATE 50 UG/ML
25 INJECTION INTRAVENOUS
Refills: 0 | Status: DISCONTINUED | OUTPATIENT
Start: 2021-12-01 | End: 2021-12-02

## 2021-12-01 RX ORDER — PROPOFOL 10 MG/ML
5 INJECTION, EMULSION INTRAVENOUS
Qty: 1000 | Refills: 0 | Status: DISCONTINUED | OUTPATIENT
Start: 2021-12-01 | End: 2021-12-01

## 2021-12-01 RX ORDER — FENTANYL CITRATE 50 UG/ML
25 INJECTION INTRAVENOUS
Refills: 0 | Status: DISCONTINUED | OUTPATIENT
Start: 2021-12-01 | End: 2021-12-01

## 2021-12-01 RX ORDER — POTASSIUM PHOSPHATE, MONOBASIC POTASSIUM PHOSPHATE, DIBASIC 236; 224 MG/ML; MG/ML
15 INJECTION, SOLUTION INTRAVENOUS ONCE
Refills: 0 | Status: COMPLETED | OUTPATIENT
Start: 2021-12-01 | End: 2021-12-01

## 2021-12-01 RX ADMIN — Medication 50 GRAM(S): at 14:36

## 2021-12-01 RX ADMIN — FENTANYL CITRATE 25 MICROGRAM(S): 50 INJECTION INTRAVENOUS at 15:22

## 2021-12-01 RX ADMIN — Medication 5 MILLIGRAM(S): at 00:29

## 2021-12-01 RX ADMIN — LIDOCAINE 1 PATCH: 4 CREAM TOPICAL at 18:38

## 2021-12-01 RX ADMIN — Medication 3 MILLILITER(S): at 17:04

## 2021-12-01 RX ADMIN — PROPOFOL 2.17 MICROGRAM(S)/KG/MIN: 10 INJECTION, EMULSION INTRAVENOUS at 14:37

## 2021-12-01 RX ADMIN — Medication 50 MILLILITER(S): at 00:48

## 2021-12-01 RX ADMIN — PANTOPRAZOLE SODIUM 40 MILLIGRAM(S): 20 TABLET, DELAYED RELEASE ORAL at 11:05

## 2021-12-01 RX ADMIN — LIDOCAINE 1 PATCH: 4 CREAM TOPICAL at 10:50

## 2021-12-01 RX ADMIN — Medication 3 MILLILITER(S): at 05:20

## 2021-12-01 RX ADMIN — FENTANYL CITRATE 25 MICROGRAM(S): 50 INJECTION INTRAVENOUS at 11:42

## 2021-12-01 RX ADMIN — Medication 400 MILLIGRAM(S): at 01:43

## 2021-12-01 RX ADMIN — Medication 5 MILLIGRAM(S): at 17:03

## 2021-12-01 RX ADMIN — CEFTRIAXONE 100 MILLIGRAM(S): 500 INJECTION, POWDER, FOR SOLUTION INTRAMUSCULAR; INTRAVENOUS at 10:50

## 2021-12-01 RX ADMIN — Medication 5 MILLIGRAM(S): at 23:28

## 2021-12-01 RX ADMIN — Medication 20 MILLIGRAM(S): at 17:56

## 2021-12-01 RX ADMIN — Medication 50 MILLILITER(S): at 01:26

## 2021-12-01 RX ADMIN — Medication 5 MILLIGRAM(S): at 11:05

## 2021-12-01 RX ADMIN — FENTANYL CITRATE 25 MICROGRAM(S): 50 INJECTION INTRAVENOUS at 15:08

## 2021-12-01 RX ADMIN — Medication 1000 MILLIGRAM(S): at 02:15

## 2021-12-01 RX ADMIN — FENTANYL CITRATE 25 MICROGRAM(S): 50 INJECTION INTRAVENOUS at 14:56

## 2021-12-01 RX ADMIN — CHLORHEXIDINE GLUCONATE 1 APPLICATION(S): 213 SOLUTION TOPICAL at 07:17

## 2021-12-01 RX ADMIN — HEPARIN SODIUM 5000 UNIT(S): 5000 INJECTION INTRAVENOUS; SUBCUTANEOUS at 14:36

## 2021-12-01 RX ADMIN — LIDOCAINE 1 PATCH: 4 CREAM TOPICAL at 00:26

## 2021-12-01 RX ADMIN — FENTANYL CITRATE 25 MICROGRAM(S): 50 INJECTION INTRAVENOUS at 15:01

## 2021-12-01 RX ADMIN — FENTANYL CITRATE 25 MICROGRAM(S): 50 INJECTION INTRAVENOUS at 15:23

## 2021-12-01 RX ADMIN — POTASSIUM PHOSPHATE, MONOBASIC POTASSIUM PHOSPHATE, DIBASIC 41.67 MILLIMOLE(S): 236; 224 INJECTION, SOLUTION INTRAVENOUS at 16:02

## 2021-12-01 RX ADMIN — Medication 5 MILLIGRAM(S): at 05:28

## 2021-12-01 RX ADMIN — FENTANYL CITRATE 25 MICROGRAM(S): 50 INJECTION INTRAVENOUS at 11:57

## 2021-12-01 RX ADMIN — CHLORHEXIDINE GLUCONATE 15 MILLILITER(S): 213 SOLUTION TOPICAL at 18:41

## 2021-12-01 RX ADMIN — Medication 3 MILLILITER(S): at 10:05

## 2021-12-01 RX ADMIN — FENTANYL CITRATE 25 MICROGRAM(S): 50 INJECTION INTRAVENOUS at 14:50

## 2021-12-01 RX ADMIN — Medication 20 MILLILITER(S): at 14:55

## 2021-12-01 RX ADMIN — HEPARIN SODIUM 5000 UNIT(S): 5000 INJECTION INTRAVENOUS; SUBCUTANEOUS at 05:28

## 2021-12-01 RX ADMIN — Medication 3 MILLILITER(S): at 21:22

## 2021-12-01 RX ADMIN — Medication 125 MILLILITER(S): at 03:30

## 2021-12-01 RX ADMIN — HEPARIN SODIUM 5000 UNIT(S): 5000 INJECTION INTRAVENOUS; SUBCUTANEOUS at 23:27

## 2021-12-01 RX ADMIN — CHLORHEXIDINE GLUCONATE 15 MILLILITER(S): 213 SOLUTION TOPICAL at 07:17

## 2021-12-01 RX ADMIN — DEXMEDETOMIDINE HYDROCHLORIDE IN 0.9% SODIUM CHLORIDE 3.61 MICROGRAM(S)/KG/HR: 4 INJECTION INTRAVENOUS at 23:28

## 2021-12-01 NOTE — PROGRESS NOTE ADULT - SUBJECTIVE AND OBJECTIVE BOX
INTERVAL/OVERNIGHT EVENTS:  CXR showing NGT in good position + Effusions. FS 72- given 1/2 amp dextrose and changed IVF to D5 LR@70. Repeat FS. UO decreased to 20/hr- ordered 100cc of 25%albumin x1 & 250 5%x1 with minimal response UA sent. 100.4 Temp ( has been low grade for past few days) given tylenol.     SUBJECTIVE:       Neurologic Medications  dexMEDEtomidine Infusion 0.2 MICROgram(s)/kG/Hr IV Continuous <Continuous>  fentaNYL    Injectable 25 MICROGram(s) IV Push every 2 hours PRN Severe Pain (7 - 10)  metoclopramide Injectable 5 milliGRAM(s) IV Push every 6 hours  ondansetron Injectable 4 milliGRAM(s) IV Push every 6 hours PRN Nausea    Respiratory Medications  albuterol/ipratropium for Nebulization 3 milliLiter(s) Nebulizer every 6 hours    Cardiovascular Medications    Gastrointestinal Medications  dextrose 5% + lactated ringers. 1000 milliLiter(s) IV Continuous <Continuous>  dextrose 5%. 1000 milliLiter(s) IV Continuous <Continuous>  dextrose 5%. 1000 milliLiter(s) IV Continuous <Continuous>  pantoprazole  Injectable 40 milliGRAM(s) IV Push daily    Genitourinary Medications    Hematologic/Oncologic Medications  heparin   Injectable 5000 Unit(s) SubCutaneous every 8 hours    Antimicrobial/Immunologic Medications  cefTRIAXone   IVPB 1000 milliGRAM(s) IV Intermittent every 24 hours    Endocrine/Metabolic Medications  dextrose 40% Gel 15 Gram(s) Oral once  dextrose 50% Injectable 25 Gram(s) IV Push once  dextrose 50% Injectable 12.5 Gram(s) IV Push once  dextrose 50% Injectable 25 Gram(s) IV Push once  glucagon  Injectable 1 milliGRAM(s) IntraMuscular once  insulin lispro (ADMELOG) corrective regimen sliding scale   SubCutaneous every 6 hours    Topical/Other Medications  chlorhexidine 0.12% Liquid 15 milliLiter(s) Oral Mucosa every 12 hours  chlorhexidine 4% Liquid 1 Application(s) Topical <User Schedule>  lidocaine   4% Patch 1 Patch Transdermal every 24 hours      MEDICATIONS  (PRN):  fentaNYL    Injectable 25 MICROGram(s) IV Push every 2 hours PRN Severe Pain (7 - 10)  ondansetron Injectable 4 milliGRAM(s) IV Push every 6 hours PRN Nausea      I&O's Detail    30 Nov 2021 07:01  -  01 Dec 2021 07:00  --------------------------------------------------------  IN:    Dexmedetomidine: 37.3 mL    IV PiggyBack: 450 mL    Lactated Ringers: 1470 mL    Propofol: 26.1 mL  Total IN: 1983.4 mL    OUT:    Indwelling Catheter - Urethral (mL): 800 mL    Jevity 1.2: 0 mL    Nasogastric/Oral tube (mL): 890 mL  Total OUT: 1690 mL    Total NET: 293.4 mL          Vital Signs Last 24 Hrs  T(C): 37.3 (01 Dec 2021 05:00), Max: 38.1 (01 Dec 2021 01:10)  T(F): 99.1 (01 Dec 2021 05:00), Max: 100.6 (01 Dec 2021 01:10)  HR: 66 (01 Dec 2021 06:14) (56 - 77)  BP: 122/56 (01 Dec 2021 06:00) (102/53 - 137/63)  BP(mean): 81 (01 Dec 2021 06:00) (73 - 90)  RR: 12 (01 Dec 2021 06:00) (10 - 20)  SpO2: 98% (01 Dec 2021 06:14) (94% - 100%)    General: Intubated, NG tube in place. Mildly altered.   C/V: NSR  Pulm: Nonlabored breathing, no respiratory distress  Abd: soft, Mild tenderness to palpation No Peritoneal signs Low midline incision with staples, mild ecchymosis.   Extrem: WWP, no edema, SCDs in place    LABS:                        7.4    9.11  )-----------( 201      ( 01 Dec 2021 05:48 )             24.3     12-01    144  |  111<H>  |  13  ----------------------------<  101<H>  3.7   |  24  |  0.45<L>    Ca    8.3<L>      01 Dec 2021 05:48  Phos  2.2     12-01  Mg     1.6     12-01        Urinalysis Basic - ( 01 Dec 2021 06:56 )    Color: Yellow / Appearance: Clear / SG: >=1.030 / pH: x  Gluc: x / Ketone: Trace mg/dL  / Bili: Small / Urobili: 0.2 E.U./dL   Blood: x / Protein: 30 mg/dL / Nitrite: NEGATIVE   Leuk Esterase: NEGATIVE / RBC: < 5 /HPF / WBC < 5 /HPF   Sq Epi: x / Non Sq Epi: 0-5 /HPF / Bacteria: Many /HPF        RADIOLOGY & ADDITIONAL STUDIES:      Culture - Blood (collected 11-28-21 @ 18:04)  Source: .Blood Blood-Peripheral  Preliminary Report (11-30-21 @ 19:01):    No growth at 2 days.    Culture - Blood (collected 11-28-21 @ 18:04)  Source: .Blood Blood-Peripheral  Preliminary Report (11-30-21 @ 19:01):    No growth at 2 days.    Culture - Sputum (collected 11-27-21 @ 16:04)  Source: .Sputum Sputum  Gram Stain (11-27-21 @ 21:31):    No epithelial cells seen    Rare WBC's    No organisms seen  Final Report (11-29-21 @ 08:38):    Rare Normal Respiratory Natalia present    Culture - Sputum (collected 11-26-21 @ 13:09)  Source: .Sputum Sputum  Gram Stain (11-26-21 @ 17:53):    Rare epithelial cells    Rare WBC's    No organisms seen  Final Report (11-28-21 @ 09:21):    Normal Respiratory Natalia present       INTERVAL/OVERNIGHT EVENTS:  CXR showing NGT in good position + Effusions. FS 72- given 1/2 amp dextrose and changed IVF to D5 LR@70. Repeat FS. UO decreased to 20/hr- ordered 100cc of 25%albumin x1 & 250 5%x1 with minimal response UA sent. 100.4 Temp ( has been low grade for past few days) given tylenol.     SUBJECTIVE: Intubated, sedated      Neurologic Medications  dexMEDEtomidine Infusion 0.2 MICROgram(s)/kG/Hr IV Continuous <Continuous>  fentaNYL    Injectable 25 MICROGram(s) IV Push every 2 hours PRN Severe Pain (7 - 10)  metoclopramide Injectable 5 milliGRAM(s) IV Push every 6 hours  ondansetron Injectable 4 milliGRAM(s) IV Push every 6 hours PRN Nausea    Respiratory Medications  albuterol/ipratropium for Nebulization 3 milliLiter(s) Nebulizer every 6 hours    Cardiovascular Medications    Gastrointestinal Medications  dextrose 5% + lactated ringers. 1000 milliLiter(s) IV Continuous <Continuous>  dextrose 5%. 1000 milliLiter(s) IV Continuous <Continuous>  dextrose 5%. 1000 milliLiter(s) IV Continuous <Continuous>  pantoprazole  Injectable 40 milliGRAM(s) IV Push daily    Genitourinary Medications    Hematologic/Oncologic Medications  heparin   Injectable 5000 Unit(s) SubCutaneous every 8 hours    Antimicrobial/Immunologic Medications  cefTRIAXone   IVPB 1000 milliGRAM(s) IV Intermittent every 24 hours    Endocrine/Metabolic Medications  dextrose 40% Gel 15 Gram(s) Oral once  dextrose 50% Injectable 25 Gram(s) IV Push once  dextrose 50% Injectable 12.5 Gram(s) IV Push once  dextrose 50% Injectable 25 Gram(s) IV Push once  glucagon  Injectable 1 milliGRAM(s) IntraMuscular once  insulin lispro (ADMELOG) corrective regimen sliding scale   SubCutaneous every 6 hours    Topical/Other Medications  chlorhexidine 0.12% Liquid 15 milliLiter(s) Oral Mucosa every 12 hours  chlorhexidine 4% Liquid 1 Application(s) Topical <User Schedule>  lidocaine   4% Patch 1 Patch Transdermal every 24 hours      MEDICATIONS  (PRN):  fentaNYL    Injectable 25 MICROGram(s) IV Push every 2 hours PRN Severe Pain (7 - 10)  ondansetron Injectable 4 milliGRAM(s) IV Push every 6 hours PRN Nausea      I&O's Detail    30 Nov 2021 07:01  -  01 Dec 2021 07:00  --------------------------------------------------------  IN:    Dexmedetomidine: 37.3 mL    IV PiggyBack: 450 mL    Lactated Ringers: 1470 mL    Propofol: 26.1 mL  Total IN: 1983.4 mL    OUT:    Indwelling Catheter - Urethral (mL): 800 mL    Jevity 1.2: 0 mL    Nasogastric/Oral tube (mL): 890 mL  Total OUT: 1690 mL    Total NET: 293.4 mL          Vital Signs Last 24 Hrs  T(C): 37.3 (01 Dec 2021 05:00), Max: 38.1 (01 Dec 2021 01:10)  T(F): 99.1 (01 Dec 2021 05:00), Max: 100.6 (01 Dec 2021 01:10)  HR: 66 (01 Dec 2021 06:14) (56 - 77)  BP: 122/56 (01 Dec 2021 06:00) (102/53 - 137/63)  BP(mean): 81 (01 Dec 2021 06:00) (73 - 90)  RR: 12 (01 Dec 2021 06:00) (10 - 20)  SpO2: 98% (01 Dec 2021 06:14) (94% - 100%)    General: Intubated, Sedated, NGT bilious  C/V: NSR  Pulm: Nonlabored breathing, no respiratory distress  Abd: soft, mildly distended, mild TTP near incision, incision c/d/i with staples in place  Extrem: WWP, no edema, SCDs in place    LABS:                        7.4    9.11  )-----------( 201      ( 01 Dec 2021 05:48 )             24.3     12-01    144  |  111<H>  |  13  ----------------------------<  101<H>  3.7   |  24  |  0.45<L>    Ca    8.3<L>      01 Dec 2021 05:48  Phos  2.2     12-01  Mg     1.6     12-01        Urinalysis Basic - ( 01 Dec 2021 06:56 )    Color: Yellow / Appearance: Clear / SG: >=1.030 / pH: x  Gluc: x / Ketone: Trace mg/dL  / Bili: Small / Urobili: 0.2 E.U./dL   Blood: x / Protein: 30 mg/dL / Nitrite: NEGATIVE   Leuk Esterase: NEGATIVE / RBC: < 5 /HPF / WBC < 5 /HPF   Sq Epi: x / Non Sq Epi: 0-5 /HPF / Bacteria: Many /HPF        RADIOLOGY & ADDITIONAL STUDIES:      Culture - Blood (collected 11-28-21 @ 18:04)  Source: .Blood Blood-Peripheral  Preliminary Report (11-30-21 @ 19:01):    No growth at 2 days.    Culture - Blood (collected 11-28-21 @ 18:04)  Source: .Blood Blood-Peripheral  Preliminary Report (11-30-21 @ 19:01):    No growth at 2 days.    Culture - Sputum (collected 11-27-21 @ 16:04)  Source: .Sputum Sputum  Gram Stain (11-27-21 @ 21:31):    No epithelial cells seen    Rare WBC's    No organisms seen  Final Report (11-29-21 @ 08:38):    Rare Normal Respiratory Natalia present    Culture - Sputum (collected 11-26-21 @ 13:09)  Source: .Sputum Sputum  Gram Stain (11-26-21 @ 17:53):    Rare epithelial cells    Rare WBC's    No organisms seen  Final Report (11-28-21 @ 09:21):    Normal Respiratory Natalia present

## 2021-12-01 NOTE — PROGRESS NOTE ADULT - SUBJECTIVE AND OBJECTIVE BOX
ON: CXR showing NGT in good position + Effusions. FS 72- given 1/2 amp dextrose and changed IVF to D5 LR@70. Repeat FS. UO decreased to 20/hr- ordered 100cc of 25%albumin x1 & 250 5%x1 with minimal response UA sent. 100.4 Temp ( has been low grade for past few days) given tylenol.       SUBJECTIVE:   Pt seen and examined by SICU team   Intubated, sedated   Unable to participate in subjective interview    MEDICATIONS  (STANDING):  albuterol/ipratropium for Nebulization 3 milliLiter(s) Nebulizer every 6 hours  cefTRIAXone   IVPB 1000 milliGRAM(s) IV Intermittent every 24 hours  chlorhexidine 0.12% Liquid 15 milliLiter(s) Oral Mucosa every 12 hours  chlorhexidine 4% Liquid 1 Application(s) Topical <User Schedule>  dexMEDEtomidine Infusion 0.2 MICROgram(s)/kG/Hr (3.61 mL/Hr) IV Continuous <Continuous>  dextrose 10%. 1000 milliLiter(s) (30 mL/Hr) IV Continuous <Continuous>  dextrose 40% Gel 15 Gram(s) Oral once  dextrose 5%. 1000 milliLiter(s) (50 mL/Hr) IV Continuous <Continuous>  dextrose 5%. 1000 milliLiter(s) (100 mL/Hr) IV Continuous <Continuous>  dextrose 50% Injectable 25 Gram(s) IV Push once  dextrose 50% Injectable 12.5 Gram(s) IV Push once  dextrose 50% Injectable 25 Gram(s) IV Push once  glucagon  Injectable 1 milliGRAM(s) IntraMuscular once  heparin   Injectable 5000 Unit(s) SubCutaneous every 8 hours  insulin lispro (ADMELOG) corrective regimen sliding scale   SubCutaneous every 6 hours  lidocaine   4% Patch 1 Patch Transdermal every 24 hours  metoclopramide Injectable 5 milliGRAM(s) IV Push every 6 hours  pantoprazole  Injectable 40 milliGRAM(s) IV Push daily    MEDICATIONS  (PRN):  fentaNYL    Injectable 25 MICROGram(s) IV Push every 2 hours PRN Severe Pain (7 - 10)  ondansetron Injectable 4 milliGRAM(s) IV Push every 6 hours PRN Nausea      Drips:   Precedex     ICU Vital Signs Last 24 Hrs  T(C): 37.3 (01 Dec 2021 10:00), Max: 38.1 (01 Dec 2021 01:10)  T(F): 99.1 (01 Dec 2021 10:00), Max: 100.6 (01 Dec 2021 01:10)  HR: 61 (01 Dec 2021 10:00) (56 - 77)  BP: 138/63 (01 Dec 2021 10:00) (102/53 - 153/85)  BP(mean): 90 (01 Dec 2021 10:00) (73 - 112)  ABP: 125/44 (01 Dec 2021 10:00) (99/42 - 152/67)  ABP(mean): 74 (01 Dec 2021 10:00) (59 - 101)  RR: 10 (01 Dec 2021 10:00) (10 - 25)  SpO2: 100% (01 Dec 2021 10:00) (94% - 100%)      Physical Exam:  General: NAD  HEENT: NC/AT, EOMI, PERRLA, normal hearing, no oral lesions, neck supple w/o LAD  Pulmonary: Nonlabored breathing, no respiratory distress, on AC  Cardiovascular: NSR, no murmurs  Abdominal: soft, NT/ND, incision C/D/I, NGT w bilious output   Extremities: WWP, 5/5 strength x 4, no clubbing/cyanosis/  Neuro: sedated      Lines/tubes/drains:  - PIV  - L Samantha Liriano: (+) UOP monitoring in critically ill pt 	      Vent settings:  Mode: AC/ CMV (Assist Control/ Continuous Mandatory Ventilation), RR (machine): 12, TV (machine): 460, FiO2: 35, PEEP: 8, ITime: 1, MAP: 12, PIP: 27    I&O's Summary    30 Nov 2021 07:01  -  01 Dec 2021 07:00  --------------------------------------------------------  IN: 1983.4 mL / OUT: 1710 mL / NET: 273.4 mL    01 Dec 2021 07:01  -  01 Dec 2021 10:24  --------------------------------------------------------  IN: 90 mL / OUT: 210 mL / NET: -120 mL        LABS:                        7.4    9.11  )-----------( 201      ( 01 Dec 2021 05:48 )             24.3     12-01    144  |  111<H>  |  13  ----------------------------<  101<H>  3.7   |  24  |  0.45<L>    Ca    8.3<L>      01 Dec 2021 05:48  Phos  2.2     12-01  Mg     1.6     12-01        Urinalysis Basic - ( 01 Dec 2021 06:56 )    Color: Yellow / Appearance: Clear / SG: >=1.030 / pH: x  Gluc: x / Ketone: Trace mg/dL  / Bili: Small / Urobili: 0.2 E.U./dL   Blood: x / Protein: 30 mg/dL / Nitrite: NEGATIVE   Leuk Esterase: NEGATIVE / RBC: < 5 /HPF / WBC < 5 /HPF   Sq Epi: x / Non Sq Epi: 0-5 /HPF / Bacteria: Many /HPF      CAPILLARY BLOOD GLUCOSE      POCT Blood Glucose.: 127 mg/dL (30 Nov 2021 23:41)  POCT Blood Glucose.: 173 mg/dL (30 Nov 2021 22:30)  POCT Blood Glucose.: 72 mg/dL (30 Nov 2021 22:07)  POCT Blood Glucose.: 83 mg/dL (30 Nov 2021 16:54)  POCT Blood Glucose.: 83 mg/dL (30 Nov 2021 10:56)        Cultures:    RADIOLOGY & ADDITIONAL STUDIES:             ON: CXR showing NGT in good position + Effusions. FS 72- given 1/2 amp dextrose and changed IVF to D5 LR@70. Repeat FS. UO decreased to 20/hr- ordered 100cc of 25%albumin x1 & 250 5%x1 with minimal response UA sent. 100.4 Temp ( has been low grade for past few days) given tylenol.       SUBJECTIVE:   Pt seen and examined by SICU team   Intubated, sedated   Unable to participate in subjective interview    MEDICATIONS  (STANDING):  albuterol/ipratropium for Nebulization 3 milliLiter(s) Nebulizer every 6 hours  cefTRIAXone   IVPB 1000 milliGRAM(s) IV Intermittent every 24 hours  chlorhexidine 0.12% Liquid 15 milliLiter(s) Oral Mucosa every 12 hours  chlorhexidine 4% Liquid 1 Application(s) Topical <User Schedule>  dexMEDEtomidine Infusion 0.2 MICROgram(s)/kG/Hr (3.61 mL/Hr) IV Continuous <Continuous>  dextrose 10%. 1000 milliLiter(s) (30 mL/Hr) IV Continuous <Continuous>  dextrose 40% Gel 15 Gram(s) Oral once  dextrose 5%. 1000 milliLiter(s) (50 mL/Hr) IV Continuous <Continuous>  dextrose 5%. 1000 milliLiter(s) (100 mL/Hr) IV Continuous <Continuous>  dextrose 50% Injectable 25 Gram(s) IV Push once  dextrose 50% Injectable 12.5 Gram(s) IV Push once  dextrose 50% Injectable 25 Gram(s) IV Push once  glucagon  Injectable 1 milliGRAM(s) IntraMuscular once  heparin   Injectable 5000 Unit(s) SubCutaneous every 8 hours  insulin lispro (ADMELOG) corrective regimen sliding scale   SubCutaneous every 6 hours  lidocaine   4% Patch 1 Patch Transdermal every 24 hours  metoclopramide Injectable 5 milliGRAM(s) IV Push every 6 hours  pantoprazole  Injectable 40 milliGRAM(s) IV Push daily    MEDICATIONS  (PRN):  fentaNYL    Injectable 25 MICROGram(s) IV Push every 2 hours PRN Severe Pain (7 - 10)  ondansetron Injectable 4 milliGRAM(s) IV Push every 6 hours PRN Nausea      Drips:   Precedex     ICU Vital Signs Last 24 Hrs  T(C): 37.3 (01 Dec 2021 10:00), Max: 38.1 (01 Dec 2021 01:10)  T(F): 99.1 (01 Dec 2021 10:00), Max: 100.6 (01 Dec 2021 01:10)  HR: 61 (01 Dec 2021 10:00) (56 - 77)  BP: 138/63 (01 Dec 2021 10:00) (102/53 - 153/85)  BP(mean): 90 (01 Dec 2021 10:00) (73 - 112)  ABP: 125/44 (01 Dec 2021 10:00) (99/42 - 152/67)  ABP(mean): 74 (01 Dec 2021 10:00) (59 - 101)  RR: 10 (01 Dec 2021 10:00) (10 - 25)  SpO2: 100% (01 Dec 2021 10:00) (94% - 100%)      Physical Exam:  General: NAD  HEENT: NC/AT, EOMI, PERRLA, normal hearing, no oral lesions, neck supple w/o LAD  Pulmonary: Nonlabored breathing, no respiratory distress, on AC  Cardiovascular: NSR, no murmurs  Abdominal: soft, NT/ND, incision C/D/I, NGT w bilious output, rectal tube   Extremities: WWP, 5/5 strength x 4, no clubbing/cyanosis/  Neuro: sedated      Lines/tubes/drains:  - PIV  - L Samantha Liriano: (+) UOP monitoring in critically ill pt 	      Vent settings:  Mode: AC/ CMV (Assist Control/ Continuous Mandatory Ventilation), RR (machine): 12, TV (machine): 460, FiO2: 35, PEEP: 8, ITime: 1, MAP: 12, PIP: 27    I&O's Summary    30 Nov 2021 07:01  -  01 Dec 2021 07:00  --------------------------------------------------------  IN: 1983.4 mL / OUT: 1710 mL / NET: 273.4 mL    01 Dec 2021 07:01  -  01 Dec 2021 10:24  --------------------------------------------------------  IN: 90 mL / OUT: 210 mL / NET: -120 mL        LABS:                        7.4    9.11  )-----------( 201      ( 01 Dec 2021 05:48 )             24.3     12-01    144  |  111<H>  |  13  ----------------------------<  101<H>  3.7   |  24  |  0.45<L>    Ca    8.3<L>      01 Dec 2021 05:48  Phos  2.2     12-01  Mg     1.6     12-01        Urinalysis Basic - ( 01 Dec 2021 06:56 )    Color: Yellow / Appearance: Clear / SG: >=1.030 / pH: x  Gluc: x / Ketone: Trace mg/dL  / Bili: Small / Urobili: 0.2 E.U./dL   Blood: x / Protein: 30 mg/dL / Nitrite: NEGATIVE   Leuk Esterase: NEGATIVE / RBC: < 5 /HPF / WBC < 5 /HPF   Sq Epi: x / Non Sq Epi: 0-5 /HPF / Bacteria: Many /HPF      CAPILLARY BLOOD GLUCOSE      POCT Blood Glucose.: 127 mg/dL (30 Nov 2021 23:41)  POCT Blood Glucose.: 173 mg/dL (30 Nov 2021 22:30)  POCT Blood Glucose.: 72 mg/dL (30 Nov 2021 22:07)  POCT Blood Glucose.: 83 mg/dL (30 Nov 2021 16:54)  POCT Blood Glucose.: 83 mg/dL (30 Nov 2021 10:56)        Cultures:    RADIOLOGY & ADDITIONAL STUDIES:

## 2021-12-01 NOTE — PROGRESS NOTE ADULT - ASSESSMENT
89 y/o F bedbound, prior DNR with PMH HTN, HLD, OA, CAD on Asa, HF, chronic constipation, recent admission to [facility] for metabolic encephalopathy, acute hypercapneic resp failure and pulmonary edema requiring intubation (9/5-9/15) CAD (on Aspirin) and no PSH who presents from [facility] for constipation and emesis. CT with high-grade small bowel obstruction at site of periumbilical hernia. NGT placed in ED with 1.7 L brown feculent output. Taken to OR on 11/24 for Exlap and Open repair of Umbilical hernia repair. Transferred to SICU post-op intubated for hemodynamic monitoring. Remains intubated for airway protection. post-op course includes episodes of SVT's (afib vs atrial tach), ileus     Neuro: Fentanyl PRN; Home Remeron on hold while NPO, zofran prn, precedex  CV: Hx CAD, HTN, HFpEF; (TTE 11/29, LVEF 60-65%, mod TR, PASP 72), episodes of SVT, afib vs atrial tach, EP consulted.   Pulm: Intubated since 11/24 AC 40%/440/12/5; duonebs. Pleural Effusions.  GI/FEN: NPO; NGT LIWS. reglan standing, rectal tube for large bowel decompression. LR@70 Protonix.   : Liriano  ID: coverage for translocation of bacteria: to Ceftriaxone (11/24-11/26, 11/30--)  // DC: s/p Flagyl (11/24-11/26) Zosyn (11/26-11/30)   Endo: ISS  PPx: SCDs, SQH   Lines: Tornado left brachial (11/24-), PIVs  Wounds: Midline incision   PT/OT: unable to participate   89 y/o F bedbound, prior DNR PMH HTN, HLD, OA, CAD on Asa, HF, chronic constipation, now s/p 11/24 for Exlap and Open  Umbilical hernia repair. Transferred to SICU post-op intubated for hemodynamic monitoring. Remains intubated for airway protection. post-op course includes episodes of SVT's (afib vs atrial tach), ileus     Neuro: Fentanyl PRN; Home Remeron on hold, precedex gtt   CV: MAP >65, episodes of SVT, afib vs atrial tach, EP consulted, NTD    Pulm: Intubated since 11/24 AC 40%/440/12/5; duonebs. Pulm consulted for possible bronch  GI/FEN: NPO; NGT LIWS. Zofran, standing Reglan, rectal tube for large bowel decompression. PPI, tap water enema x1  : Liriano  ID: coverage for translocation of bacteria: to Ceftriaxone (11/24-11/26, 11/30--)  Endo: ISS, D10 at 30cc/hr for hypoglycemia   PPx: SCDs, SQH   Lines: Samantha left brachial (11/24-), PIVs  Wounds: Midline incision   PT/OT: unable to participate

## 2021-12-01 NOTE — BRIEF OPERATIVE NOTE - NSICDXBRIEFPROCEDURE_GEN_ALL_CORE_FT
PROCEDURES:  Flexible bronchoscopy 01-Dec-2021 16:10:47  Abhilash Larios  
PROCEDURES:  Laparotomy, exploratory, adult 24-Nov-2021 18:59:47  Ivis Chang  Open repair of abdominal hernia 24-Nov-2021 19:00:08 umbilical hernia Ivis Chang

## 2021-12-01 NOTE — CONSULT NOTE ADULT - SUBJECTIVE AND OBJECTIVE BOX
PULMONARY SERVICE INITIAL CONSULT NOTE    HPI:  89yoF bedbound, prior DNR with PMH HTN, HLD, OA, CAD on Asa, HFpEF (65-70%), chronic constipation, recent admission to [facility] for metabolic encephalopathy, acute and chronic resp failure with hypercapnia requiring intubation (9/5-9/15) and no PSH per report who presents from [facility] for constipation and emesis. Per nursing home report to ED, patient has been groaning in pain and pointing to L side, emesis since Monday nonbloody. Patient is responsive to voice and touch, oriented to person and time but not place or full situation. Able to localized pain to abdomen. Unable to detail last BM or flatus. Per Paulette over phone, abdominal pain since Saturday, began emesis on Monday. As far as family knows, normal BMs once every 2d until Saturday but then less frequent. States that patient is typically is very alert and oriented. Was at baseline mental status until last night and called to tell nieces she is excruciating pain followed by decline in mental status, tx to [facility] for further evaluation. In ED, multiple brown, nonbloody liquid BMs and no masses or impacted stool on WAYNE by ED.    In ED, febrile 100.8F, HD stable with mild intermittent tachy, WBC wnl, BLADIMIR with Cr 77/1.91, Trop 0.03, BNP 2396, lactate 5.8 decreased to 3.2 after IVF, cdiff neg. CT w/o PO or IV contrast (concern for aspiration/PNA) small L pleural effusion, cardiomegaly, PAH, severe coronary artery calcification, moderate AV calcification, severe stomach and small bowel dilation with high grade SBO at umbilical hernia site, extensive large bowel stool, perirectal edema, enlarged myomatous uterus and 4.5 cm L adnexal cyst.       REVIEW OF SYSTEMS:  Constitutional: No fever, weight loss or fatigue  Eyes: No eye pain, visual disturbances, or discharge  ENMT:  No difficulty hearing, tinnitus, vertigo; No sinus or throat pain  Neck: No pain, stiffness or neck swelling  Respiratory: see HPI  Cardiovascular: No chest pain, palpitations, dizziness or leg swelling  Gastrointestinal: No abdominal or epigastric pain. No nausea, vomiting or hematemesis; No diarrhea or constipation. No melena or hematochezia.  Genitourinary: No dysuria, frequency, hematuria or incontinence  Neurological: No headaches, memory loss, loss of strength, numbness or tremors  Skin: No itching, burning, rashes or lesions   Lymph Nodes: No enlarged glands  Endocrine: No heat or cold intolerance; No hair loss  Musculoskeletal: No joint pain or swelling; No muscle, back or extremity pain  Psychiatric: No depression, anxiety, mood swings or difficulty sleeping  Heme/Lymph: No easy bruising or bleeding gums  Allergy and Immunologic: No hives or eczema    PAST MEDICAL & SURGICAL HISTORY:  HTN (hypertension)  HLD (hyperlipidemia)  Osteoarthritis  Hyponatremia  Altered mental status  Urinary tract infection  HF (heart failure)    FAMILY HISTORY: Noncontributory    SOCIAL HISTORY:  Smoking Status: Unable to obtain    MEDICATIONS:  Pulmonary:  albuterol/ipratropium for Nebulization 3 milliLiter(s) Nebulizer every 6 hours    Antimicrobials:  cefTRIAXone   IVPB 1000 milliGRAM(s) IV Intermittent every 24 hours    Anticoagulants:  heparin   Injectable 5000 Unit(s) SubCutaneous every 8 hours    GI/:  pantoprazole  Injectable 40 milliGRAM(s) IV Push daily    Endocrine:  dextrose 40% Gel 15 Gram(s) Oral once  dextrose 50% Injectable 25 Gram(s) IV Push once  dextrose 50% Injectable 12.5 Gram(s) IV Push once  dextrose 50% Injectable 25 Gram(s) IV Push once  glucagon  Injectable 1 milliGRAM(s) IntraMuscular once  insulin lispro (ADMELOG) corrective regimen sliding scale   SubCutaneous every 6 hours    Other Medications:  chlorhexidine 0.12% Liquid 15 milliLiter(s) Oral Mucosa every 12 hours  chlorhexidine 4% Liquid 1 Application(s) Topical <User Schedule>  dexMEDEtomidine Infusion 0.2 MICROgram(s)/kG/Hr IV Continuous <Continuous>  dextrose 10%. 1000 milliLiter(s) IV Continuous <Continuous>  dextrose 5%. 1000 milliLiter(s) IV Continuous <Continuous>  dextrose 5%. 1000 milliLiter(s) IV Continuous <Continuous>  fentaNYL    Injectable 25 MICROGram(s) IV Push every 2 hours PRN  lidocaine   4% Patch 1 Patch Transdermal every 24 hours  metoclopramide Injectable 5 milliGRAM(s) IV Push every 6 hours  ondansetron Injectable 4 milliGRAM(s) IV Push every 6 hours PRN    Allergies  iodine containing compounds (Unknown)  lisinopril (Unknown)    Vital Signs Last 24 Hrs  T(C): 37.3 (01 Dec 2021 10:00), Max: 38.1 (01 Dec 2021 01:10)  T(F): 99.1 (01 Dec 2021 10:00), Max: 100.6 (01 Dec 2021 01:10)  HR: 76 (01 Dec 2021 11:00) (56 - 77)  BP: 145/64 (01 Dec 2021 11:00) (102/53 - 153/85)  BP(mean): 92 (01 Dec 2021 11:00) (73 - 112)  RR: 17 (01 Dec 2021 11:00) (10 - 25)  SpO2: 100% (01 Dec 2021 11:00) (94% - 100%)    11-30 @ 07:01 - 12-01 @ 07:00  --------------------------------------------------------  IN: 1983.4 mL / OUT: 1710 mL / NET: 273.4 mL    12-01 @ 07:01  -  12-01 @ 11:31  --------------------------------------------------------  IN: 170 mL / OUT: 335 mL / NET: -165 mL    PHYSICAL EXAM:  Constitutional: WD  Head: NC/AT  EENT: PERRL, anicteric sclera; oropharynx clear, MMM  Neck: supple, no appreciable JVD  Respiratory: CTA B/L; no W/R/R  Cardiovascular: +S1/S2, RRR  Gastrointestinal: soft, NT/ND  Extremities: WWP; no edema, clubbing or cyanosis  Vascular: 2+ radial and pedal pulses  Neurological: AAOx3; no focal deficits    LABS:  CBC Full  -  ( 01 Dec 2021 05:48 )  WBC Count : 9.11 K/uL  RBC Count : 2.64 M/uL  Hemoglobin : 7.4 g/dL  Hematocrit : 24.3 %  Platelet Count - Automated : 201 K/uL  Mean Cell Volume : 92.0 fl  Mean Cell Hemoglobin : 28.0 pg  Mean Cell Hemoglobin Concentration : 30.5 gm/dL    12-01    144  |  111<H>  |  13  ----------------------------<  101<H>  3.7   |  24  |  0.45<L>    Ca    8.3<L>      01 Dec 2021 05:48  Phos  2.2     12-01  Mg     1.6     12-01    Urinalysis Basic - ( 01 Dec 2021 06:56 )    Color: Yellow / Appearance: Clear / SG: >=1.030 / pH: x  Gluc: x / Ketone: Trace mg/dL  / Bili: Small / Urobili: 0.2 E.U./dL   Blood: x / Protein: 30 mg/dL / Nitrite: NEGATIVE   Leuk Esterase: NEGATIVE / RBC: < 5 /HPF / WBC < 5 /HPF   Sq Epi: x / Non Sq Epi: 0-5 /HPF / Bacteria: Many /HPF    RADIOLOGY & ADDITIONAL STUDIES: Reviewed.  < from: CT Chest No Cont (09.05.21 @ 01:45) >    IMPRESSION:  Motion degraded study. Findings suggestive of cardiogenic pulmonary edema. Small to moderate bilateral pleural effusions. Compressive atelectasis in both lower lobes with superimposed infectious process not excluded. Age indeterminant compression fracture deformity of L1.    < end of copied text >

## 2021-12-01 NOTE — BRIEF OPERATIVE NOTE - OPERATION/FINDINGS
PROTOCOL:    Informed consent obtained via telephone from Doretha (Sheri): [phone]  Patient already intubated. Sedated with Propofol gtt and Fentanyl PRN pushes.   Large disposable bronchoscopy inserted through ETTube and inspection of bilateral airways to the segmental levels performed. Lidocaine 1% used (total of 4cc). Therapeutic suctioning performed throughout. Bronchial washings collected.   Bronchoscope removed.      FINDINGS:   Mid and distal trachea within normal limits. Main jae sharp. Dynamic airway collapse noted (50-60% of lumen size). Mild amount of white, mildly purulent secretions noted at distal trachea and proximal left and proximal right main stem bronchi. No endobronchial lesions, friable mucosa, or sites of active/prior bleeding noted.
Midline incision revealed umbilical hernia with neck of about 1.5 cm. Dissection was carried out carefully to fascial edges. Umbilical skin released from hernia. Hernia sac opened to evaluate bowel, with small amount of ascitic fluid in sac, which revealed viable bowel, clearly incarcerated. This was eviscerated slightly to evaluate, revealing viable peristalsing bowel with improving congestion. Bowel was reduced, requiring small extension of fasica by 5 mm. Fascia then closed with PDS suture. Subcutaneous reapproximation with vicryl stitches. Skin closed with staples. Exparel administered prior to closure.

## 2021-12-01 NOTE — PROGRESS NOTE ADULT - ASSESSMENT
89 y/o F bedbound, prior DNR PMH HTN, HLD, OA, CAD on Asa, HF, chronic constipation, now s/p 11/24 for Exlap and Open  Umbilical hernia repair. Transferred to SICU post-op intubated for hemodynamic monitoring. Remains intubated for airway protection. post-op course includes episodes of SVT's (afib vs atrial tach), ileus     Neuro: Fentanyl PRN; Home Remeron on hold, precedex gtt   CV: MAP >65, episodes of SVT, afib vs atrial tach, EP consulted, NTD    Pulm: Intubated since 11/24 AC 40%/440/12/5; duonebs. Pulm consulted for possible bronch  GI/FEN: NPO; NGT LIWS. Zofran, standing Reglan, rectal tube for large bowel decompression. PPI  : Liriano  ID: coverage for translocation of bacteria: to Ceftriaxone (11/24-11/26, 11/30--)  Endo: ISS, D10 at 30cc/hr for hypoglycemia   PPx: SCDs, SQH   Lines: Samantha left brachial (11/24-), PIVs  Wounds: Midline incision   PT/OT: unable to participate      89 y/o F bedbound, prior DNR PMH HTN, HLD, OA, CAD on Asa, HF, chronic constipation, now s/p 11/24 for Exlap and Open  Umbilical hernia repair. Transferred to SICU post-op intubated for hemodynamic monitoring. Remains intubated for airway protection. post-op course includes episodes of SVT's (afib vs atrial tach), ileus     Neuro: Fentanyl PRN; Home Remeron on hold, precedex gtt   CV: MAP >65, episodes of SVT, afib vs atrial tach, EP consulted, NTD    Pulm: Intubated since 11/24 AC 40%/440/12/5; duonebs. Pulm consulted for possible bronch  GI/FEN: NPO; NGT LIWS. Zofran, standing Reglan, rectal tube for large bowel decompression. PPI, tap water enema x1  : Liriano  ID: coverage for translocation of bacteria: to Ceftriaxone (11/24-11/26, 11/30--)  Endo: ISS, D10 at 30cc/hr for hypoglycemia   PPx: SCDs, SQH   Lines: Samantha left brachial (11/24-), PIVs  Wounds: Midline incision   PT/OT: unable to participate

## 2021-12-01 NOTE — CONSULT NOTE ADULT - ASSESSMENT
89 y/o F bedbound, prior DNR with PMH HTN, HLD, OA, CAD on Asa, HF, chronic constipation, recent admission to [facility] for metabolic encephalopathy, acute hypercapneic resp failure and pulmonary edema requiring intubation (9/5-9/15) CAD (on Aspirin) and no PSH who presents from [facility] for constipation and emesis. CT with high-grade small bowel obstruction at site of periumbilical hernia. NGT placed in ED with 1.7 L brown feculent output. Taken to OR on 11/24 for Exlap and Open repair of Umbilical hernia repair. Transferred to SICU post-op intubated for hemodynamic monitoring. Remains intubated for airway protection. post-op course includes episodes of SVT's (afib vs atrial tach), ileus    89F bedbound with PMH HTN, HLD, OA, CAD, HF, chronic constipation, recent admission for metabolic encephalopathy, acute hypercapneic resp failure and pulmonary edema who presented from rehab for constipation and emesis found to have high grade SBO at periumbilical hernia site, s/p 11/24 for exlap and open repair of umbilical hernia repair admitted to SICU in setting of hemodynamic instability and continued mechanical ventilation requirement. Pulmonary consulted today for concern for mucus plug.    #Hypoxemic respiratory failure  #Pleural effusion- R sided, small to moderate on US  #R/o mucus plug    Patient currently on 35% FiO2 on the ventilator. Review of serial CXRs demonstrating worsening of R sided opacification. Bedside pocus with some effusion, simple, on R, not amenable to thoracentesis at this time. May be component of mucus plugging but it does not appear to be limiting patient's respiratory status. Will consider bronchoscopy pending discussion with team and attending.    Plan pending discussion with attending Dr. Wolf.   89F bedbound with PMH HTN, HLD, OA, CAD, HF, chronic constipation, recent admission for metabolic encephalopathy, acute hypercapneic resp failure and pulmonary edema who presented from rehab for constipation and emesis found to have high grade SBO at periumbilical hernia site, s/p 11/24 for exlap and open repair of umbilical hernia repair admitted to SICU in setting of hemodynamic instability and continued mechanical ventilation requirement. Pulmonary consulted today for concern for mucus plug.    #Hypoxemic respiratory failure  #Pleural effusion- R sided, small to moderate on US  #R/o mucus plug    Patient currently on 35% FiO2 on the ventilator. Review of serial CXRs demonstrating worsening of R sided opacification. Bedside pocus with small effusion, simple, on R, not amenable to thoracentesis at this time. May be component of mucus plugging but it does not appear to be limiting patient's respiratory status.    89F bedbound with PMH HTN, HLD, OA, CAD, HF, chronic constipation, recent admission for metabolic encephalopathy, acute hypercapneic resp failure and pulmonary edema who presented from rehab for constipation and emesis found to have high grade SBO at periumbilical hernia site, s/p 11/24 for exlap and open repair of umbilical hernia repair admitted to SICU in setting of hemodynamic instability and continued mechanical ventilation requirement. Pulmonary consulted today for concern for mucus plug.    #Hypoxemic respiratory failure  #Pleural effusion- R sided, small to moderate on US  #R/o mucus plug    Patient currently on 35% FiO2 on the ventilator. Review of serial CXRs demonstrating worsening of R sided opacification. Bedside pocus with small effusion, simple, on R, not amenable to thoracentesis at this time. May be component of mucus plugging but it does not appear to be limiting patient's respiratory status. Send sputum culture, continue treatment for bacterial pneumonia, continue diuresis for fluid overload. Call back for any questions.

## 2021-12-01 NOTE — BRIEF OPERATIVE NOTE - CONDITION POST OP
CC; abnormal GI imaging/CT scan of the abdomen and pelvis and right-sided abdominal pain. HISTORY OF PRESENT ILLNESS: This is a 52year old female right-sided abdominal pain and abnormal CT/GI imaging. URRENT MEDICATIONS:  Current Facility-Administered Medications   Medication Dose Route Frequency Provider Last Rate Last Dose   â¢ sodium chloride (PF) 0.9 % injection 2 mL  2 mL Injection 2 times per day TIARA Corona       â¢ sodium chloride (PF) 0.9 % injection 2 mL  2 mL Injection PRN TIARA Corona       â¢ lactated ringers infusion   Intravenous Continuous TIARA Corona       â¢ lidocaine-sodium bicarbonate 0.9-8.4% injection 0.5-1 mL  0.5-1 mL Subcutaneous PRN Marybeth Martinez MD        Or   â¢ lidocaine 1 % injection 5-10 mg  5-10 mg Subcutaneous PRN Marybeth Martinez MD       â¢ dextrose 50 % injection 25 g  25 g Intravenous PRN Marybeth Martinez MD       â¢ MIDAZolam (VERSED) injection 1-2 mg  1-2 mg Intravenous Q5 Min PRN Marybeth Martinez MD       â¢ sodium chloride (PF) 0.9 % injection 2 mL  2 mL Injection 2 times per day Marybeth Martinez MD       â¢ sodium chloride (PF) 0.9 % injection 2 mL  2 mL Injection PRN Marybeth Martinez MD       â¢ lactated ringers infusion   Intravenous Continuous Marybeth Martinez MD       â¢ lactated ringers infusion   Intravenous Continuous Héctor Lambert MD           SOCIAL HISTORY:  Social History     Social History   â¢ Marital status: Single     Spouse name: N/A   â¢ Number of children: N/A   â¢ Years of education: N/A     Occupational History   â¢ Not on file.      Social History Main Topics   â¢ Smoking status: Former Smoker     Packs/day: 0.50     Types: Cigarettes     Start date: 1/1/1986     Quit date: 7/28/2017   â¢ Smokeless tobacco: Never Used   â¢ Alcohol use No   â¢ Drug use: No   â¢ Sexual activity: Not on file     Other Topics Concern   â¢ Not on file     Social History Narrative   â¢ No narrative on file
"      ALLERGIES:  Allergies as of 01/15/2018   â¢ (No Known Allergies)       PAST MEDICAL HISTORY:  Past Medical History:   Diagnosis Date   â¢ ADHD (attention deficit hyperactivity disorder)    â¢ ASCUS (atypical squamous cells of undetermined significance) on Pap smear    â¢ GERD (gastroesophageal reflux disease)    â¢ Kidney stones    â¢ Onychomycosis of toenail    â¢ Venereal warts        PAST SURGICAL HISTORY:  Past Surgical History:   Procedure Laterality Date   â¢ COLONOSCOPY  01/25/2018   â¢ ESOPHAGOGASTRODUODENOSCOPY  01/25/2018   â¢ EXTRACORPOREAL SHOCK WAVE LITHOTRIPSY  03/24/2004   â¢ TUBAL LIGATION         FAMILY HISTORY:  Family History   Problem Relation Age of Onset   â¢ Substance abuse Father    â¢ Arthritis Maternal Aunt    â¢ Cancer Maternal Aunt    â¢ Diabetes Maternal Uncle    â¢ Substance abuse Maternal Uncle    â¢ Cancer Maternal Grandmother      breast          REVIEW OF SYSTEMS:   Constitutional: Denies fever or chills   Eyes: Denies change in visual acuity   HENT: Denies nasal congestion or sore throat   Respiratory: Denies cough or shortness of breath   Cardiovascular: Denies chest pain or edema   Gastrointestinal: Denies abdominal pain, nausea, vomiting, bloody stools or diarrhea   Genitourinary: Denies dysuria   Musculoskeletal: Denies back pain or joint pain   Integument: Denies rash   Neurologic: Denies headache, focal weakness or sensory changes   Endocrine: Denies polyuria or polydipsia   Lymphatic: Denies swollen glands    Psychiatric: Denies depression or anxiety     PHYSICAL EXAM:   VITAL SIGNS:   Visit Vitals  /59   Pulse 77   Temp 97.5 Â°F (36.4 Â°C) (Temporal Artery)   Resp 16   Ht 5' 6"" (1.676 m)   Wt 63 kg   SpO2 98%   BMI 22.42 kg/mÂ²     Constitutional: Well developed, well nourished, no acute distress, nontoxic appearance   Eyes: Pupils equal, round, and reactive to light, conjunctivae normal   HENT: Atraumatic, external ears normal, nose normal, oropharynx moist, no pharyngeal "
Stable
exudates. Neck- normal range of motion, no tenderness, supple   Respiratory: No respiratory distress, normal breath sounds, no rales, no wheezing   Cardiovascular: Normal rate, normal rhythm, no murmurs, no gallops, no rubs   Gastrointestinal: Soft, nondistended, normal bowel sounds, nontender, no organomegaly, no mass, no rebound, no guarding   Genitourinary: No costovertebral angle tenderness   Musculoskeletal: No edema, no tenderness, no deformities. Back- no tenderness  Integument: Well hydrated, no rash   Lymphatic: No lymphadenopathy noted   Neurologic: Alert and oriented x3, cranial nerves 2-12 normal, normal motor function, normal sensory function, no focal deficits noted   Psychiatric: Speech and behavior appropriate     ASSESSMENT AND PLAN;ABNORMAL GI IMAGING/CT SCAN AND RIGHT-SIDED ABDOMINAL PAIN;plan; EGD and possible biopsy and colonoscopy and possible biopsy and polypectomy; the procedures, indications, potential risks, benefits and alternatives available have been explained in detail and informed consent obtained and further recommendations pending the outcome of these studies.
stable, remain intubated

## 2021-12-01 NOTE — CHART NOTE - NSCHARTNOTEFT_GEN_A_CORE
MTB PRE-BRONCHOSCOPY RISK ASSESSMENT  ------------------------------------------------------------    Procedure Date: 12/1/21    Provider Name: Dr. Larios / Dr. Wolf     Reason for Bronchoscopy: Concern for mucus plug     Location of Procedure (check one):   [  ] Endoscopy  [  ] Emergency Department  [ X  ] Intensive Care Unit  [  ] Operating Room  [  ] Other: _________    RISK ASSESSMENT  I. Patient symptoms (check all that apply): >/ 3 of these = SIGNIFICANT RISK for TB  [  ] Coughing > 2 to 3 weeks                 [  ] Unexplained fever >/ 2 weeks   [  ] Unusual weakness or fatigue  [  ] Unexplained weight loss > 10lbs.  [  ] Hemoptysis                                   [  ] Unusual or night sweating  [ X  ] NON-APPLICABLE    II. TB history (Check all that apply): >/ 1 of these = SIGNIFICANT RISK for TB  [  ] Sputum smear/culture (+) for acid fast bacilli (AFB)                           [  ] Abnormal CXR or CT suggestive of TB; date(s) & description __________  [  ] Positive TB skin or blood test; date: __________                               [  ] On medication for latent TB or disease; list medication(s) ____________  [  ] TB diagnosed in the past; year treated: _______                                [  ] Inadequately treated TB  [  ] Current close contact of a person known or suspected to have TB  [X  ] NON-APPLICABLE    III. Additional Risk factors for TB (Check all that apply): Consider these in relation to symptoms and history  [  ] Person has conditions placing them at higher risk for TB disease (i.e. immunosuppressive therapy)  [  ] Person has lived in a country for 3 months or more where TB is common  [  ] Person lives in a high risk environment for TB (i.e. long term care, health care worker, incarcerated, homeless)  [  ] Person injects illicit drugs  [  ] Person is HIV positive or at high risk for HIV    ****************************************************************  BASED ON THE TB RISK ASSESSMENT ABOVE, THE PATIENT'S RISK FOR TB IS:                       [X  ] LOW RISK FOR TB            [  ] SIGNIFICANT RISK FOR TB  ****************************************************************    IV. Based on the Determined Risk for TB, the following Action(s) are Recommended:  [ X ] Low risk for TB infection --> Proceed with the diagnostic procedure    [  ] Significant risk for TB infection:  1. Perform the procedure in a negative pressure room, with appropriate personal protective equipment (PPE) for healthcare personnel (i.e. N95 respirator)    2. If it is not feasible to move the patient or defer the procedure:    a. Use a single-bedded room in a low traffic area to perform the bronchoscopy procedure    b. Place a portable high-efficiency particulate air (HEPA) filter in the space prior to starting the procedure and keep the door closed. Refer to Infection Control policy titled "Tuberculosis Control Strategy Plan" for additional information.    c. All healthcare personnel in the procedure room shall wear and N95 disposible respirator.    3. Documentation of the tuberculosis risk assessment is to be included in the patient's medical record.

## 2021-12-01 NOTE — CONSULT NOTE ADULT - ATTENDING COMMENTS
89 yo woman now s/p ex lap and open repair of strangulated umbilical hernia with h/o CAD,HTN, HFpEF; to continue MV as abdomen is still distended with decrease in pulmonary compliance. continue ceftriaxone and flagyl with careful hydration and follow UO.
pulmonary consulted for role of bronchoscopy. Patient has worsening right sided infiltrate on serial CXR, bedside ultrasound of the chest showed small, simple right pleural effusion and absence of diffuse b-lines. she continues to have low grade fevers but has been on abx for about 1 week. suspicion for mucus plugging is low, however, given difficulty of weaning from the vent will plan for an inspection bronchoscopy.

## 2021-12-02 LAB
ALBUMIN SERPL ELPH-MCNC: 2.6 G/DL — LOW (ref 3.3–5)
ANION GAP SERPL CALC-SCNC: 8 MMOL/L — SIGNIFICANT CHANGE UP (ref 5–17)
BUN SERPL-MCNC: 8 MG/DL — SIGNIFICANT CHANGE UP (ref 7–23)
CALCIUM SERPL-MCNC: 8.1 MG/DL — LOW (ref 8.4–10.5)
CHLORIDE SERPL-SCNC: 108 MMOL/L — SIGNIFICANT CHANGE UP (ref 96–108)
CO2 SERPL-SCNC: 25 MMOL/L — SIGNIFICANT CHANGE UP (ref 22–31)
CREAT SERPL-MCNC: 0.42 MG/DL — LOW (ref 0.5–1.3)
GLUCOSE BLDC GLUCOMTR-MCNC: 114 MG/DL — HIGH (ref 70–99)
GLUCOSE BLDC GLUCOMTR-MCNC: 124 MG/DL — HIGH (ref 70–99)
GLUCOSE BLDC GLUCOMTR-MCNC: 169 MG/DL — HIGH (ref 70–99)
GLUCOSE BLDC GLUCOMTR-MCNC: 208 MG/DL — HIGH (ref 70–99)
GLUCOSE BLDC GLUCOMTR-MCNC: 53 MG/DL — CRITICAL LOW (ref 70–99)
GLUCOSE BLDC GLUCOMTR-MCNC: 92 MG/DL — SIGNIFICANT CHANGE UP (ref 70–99)
GLUCOSE BLDC GLUCOMTR-MCNC: 99 MG/DL — SIGNIFICANT CHANGE UP (ref 70–99)
GLUCOSE SERPL-MCNC: 107 MG/DL — HIGH (ref 70–99)
HCT VFR BLD CALC: 24.4 % — LOW (ref 34.5–45)
HGB BLD-MCNC: 7.6 G/DL — LOW (ref 11.5–15.5)
MAGNESIUM SERPL-MCNC: 1.8 MG/DL — SIGNIFICANT CHANGE UP (ref 1.6–2.6)
MCHC RBC-ENTMCNC: 27.8 PG — SIGNIFICANT CHANGE UP (ref 27–34)
MCHC RBC-ENTMCNC: 31.1 GM/DL — LOW (ref 32–36)
MCV RBC AUTO: 89.4 FL — SIGNIFICANT CHANGE UP (ref 80–100)
NRBC # BLD: 0 /100 WBCS — SIGNIFICANT CHANGE UP (ref 0–0)
PHOSPHATE SERPL-MCNC: 2.4 MG/DL — LOW (ref 2.5–4.5)
PLATELET # BLD AUTO: 216 K/UL — SIGNIFICANT CHANGE UP (ref 150–400)
POTASSIUM SERPL-MCNC: 3.4 MMOL/L — LOW (ref 3.5–5.3)
POTASSIUM SERPL-SCNC: 3.4 MMOL/L — LOW (ref 3.5–5.3)
RBC # BLD: 2.73 M/UL — LOW (ref 3.8–5.2)
RBC # FLD: 17.2 % — HIGH (ref 10.3–14.5)
SODIUM SERPL-SCNC: 141 MMOL/L — SIGNIFICANT CHANGE UP (ref 135–145)
WBC # BLD: 10.74 K/UL — HIGH (ref 3.8–10.5)
WBC # FLD AUTO: 10.74 K/UL — HIGH (ref 3.8–10.5)

## 2021-12-02 PROCEDURE — 76937 US GUIDE VASCULAR ACCESS: CPT | Mod: 26,59

## 2021-12-02 PROCEDURE — 99291 CRITICAL CARE FIRST HOUR: CPT

## 2021-12-02 PROCEDURE — 71045 X-RAY EXAM CHEST 1 VIEW: CPT | Mod: 26

## 2021-12-02 PROCEDURE — 36569 INSJ PICC 5 YR+ W/O IMAGING: CPT

## 2021-12-02 RX ORDER — SODIUM CHLORIDE 9 MG/ML
10 INJECTION INTRAMUSCULAR; INTRAVENOUS; SUBCUTANEOUS
Refills: 0 | Status: DISCONTINUED | OUTPATIENT
Start: 2021-12-02 | End: 2021-12-09

## 2021-12-02 RX ORDER — FUROSEMIDE 40 MG
20 TABLET ORAL ONCE
Refills: 0 | Status: COMPLETED | OUTPATIENT
Start: 2021-12-02 | End: 2021-12-02

## 2021-12-02 RX ORDER — MAGNESIUM SULFATE 500 MG/ML
1 VIAL (ML) INJECTION ONCE
Refills: 0 | Status: COMPLETED | OUTPATIENT
Start: 2021-12-02 | End: 2021-12-02

## 2021-12-02 RX ORDER — SODIUM CHLORIDE 9 MG/ML
1000 INJECTION, SOLUTION INTRAVENOUS ONCE
Refills: 0 | Status: COMPLETED | OUTPATIENT
Start: 2021-12-02 | End: 2021-12-02

## 2021-12-02 RX ORDER — ACETAMINOPHEN 500 MG
1000 TABLET ORAL ONCE
Refills: 0 | Status: COMPLETED | OUTPATIENT
Start: 2021-12-02 | End: 2021-12-08

## 2021-12-02 RX ORDER — POTASSIUM PHOSPHATE, MONOBASIC POTASSIUM PHOSPHATE, DIBASIC 236; 224 MG/ML; MG/ML
15 INJECTION, SOLUTION INTRAVENOUS ONCE
Refills: 0 | Status: COMPLETED | OUTPATIENT
Start: 2021-12-02 | End: 2021-12-02

## 2021-12-02 RX ORDER — POTASSIUM CHLORIDE 20 MEQ
20 PACKET (EA) ORAL
Refills: 0 | Status: COMPLETED | OUTPATIENT
Start: 2021-12-02 | End: 2021-12-02

## 2021-12-02 RX ORDER — DEXTROSE 50 % IN WATER 50 %
25 SYRINGE (ML) INTRAVENOUS ONCE
Refills: 0 | Status: COMPLETED | OUTPATIENT
Start: 2021-12-02 | End: 2021-12-02

## 2021-12-02 RX ORDER — I.V. FAT EMULSION 20 G/100ML
0.28 EMULSION INTRAVENOUS
Qty: 50 | Refills: 0 | Status: DISCONTINUED | OUTPATIENT
Start: 2021-12-02 | End: 2021-12-02

## 2021-12-02 RX ORDER — ELECTROLYTE SOLUTION,INJ
1 VIAL (ML) INTRAVENOUS
Refills: 0 | Status: DISCONTINUED | OUTPATIENT
Start: 2021-12-02 | End: 2021-12-02

## 2021-12-02 RX ADMIN — Medication 50 MILLIEQUIVALENT(S): at 12:48

## 2021-12-02 RX ADMIN — CHLORHEXIDINE GLUCONATE 15 MILLILITER(S): 213 SOLUTION TOPICAL at 06:29

## 2021-12-02 RX ADMIN — FENTANYL CITRATE 25 MICROGRAM(S): 50 INJECTION INTRAVENOUS at 10:00

## 2021-12-02 RX ADMIN — Medication 5 MILLIGRAM(S): at 17:02

## 2021-12-02 RX ADMIN — FENTANYL CITRATE 25 MICROGRAM(S): 50 INJECTION INTRAVENOUS at 15:50

## 2021-12-02 RX ADMIN — SODIUM CHLORIDE 10 MILLILITER(S): 9 INJECTION INTRAMUSCULAR; INTRAVENOUS; SUBCUTANEOUS at 15:50

## 2021-12-02 RX ADMIN — Medication 3 MILLILITER(S): at 11:08

## 2021-12-02 RX ADMIN — POTASSIUM PHOSPHATE, MONOBASIC POTASSIUM PHOSPHATE, DIBASIC 62.5 MILLIMOLE(S): 236; 224 INJECTION, SOLUTION INTRAVENOUS at 09:05

## 2021-12-02 RX ADMIN — FENTANYL CITRATE 25 MICROGRAM(S): 50 INJECTION INTRAVENOUS at 13:05

## 2021-12-02 RX ADMIN — LIDOCAINE 1 PATCH: 4 CREAM TOPICAL at 21:43

## 2021-12-02 RX ADMIN — Medication 5 MILLIGRAM(S): at 06:29

## 2021-12-02 RX ADMIN — PANTOPRAZOLE SODIUM 40 MILLIGRAM(S): 20 TABLET, DELAYED RELEASE ORAL at 11:55

## 2021-12-02 RX ADMIN — SODIUM CHLORIDE 1000 MILLILITER(S): 9 INJECTION, SOLUTION INTRAVENOUS at 14:19

## 2021-12-02 RX ADMIN — Medication 3 MILLILITER(S): at 05:13

## 2021-12-02 RX ADMIN — Medication 1: at 23:05

## 2021-12-02 RX ADMIN — LIDOCAINE 1 PATCH: 4 CREAM TOPICAL at 17:54

## 2021-12-02 RX ADMIN — FENTANYL CITRATE 25 MICROGRAM(S): 50 INJECTION INTRAVENOUS at 09:45

## 2021-12-02 RX ADMIN — Medication 50 MILLIEQUIVALENT(S): at 14:58

## 2021-12-02 RX ADMIN — CHLORHEXIDINE GLUCONATE 15 MILLILITER(S): 213 SOLUTION TOPICAL at 17:53

## 2021-12-02 RX ADMIN — Medication 371.88 MILLIGRAM(S): at 18:39

## 2021-12-02 RX ADMIN — HEPARIN SODIUM 5000 UNIT(S): 5000 INJECTION INTRAVENOUS; SUBCUTANEOUS at 06:29

## 2021-12-02 RX ADMIN — Medication 100 GRAM(S): at 08:45

## 2021-12-02 RX ADMIN — SODIUM CHLORIDE 10 MILLILITER(S): 9 INJECTION INTRAMUSCULAR; INTRAVENOUS; SUBCUTANEOUS at 17:54

## 2021-12-02 RX ADMIN — CEFTRIAXONE 100 MILLIGRAM(S): 500 INJECTION, POWDER, FOR SOLUTION INTRAMUSCULAR; INTRAVENOUS at 10:50

## 2021-12-02 RX ADMIN — Medication 25 GRAM(S): at 17:53

## 2021-12-02 RX ADMIN — Medication 3 MILLILITER(S): at 16:30

## 2021-12-02 RX ADMIN — FENTANYL CITRATE 25 MICROGRAM(S): 50 INJECTION INTRAVENOUS at 13:20

## 2021-12-02 RX ADMIN — CHLORHEXIDINE GLUCONATE 1 APPLICATION(S): 213 SOLUTION TOPICAL at 06:29

## 2021-12-02 RX ADMIN — FENTANYL CITRATE 25 MICROGRAM(S): 50 INJECTION INTRAVENOUS at 16:05

## 2021-12-02 RX ADMIN — Medication 5 MILLIGRAM(S): at 11:55

## 2021-12-02 RX ADMIN — Medication 3 MILLILITER(S): at 23:04

## 2021-12-02 RX ADMIN — HEPARIN SODIUM 5000 UNIT(S): 5000 INJECTION INTRAVENOUS; SUBCUTANEOUS at 21:42

## 2021-12-02 RX ADMIN — Medication 1 EACH: at 18:12

## 2021-12-02 RX ADMIN — I.V. FAT EMULSION 31.25 GM/KG/DAY: 20 EMULSION INTRAVENOUS at 22:01

## 2021-12-02 RX ADMIN — LIDOCAINE 1 PATCH: 4 CREAM TOPICAL at 10:49

## 2021-12-02 RX ADMIN — LIDOCAINE 1 PATCH: 4 CREAM TOPICAL at 00:19

## 2021-12-02 RX ADMIN — Medication 25 GRAM(S): at 07:30

## 2021-12-02 RX ADMIN — HEPARIN SODIUM 5000 UNIT(S): 5000 INJECTION INTRAVENOUS; SUBCUTANEOUS at 13:05

## 2021-12-02 RX ADMIN — Medication 5 MILLIGRAM(S): at 23:03

## 2021-12-02 RX ADMIN — Medication 20 MILLIGRAM(S): at 17:54

## 2021-12-02 RX ADMIN — Medication 50 MILLIEQUIVALENT(S): at 08:46

## 2021-12-02 RX ADMIN — Medication 30 MILLILITER(S): at 17:56

## 2021-12-02 RX ADMIN — Medication 20 MILLIGRAM(S): at 04:20

## 2021-12-02 NOTE — PROGRESS NOTE ADULT - SUBJECTIVE AND OBJECTIVE BOX
ON: UO decreased @4 am given lasix 20 x1. BCx NG 3 days      SUBJECTIVE:   Pt seen and examined by SICU team   Intubated + sedated    MEDICATIONS  (STANDING):  albuterol/ipratropium for Nebulization 3 milliLiter(s) Nebulizer every 6 hours  cefTRIAXone   IVPB 1000 milliGRAM(s) IV Intermittent every 24 hours  chlorhexidine 0.12% Liquid 15 milliLiter(s) Oral Mucosa every 12 hours  chlorhexidine 4% Liquid 1 Application(s) Topical <User Schedule>  dexMEDEtomidine Infusion 0.2 MICROgram(s)/kG/Hr (3.61 mL/Hr) IV Continuous <Continuous>  dextrose 10%. 1000 milliLiter(s) (30 mL/Hr) IV Continuous <Continuous>  dextrose 40% Gel 15 Gram(s) Oral once  dextrose 5%. 1000 milliLiter(s) (50 mL/Hr) IV Continuous <Continuous>  dextrose 5%. 1000 milliLiter(s) (100 mL/Hr) IV Continuous <Continuous>  dextrose 50% Injectable 25 Gram(s) IV Push once  dextrose 50% Injectable 12.5 Gram(s) IV Push once  dextrose 50% Injectable 25 Gram(s) IV Push once  glucagon  Injectable 1 milliGRAM(s) IntraMuscular once  heparin   Injectable 5000 Unit(s) SubCutaneous every 8 hours  insulin lispro (ADMELOG) corrective regimen sliding scale   SubCutaneous every 6 hours  lidocaine   4% Patch 1 Patch Transdermal every 24 hours  metoclopramide Injectable 5 milliGRAM(s) IV Push every 6 hours  pantoprazole  Injectable 40 milliGRAM(s) IV Push daily    MEDICATIONS  (PRN):  fentaNYL    Injectable 25 MICROGram(s) IV Push every 2 hours PRN Severe Pain (7 - 10)  fentaNYL    Injectable 25 MICROGram(s) IV Push every 5 minutes PRN pain, FOR BRONCH  ondansetron Injectable 4 milliGRAM(s) IV Push every 6 hours PRN Nausea      Drips:   - Precedex     ICU Vital Signs Last 24 Hrs  T(C): 37.6 (02 Dec 2021 05:00), Max: 37.6 (02 Dec 2021 05:00)  T(F): 99.7 (02 Dec 2021 05:00), Max: 99.7 (02 Dec 2021 05:00)  HR: 60 (02 Dec 2021 06:00) (51 - 82)  BP: 120/56 (02 Dec 2021 06:00) (90/46 - 153/85)  BP(mean): 81 (02 Dec 2021 06:00) (65 - 112)  ABP: 116/39 (02 Dec 2021 06:00) (97/32 - 150/59)  ABP(mean): 66 (02 Dec 2021 06:00) (57 - 95)  RR: 12 (02 Dec 2021 06:00) (10 - 29)  SpO2: 100% (02 Dec 2021 06:00) (98% - 100%)      Physical Exam:  General: NAD  HEENT: NC/AT, EOMI, PERRLA,   Pulmonary: Nonlabored breathing, no respiratory distress, on AC  Cardiovascular: NSR, no murmurs  Abdominal: soft, NT/ND, incision C/D/I, NGT w bilious output, rectal tube w scant feculent output  Extremities: WWP, 5/5 strength x 4, no clubbing/cyanosis  Neuro: sedated      Lines/tubes/drains:  - PIV  - L Samantha Liriano: (+) UOP monitoring in critically ill pt 	    Vent settings:  Mode: AC/ CMV (Assist Control/ Continuous Mandatory Ventilation), RR (machine): 12, TV (machine): 460, FiO2: 35, PEEP: 8, ITime: 1, MAP: 11, PIP: 21    I&O's Summary    01 Dec 2021 07:01  -  02 Dec 2021 07:00  --------------------------------------------------------  IN: 1116.9 mL / OUT: 1653 mL / NET: -536.1 mL        LABS:                        7.6    10.74 )-----------( 216      ( 02 Dec 2021 06:08 )             24.4     12-02    141  |  108  |  8   ----------------------------<  107<H>  3.4<L>   |  25  |  0.42<L>    Ca    8.1<L>      02 Dec 2021 06:08  Phos  2.4     12-02  Mg     1.8     12-02    TPro  x   /  Alb  2.6<L>  /  TBili  x   /  DBili  x   /  AST  x   /  ALT  x   /  AlkPhos  x   12-02      Urinalysis Basic - ( 01 Dec 2021 06:56 )    Color: Yellow / Appearance: Clear / SG: >=1.030 / pH: x  Gluc: x / Ketone: Trace mg/dL  / Bili: Small / Urobili: 0.2 E.U./dL   Blood: x / Protein: 30 mg/dL / Nitrite: NEGATIVE   Leuk Esterase: NEGATIVE / RBC: < 5 /HPF / WBC < 5 /HPF   Sq Epi: x / Non Sq Epi: 0-5 /HPF / Bacteria: Many /HPF      CAPILLARY BLOOD GLUCOSE      POCT Blood Glucose.: 99 mg/dL (02 Dec 2021 07:12)  POCT Blood Glucose.: 114 mg/dL (02 Dec 2021 00:04)  POCT Blood Glucose.: 97 mg/dL (01 Dec 2021 16:59)  POCT Blood Glucose.: 92 mg/dL (01 Dec 2021 11:13)    LIVER FUNCTIONS - ( 02 Dec 2021 06:08 )  Alb: 2.6 g/dL / Pro: x     / ALK PHOS: x     / ALT: x     / AST: x     / GGT: x             Cultures:    RADIOLOGY & ADDITIONAL STUDIES:

## 2021-12-02 NOTE — CONSULT NOTE ADULT - SUBJECTIVE AND OBJECTIVE BOX
Vascular Access Service Consult Note    89yFemaleHEALTH ISSUES - PROBLEM Dx:             Diagnosis: SBO    Indications for Vascular Access (Check all that apply)  [  ]  Antibiotic Therapy       Antibiotic Prescribed:              [  ]  IV Hydration  [  X]  Total Parenteral Nutrition  [  ]  Chemotherapy  [  ]  Difficult Venous Access  [  ]  CVP monitoring  [  ]  Medications with high potential for tissue necrosis on extravasation  [  ]  Other    Screening (Check all that apply)  Previous Radiation to chest  [  ] Yes      [  X]  No  Breast Cancer                          [  ] Left     [  ]  Right    [  X]  No  Lymph Node Dissection         [  ] Left     [  ]  Right    [ X ]  No  Pacemaker or ICD                   [  ] Left     [  ]  Right    [ X ]  No  Upper Extremity DVT             [  ] Left     [  ]  Right    [  X]  No  Chronic Kidney Disease         [  ]  Yes     [ X ]  No  Hemodialysis                           [  ]  Yes     [X  ]  No  AV Fistula/ Graft                     [  ]  Left    [  ]  Right    [ X ]  No  Temp>101F in past 24 H       [  ]  Yes     [  X]  No  H/O PICC/Midline                   [  ]  Yes     [  X]  No    Lab data:                        7.6    10.74 )-----------( 216      ( 02 Dec 2021 06:08 )             24.4     12-02    141  |  108  |  8   ----------------------------<  107<H>  3.4<L>   |  25  |  0.42<L>    Ca    8.1<L>      02 Dec 2021 06:08  Phos  2.4     12-02  Mg     1.8     12-02    TPro  x   /  Alb  2.6<L>  /  TBili  x   /  DBili  x   /  AST  x   /  ALT  x   /  AlkPhos  x   12-02      Culture Results:   Numerous Gram Negative Rods  Identification and susceptibility to follow. (12-01 @ 15:58)  Culture Results:   Numerous Gram Negative Rods  Identification and susceptibility to follow. (12-01 @ 12:56)          I have reviewed the chart, interviewed and examined the patient and determined that this patient:  [ X ] Is a candidate for a PICC line  [  ] Is a candidate for a Midline  [  ] Is not a candidate for vascular access device (reason)    Lumens:    [  ] Single  [ X ] Double

## 2021-12-02 NOTE — PROGRESS NOTE ADULT - ASSESSMENT
89 y/o F bedbound, prior DNR PMH HTN, HLD, OA, CAD on Asa, HF, chronic constipation, now s/p 11/24 for Exlap and Open  Umbilical hernia repair. Transferred to SICU post-op intubated for hemodynamic monitoring. Remains intubated for airway protection. post-op course includes episodes of SVT's (afib vs atrial tach), ileus.     Neuro: Fentanyl PRN; Home Remeron on hold, precedex gtt   CV: MAP >65, Hx CAD, HTN, HFpEF; (TTE 11/29, LVEF 60-65%, mod TR, PASP 72), episodes of SVT, afib vs atrial tach, EP consulted, NTD    Pulm: Intubated since 11/24 AC 40%/440/12/5; duonebs. s/p bronch, Cx sent  GI/FEN: NPO; NGT LIWS. Zofran, standing Reglan, rectal tube for large bowel decompression. PPI, D10@30  : Liriano  ID: coverage for translocation of bacteria: to Ceftriaxone (11/24-11/26, 11/30--)  Endo: ISS, D10 at 30cc/hr for hypoglycemia   PPx: SCDs, SQH   Lines: Samantha left brachial (11/24-), PIVs  Wounds: Midline incision   PT/OT: unable to participate

## 2021-12-02 NOTE — PROGRESS NOTE ADULT - SUBJECTIVE AND OBJECTIVE BOX
SUBJECTIVE:   ON: 12/2: 1amp D50  for low FS, Picc consulted placed    Patient seen and examined at bedside. Continues to be intubated, reports no abdominal pain, nausea or vomiting rectal tube in place with minimal output     cefTRIAXone   IVPB 1000 milliGRAM(s) IV Intermittent every 24 hours  heparin   Injectable 5000 Unit(s) SubCutaneous every 8 hours    MEDICATIONS  (PRN):  fentaNYL    Injectable 25 MICROGram(s) IV Push every 2 hours PRN Severe Pain (7 - 10)  ondansetron Injectable 4 milliGRAM(s) IV Push every 6 hours PRN Nausea      I&O's Detail    01 Dec 2021 07:01  -  02 Dec 2021 07:00  --------------------------------------------------------  IN:    Dexmedetomidine: 14.4 mL    dextrose 10%: 720 mL    IV PiggyBack: 100 mL    IV PiggyBack: 312.5 mL  Total IN: 1146.9 mL    OUT:    Indwelling Catheter - Urethral (mL): 1403 mL    Nasogastric/Oral tube (mL): 650 mL    Propofol: 0 mL    Rectal Tube (mL): 25 mL  Total OUT: 2078 mL    Total NET: -931.1 mL      02 Dec 2021 07:01  -  02 Dec 2021 08:33  --------------------------------------------------------  IN:    dextrose 10%: 30 mL  Total IN: 30 mL    OUT:    Indwelling Catheter - Urethral (mL): 60 mL  Total OUT: 60 mL    Total NET: -30 mL          T(C): 37.6 (12-02-21 @ 05:00), Max: 37.6 (12-02-21 @ 05:00)  HR: 55 (12-02-21 @ 08:00) (51 - 82)  BP: 112/56 (12-02-21 @ 08:00) (90/46 - 153/85)  RR: 11 (12-02-21 @ 08:00) (10 - 29)  SpO2: 100% (12-02-21 @ 08:00) (100% - 100%)    General: Intubated, Sedated, NGT bilious  C/V: NSR  Pulm: Nonlabored breathing, no respiratory distress, ETT tube in place  Abd: soft, nondistended, appropriate tenderness, incision c/d/i with staples in place  Extrem: WWP, no edema, SCDs in place  neuro: GCS 11T    LABS:                        7.6    10.74 )-----------( 216      ( 02 Dec 2021 06:08 )             24.4     12-02    141  |  108  |  8   ----------------------------<  107<H>  3.4<L>   |  25  |  0.42<L>    Ca    8.1<L>      02 Dec 2021 06:08  Phos  2.4     12-02  Mg     1.8     12-02    TPro  x   /  Alb  2.6<L>  /  TBili  x   /  DBili  x   /  AST  x   /  ALT  x   /  AlkPhos  x   12-02      Urinalysis Basic - ( 01 Dec 2021 06:56 )    Color: Yellow / Appearance: Clear / SG: >=1.030 / pH: x  Gluc: x / Ketone: Trace mg/dL  / Bili: Small / Urobili: 0.2 E.U./dL   Blood: x / Protein: 30 mg/dL / Nitrite: NEGATIVE   Leuk Esterase: NEGATIVE / RBC: < 5 /HPF / WBC < 5 /HPF   Sq Epi: x / Non Sq Epi: 0-5 /HPF / Bacteria: Many /HPF        RADIOLOGY & ADDITIONAL STUDIES:      Culture - Bronchial (collected 12-01-21 @ 15:58)  Source: .Bronchial bronch sputum  Gram Stain (12-01-21 @ 18:04):    No epithelial cells seen    Few-moderate White blood cells    Few-moderate Gram Negative Rods    Culture - Sputum (collected 12-01-21 @ 12:56)  Source: .Sputum ETT sputum  Gram Stain (12-01-21 @ 17:57):    No epithelial cells seen    Few White blood cells    Rare Yeast    Few Gram Negative Rods    Culture - Blood (collected 11-28-21 @ 18:04)  Source: .Blood Blood-Peripheral  Preliminary Report (12-01-21 @ 19:01):    No growth at 3 days.    Culture - Blood (collected 11-28-21 @ 18:04)  Source: .Blood Blood-Peripheral  Preliminary Report (12-01-21 @ 19:01):    No growth at 3 days.    Culture - Sputum (collected 11-27-21 @ 16:04)  Source: .Sputum Sputum  Gram Stain (11-27-21 @ 21:31):    No epithelial cells seen    Rare WBC's    No organisms seen  Final Report (11-29-21 @ 08:38):    Rare Normal Respiratory Natalia present

## 2021-12-02 NOTE — PROGRESS NOTE ADULT - ASSESSMENT
89 y/o F bedbound, prior DNR PMH HTN, HLD, OA, CAD on Asa, HF, chronic constipation, now s/p 11/24 for Exlap and Open  Umbilical hernia repair. Transferred to SICU post-op intubated for hemodynamic monitoring. Remains intubated for airway protection. post-op course includes episodes of SVT's (afib vs atrial tach), ileus     plan:   SICU following appreciate recs  Wean to extubate per ICU protocol  Continue NGT and Rectal tube decompression   NPO, Awaiting return of bowel function   will need Nutritional Support with TPN given prolonged ileus  rest of care per SICU   Team 4 surgery will continue to follow

## 2021-12-02 NOTE — CHART NOTE - NSCHARTNOTEFT_GEN_A_CORE
Admitting Diagnosis:   Patient is a 89y old  Female who presents with a chief complaint of SBO, strangulated umbilical hernia (02 Dec 2021 10:30)      PAST MEDICAL & SURGICAL HISTORY:  HTN (hypertension)    HLD (hyperlipidemia)    Osteoarthritis    Hyponatremia    Altered mental status    Urinary tract infection    HF (heart failure)        Current Nutrition Order:  NPO at time of visit  PICC placed to start TPN today      PO Intake: Good (%) [   ]  Fair (50-75%) [   ] Poor (<25%) [   ]- NA NPO     GI Issues: NGT remains to LIWS (650ml bilious output x 24hrs)  Rectal tube w/~25ml output x 24hrs, s/p tap water enema    Pain: No complaints of pain today    Skin Integrity: Pola 12, B/L LE trace edema  Midline abdominal surgical wound  Sacrum stage III pressure injury     Labs:       141  |  108  |  8   ----------------------------<  107<H>  3.4<L>   |  25  |  0.42<L>    Ca    8.1<L>      02 Dec 2021 06:08  Phos  2.4     12  Mg     1.8     12    TPro  x   /  Alb  2.6<L>  /  TBili  x   /  DBili  x   /  AST  x   /  ALT  x   /  AlkPhos  x   12-    CAPILLARY BLOOD GLUCOSE      POCT Blood Glucose.: 124 mg/dL (02 Dec 2021 11:55)  POCT Blood Glucose.: 208 mg/dL (02 Dec 2021 07:48)  POCT Blood Glucose.: 99 mg/dL (02 Dec 2021 07:12)  POCT Blood Glucose.: 114 mg/dL (02 Dec 2021 00:04)  POCT Blood Glucose.: 97 mg/dL (01 Dec 2021 16:59)      Medications:  MEDICATIONS  (STANDING):  albuterol/ipratropium for Nebulization 3 milliLiter(s) Nebulizer every 6 hours  cefTRIAXone   IVPB 1000 milliGRAM(s) IV Intermittent every 24 hours  chlorhexidine 0.12% Liquid 15 milliLiter(s) Oral Mucosa every 12 hours  chlorhexidine 4% Liquid 1 Application(s) Topical <User Schedule>  dexMEDEtomidine Infusion 0.2 MICROgram(s)/kG/Hr (3.61 mL/Hr) IV Continuous <Continuous>  dextrose 10%. 1000 milliLiter(s) (30 mL/Hr) IV Continuous <Continuous>  dextrose 40% Gel 15 Gram(s) Oral once  dextrose 5%. 1000 milliLiter(s) (50 mL/Hr) IV Continuous <Continuous>  dextrose 5%. 1000 milliLiter(s) (100 mL/Hr) IV Continuous <Continuous>  dextrose 50% Injectable 25 Gram(s) IV Push once  dextrose 50% Injectable 12.5 Gram(s) IV Push once  dextrose 50% Injectable 25 Gram(s) IV Push once  fat emulsion (Plant Based) 20% Infusion 0.28 Gm/kG/Day (31.25 mL/Hr) IV Continuous <Continuous>  glucagon  Injectable 1 milliGRAM(s) IntraMuscular once  heparin   Injectable 5000 Unit(s) SubCutaneous every 8 hours  insulin lispro (ADMELOG) corrective regimen sliding scale   SubCutaneous every 6 hours  lidocaine   4% Patch 1 Patch Transdermal every 24 hours  metoclopramide Injectable 5 milliGRAM(s) IV Push every 6 hours  pantoprazole  Injectable 40 milliGRAM(s) IV Push daily  Parenteral Nutrition - Adult 1 Each (33 mL/Hr) TPN Continuous <Continuous>  potassium chloride  20 mEq/100 mL IVPB 20 milliEquivalent(s) IV Intermittent every 2 hours    MEDICATIONS  (PRN):  fentaNYL    Injectable 25 MICROGram(s) IV Push every 2 hours PRN Severe Pain (7 - 10)  ondansetron Injectable 4 milliGRAM(s) IV Push every 6 hours PRN Nausea  sodium chloride 0.9% lock flush 10 milliLiter(s) IV Push every 1 hour PRN Pre/post blood products, medications, blood draw, and to maintain line patency      Anthropometrics:  Height: 60.4"    Daily Weight in k.3 (2021 06:00)/ 166 lbs  IBW: 100 lbs +/-10%/ (45kg)  % IBW: 166%  BMI: 31.9    Weight Change:   Weight on admit (): 72.2 kg (158.8 lbs)  Weight : 75.3 kg (166 lbs) +4.3% in 1 week, likely fluid related     Nutrition Focused Physical Exam: Completed [ X  ]  Not Pertinent [   ] - No fat, muscle wasting noted  Please refer to initial RD assessment     Estimated energy needs: IBW used for calculations as pt >100% of IBW (166%), adjusted for age, vent, fluids per team  Calories: 25-30 kcal/kg-->2228-5463 kcal/d  Protein: 1.4-1.6 g/d--> 63.4 - 72g/d  Fluid: per team    Subjective:    89 y/o F bedbound, prior DNR with PMH HTN, HLD, OA, CAD on Asa, HF, chronic constipation, recent admission to [facility] for metabolic encephalopathy, acute hypercapnic resp failure and pulmonary edema requiring intubation (-9/15) CAD (on Aspirin) and no PSH who presents from [facility] for constipation and emesis. CT with high-grade small bowel obstruction at site of periumbilical hernia. NGT placed in ED with 1.7 L brown feculent output. Concerning for strangulated umbilical hernia with possible bowel ischemia. Taken to OR on  for Exlap and Open repair of Umbilical hernia repair. Transferred to SICU post-op intubated for hemodynamic monitoring. Remains intubated for airway protection. Post-op course includes episodes of SVT's (afib vs atrial tach), ileus, failed CPAP trials 2/2 tachypnea. CXR  showing R. lung collapse, effusions. S/p bronch - no mucous plugs. S/p PICC placement today for TPN d/t ongoing ileus w/inability to tolerate EN. Pt seen in room, awake, alert/not-sedated. She remains intubated on VC/AC mode. MAP 75- not requiring pressors. NGT to LIWS- bilious output noted. She denied pain, but per RN she was mouthing that she was hungry. Noted on KCl, MgSulfate, KPhos drips for repletion. Also receiving D10. Full code per family. Pertinent labs: WBC 10.74 (H), POC , 99, 114mg/dL, K 3.4 (L), Phos 2.4 (L). Will continue to follow per RD protocol.     Previous Nutrition Diagnosis:  Inadequate energy intake RT NPO status AEB meeting 0% EER at present.    Active [ X  ]  Resolved [   ]    Goal: Pt to start nutrition in 24-48 hours via appropriate route when medically feasible.    Recommendations:   1. TPN via PICC: goal 160g Dex, 68g AA, 50g 20% SMOF Lipids to provide in total: 1316Kcal, 68g protein, GIR 1.48mg/kg/min, 1.5g/kg IBW protein.  >Recommend checking TG before starting TPN and then check TG weekly. Start at 100g Dex on Day 1, 160g Dex on Day 2  >Check Mg, Phos, K daily and POC BG Q6hrs. Trend daily weights. Fluids and lytes per MD discretion.   2. Once pt's GI tract functional, please start trickle feeds of Osmolite 1.2 Josr @ 10ml/hr. Will provide goal recs pending tolerance.   3. Pain regimen per team   *dw PA     Education: Deferred at this time    Risk Level: High [ X ] Moderate [   ] Low [   ].

## 2021-12-03 LAB
-  CEFTAZIDIME: SIGNIFICANT CHANGE UP
-  CEFTAZIDIME: SIGNIFICANT CHANGE UP
-  LEVOFLOXACIN: SIGNIFICANT CHANGE UP
-  LEVOFLOXACIN: SIGNIFICANT CHANGE UP
-  TRIMETHOPRIM/SULFAMETHOXAZOLE: SIGNIFICANT CHANGE UP
-  TRIMETHOPRIM/SULFAMETHOXAZOLE: SIGNIFICANT CHANGE UP
ANION GAP SERPL CALC-SCNC: 7 MMOL/L — SIGNIFICANT CHANGE UP (ref 5–17)
BUN SERPL-MCNC: 8 MG/DL — SIGNIFICANT CHANGE UP (ref 7–23)
CALCIUM SERPL-MCNC: 8 MG/DL — LOW (ref 8.4–10.5)
CHLORIDE SERPL-SCNC: 104 MMOL/L — SIGNIFICANT CHANGE UP (ref 96–108)
CO2 SERPL-SCNC: 24 MMOL/L — SIGNIFICANT CHANGE UP (ref 22–31)
CREAT SERPL-MCNC: 0.44 MG/DL — LOW (ref 0.5–1.3)
CULTURE RESULTS: SIGNIFICANT CHANGE UP
GLUCOSE BLDC GLUCOMTR-MCNC: 141 MG/DL — HIGH (ref 70–99)
GLUCOSE BLDC GLUCOMTR-MCNC: 147 MG/DL — HIGH (ref 70–99)
GLUCOSE BLDC GLUCOMTR-MCNC: 161 MG/DL — HIGH (ref 70–99)
GLUCOSE BLDC GLUCOMTR-MCNC: 165 MG/DL — HIGH (ref 70–99)
GLUCOSE SERPL-MCNC: 177 MG/DL — HIGH (ref 70–99)
HCT VFR BLD CALC: 24.3 % — LOW (ref 34.5–45)
HGB BLD-MCNC: 7.6 G/DL — LOW (ref 11.5–15.5)
MAGNESIUM SERPL-MCNC: 1.7 MG/DL — SIGNIFICANT CHANGE UP (ref 1.6–2.6)
MCHC RBC-ENTMCNC: 28.6 PG — SIGNIFICANT CHANGE UP (ref 27–34)
MCHC RBC-ENTMCNC: 31.3 GM/DL — LOW (ref 32–36)
MCV RBC AUTO: 91.4 FL — SIGNIFICANT CHANGE UP (ref 80–100)
METHOD TYPE: SIGNIFICANT CHANGE UP
NRBC # BLD: 0 /100 WBCS — SIGNIFICANT CHANGE UP (ref 0–0)
ORGANISM # SPEC MICROSCOPIC CNT: SIGNIFICANT CHANGE UP
PHOSPHATE SERPL-MCNC: 2.3 MG/DL — LOW (ref 2.5–4.5)
PLATELET # BLD AUTO: 233 K/UL — SIGNIFICANT CHANGE UP (ref 150–400)
POTASSIUM SERPL-MCNC: 4.1 MMOL/L — SIGNIFICANT CHANGE UP (ref 3.5–5.3)
POTASSIUM SERPL-SCNC: 4.1 MMOL/L — SIGNIFICANT CHANGE UP (ref 3.5–5.3)
RBC # BLD: 2.66 M/UL — LOW (ref 3.8–5.2)
RBC # FLD: 17 % — HIGH (ref 10.3–14.5)
SODIUM SERPL-SCNC: 135 MMOL/L — SIGNIFICANT CHANGE UP (ref 135–145)
SPECIMEN SOURCE: SIGNIFICANT CHANGE UP
TRIGL SERPL-MCNC: 86 MG/DL — SIGNIFICANT CHANGE UP
WBC # BLD: 10.15 K/UL — SIGNIFICANT CHANGE UP (ref 3.8–10.5)
WBC # FLD AUTO: 10.15 K/UL — SIGNIFICANT CHANGE UP (ref 3.8–10.5)

## 2021-12-03 PROCEDURE — 99291 CRITICAL CARE FIRST HOUR: CPT

## 2021-12-03 PROCEDURE — 71045 X-RAY EXAM CHEST 1 VIEW: CPT | Mod: 26

## 2021-12-03 PROCEDURE — 74018 RADEX ABDOMEN 1 VIEW: CPT | Mod: 26

## 2021-12-03 RX ORDER — MAGNESIUM SULFATE 500 MG/ML
2 VIAL (ML) INJECTION ONCE
Refills: 0 | Status: COMPLETED | OUTPATIENT
Start: 2021-12-03 | End: 2021-12-03

## 2021-12-03 RX ORDER — I.V. FAT EMULSION 20 G/100ML
0.28 EMULSION INTRAVENOUS
Qty: 50 | Refills: 0 | Status: DISCONTINUED | OUTPATIENT
Start: 2021-12-03 | End: 2021-12-03

## 2021-12-03 RX ORDER — MAGNESIUM SULFATE 500 MG/ML
1 VIAL (ML) INJECTION ONCE
Refills: 0 | Status: DISCONTINUED | OUTPATIENT
Start: 2021-12-03 | End: 2021-12-03

## 2021-12-03 RX ORDER — FUROSEMIDE 40 MG
20 TABLET ORAL ONCE
Refills: 0 | Status: COMPLETED | OUTPATIENT
Start: 2021-12-03 | End: 2021-12-03

## 2021-12-03 RX ORDER — FENTANYL CITRATE 50 UG/ML
25 INJECTION INTRAVENOUS
Refills: 0 | Status: DISCONTINUED | OUTPATIENT
Start: 2021-12-03 | End: 2021-12-08

## 2021-12-03 RX ORDER — ELECTROLYTE SOLUTION,INJ
1 VIAL (ML) INTRAVENOUS
Refills: 0 | Status: DISCONTINUED | OUTPATIENT
Start: 2021-12-03 | End: 2021-12-03

## 2021-12-03 RX ADMIN — Medication 5 MILLIGRAM(S): at 11:36

## 2021-12-03 RX ADMIN — Medication 1: at 06:32

## 2021-12-03 RX ADMIN — CHLORHEXIDINE GLUCONATE 1 APPLICATION(S): 213 SOLUTION TOPICAL at 05:39

## 2021-12-03 RX ADMIN — Medication 3 MILLILITER(S): at 05:04

## 2021-12-03 RX ADMIN — Medication 1: at 23:31

## 2021-12-03 RX ADMIN — PANTOPRAZOLE SODIUM 40 MILLIGRAM(S): 20 TABLET, DELAYED RELEASE ORAL at 11:35

## 2021-12-03 RX ADMIN — Medication 371.88 MILLIGRAM(S): at 02:36

## 2021-12-03 RX ADMIN — Medication 3 MILLILITER(S): at 21:15

## 2021-12-03 RX ADMIN — Medication 3 MILLILITER(S): at 16:23

## 2021-12-03 RX ADMIN — LIDOCAINE 1 PATCH: 4 CREAM TOPICAL at 18:55

## 2021-12-03 RX ADMIN — Medication 5 MILLIGRAM(S): at 23:31

## 2021-12-03 RX ADMIN — I.V. FAT EMULSION 20.83 GM/KG/DAY: 20 EMULSION INTRAVENOUS at 22:03

## 2021-12-03 RX ADMIN — HEPARIN SODIUM 5000 UNIT(S): 5000 INJECTION INTRAVENOUS; SUBCUTANEOUS at 21:55

## 2021-12-03 RX ADMIN — CHLORHEXIDINE GLUCONATE 15 MILLILITER(S): 213 SOLUTION TOPICAL at 05:03

## 2021-12-03 RX ADMIN — CHLORHEXIDINE GLUCONATE 15 MILLILITER(S): 213 SOLUTION TOPICAL at 18:00

## 2021-12-03 RX ADMIN — Medication 50 GRAM(S): at 11:35

## 2021-12-03 RX ADMIN — DEXMEDETOMIDINE HYDROCHLORIDE IN 0.9% SODIUM CHLORIDE 3.61 MICROGRAM(S)/KG/HR: 4 INJECTION INTRAVENOUS at 22:03

## 2021-12-03 RX ADMIN — FENTANYL CITRATE 25 MICROGRAM(S): 50 INJECTION INTRAVENOUS at 18:03

## 2021-12-03 RX ADMIN — LIDOCAINE 1 PATCH: 4 CREAM TOPICAL at 10:39

## 2021-12-03 RX ADMIN — Medication 5 MILLIGRAM(S): at 18:00

## 2021-12-03 RX ADMIN — Medication 1 EACH: at 18:00

## 2021-12-03 RX ADMIN — LIDOCAINE 1 PATCH: 4 CREAM TOPICAL at 21:56

## 2021-12-03 RX ADMIN — Medication 20 MILLIGRAM(S): at 13:21

## 2021-12-03 RX ADMIN — Medication 3 MILLILITER(S): at 09:47

## 2021-12-03 RX ADMIN — HEPARIN SODIUM 5000 UNIT(S): 5000 INJECTION INTRAVENOUS; SUBCUTANEOUS at 05:04

## 2021-12-03 RX ADMIN — HEPARIN SODIUM 5000 UNIT(S): 5000 INJECTION INTRAVENOUS; SUBCUTANEOUS at 13:21

## 2021-12-03 RX ADMIN — Medication 20 MILLIGRAM(S): at 09:50

## 2021-12-03 RX ADMIN — Medication 62.5 MILLIMOLE(S): at 13:36

## 2021-12-03 RX ADMIN — Medication 5 MILLIGRAM(S): at 05:03

## 2021-12-03 NOTE — PROVIDER CONTACT NOTE (OTHER) - ACTION/TREATMENT ORDERED:
BRANDON Palafox verbally ordered to delay tube feedings.
At this time pt remains intubated. Pt given emotional support. Niece who is health care proxy called.

## 2021-12-03 NOTE — PROVIDER CONTACT NOTE (OTHER) - ASSESSMENT
DR. Jaime and surgical team came down to assess pt.  obtained #[id number]. Pt given pen and paper to answer questions. Pt AxOx3 . Pt expressed wishes of death. Pt aware of possibility when extubated that she might die and she wrote yes.
Bowel sounds present prior to starting enteral feedings. Feeds started @ approx. 1300 @ 5cc/hr. @ 1605. 50cc residual present.

## 2021-12-03 NOTE — PROGRESS NOTE ADULT - ASSESSMENT
89 y/o F bedbound, prior DNR PMH HTN, HLD, OA, CAD on Asa, HF, chronic constipation, now s/p 11/24 for Exlap and Open  Umbilical hernia repair. Post-op remains intubated w stenotropomonas sputum, and course c/b episodes of SVT's (afib vs atrial tach) and ileus.     Neuro: Tylenol and Fentanyl PRN, Lidoderm patch; Home Remeron on hold, precedex gtt   CV: MAP >65, Hx CAD, HTN, HFpEF; (TTE 11/29, LVEF 60-65%, mod TR, PASP 72), episodes of SVT, afib vs atrial tach, EP consulted, NTD    Pulm: Intubated since 11/24 AC 35/440/12/5; duonebs. s/p bronch, Cx sent  GI/FEN: NPO/NGT LIWS/TPN. Zofran, standing Reglan, PPI, rectal tube for large bowel decompression.  : Liriano  ID: Stenotropomonas Sputum: Bactrim (12/2--): Coverage for translocation of bacteria: to Ceftriaxone (11/24-11/26, 11/30-12/2).  Endo: ISS, Hypoglycemia, now on TPN.  PPx: SCDs, SQH   Lines: Samantha left brachial (11/24-), PIVs  Wounds: Midline incision   PT/OT: unable to participate

## 2021-12-03 NOTE — PROGRESS NOTE ADULT - SUBJECTIVE AND OBJECTIVE BOX
ON: D10 stopped bc on TPN now, FBG 95>165, UOP 1L after lasix, NG unclogged - 700cc output.       SUBJECTIVE:   Pt seen by surgical + SICU team   intubated + sedated     MEDICATIONS  (STANDING):  albuterol/ipratropium for Nebulization 3 milliLiter(s) Nebulizer every 6 hours  chlorhexidine 0.12% Liquid 15 milliLiter(s) Oral Mucosa every 12 hours  chlorhexidine 4% Liquid 1 Application(s) Topical <User Schedule>  dexMEDEtomidine Infusion 0.2 MICROgram(s)/kG/Hr (3.61 mL/Hr) IV Continuous <Continuous>  dextrose 40% Gel 15 Gram(s) Oral once  dextrose 5%. 1000 milliLiter(s) (50 mL/Hr) IV Continuous <Continuous>  dextrose 5%. 1000 milliLiter(s) (100 mL/Hr) IV Continuous <Continuous>  dextrose 50% Injectable 25 Gram(s) IV Push once  dextrose 50% Injectable 12.5 Gram(s) IV Push once  dextrose 50% Injectable 25 Gram(s) IV Push once  fat emulsion (Plant Based) 20% Infusion 0.28 Gm/kG/Day (31.25 mL/Hr) IV Continuous <Continuous>  glucagon  Injectable 1 milliGRAM(s) IntraMuscular once  heparin   Injectable 5000 Unit(s) SubCutaneous every 8 hours  insulin lispro (ADMELOG) corrective regimen sliding scale   SubCutaneous every 6 hours  lidocaine   4% Patch 1 Patch Transdermal every 24 hours  metoclopramide Injectable 5 milliGRAM(s) IV Push every 6 hours  pantoprazole  Injectable 40 milliGRAM(s) IV Push daily  Parenteral Nutrition - Adult 1 Each (33 mL/Hr) TPN Continuous <Continuous>  trimethoprim / sulfamethoxazole IVPB 350 milliGRAM(s) IV Intermittent every 8 hours    MEDICATIONS  (PRN):  acetaminophen   IVPB .. 1000 milliGRAM(s) IV Intermittent once PRN Moderate Pain (4 - 6)  fentaNYL    Injectable 25 MICROGram(s) IV Push every 2 hours PRN Severe Pain (7 - 10)  ondansetron Injectable 4 milliGRAM(s) IV Push every 6 hours PRN Nausea  sodium chloride 0.9% lock flush 10 milliLiter(s) IV Push every 1 hour PRN Pre/post blood products, medications, blood draw, and to maintain line patency      Drips: precedex     ICU Vital Signs Last 24 Hrs  T(C): 37.4 (03 Dec 2021 06:39), Max: 37.6 (02 Dec 2021 22:15)  T(F): 99.3 (03 Dec 2021 06:39), Max: 99.7 (02 Dec 2021 22:15)  HR: 63 (03 Dec 2021 07:00) (55 - 84)  BP: 132/59 (02 Dec 2021 11:00) (112/56 - 141/61)  BP(mean): 85 (02 Dec 2021 11:00) (81 - 88)  ABP: 96/35 (03 Dec 2021 07:00) (87/35 - 155/62)  ABP(mean): 57 (03 Dec 2021 07:00) (54 - 100)  RR: 13 (03 Dec 2021 07:00) (10 - 35)  SpO2: 99% (03 Dec 2021 07:00) (99% - 100%)      Physical Exam:  General: NAD, resting  HEENT: NC/AT, EOMI, PERRLA,   Pulmonary: Nonlabored breathing, no respiratory distress, on AC  Cardiovascular: NSR, no murmurs  Abdominal: soft, NT/ND, incision C/D/I, NGT w bilious output, rectal tube w scant feculent output  Extremities: WWP, 5/5 strength x 4, no clubbing/cyanosis. PICC site c/di  Neuro: sedated      Lines/tubes/drains:  - PIV  - L Samantha   - PICC    Vent settings:  Mode: AC/ CMV (Assist Control/ Continuous Mandatory Ventilation), RR (machine): 12, TV (machine): 460, FiO2: 35, PEEP: 8, ITime: 1, MAP: 11, PIP: 25    I&O's Summary    02 Dec 2021 07:01  -  03 Dec 2021 07:00  --------------------------------------------------------  IN: 3235.2 mL / OUT: 2415 mL / NET: 820.2 mL    03 Dec 2021 07:01  -  03 Dec 2021 07:03  --------------------------------------------------------  IN: 34.8 mL / OUT: 0 mL / NET: 34.8 mL        LABS:                        7.6    10.15 )-----------( 233      ( 03 Dec 2021 05:26 )             24.3     12-03    135  |  104  |  8   ----------------------------<  177<H>  4.1   |  24  |  0.44<L>    Ca    8.0<L>      03 Dec 2021 05:26  Phos  2.3     12-03  Mg     1.7     12-03    TPro  x   /  Alb  2.6<L>  /  TBili  x   /  DBili  x   /  AST  x   /  ALT  x   /  AlkPhos  x   12-02        CAPILLARY BLOOD GLUCOSE      POCT Blood Glucose.: 161 mg/dL (03 Dec 2021 06:23)  POCT Blood Glucose.: 169 mg/dL (02 Dec 2021 22:22)  POCT Blood Glucose.: 92 mg/dL (02 Dec 2021 17:00)  POCT Blood Glucose.: 53 mg/dL (02 Dec 2021 16:59)  POCT Blood Glucose.: 124 mg/dL (02 Dec 2021 11:55)  POCT Blood Glucose.: 208 mg/dL (02 Dec 2021 07:48)  POCT Blood Glucose.: 99 mg/dL (02 Dec 2021 07:12)    LIVER FUNCTIONS - ( 02 Dec 2021 06:08 )  Alb: 2.6 g/dL / Pro: x     / ALK PHOS: x     / ALT: x     / AST: x     / GGT: x             Cultures:Culture Results:   Numerous Stenotrophomonas maltophilia  Susceptibility to follow. (12-01 @ 15:58)  Culture Results:   Numerous Stenotrophomonas maltophilia  Susceptibility to follow. (12-01 @ 12:56)      RADIOLOGY & ADDITIONAL STUDIES:

## 2021-12-03 NOTE — PROVIDER CONTACT NOTE (OTHER) - BACKGROUND
pt s/p umbilical hernia operation, vented on VC AC. Ordered to begin trickle feedings.
Pt had umbilical hernia repair and keeps failing CPAP trials

## 2021-12-03 NOTE — PROGRESS NOTE ADULT - SUBJECTIVE AND OBJECTIVE BOX
INTERVAL/OVERNIGHT EVENTS:  D10 stopped bc on TPN now, FBG 95>165, UOP 1L after lasix, NG unclogged - 700cc output.     SUBJECTIVE: Intubated, sedated    Neurologic Medications  acetaminophen   IVPB .. 1000 milliGRAM(s) IV Intermittent once PRN Moderate Pain (4 - 6)  dexMEDEtomidine Infusion 0.2 MICROgram(s)/kG/Hr IV Continuous <Continuous>  fentaNYL    Injectable 25 MICROGram(s) IV Push every 2 hours PRN Severe Pain (7 - 10)  metoclopramide Injectable 5 milliGRAM(s) IV Push every 6 hours  ondansetron Injectable 4 milliGRAM(s) IV Push every 6 hours PRN Nausea    Respiratory Medications  albuterol/ipratropium for Nebulization 3 milliLiter(s) Nebulizer every 6 hours    Cardiovascular Medications    Gastrointestinal Medications  dextrose 5%. 1000 milliLiter(s) IV Continuous <Continuous>  dextrose 5%. 1000 milliLiter(s) IV Continuous <Continuous>  fat emulsion (Plant Based) 20% Infusion 0.28 Gm/kG/Day IV Continuous <Continuous>  fat emulsion (Plant Based) 20% Infusion 0.28 Gm/kG/Day IV Continuous <Continuous>  pantoprazole  Injectable 40 milliGRAM(s) IV Push daily  Parenteral Nutrition - Adult 1 Each TPN Continuous <Continuous>  Parenteral Nutrition - Adult 1 Each TPN Continuous <Continuous>  sodium chloride 0.9% lock flush 10 milliLiter(s) IV Push every 1 hour PRN Pre/post blood products, medications, blood draw, and to maintain line patency    Genitourinary Medications    Hematologic/Oncologic Medications  heparin   Injectable 5000 Unit(s) SubCutaneous every 8 hours    Antimicrobial/Immunologic Medications  trimethoprim / sulfamethoxazole IVPB 350 milliGRAM(s) IV Intermittent every 8 hours    Endocrine/Metabolic Medications  dextrose 40% Gel 15 Gram(s) Oral once  dextrose 50% Injectable 25 Gram(s) IV Push once  dextrose 50% Injectable 12.5 Gram(s) IV Push once  dextrose 50% Injectable 25 Gram(s) IV Push once  glucagon  Injectable 1 milliGRAM(s) IntraMuscular once  insulin lispro (ADMELOG) corrective regimen sliding scale   SubCutaneous every 6 hours    Topical/Other Medications  chlorhexidine 0.12% Liquid 15 milliLiter(s) Oral Mucosa every 12 hours  chlorhexidine 4% Liquid 1 Application(s) Topical <User Schedule>  lidocaine   4% Patch 1 Patch Transdermal every 24 hours      MEDICATIONS  (PRN):  acetaminophen   IVPB .. 1000 milliGRAM(s) IV Intermittent once PRN Moderate Pain (4 - 6)  fentaNYL    Injectable 25 MICROGram(s) IV Push every 2 hours PRN Severe Pain (7 - 10)  ondansetron Injectable 4 milliGRAM(s) IV Push every 6 hours PRN Nausea  sodium chloride 0.9% lock flush 10 milliLiter(s) IV Push every 1 hour PRN Pre/post blood products, medications, blood draw, and to maintain line patency      I&O's Detail    02 Dec 2021 07:01  -  03 Dec 2021 07:00  --------------------------------------------------------  IN:    Dexmedetomidine: 46.8 mL    dextrose 10%: 330 mL    Fat Emulsion (Plant Based) 20%: 220.5 mL    IV PiggyBack: 800 mL    IV PiggyBack: 375 mL    Lactated Ringers Bolus: 1000 mL    TPN (Total Parenteral Nutrition): 462.9 mL  Total IN: 3235.2 mL    OUT:    Indwelling Catheter - Urethral (mL): 1565 mL    Nasogastric/Oral tube (mL): 850 mL    Rectal Tube (mL): 0 mL  Total OUT: 2415 mL    Total NET: 820.2 mL      03 Dec 2021 07:01  -  03 Dec 2021 10:37  --------------------------------------------------------  IN:    Dexmedetomidine: 1.8 mL    TPN (Total Parenteral Nutrition): 33 mL  Total IN: 34.8 mL    OUT:    Indwelling Catheter - Urethral (mL): 15 mL  Total OUT: 15 mL    Total NET: 19.8 mL          Vital Signs Last 24 Hrs  T(C): 37.4 (03 Dec 2021 09:00), Max: 37.6 (02 Dec 2021 22:15)  T(F): 99.3 (03 Dec 2021 09:00), Max: 99.7 (02 Dec 2021 22:15)  HR: 64 (03 Dec 2021 09:47) (55 - 84)  BP: 132/59 (02 Dec 2021 11:00) (132/59 - 132/59)  BP(mean): 85 (02 Dec 2021 11:00) (85 - 85)  RR: 15 (03 Dec 2021 09:47) (10 - 35)  SpO2: 100% (03 Dec 2021 09:47) (99% - 100%)    GENERAL: Intubated, sedated  HEENT: NGT bilious  C/V: Normal rate, normal peripheral perfusion  PULM: Intubated, no respiratory distress Mode: AC/ CMV RR (machine): 12, TV (machine): 460, FiO2: 35, PEEP: 5, ITime: 1, MAP: 8.5, PIP: 20  ABD: Soft, ND, incision c/d/i with staples  EXTREM: WWP, no edema, SCDs in place  NEURO: Sedated    LABS:                        7.6    10.15 )-----------( 233      ( 03 Dec 2021 05:26 )             24.3     12-03    135  |  104  |  8   ----------------------------<  177<H>  4.1   |  24  |  0.44<L>    Ca    8.0<L>      03 Dec 2021 05:26  Phos  2.3     12-03  Mg     1.7     12-03    TPro  x   /  Alb  2.6<L>  /  TBili  x   /  DBili  x   /  AST  x   /  ALT  x   /  AlkPhos  x   12-02          RADIOLOGY & ADDITIONAL STUDIES:      Culture - Bronchial (collected 12-01-21 @ 15:58)  Source: .Bronchial bronch sputum  Gram Stain (12-01-21 @ 18:04):    No epithelial cells seen    Few-moderate White blood cells    Few-moderate Gram Negative Rods  Preliminary Report (12-02-21 @ 12:16):    Numerous Stenotrophomonas maltophilia    Susceptibility to follow.    Culture - Sputum (collected 12-01-21 @ 12:56)  Source: .Sputum ETT sputum  Gram Stain (12-01-21 @ 17:57):    No epithelial cells seen    Few White blood cells    Rare Yeast    Few Gram Negative Rods  Preliminary Report (12-02-21 @ 12:21):    Numerous Stenotrophomonas maltophilia    Susceptibility to follow.    Culture - Blood (collected 11-28-21 @ 18:04)  Source: .Blood Blood-Peripheral  Preliminary Report (12-02-21 @ 19:00):    No growth at 4 days.    Culture - Blood (collected 11-28-21 @ 18:04)  Source: .Blood Blood-Peripheral  Preliminary Report (12-02-21 @ 19:00):    No growth at 4 days.

## 2021-12-03 NOTE — PROGRESS NOTE ADULT - ASSESSMENT
89 y/o F bedbound, prior DNR PMH HTN, HLD, OA, CAD on Asa, HF, chronic constipation, now s/p 11/24 for Exlap and Open  Umbilical hernia repair. Post-op remains intubated w Stenotrophomonas sputum, Currently w ileus. PICC line placed 12/2.     Neuro: Tylenol and Fentanyl PRN, Lidoderm patch; Home Remeron on hold, precedex gtt   CV: MAP >65   Pulm: Intubated since 11/24 AC 35/440/12/5; duonebs. Bronch Cx w Stenotrophomonas    GI/FEN: NPO/NGT to LIWS, TPN. Zofran, standing Reglan, PPI, rectal tube   : Liriano  ID: Stenotropomonas Sputum: Bactrim (12/2--):   Endo: ISS, Hypoglycemia, now on TPN.  PPx: SCDs, SQH   Lines: [place] left brachial (11/24-), PIVs PICC (12/2-)  Wounds: Midline incision   PT/OT: unable to participate   89 y/o F bedbound, prior DNR PMH HTN, HLD, OA, CAD on Asa, HF, chronic constipation, now s/p 11/24 for Exlap and Open  Umbilical hernia repair. Post-op remains intubated w Stenotrophomonas sputum, Currently w ileus. PICC line placed 12/2. Weak diaphragmatic excursion, CXR improved following diuresis but still failing CPAP trials, HCP amenable to trach.     Neuro: Tylenol and Fentanyl PRN, Lidoderm patch; Home Remeron on hold, precedex gtt   CV: MAP >65   Pulm: Intubated since 11/24 AC 35/440/12/5; duonebs. Bronch Cx w Stenotrophomonas    GI/FEN: NPO/NGT to LIWS, TPN. Zofran, standing Reglan, PPI, rectal tube. AXR to assess for airfluid levels  : Liriano  ID: Stenotropomonas Sputum: Bactrim (12/2--):   Endo: ISS, Hypoglycemia, now on TPN.  PPx: SCDs, SQH   Lines: [place] left brachial (11/24-), PIVs PICC (12/2-)  Wounds: Midline incision   PT/OT: unable to participate

## 2021-12-03 NOTE — PROVIDER CONTACT NOTE (OTHER) - SITUATION
Pt  ET to vent. Pt wrote on paper she wants to die.  ;Pt expressed these wishes to RN and respiratory therapist.
pt. has Jevity 1.2 running thru NG tube at 5cc/hr for three hours. After checking residual @ 1605. 50 cc pulled back.

## 2021-12-04 LAB
ANION GAP SERPL CALC-SCNC: 7 MMOL/L — SIGNIFICANT CHANGE UP (ref 5–17)
BUN SERPL-MCNC: 12 MG/DL — SIGNIFICANT CHANGE UP (ref 7–23)
CALCIUM SERPL-MCNC: 8 MG/DL — LOW (ref 8.4–10.5)
CHLORIDE SERPL-SCNC: 100 MMOL/L — SIGNIFICANT CHANGE UP (ref 96–108)
CO2 SERPL-SCNC: 26 MMOL/L — SIGNIFICANT CHANGE UP (ref 22–31)
CREAT SERPL-MCNC: 0.47 MG/DL — LOW (ref 0.5–1.3)
GLUCOSE BLDC GLUCOMTR-MCNC: 141 MG/DL — HIGH (ref 70–99)
GLUCOSE BLDC GLUCOMTR-MCNC: 157 MG/DL — HIGH (ref 70–99)
GLUCOSE BLDC GLUCOMTR-MCNC: 159 MG/DL — HIGH (ref 70–99)
GLUCOSE BLDC GLUCOMTR-MCNC: 191 MG/DL — HIGH (ref 70–99)
GLUCOSE BLDC GLUCOMTR-MCNC: 268 MG/DL — HIGH (ref 70–99)
GLUCOSE SERPL-MCNC: 196 MG/DL — HIGH (ref 70–99)
HCT VFR BLD CALC: 24.6 % — LOW (ref 34.5–45)
HGB BLD-MCNC: 7.8 G/DL — LOW (ref 11.5–15.5)
MAGNESIUM SERPL-MCNC: 1.7 MG/DL — SIGNIFICANT CHANGE UP (ref 1.6–2.6)
MCHC RBC-ENTMCNC: 27.9 PG — SIGNIFICANT CHANGE UP (ref 27–34)
MCHC RBC-ENTMCNC: 31.7 GM/DL — LOW (ref 32–36)
MCV RBC AUTO: 87.9 FL — SIGNIFICANT CHANGE UP (ref 80–100)
NRBC # BLD: 0 /100 WBCS — SIGNIFICANT CHANGE UP (ref 0–0)
PHOSPHATE SERPL-MCNC: 3.5 MG/DL — SIGNIFICANT CHANGE UP (ref 2.5–4.5)
PLATELET # BLD AUTO: 275 K/UL — SIGNIFICANT CHANGE UP (ref 150–400)
POTASSIUM SERPL-MCNC: 3.7 MMOL/L — SIGNIFICANT CHANGE UP (ref 3.5–5.3)
POTASSIUM SERPL-SCNC: 3.7 MMOL/L — SIGNIFICANT CHANGE UP (ref 3.5–5.3)
RBC # BLD: 2.8 M/UL — LOW (ref 3.8–5.2)
RBC # FLD: 16.6 % — HIGH (ref 10.3–14.5)
SODIUM SERPL-SCNC: 133 MMOL/L — LOW (ref 135–145)
URATE SERPL-MCNC: 2.3 MG/DL — LOW (ref 2.5–7)
WBC # BLD: 8.03 K/UL — SIGNIFICANT CHANGE UP (ref 3.8–10.5)
WBC # FLD AUTO: 8.03 K/UL — SIGNIFICANT CHANGE UP (ref 3.8–10.5)

## 2021-12-04 PROCEDURE — 99291 CRITICAL CARE FIRST HOUR: CPT

## 2021-12-04 PROCEDURE — 71045 X-RAY EXAM CHEST 1 VIEW: CPT | Mod: 26

## 2021-12-04 RX ORDER — FUROSEMIDE 40 MG
40 TABLET ORAL ONCE
Refills: 0 | Status: COMPLETED | OUTPATIENT
Start: 2021-12-04 | End: 2021-12-04

## 2021-12-04 RX ORDER — ELECTROLYTE SOLUTION,INJ
1 VIAL (ML) INTRAVENOUS
Refills: 0 | Status: DISCONTINUED | OUTPATIENT
Start: 2021-12-04 | End: 2021-12-04

## 2021-12-04 RX ORDER — MAGNESIUM SULFATE 500 MG/ML
2 VIAL (ML) INJECTION
Refills: 0 | Status: COMPLETED | OUTPATIENT
Start: 2021-12-04 | End: 2021-12-04

## 2021-12-04 RX ORDER — I.V. FAT EMULSION 20 G/100ML
0.28 EMULSION INTRAVENOUS
Qty: 50 | Refills: 0 | Status: DISCONTINUED | OUTPATIENT
Start: 2021-12-04 | End: 2021-12-04

## 2021-12-04 RX ORDER — POTASSIUM CHLORIDE 20 MEQ
20 PACKET (EA) ORAL EVERY 4 HOURS
Refills: 0 | Status: COMPLETED | OUTPATIENT
Start: 2021-12-04 | End: 2021-12-04

## 2021-12-04 RX ORDER — INSULIN LISPRO 100/ML
VIAL (ML) SUBCUTANEOUS EVERY 6 HOURS
Refills: 0 | Status: DISCONTINUED | OUTPATIENT
Start: 2021-12-04 | End: 2021-12-09

## 2021-12-04 RX ADMIN — Medication 50 GRAM(S): at 10:21

## 2021-12-04 RX ADMIN — LIDOCAINE 1 PATCH: 4 CREAM TOPICAL at 22:19

## 2021-12-04 RX ADMIN — HEPARIN SODIUM 5000 UNIT(S): 5000 INJECTION INTRAVENOUS; SUBCUTANEOUS at 15:53

## 2021-12-04 RX ADMIN — DEXMEDETOMIDINE HYDROCHLORIDE IN 0.9% SODIUM CHLORIDE 3.61 MICROGRAM(S)/KG/HR: 4 INJECTION INTRAVENOUS at 23:31

## 2021-12-04 RX ADMIN — Medication 50 GRAM(S): at 07:41

## 2021-12-04 RX ADMIN — PANTOPRAZOLE SODIUM 40 MILLIGRAM(S): 20 TABLET, DELAYED RELEASE ORAL at 13:34

## 2021-12-04 RX ADMIN — Medication 40 MILLIGRAM(S): at 00:35

## 2021-12-04 RX ADMIN — FENTANYL CITRATE 25 MICROGRAM(S): 50 INJECTION INTRAVENOUS at 06:01

## 2021-12-04 RX ADMIN — Medication 3 MILLILITER(S): at 04:17

## 2021-12-04 RX ADMIN — Medication 5 MILLIGRAM(S): at 05:58

## 2021-12-04 RX ADMIN — Medication 3 MILLILITER(S): at 16:47

## 2021-12-04 RX ADMIN — Medication 100 MILLIEQUIVALENT(S): at 10:09

## 2021-12-04 RX ADMIN — Medication 2: at 13:35

## 2021-12-04 RX ADMIN — Medication 100 MILLIEQUIVALENT(S): at 15:52

## 2021-12-04 RX ADMIN — Medication 1: at 05:59

## 2021-12-04 RX ADMIN — HEPARIN SODIUM 5000 UNIT(S): 5000 INJECTION INTRAVENOUS; SUBCUTANEOUS at 05:58

## 2021-12-04 RX ADMIN — CHLORHEXIDINE GLUCONATE 15 MILLILITER(S): 213 SOLUTION TOPICAL at 05:57

## 2021-12-04 RX ADMIN — LIDOCAINE 1 PATCH: 4 CREAM TOPICAL at 10:22

## 2021-12-04 RX ADMIN — I.V. FAT EMULSION 20.83 GM/KG/DAY: 20 EMULSION INTRAVENOUS at 22:07

## 2021-12-04 RX ADMIN — CHLORHEXIDINE GLUCONATE 1 APPLICATION(S): 213 SOLUTION TOPICAL at 06:59

## 2021-12-04 RX ADMIN — FENTANYL CITRATE 25 MICROGRAM(S): 50 INJECTION INTRAVENOUS at 01:00

## 2021-12-04 RX ADMIN — Medication 40 MILLIGRAM(S): at 22:08

## 2021-12-04 RX ADMIN — HEPARIN SODIUM 5000 UNIT(S): 5000 INJECTION INTRAVENOUS; SUBCUTANEOUS at 22:06

## 2021-12-04 RX ADMIN — FENTANYL CITRATE 25 MICROGRAM(S): 50 INJECTION INTRAVENOUS at 06:30

## 2021-12-04 RX ADMIN — Medication 3 MILLILITER(S): at 09:58

## 2021-12-04 RX ADMIN — Medication 40 MILLIGRAM(S): at 10:21

## 2021-12-04 RX ADMIN — Medication 3 MILLILITER(S): at 21:24

## 2021-12-04 RX ADMIN — Medication 1 EACH: at 19:22

## 2021-12-04 RX ADMIN — FENTANYL CITRATE 25 MICROGRAM(S): 50 INJECTION INTRAVENOUS at 15:30

## 2021-12-04 RX ADMIN — CHLORHEXIDINE GLUCONATE 15 MILLILITER(S): 213 SOLUTION TOPICAL at 19:23

## 2021-12-04 RX ADMIN — FENTANYL CITRATE 25 MICROGRAM(S): 50 INJECTION INTRAVENOUS at 20:40

## 2021-12-04 RX ADMIN — FENTANYL CITRATE 25 MICROGRAM(S): 50 INJECTION INTRAVENOUS at 01:30

## 2021-12-04 RX ADMIN — Medication 5 MILLIGRAM(S): at 13:35

## 2021-12-04 RX ADMIN — Medication 5 MILLIGRAM(S): at 19:23

## 2021-12-04 RX ADMIN — FENTANYL CITRATE 25 MICROGRAM(S): 50 INJECTION INTRAVENOUS at 20:10

## 2021-12-04 RX ADMIN — FENTANYL CITRATE 25 MICROGRAM(S): 50 INJECTION INTRAVENOUS at 14:39

## 2021-12-04 RX ADMIN — Medication 0.25 MILLIGRAM(S): at 09:30

## 2021-12-04 RX ADMIN — LIDOCAINE 1 PATCH: 4 CREAM TOPICAL at 19:16

## 2021-12-04 NOTE — PROGRESS NOTE ADULT - SUBJECTIVE AND OBJECTIVE BOX
S: s/p lasix 40 last night with good response.   No new issues/events overnight, no new med c/o    O: ICU Vital Signs Last 24 Hrs  T(F): 99.1 (12-04-21 @ 05:51), Max: 99.1 (12-03-21 @ 21:00)  HR: 65 (12-04-21 @ 08:40) (46 - 81)  BP: 87/52 (12-03-21 @ 14:43) (87/52 - 87/52)  BP(mean): 67 (12-03-21 @ 14:43) (67 - 67)  ABP: 115/44 (12-04-21 @ 08:00)  RR: 32 (12-04-21 @ 08:40) (9 - 32)  SpO2: 100% (12-04-21 @ 08:40) (97% - 100%)    PHYSICAL EXAM:   Neurological: when off sedation, awake alert, CNII-XII intact,  strength 5/5 b/l  ENT: mucus membrane moist  Cardiovascular: RRR  Respiratory: CTA  Gastrointestinal: soft, NT, minimal distension, BS+  Extremities: warm, mild dependent edema  Vascular: no cyanosis/erythema  Skin: no rashes  MSK: no joint swelling.     LABS:    12-04    133<L>  |  100  |  12  ----------------------------<  196<H>  3.7   |  26  |  0.47<L>    Ca    8.0<L>      04 Dec 2021 06:03  Phos  3.5     12-04  Mg     1.7     12-04                          7.8    8.03  )-----------( 275      ( 04 Dec 2021 06:03 )             24.6     CAPILLARY BLOOD GLUCOSE      POCT Blood Glucose.: 191 mg/dL (04 Dec 2021 05:52)  POCT Blood Glucose.: 165 mg/dL (03 Dec 2021 23:28)  POCT Blood Glucose.: 147 mg/dL (03 Dec 2021 18:07)  POCT Blood Glucose.: 141 mg/dL (03 Dec 2021 11:46)    MEDICATIONS  (STANDING):  albuterol/ipratropium for Nebulization 3 milliLiter(s) Nebulizer every 6 hours  chlorhexidine 0.12% Liquid 15 milliLiter(s) Oral Mucosa every 12 hours  chlorhexidine 4% Liquid 1 Application(s) Topical <User Schedule>  dexMEDEtomidine Infusion 0.2 MICROgram(s)/kG/Hr (3.61 mL/Hr) IV Continuous <Continuous>  fat emulsion (Plant Based) 20% Infusion 0.28 Gm/kG/Day (20.83 mL/Hr) IV Continuous <Continuous>  fat emulsion (Plant Based) 20% Infusion 0.28 Gm/kG/Day (20.83 mL/Hr) IV Continuous <Continuous>  glucagon  Injectable 1 milliGRAM(s) IntraMuscular once  heparin   Injectable 5000 Unit(s) SubCutaneous every 8 hours  insulin lispro (ADMELOG) corrective regimen sliding scale   SubCutaneous every 6 hours  levoFLOXacin IVPB      levoFLOXacin IVPB 750 milliGRAM(s) IV Intermittent every 24 hours  lidocaine   4% Patch 1 Patch Transdermal every 24 hours  LORazepam   Injectable 0.25 milliGRAM(s) IV Push once  magnesium sulfate  IVPB 2 Gram(s) IV Intermittent every 1 hour  metoclopramide Injectable 5 milliGRAM(s) IV Push every 6 hours  pantoprazole  Injectable 40 milliGRAM(s) IV Push daily  Parenteral Nutrition - Adult 1 Each (46 mL/Hr) TPN Continuous <Continuous>  Parenteral Nutrition - Adult 1 Each (54 mL/Hr) TPN Continuous <Continuous>  potassium chloride  20 mEq/100 mL IVPB 20 milliEquivalent(s) IV Intermittent every 4 hours    MEDICATIONS  (PRN):  acetaminophen   IVPB .. 1000 milliGRAM(s) IV Intermittent once PRN Moderate Pain (4 - 6)  fentaNYL    Injectable 25 MICROGram(s) IV Push every 2 hours PRN Severe Pain (7 - 10)  ondansetron Injectable 4 milliGRAM(s) IV Push every 6 hours PRN Nausea  sodium chloride 0.9% lock flush 10 milliLiter(s) IV Push every 1 hour PRN Pre/post blood products, medications, blood draw, and to maintain line patency      Liriano:	  [ ] None	[x ] Daily Liriano Order Placed	   Indication:	  [x ] Strict I and O's    [ ] Obstruction     [ ] Incontinence + Stage 3 or 4 Decubitus  Central Line:  [ ] None	   [x ]  Medication / TPN Administration     [ ] No Peripheral IV        S: s/p lasix 40 last night with good response. still has a lot of secretions via ETT per RN.   No new issues/events overnight, no new med c/o    O: ICU Vital Signs Last 24 Hrs  T(F): 99.1 (12-04-21 @ 05:51), Max: 99.1 (12-03-21 @ 21:00)  HR: 65 (12-04-21 @ 08:40) (46 - 81)  BP: 87/52 (12-03-21 @ 14:43) (87/52 - 87/52)  BP(mean): 67 (12-03-21 @ 14:43) (67 - 67)  ABP: 115/44 (12-04-21 @ 08:00)  RR: 32 (12-04-21 @ 08:40) (9 - 32)  SpO2: 100% (12-04-21 @ 08:40) (97% - 100%)    PHYSICAL EXAM:   Neurological: when off sedation, awake alert, CNII-XII intact,  strength 5/5 b/l  ENT: mucus membrane moist  Cardiovascular: RRR  Respiratory: CTA  Gastrointestinal: soft, NT, minimal distension, BS+  Extremities: warm, mild dependent edema  Vascular: no cyanosis/erythema  Skin: no rashes  MSK: no joint swelling.     LABS:    12-04    133<L>  |  100  |  12  ----------------------------<  196<H>  3.7   |  26  |  0.47<L>    Ca    8.0<L>      04 Dec 2021 06:03  Phos  3.5     12-04  Mg     1.7     12-04                          7.8    8.03  )-----------( 275      ( 04 Dec 2021 06:03 )             24.6     CAPILLARY BLOOD GLUCOSE      POCT Blood Glucose.: 191 mg/dL (04 Dec 2021 05:52)  POCT Blood Glucose.: 165 mg/dL (03 Dec 2021 23:28)  POCT Blood Glucose.: 147 mg/dL (03 Dec 2021 18:07)  POCT Blood Glucose.: 141 mg/dL (03 Dec 2021 11:46)    MEDICATIONS  (STANDING):  albuterol/ipratropium for Nebulization 3 milliLiter(s) Nebulizer every 6 hours  chlorhexidine 0.12% Liquid 15 milliLiter(s) Oral Mucosa every 12 hours  chlorhexidine 4% Liquid 1 Application(s) Topical <User Schedule>  dexMEDEtomidine Infusion 0.2 MICROgram(s)/kG/Hr (3.61 mL/Hr) IV Continuous <Continuous>  fat emulsion (Plant Based) 20% Infusion 0.28 Gm/kG/Day (20.83 mL/Hr) IV Continuous <Continuous>  fat emulsion (Plant Based) 20% Infusion 0.28 Gm/kG/Day (20.83 mL/Hr) IV Continuous <Continuous>  glucagon  Injectable 1 milliGRAM(s) IntraMuscular once  heparin   Injectable 5000 Unit(s) SubCutaneous every 8 hours  insulin lispro (ADMELOG) corrective regimen sliding scale   SubCutaneous every 6 hours  levoFLOXacin IVPB      levoFLOXacin IVPB 750 milliGRAM(s) IV Intermittent every 24 hours  lidocaine   4% Patch 1 Patch Transdermal every 24 hours  LORazepam   Injectable 0.25 milliGRAM(s) IV Push once  magnesium sulfate  IVPB 2 Gram(s) IV Intermittent every 1 hour  metoclopramide Injectable 5 milliGRAM(s) IV Push every 6 hours  pantoprazole  Injectable 40 milliGRAM(s) IV Push daily  Parenteral Nutrition - Adult 1 Each (46 mL/Hr) TPN Continuous <Continuous>  Parenteral Nutrition - Adult 1 Each (54 mL/Hr) TPN Continuous <Continuous>  potassium chloride  20 mEq/100 mL IVPB 20 milliEquivalent(s) IV Intermittent every 4 hours    MEDICATIONS  (PRN):  acetaminophen   IVPB .. 1000 milliGRAM(s) IV Intermittent once PRN Moderate Pain (4 - 6)  fentaNYL    Injectable 25 MICROGram(s) IV Push every 2 hours PRN Severe Pain (7 - 10)  ondansetron Injectable 4 milliGRAM(s) IV Push every 6 hours PRN Nausea  sodium chloride 0.9% lock flush 10 milliLiter(s) IV Push every 1 hour PRN Pre/post blood products, medications, blood draw, and to maintain line patency      Liriano:	  [ ] None	[x ] Daily Liriano Order Placed	   Indication:	  [x ] Strict I and O's    [ ] Obstruction     [ ] Incontinence + Stage 3 or 4 Decubitus  Central Line:  [ ] None	   [x ]  Medication / TPN Administration     [ ] No Peripheral IV

## 2021-12-04 NOTE — PROGRESS NOTE ADULT - SUBJECTIVE AND OBJECTIVE BOX
STATUS POST:  Exlap and Open repair of Umbilical Hernia.     ON: BCx NG 5 days. Given lasix 40 x1 @MN neg 1.3 l in am      SUBJECTIVE: Patient seen and examined bedside by chief resident. Intubated. No BM and on TPN    heparin   Injectable 5000 Unit(s) SubCutaneous every 8 hours  levoFLOXacin IVPB      levoFLOXacin IVPB 750 milliGRAM(s) IV Intermittent every 24 hours      Vital Signs Last 24 Hrs  T(C): 37.2 (04 Dec 2021 14:00), Max: 37.3 (03 Dec 2021 21:00)  T(F): 99 (04 Dec 2021 14:00), Max: 99.1 (03 Dec 2021 21:00)  HR: 94 (04 Dec 2021 20:00) (46 - 94)  BP: --  BP(mean): --  RR: 28 (04 Dec 2021 20:00) (9 - 43)  SpO2: 94% (04 Dec 2021 20:00) (92% - 100%)  I&O's Detail    03 Dec 2021 07:01  -  04 Dec 2021 07:00  --------------------------------------------------------  IN:    Dexmedetomidine: 75.6 mL    Fat Emulsion (Plant Based) 20%: 208 mL    IV PiggyBack: 450 mL    Jevity 1.2: 20 mL    TPN (Total Parenteral Nutrition): 974 mL  Total IN: 1727.6 mL    OUT:    Indwelling Catheter - Urethral (mL): 2575 mL    Nasogastric/Oral tube (mL): 700 mL  Total OUT: 3275 mL    Total NET: -1547.4 mL      04 Dec 2021 07:01  -  04 Dec 2021 20:25  --------------------------------------------------------  IN:    Dexmedetomidine: 23.4 mL    Fat Emulsion (Plant Based) 20%: 20.8 mL    TPN (Total Parenteral Nutrition): 614 mL  Total IN: 658.2 mL    OUT:    Indwelling Catheter - Urethral (mL): 1015 mL    Nasogastric/Oral tube (mL): 500 mL  Total OUT: 1515 mL    Total NET: -856.8 mL          Physical Exam:  General: No acute distress, intubated, awake  C/V: sinus bradycardia  Pulm: Nonlabored breathing, no respiratory distress  Abd: soft, non-tender, slight distension, incisions clean/dry/intact. NGT in place  Extrem: warm and well perfused, b/l LE edema     LABS:                        7.8    8.03  )-----------( 275      ( 04 Dec 2021 06:03 )             24.6     12-04    133<L>  |  100  |  12  ----------------------------<  196<H>  3.7   |  26  |  0.47<L>    Ca    8.0<L>      04 Dec 2021 06:03  Phos  3.5     12-04  Mg     1.7     12-04            RADIOLOGY & ADDITIONAL STUDIES:    89 y/o F baseline aaox3, bedbound, PMH HTN, HLD, OA, CAD on Asa, HF, chronic constipation, admited with incarcerated umbilical hernia, s/p 11/24 for Exlap Open Umbilical hernia repair. Post-op with respiratory failure, remains intubated w stenotropomonas sputum, and course c/b episodes of SVT's (afib vs atrial tach) and ileus.     - c/w lasix    Neuro: Tylenol and Fentanyl PRN, Lidoderm patch; Home Remeron on hold, precedex gtt   CV: MAP >65, Hx CAD, HTN, HFpEF; pulm htn (TTE 11/29, LVEF 60-65%, mod TR, PASP 72), episodes of SVT, afib vs atrial tach, EP consulted, NTD    Pulm: Intubated since 11/24 on AC 35/440/12/5; duonebs  GI/FEN: NPO/NGT LIWS/TPN. Zofran, standing Reglan, PPI, Rectal tube removed on 12/4 2* hard stool.   : Liriano  ID: Stenotropomonas Sputum: Levaquin (12/3--) ///dc: Bactrim (12/2-12/3): Coverage for translocation of bacteria: Ceftriaxone (11/24-11/26, 11/30-12/2).  Endo: ISS,   PPx: SCDs, SQH   Lines: [place] left brachial (11/24-), PIVs  Wounds: Midline incision   PT/OT: ordered for early mobility.

## 2021-12-04 NOTE — PROGRESS NOTE ADULT - ASSESSMENT
89 y/o F baseline aaox3, bedbound, PMH HTN, HLD, OA, CAD on Asa, HF, chronic constipation, admited with incarcerated umbilical hernia, s/p 11/24 for Exlap Open Umbilical hernia repair. Post-op with respiratory failure, remains intubated w stenotropomonas sputum, and course c/b episodes of SVT's (afib vs atrial tach) and ileus.     Neuro: Tylenol and Fentanyl PRN, Lidoderm patch; Home Remeron on hold while NPO. precedex gtt   CV: MAP >65, Hx CAD, HTN, HFpEF; pulmonary hypertension with PASP 72, (TTE 11/29, LVEF 60-65%, mod TR, PASP 72), episodes of SVT, afib vs atrial tach, EP consulted. no further episodes in past few days.    Pulm: Intubated since 11/24 on AC 35/440/12/5; duonebs. s/p bronch. stenotrohomonas on levaquin. lasix diuresis   GI/FEN: ileus, attempted trickle TF yesterday, but failed again with high residual. cont NPO/NGT LIWS/TPN/lipids, standing Reglan, PPI,   : [place]  ID: Stenotropomonas Sputum: Levaquin 750mg qdaily  Endo: ISS,   PPx: SCDs, SQH   Lines: Samantha left brachial (11/24-), PIVs  Wounds: Midline incision   PT/OT: unable to participate

## 2021-12-05 LAB
ANION GAP SERPL CALC-SCNC: 7 MMOL/L — SIGNIFICANT CHANGE UP (ref 5–17)
BUN SERPL-MCNC: 16 MG/DL — SIGNIFICANT CHANGE UP (ref 7–23)
CALCIUM SERPL-MCNC: 7.9 MG/DL — LOW (ref 8.4–10.5)
CHLORIDE SERPL-SCNC: 103 MMOL/L — SIGNIFICANT CHANGE UP (ref 96–108)
CO2 SERPL-SCNC: 27 MMOL/L — SIGNIFICANT CHANGE UP (ref 22–31)
CREAT SERPL-MCNC: 0.51 MG/DL — SIGNIFICANT CHANGE UP (ref 0.5–1.3)
GLUCOSE BLDC GLUCOMTR-MCNC: 129 MG/DL — HIGH (ref 70–99)
GLUCOSE BLDC GLUCOMTR-MCNC: 133 MG/DL — HIGH (ref 70–99)
GLUCOSE BLDC GLUCOMTR-MCNC: 135 MG/DL — HIGH (ref 70–99)
GLUCOSE BLDC GLUCOMTR-MCNC: 140 MG/DL — HIGH (ref 70–99)
GLUCOSE SERPL-MCNC: 129 MG/DL — HIGH (ref 70–99)
HCT VFR BLD CALC: 23.6 % — LOW (ref 34.5–45)
HGB BLD-MCNC: 7.5 G/DL — LOW (ref 11.5–15.5)
MAGNESIUM SERPL-MCNC: 2 MG/DL — SIGNIFICANT CHANGE UP (ref 1.6–2.6)
MCHC RBC-ENTMCNC: 28.6 PG — SIGNIFICANT CHANGE UP (ref 27–34)
MCHC RBC-ENTMCNC: 31.8 GM/DL — LOW (ref 32–36)
MCV RBC AUTO: 90.1 FL — SIGNIFICANT CHANGE UP (ref 80–100)
NRBC # BLD: 0 /100 WBCS — SIGNIFICANT CHANGE UP (ref 0–0)
PHOSPHATE SERPL-MCNC: 3.8 MG/DL — SIGNIFICANT CHANGE UP (ref 2.5–4.5)
PLATELET # BLD AUTO: 315 K/UL — SIGNIFICANT CHANGE UP (ref 150–400)
POTASSIUM SERPL-MCNC: 4.1 MMOL/L — SIGNIFICANT CHANGE UP (ref 3.5–5.3)
POTASSIUM SERPL-SCNC: 4.1 MMOL/L — SIGNIFICANT CHANGE UP (ref 3.5–5.3)
RBC # BLD: 2.62 M/UL — LOW (ref 3.8–5.2)
RBC # FLD: 16.6 % — HIGH (ref 10.3–14.5)
SODIUM SERPL-SCNC: 137 MMOL/L — SIGNIFICANT CHANGE UP (ref 135–145)
WBC # BLD: 8.47 K/UL — SIGNIFICANT CHANGE UP (ref 3.8–10.5)
WBC # FLD AUTO: 8.47 K/UL — SIGNIFICANT CHANGE UP (ref 3.8–10.5)

## 2021-12-05 PROCEDURE — 71045 X-RAY EXAM CHEST 1 VIEW: CPT | Mod: 26

## 2021-12-05 PROCEDURE — 99233 SBSQ HOSP IP/OBS HIGH 50: CPT | Mod: GC

## 2021-12-05 RX ORDER — ELECTROLYTE SOLUTION,INJ
1 VIAL (ML) INTRAVENOUS
Refills: 0 | Status: DISCONTINUED | OUTPATIENT
Start: 2021-12-05 | End: 2021-12-05

## 2021-12-05 RX ORDER — FUROSEMIDE 40 MG
40 TABLET ORAL ONCE
Refills: 0 | Status: COMPLETED | OUTPATIENT
Start: 2021-12-05 | End: 2021-12-05

## 2021-12-05 RX ORDER — I.V. FAT EMULSION 20 G/100ML
0.28 EMULSION INTRAVENOUS
Qty: 50 | Refills: 0 | Status: DISCONTINUED | OUTPATIENT
Start: 2021-12-05 | End: 2021-12-05

## 2021-12-05 RX ADMIN — Medication 2: at 00:08

## 2021-12-05 RX ADMIN — HEPARIN SODIUM 5000 UNIT(S): 5000 INJECTION INTRAVENOUS; SUBCUTANEOUS at 06:11

## 2021-12-05 RX ADMIN — Medication 3 MILLILITER(S): at 23:03

## 2021-12-05 RX ADMIN — Medication 5 MILLIGRAM(S): at 11:20

## 2021-12-05 RX ADMIN — Medication 40 MILLIGRAM(S): at 09:23

## 2021-12-05 RX ADMIN — HEPARIN SODIUM 5000 UNIT(S): 5000 INJECTION INTRAVENOUS; SUBCUTANEOUS at 22:05

## 2021-12-05 RX ADMIN — Medication 5 MILLIGRAM(S): at 06:12

## 2021-12-05 RX ADMIN — Medication 5 MILLIGRAM(S): at 23:50

## 2021-12-05 RX ADMIN — PANTOPRAZOLE SODIUM 40 MILLIGRAM(S): 20 TABLET, DELAYED RELEASE ORAL at 12:02

## 2021-12-05 RX ADMIN — Medication 5 MILLIGRAM(S): at 00:07

## 2021-12-05 RX ADMIN — LIDOCAINE 1 PATCH: 4 CREAM TOPICAL at 18:14

## 2021-12-05 RX ADMIN — HEPARIN SODIUM 5000 UNIT(S): 5000 INJECTION INTRAVENOUS; SUBCUTANEOUS at 14:19

## 2021-12-05 RX ADMIN — I.V. FAT EMULSION 20.83 GM/KG/DAY: 20 EMULSION INTRAVENOUS at 22:06

## 2021-12-05 RX ADMIN — LIDOCAINE 1 PATCH: 4 CREAM TOPICAL at 21:00

## 2021-12-05 RX ADMIN — LIDOCAINE 1 PATCH: 4 CREAM TOPICAL at 09:47

## 2021-12-05 RX ADMIN — Medication 3 MILLILITER(S): at 17:38

## 2021-12-05 RX ADMIN — Medication 3 MILLILITER(S): at 11:20

## 2021-12-05 RX ADMIN — Medication 5 MILLIGRAM(S): at 17:38

## 2021-12-05 RX ADMIN — Medication 3 MILLILITER(S): at 06:02

## 2021-12-05 RX ADMIN — CHLORHEXIDINE GLUCONATE 15 MILLILITER(S): 213 SOLUTION TOPICAL at 06:11

## 2021-12-05 RX ADMIN — Medication 1 EACH: at 17:39

## 2021-12-05 NOTE — PROGRESS NOTE ADULT - ASSESSMENT
89 y/o F baseline AAOx3, bedbound, PMH HTN, HLD, OA, CAD on ASA, HF, chronic constipation, admitted with incarcerated umbilical hernia, s/p 11/24 for exlap with open Umbilical hernia repair. Post-op with respiratory failure, requiring intubation w/ stenotropomonas sputum, and post operative ileus.    continue CPAP trials with goal to extubate  continue lasix as needed  PT   continue coverage for stenotrophomonas pneumonia (levaquin)  continue TPN due to ileus, with goal to feed with first signs of bowel function

## 2021-12-05 NOTE — PHYSICAL THERAPY INITIAL EVALUATION ADULT - ADDITIONAL COMMENTS
As per  note pt is long term resident at [facility] since April 2020.  prior to having COVID patient was able to ambulate with walker/wheelchair and was independent with ADLs.

## 2021-12-05 NOTE — PHYSICAL THERAPY INITIAL EVALUATION ADULT - GENERAL OBSERVATIONS, REHAB EVAL
Pt received semi supine in bed +NG tube +tele +A line +SCD +4L NC +bahena. PT received consent to treat from IRAM Sapp. Pt was left as received wit call bell in reach, VSS, and in NAD. PT to follow up.

## 2021-12-05 NOTE — PHYSICAL THERAPY INITIAL EVALUATION ADULT - PERTINENT HX OF CURRENT PROBLEM, REHAB EVAL
89 y/o F baseline AAOx3, bedbound, PMH HTN, HLD, OA, CAD on ASA, HF, chronic constipation, admitted with incarcerated umbilical hernia, s/p 11/24 for exlap with open Umbilical hernia repair. Post-op with respiratory failure, requiring intubation w/ stenotropomonas sputum, and post operative ileus.

## 2021-12-05 NOTE — PROGRESS NOTE ADULT - ASSESSMENT
89 y/o F baseline aaox3, bedbound, PMH HTN, HLD, OA, CAD on Asa, HF, chronic constipation, admited with incarcerated umbilical hernia, s/p 11/24 for Exlap Open Umbilical hernia repair. Post-op with respiratory failure, remains intubated w stenotropomonas sputum, and course c/b episodes of SVT's (afib vs atrial tach) and ileus.     Neuro: Tylenol and Fentanyl PRN, Lidoderm patch; Home Remeron on hold, precedex gtt   CV: MAP >65, Hx CAD, HTN, HFpEF; pulm htn (TTE 11/29, LVEF 60-65%, mod TR, PASP 72), episodes of SVT, afib vs atrial tach, EP consulted, NTD    Pulm: Intubated since 11/24 on AC 35/370/12/5; duonebs CPAP: 40%10/5 ( wean PS as tolerated)   GI/FEN: NPO/NGT LIWS/TPN. Zofran, standing Reglan, PPI, Rectal tube removed on 12/4 2* hard stool.   : Liriano  ID: Stenotropomonas Sputum: Levaquin (12/3--) ///dc: Bactrim (12/2-12/3): Coverage for translocation of bacteria: Ceftriaxone (11/24-11/26, 11/30-12/2).  Endo: ISS,   PPx: SCDs, SQH   Lines: [place] left brachial (11/24-), PIVs  Wounds: Midline incision   PT/OT: ordered for early mobility.  89 y/o F baseline aaox3, bedbound, PMH HTN, HLD, OA, CAD on Asa, HF, chronic constipation, admited with incarcerated umbilical hernia, s/p 11/24 for Exlap Open Umbilical hernia repair. Post-op with respiratory failure, remains intubated w stenotropomonas sputum, and course c/b episodes of SVT's (afib vs atrial tach) and ileus.     Neuro: Tylenol and Fentanyl PRN, Lidoderm patch; Home Remeron on hold, precedex gtt   CV: MAP >65, Hx CAD, HTN, HFpEF; pulm htn (TTE 11/29, LVEF 60-65%, mod TR, PASP 72), episodes of SVT, afib vs atrial tach, EP consulted, NTD    Pulm: Intubated since 11/24 on AC 35/370/12/5; duonebs CPAP: 40%10/5 ( wean PS as tolerated) Trial extubation today if tolerated remove Samantha/bahena  GI/FEN: NPO/NGT LIWS/TPN. Zofran, standing Reglan, PPI, Rectal tube removed on 12/4 2* hard stool.   : Bahena  ID: Stenotropomonas Sputum: Levaquin (12/3--) ///dc: Bactrim (12/2-12/3): Coverage for translocation of bacteria: Ceftriaxone (11/24-11/26, 11/30-12/2).  Endo: ISS,   PPx: SCDs, SQH   Lines: Samantha left brachial (11/24-), PIVs  Wounds: Midline incision   PT/OT: ordered for early mobility.

## 2021-12-05 NOTE — PROGRESS NOTE ADULT - SUBJECTIVE AND OBJECTIVE BOX
S: Pt reports feeling well, wanting the ET tube out, pain is well controlled with medications. No HA/Cp/SOB.     Vitals: ICU Vital Signs Last 24 Hrs  T(C): 37.6 (05 Dec 2021 05:02), Max: 37.7 (05 Dec 2021 01:01)  T(F): 99.7 (05 Dec 2021 05:02), Max: 99.9 (05 Dec 2021 01:01)  HR: 59 (05 Dec 2021 08:00) (59 - 94)  BP: --  BP(mean): --  ABP: 95/34 (05 Dec 2021 08:00) (89/40 - 160/68)  ABP(mean): 56 (05 Dec 2021 08:00) (55 - 105)  RR: 14 (05 Dec 2021 08:00) (14 - 43)  SpO2: 100% (05 Dec 2021 08:00) (92% - 100%)            I&O:  I&O's Detail    04 Dec 2021 07:01  -  05 Dec 2021 07:00  --------------------------------------------------------  IN:    Dexmedetomidine: 46.8 mL    Fat Emulsion (Plant Based) 20%: 208 mL    Fat Emulsion (Plant Based) 20%: 20.8 mL    TPN (Total Parenteral Nutrition): 1208 mL  Total IN: 1483.6 mL    OUT:    Indwelling Catheter - Urethral (mL): 2070 mL    Nasogastric/Oral tube (mL): 500 mL  Total OUT: 2570 mL    Total NET: -1086.4 mL      05 Dec 2021 07:01  -  05 Dec 2021 08:42  --------------------------------------------------------  IN:    Fat Emulsion (Plant Based) 20%: 20.8 mL    TPN (Total Parenteral Nutrition): 54 mL  Total IN: 74.8 mL    OUT:    Dexmedetomidine: 0 mL    Indwelling Catheter - Urethral (mL): 30 mL    Nasogastric/Oral tube (mL): 50 mL  Total OUT: 80 mL    Total NET: -5.2 mL          Mode: AC/ CMV (Assist Control/ Continuous Mandatory Ventilation)  RR (machine): 12  TV (machine): 370  FiO2: 35  PEEP: 5  ITime: 1  MAP: 8.6  PIP: 18    CAPILLARY BLOOD GLUCOSE      POCT Blood Glucose.: 140 mg/dL (05 Dec 2021 06:15)  POCT Blood Glucose.: 159 mg/dL (04 Dec 2021 23:54)  POCT Blood Glucose.: 268 mg/dL (04 Dec 2021 21:56)  POCT Blood Glucose.: 141 mg/dL (04 Dec 2021 18:04)  POCT Blood Glucose.: 157 mg/dL (04 Dec 2021 11:42)      Labs:                         7.5    8.47  )-----------( 315      ( 05 Dec 2021 05:37 )             23.6   12-05    137  |  103  |  16  ----------------------------<  129<H>  4.1   |  27  |  0.51    Ca    7.9<L>      05 Dec 2021 05:37  Phos  3.8     12-05  Mg     2.0     12-05          Electrolytes repleated to goals as follows: Potassium 4, Phosphorus 3 and Magnesium 2 where appropriate and necessary    Exam:  Neuro: A&Ox3, NAD, grossly neuro intact  HEENT: PERRL < EOMI, MMM  Neck: Supple  CV: RRR, no MRGs, decreased breath sounds at bases b/l  Pulm: CTAB, no wheezes, rales, or rhonchi  Abd: ND, Soft, NT, no rebound or guarding, midline incision closed with staples, c/d/i  : Liriano in place  Ext: No edema peripherally or centrally  Vasc: Extremities warm to palpation, 2+ pulses - radial and PT  MSK: no joint swelling  Skin: no rash  Psych: affect appropriate

## 2021-12-05 NOTE — PROGRESS NOTE ADULT - SUBJECTIVE AND OBJECTIVE BOX
DAILY PROGRESS NOTE:    INTERVAL HPI/OVERNIGHT EVENTS:    STATUS POST:      POST OPERATIVE DAY #:     SUBJECTIVE:  Flatus:   Bowel movement:   Pain controlled: YES   Nausea: NO            Vomiting: NO  Diarrhea: NO         Chest Pain: NO    SOB: NO    MEDICATIONS  (STANDING):  albuterol/ipratropium for Nebulization 3 milliLiter(s) Nebulizer every 6 hours  chlorhexidine 4% Liquid 1 Application(s) Topical <User Schedule>  dextrose 40% Gel 15 Gram(s) Oral once  dextrose 5%. 1000 milliLiter(s) (100 mL/Hr) IV Continuous <Continuous>  dextrose 5%. 1000 milliLiter(s) (50 mL/Hr) IV Continuous <Continuous>  dextrose 50% Injectable 25 Gram(s) IV Push once  dextrose 50% Injectable 25 Gram(s) IV Push once  dextrose 50% Injectable 12.5 Gram(s) IV Push once  fat emulsion (Plant Based) 20% Infusion 0.28 Gm/kG/Day (20.83 mL/Hr) IV Continuous <Continuous>  fat emulsion (Plant Based) 20% Infusion 0.28 Gm/kG/Day (20.83 mL/Hr) IV Continuous <Continuous>  glucagon  Injectable 1 milliGRAM(s) IntraMuscular once  heparin   Injectable 5000 Unit(s) SubCutaneous every 8 hours  insulin lispro (ADMELOG) corrective regimen sliding scale   SubCutaneous every 6 hours  levoFLOXacin IVPB      levoFLOXacin IVPB 750 milliGRAM(s) IV Intermittent every 24 hours  lidocaine   4% Patch 1 Patch Transdermal every 24 hours  metoclopramide Injectable 5 milliGRAM(s) IV Push every 6 hours  pantoprazole  Injectable 40 milliGRAM(s) IV Push daily  Parenteral Nutrition - Adult 1 Each (54 mL/Hr) TPN Continuous <Continuous>  Parenteral Nutrition - Adult 1 Each (54 mL/Hr) TPN Continuous <Continuous>    MEDICATIONS  (PRN):  acetaminophen   IVPB .. 1000 milliGRAM(s) IV Intermittent once PRN Moderate Pain (4 - 6)  fentaNYL    Injectable 25 MICROGram(s) IV Push every 2 hours PRN Severe Pain (7 - 10)  ondansetron Injectable 4 milliGRAM(s) IV Push every 6 hours PRN Nausea  sodium chloride 0.9% lock flush 10 milliLiter(s) IV Push every 1 hour PRN Pre/post blood products, medications, blood draw, and to maintain line patency      Vital Signs Last 24 Hrs  T(C): 36.6 (05 Dec 2021 14:00), Max: 37.7 (05 Dec 2021 01:01)  T(F): 97.9 (05 Dec 2021 14:00), Max: 99.9 (05 Dec 2021 01:01)  HR: 83 (05 Dec 2021 14:00) (59 - 95)  BP: --  BP(mean): --  RR: 25 (05 Dec 2021 14:00) (14 - 31)  SpO2: 100% (05 Dec 2021 14:00) (91% - 100%)    I&O's Detail    04 Dec 2021 07:01  -  05 Dec 2021 07:00  --------------------------------------------------------  IN:    Dexmedetomidine: 46.8 mL    Fat Emulsion (Plant Based) 20%: 20.8 mL    Fat Emulsion (Plant Based) 20%: 208 mL    TPN (Total Parenteral Nutrition): 1208 mL  Total IN: 1483.6 mL    OUT:    Indwelling Catheter - Urethral (mL): 2070 mL    Nasogastric/Oral tube (mL): 500 mL  Total OUT: 2570 mL    Total NET: -1086.4 mL      05 Dec 2021 07:01  -  05 Dec 2021 14:22  --------------------------------------------------------  IN:    Fat Emulsion (Plant Based) 20%: 41.6 mL    TPN (Total Parenteral Nutrition): 216 mL  Total IN: 257.6 mL    OUT:    Dexmedetomidine: 0 mL    Indwelling Catheter - Urethral (mL): 735 mL    Nasogastric/Oral tube (mL): 50 mL  Total OUT: 785 mL    Total NET: -527.4 mL    Physical Exam:      NAD, awake, trying to communicated despite being intubated. follows commands  intubated  Abdomen soft, nondistended. staples in place. no evidence of fluid collection below staples. no drainage  extremities- moving arms freely  skin- nondiaphoretic, warm    LABS:                        7.5    8.47  )-----------( 315      ( 05 Dec 2021 05:37 )             23.6     12-05    137  |  103  |  16  ----------------------------<  129<H>  4.1   |  27  |  0.51    Ca    7.9<L>      05 Dec 2021 05:37  Phos  3.8     12-05  Mg     2.0     12-05            RADIOLOGY & ADDITIONAL STUDIES:

## 2021-12-06 LAB
ANION GAP SERPL CALC-SCNC: 5 MMOL/L — SIGNIFICANT CHANGE UP (ref 5–17)
BUN SERPL-MCNC: 17 MG/DL — SIGNIFICANT CHANGE UP (ref 7–23)
CALCIUM SERPL-MCNC: 8 MG/DL — LOW (ref 8.4–10.5)
CHLORIDE SERPL-SCNC: 107 MMOL/L — SIGNIFICANT CHANGE UP (ref 96–108)
CO2 SERPL-SCNC: 30 MMOL/L — SIGNIFICANT CHANGE UP (ref 22–31)
CREAT SERPL-MCNC: 0.41 MG/DL — LOW (ref 0.5–1.3)
GLUCOSE BLDC GLUCOMTR-MCNC: 138 MG/DL — HIGH (ref 70–99)
GLUCOSE BLDC GLUCOMTR-MCNC: 161 MG/DL — HIGH (ref 70–99)
GLUCOSE BLDC GLUCOMTR-MCNC: 163 MG/DL — HIGH (ref 70–99)
GLUCOSE BLDC GLUCOMTR-MCNC: 170 MG/DL — HIGH (ref 70–99)
GLUCOSE BLDC GLUCOMTR-MCNC: >600 MG/DL — CRITICAL HIGH (ref 70–99)
GLUCOSE SERPL-MCNC: 149 MG/DL — HIGH (ref 70–99)
HCT VFR BLD CALC: 26 % — LOW (ref 34.5–45)
HGB BLD-MCNC: 7.8 G/DL — LOW (ref 11.5–15.5)
MAGNESIUM SERPL-MCNC: 1.8 MG/DL — SIGNIFICANT CHANGE UP (ref 1.6–2.6)
MCHC RBC-ENTMCNC: 27.7 PG — SIGNIFICANT CHANGE UP (ref 27–34)
MCHC RBC-ENTMCNC: 30 GM/DL — LOW (ref 32–36)
MCV RBC AUTO: 92.2 FL — SIGNIFICANT CHANGE UP (ref 80–100)
NRBC # BLD: 0 /100 WBCS — SIGNIFICANT CHANGE UP (ref 0–0)
PHOSPHATE SERPL-MCNC: 3.2 MG/DL — SIGNIFICANT CHANGE UP (ref 2.5–4.5)
PLATELET # BLD AUTO: 344 K/UL — SIGNIFICANT CHANGE UP (ref 150–400)
POTASSIUM SERPL-MCNC: 4.4 MMOL/L — SIGNIFICANT CHANGE UP (ref 3.5–5.3)
POTASSIUM SERPL-SCNC: 4.4 MMOL/L — SIGNIFICANT CHANGE UP (ref 3.5–5.3)
RBC # BLD: 2.82 M/UL — LOW (ref 3.8–5.2)
RBC # FLD: 16.8 % — HIGH (ref 10.3–14.5)
SODIUM SERPL-SCNC: 142 MMOL/L — SIGNIFICANT CHANGE UP (ref 135–145)
WBC # BLD: 10.44 K/UL — SIGNIFICANT CHANGE UP (ref 3.8–10.5)
WBC # FLD AUTO: 10.44 K/UL — SIGNIFICANT CHANGE UP (ref 3.8–10.5)

## 2021-12-06 PROCEDURE — 99223 1ST HOSP IP/OBS HIGH 75: CPT

## 2021-12-06 PROCEDURE — 99233 SBSQ HOSP IP/OBS HIGH 50: CPT | Mod: GC

## 2021-12-06 PROCEDURE — 71045 X-RAY EXAM CHEST 1 VIEW: CPT | Mod: 26

## 2021-12-06 PROCEDURE — 99497 ADVNCD CARE PLAN 30 MIN: CPT | Mod: 25

## 2021-12-06 PROCEDURE — 99358 PROLONG SERVICE W/O CONTACT: CPT | Mod: NC

## 2021-12-06 RX ORDER — CHLORHEXIDINE GLUCONATE 213 G/1000ML
1 SOLUTION TOPICAL DAILY
Refills: 0 | Status: DISCONTINUED | OUTPATIENT
Start: 2021-12-06 | End: 2021-12-09

## 2021-12-06 RX ORDER — MAGNESIUM SULFATE 500 MG/ML
1 VIAL (ML) INJECTION ONCE
Refills: 0 | Status: COMPLETED | OUTPATIENT
Start: 2021-12-06 | End: 2021-12-06

## 2021-12-06 RX ORDER — I.V. FAT EMULSION 20 G/100ML
0.28 EMULSION INTRAVENOUS
Qty: 50 | Refills: 0 | Status: DISCONTINUED | OUTPATIENT
Start: 2021-12-06 | End: 2021-12-06

## 2021-12-06 RX ORDER — ELECTROLYTE SOLUTION,INJ
1 VIAL (ML) INTRAVENOUS
Refills: 0 | Status: DISCONTINUED | OUTPATIENT
Start: 2021-12-06 | End: 2021-12-06

## 2021-12-06 RX ORDER — FUROSEMIDE 40 MG
40 TABLET ORAL ONCE
Refills: 0 | Status: COMPLETED | OUTPATIENT
Start: 2021-12-06 | End: 2021-12-06

## 2021-12-06 RX ADMIN — Medication 5 MILLIGRAM(S): at 18:07

## 2021-12-06 RX ADMIN — Medication 3 MILLILITER(S): at 03:55

## 2021-12-06 RX ADMIN — HEPARIN SODIUM 5000 UNIT(S): 5000 INJECTION INTRAVENOUS; SUBCUTANEOUS at 06:13

## 2021-12-06 RX ADMIN — Medication 3 MILLILITER(S): at 21:11

## 2021-12-06 RX ADMIN — I.V. FAT EMULSION 20.83 GM/KG/DAY: 20 EMULSION INTRAVENOUS at 22:05

## 2021-12-06 RX ADMIN — Medication 3 MILLILITER(S): at 16:55

## 2021-12-06 RX ADMIN — Medication 3 MILLILITER(S): at 10:04

## 2021-12-06 RX ADMIN — Medication 5 MILLIGRAM(S): at 06:14

## 2021-12-06 RX ADMIN — Medication 100 GRAM(S): at 06:40

## 2021-12-06 RX ADMIN — Medication 2: at 18:41

## 2021-12-06 RX ADMIN — PANTOPRAZOLE SODIUM 40 MILLIGRAM(S): 20 TABLET, DELAYED RELEASE ORAL at 11:30

## 2021-12-06 RX ADMIN — LIDOCAINE 1 PATCH: 4 CREAM TOPICAL at 23:05

## 2021-12-06 RX ADMIN — Medication 2: at 12:15

## 2021-12-06 RX ADMIN — Medication 1 EACH: at 19:15

## 2021-12-06 RX ADMIN — LIDOCAINE 1 PATCH: 4 CREAM TOPICAL at 11:34

## 2021-12-06 RX ADMIN — Medication 40 MILLIGRAM(S): at 08:20

## 2021-12-06 RX ADMIN — LIDOCAINE 1 PATCH: 4 CREAM TOPICAL at 18:10

## 2021-12-06 RX ADMIN — HEPARIN SODIUM 5000 UNIT(S): 5000 INJECTION INTRAVENOUS; SUBCUTANEOUS at 22:05

## 2021-12-06 RX ADMIN — Medication 5 MILLIGRAM(S): at 11:31

## 2021-12-06 RX ADMIN — HEPARIN SODIUM 5000 UNIT(S): 5000 INJECTION INTRAVENOUS; SUBCUTANEOUS at 15:40

## 2021-12-06 NOTE — BH CONSULTATION LIAISON ASSESSMENT NOTE - CASE SUMMARY
89yo F hx incarcerated hernia with repair 11/24, resp failure, recently extubated.  Psychiatry consulted to eval depression.  Pt seen with writer and PGY4 and MS3, with [place]ian phone  [phone].  Pt was sitting in chair, polite, conversant, attentive, describes stable mood, has been feeling lonely but not depressed, no SI, strongly Restorationism, connected to family, no constellation of sx significant for MDE, no elicited woody or psychosis.  Pt future oriented, no anhedonia, loves singing, etc.  No indication for inpatient psychiatric admission or antidepressant medication.  Will see  for support.  Outpt SPOP referral may be helpful for support following discharge.  Please call if questions.

## 2021-12-06 NOTE — BH CONSULTATION LIAISON ASSESSMENT NOTE - SUMMARY
89 year old  F, bedbound, domiciled at [facility], PMH of HTN, HLD, OA, CAD on Asa, HF, chronic constipation, h/o metabolic encephalopathy September 2021, has MOLST DNR in September 2021 which was rescinded 11/24/21 by HCP ivon Caldwell, admited for incarcerated umbilical hernia, s/p 11/24 for Exlap Open Umbilical hernia repair. Post-op with respiratory failure w stenotropomonas sputum--now extubated, and course c/b episodes of SVT's (afib vs atrial tach) and ileus. Patient is consulted to psychiatry due to concerns of untreated depression endorsed by family which may impair her decisional capacity. As per primary team, patient is A&Ox3 at baseline and her mentation has improved. Patient is also being followed by Palliative care ([id number]) Dr Escobar.    Upon evaluation, patient reports good mood with occasional sadness or loneliness in the context of being distanced from her family members. She still reports good sleep, appetite, being able to enjoy singing/dancing. She denies past/current suicidal/homicidal ideation/intent/plan. She is future focused, and her Shinto is a protective factor for her. She is fully oriented to self, time, and situation, knows that she is in the hospital but does not know the name. She intends to continue with primary team's recommendations for her treatment.    Current impression is that patient has Adjustment disorder with depressed mood, likely secondary to current medical condition. She does not meet criteria for major depressive disorder/bipolar disorder/ late life psychosis at this time. She is at low risk to harm self or others.    Recommendations:  - No standing medications/prn needed. Patient in behavioral control  - Recommend involving Amish  to support patient during hospital stay  - Recommend collaboration with palliative care and NOK to discuss goals of care  - If patient is amenable, patient may benefit from being seen by nursing home psychiatrist upon discharge  - Psychiatry will sign off. Please reconsult psychiatry if needed

## 2021-12-06 NOTE — BH CONSULTATION LIAISON ASSESSMENT NOTE - NSBHCHARTREVIEWVS_PSY_A_CORE FT
Vital Signs Last 24 Hrs  T(C): 36.8 (06 Dec 2021 05:01), Max: 37.1 (05 Dec 2021 21:13)  T(F): 98.3 (06 Dec 2021 05:01), Max: 98.7 (05 Dec 2021 21:13)  HR: 103 (06 Dec 2021 16:00) (79 - 116)  BP: 150/69 (06 Dec 2021 15:21) (115/61 - 150/69)  BP(mean): 94 (06 Dec 2021 15:21) (82 - 94)  RR: 47 (06 Dec 2021 16:00) (25 - 47)  SpO2: 100% (06 Dec 2021 16:00) (94% - 100%)

## 2021-12-06 NOTE — CONSULT NOTE ADULT - CONVERSATION DETAILS
SERVICES ID #[id number]    Discussed advanced directives and current hospitalization. Asked about desired care at the end of life related to cardiac arrest and respiratory failure. Patient states she would not want CPR if her heart stops and she states she does not wish to be placed back on a ventilator. She explicitly stated that she understood that she would likely die if she refused intubation if deemed medically necessary. She is very Pentecostalism and believes God will dictate when her life should end and she does not wish to interfere with those plans.

## 2021-12-06 NOTE — BH CONSULTATION LIAISON ASSESSMENT NOTE - HPI (INCLUDE ILLNESS QUALITY, SEVERITY, DURATION, TIMING, CONTEXT, MODIFYING FACTORS, ASSOCIATED SIGNS AND SYMPTOMS)
89 year old  F, bedbound, domiciled at [facility], PMH of HTN, HLD, OA, CAD on Asa, HF, chronic constipation, h/o metabolic encephalopathy 2021, has MOLST DNR in 2021 which was rescinded 21 by HCP ivon Caldwell, admited for incarcerated umbilical hernia, s/p  for Exlap Open Umbilical hernia repair. Post-op with respiratory failure w stenotropomonas sputum--now extubated, and course c/b episodes of SVT's (afib vs atrial tach) and ileus. Patient is consulted to psychiatry due to concerns of untreated depression endorsed by family which may impair her decisional capacity. As per primary team, patient is A&Ox3 at baseline and her mentation has improved. Patient is also being followed by Palliative care ([id number]) Dr Escobar.    Patient seen with Attending and medical student using  service. Patient is very pleasant, friendly, calm, and cooperative, in good spirits. Initially she apologized as she reports being mildly confused but says she is more alert than before. She does not know what brought her to the hospital but is glad to feel better now, she does not know the name of the hospital but knows the day, date, month, year, and her name and . She noticed her voice changed 2/2 intubation. She denies ever seeing a psychiatrist before, any past psychiatric history or hospitalizations. No past medication trials, family history of SMI, or substance use. Patient reports her mood has been sad/lonely in the context of missing her family members; she is concerned that they have not visited because they did not know she was in the hospital. She says that she still finds sridhar in dancing and singing at NH although feels  from her family and wants to be with them despite knowing that they may not be able to live with her. Patient says that her energy, sleep have been good prior to hospitalization. She denies paranoia/ideas of reference/visual/auditory hallucinations. When asked about past suicidality, patient responds "I have written a letter stating that I am ready when God takes me but my sister became upset, she said for me to look at the positive side in life". Patient herself finds several things to live for: God, her sister, her niece, and her other family members. Patient says she is a devout Restorationism and has never had any thoughts to harm her life or made any preparations to end her life before. She says she would continue to live until God allows. She agrees to continue with treatment during hospitalization. When asked how psychiatry could support her during this admission, patient requests for writer to accompany and befriend her so that she would feel less lonely, she does not want to take any medications. She is amenable for Restorationism  to come and pray with her, and she wants to call her family rather than for writer to call them instead.

## 2021-12-06 NOTE — BH CONSULTATION LIAISON ASSESSMENT NOTE - NSBHCONSULTPRIMARYDISCUSSYES_PSY_A_CORE FT
Spoke with Dr Babb [phone] regarding findings. Recommend to include palliative care Dr Escobar [phone] to discuss GOC.

## 2021-12-06 NOTE — CONSULT NOTE ADULT - PROBLEM SELECTOR RECOMMENDATION 5
.  Complex medical decision making in the setting of critical illness.    Decision made to be DNR.  Will continue to follow for ongoing GOC discussion.   Emotional support provided, questions answered.  Active Psychosocial Referrals: SW    Coping: [x] well [ ] with difficulty [ ] poor coping   Support system: [ ] strong [x] adequate [ ] inadequate    For new or uncontrolled symptoms, please call Palliative Care at [phone]. The service is available 24/7 (including nights & weekends) to provide symptom management recommendations over the phone as appropriate

## 2021-12-06 NOTE — BH CONSULTATION LIAISON ASSESSMENT NOTE - NSBHREFERDETAILS_PSY_A_CORE_FT
Concerns for untreated depression, patient unwilling to be intubated anymore. HCP states patient has depression and that in the past she has written "I want to die"

## 2021-12-06 NOTE — BH CONSULTATION LIAISON ASSESSMENT NOTE - NSBHCHARTREVIEWINVESTIGATE_PSY_A_CORE FT
CT scan 09/21: no acute intracranial pathology, parenchyme and sulci WNL. chronic ischemic microangiopathic disease.

## 2021-12-06 NOTE — SWALLOW BEDSIDE ASSESSMENT ADULT - NS SPL SWALLOW CLINIC TRIAL FT
Pt presents with essentially functional olivier-pharyngeal swallow for her age with no overt signs of airway protection deficits. Given current deconditioning and prolonged NPO period during intubation, recommend to initiate minced and moist diet with thin liquids. this service will continue to monitor tolerance and determine candidacy for diet upgrade in 1-2 days.

## 2021-12-06 NOTE — CONSULT NOTE ADULT - ASSESSMENT
Full Note to Follow    ·	patient expressed her desire to not receive CPR in the event of cardiac arrest and not be placed back on a ventilator in the event of respiratory failure; she repeated back understanding that she would die without those interventions, states she has thought about these heroic measures in the past, she explained she did not understand the events of her hospitalization until it was explained today  ·	will follow-up with patient tomorrow to see if patient feels the same and will coordinate with family to ensure this is being handled collaboratively -> threshold to say patient has capacity to make this particular decision is not high especially since she had expressed this desire in the past but need to include family to maintain functional provide-caregiver relationship and to prevent them from revoking in the future if MOLST is formalized again Full Note to Follow    ·	patient expressed her desire to not receive CPR in the event of cardiac arrest and not be placed back on a ventilator in the event of respiratory failure; she repeated back understanding that she would die without those interventions, states she has thought about these heroic measures in the past and accepts that these matters are up to God, she explained she did not understand the events of her hospitalization until they were explained today  ·	will follow-up with patient tomorrow to see if patient feels the same and will coordinate with family to ensure this is being handled collaboratively -> threshold to say patient has capacity to make this particular decision is not high especially since she had expressed this desire in the past but need to include family to maintain functional provide-caregiver relationship and to prevent them from revoking in the future if MOLST is formalized again 89yo F with PMH of HTN, HLD, and OA p/w incarcerated hernia requiring emergent surgery. Palliative consulted for complex medical decision making in the setting of advanced illness.    ·	patient expressed her desire to not receive CPR in the event of cardiac arrest and not be placed back on a ventilator in the event of respiratory failure; she repeated back understanding that she would die without those interventions, states she has thought about these heroic measures in the past and accepts that these matters are up to God, she explained she did not understand the events of her hospitalization until they were explained today  ·	will follow-up with patient tomorrow to see if patient feels the same and will coordinate with family to ensure this is being handled collaboratively -> threshold to say patient has capacity to make this particular decision is not high especially since she had expressed this desire in the past but need to include family to maintain functional provide-caregiver relationship and to prevent them from revoking MOLST in the future

## 2021-12-06 NOTE — BH CONSULTATION LIAISON ASSESSMENT NOTE - CURRENT MEDICATION
MEDICATIONS  (STANDING):  albuterol/ipratropium for Nebulization 3 milliLiter(s) Nebulizer every 6 hours  chlorhexidine 4% Liquid 1 Application(s) Topical <User Schedule>  dextrose 40% Gel 15 Gram(s) Oral once  dextrose 5%. 1000 milliLiter(s) (50 mL/Hr) IV Continuous <Continuous>  dextrose 5%. 1000 milliLiter(s) (100 mL/Hr) IV Continuous <Continuous>  dextrose 50% Injectable 25 Gram(s) IV Push once  dextrose 50% Injectable 12.5 Gram(s) IV Push once  dextrose 50% Injectable 25 Gram(s) IV Push once  fat emulsion (Plant Based) 20% Infusion 0.28 Gm/kG/Day (20.83 mL/Hr) IV Continuous <Continuous>  glucagon  Injectable 1 milliGRAM(s) IntraMuscular once  heparin   Injectable 5000 Unit(s) SubCutaneous every 8 hours  insulin lispro (ADMELOG) corrective regimen sliding scale   SubCutaneous every 6 hours  levoFLOXacin IVPB      levoFLOXacin IVPB 750 milliGRAM(s) IV Intermittent every 24 hours  lidocaine   4% Patch 1 Patch Transdermal every 24 hours  metoclopramide Injectable 5 milliGRAM(s) IV Push every 6 hours  pantoprazole  Injectable 40 milliGRAM(s) IV Push daily  Parenteral Nutrition - Adult 1 Each (54 mL/Hr) TPN Continuous <Continuous>  Parenteral Nutrition - Adult 1 Each (54 mL/Hr) TPN Continuous <Continuous>    MEDICATIONS  (PRN):  acetaminophen   IVPB .. 1000 milliGRAM(s) IV Intermittent once PRN Moderate Pain (4 - 6)  fentaNYL    Injectable 25 MICROGram(s) IV Push every 2 hours PRN Severe Pain (7 - 10)  ondansetron Injectable 4 milliGRAM(s) IV Push every 6 hours PRN Nausea  sodium chloride 0.9% lock flush 10 milliLiter(s) IV Push every 1 hour PRN Pre/post blood products, medications, blood draw, and to maintain line patency

## 2021-12-06 NOTE — CONSULT NOTE ADULT - REASON FOR ADMISSION
SBO, strangulated umbilical hernia
Yes

## 2021-12-06 NOTE — PROGRESS NOTE ADULT - ASSESSMENT
89 y/o F baseline aaox3, bedbound, PMH HTN, HLD, OA, CAD on Asa, HF, chronic constipation, admitted with incarcerated umbilical hernia, s/p 11/24 for Exlap Open Umbilical hernia repair. Post-op with respiratory failure w Stenotrophomonas sputum, extubated 12/5, ileus resolved. Continues to be AAOx3, will have psych see today to formally comment on capacity to make medical decisions.     Neuro: Tylenol and Fentanyl PRN, Lidoderm patch; Home Remeron on hold.  CV: MAP >65, lasix 40mg PRN  Pulm: Extubated on 12/5 to NC, duonebs PRN,   GI/FEN: NPO/NGT LIWS, TPN. Zofran, standing Reglan, PPI,   : Deandra  ID: Stenotropomonas Sputum: Levaquin (12/3--)   Endo: ISS  PPx: SCDs, SQH   Lines: Samantha left brachial (11/24-), PIVs  Wounds: Midline incision   PT/OT: ordered

## 2021-12-06 NOTE — PROGRESS NOTE ADULT - SUBJECTIVE AND OBJECTIVE BOX
SUBJECTIVE: LUCA overnight, Patient seen and examined at bedside. soing well this am, continues to have pain in left knee, and had BM this morning NGT with 250 cc out. no nausea vomting, or abd pain.     heparin   Injectable 5000 Unit(s) SubCutaneous every 8 hours  levoFLOXacin IVPB      levoFLOXacin IVPB 750 milliGRAM(s) IV Intermittent every 24 hours    MEDICATIONS  (PRN):  acetaminophen   IVPB .. 1000 milliGRAM(s) IV Intermittent once PRN Moderate Pain (4 - 6)  fentaNYL    Injectable 25 MICROGram(s) IV Push every 2 hours PRN Severe Pain (7 - 10)  ondansetron Injectable 4 milliGRAM(s) IV Push every 6 hours PRN Nausea  sodium chloride 0.9% lock flush 10 milliLiter(s) IV Push every 1 hour PRN Pre/post blood products, medications, blood draw, and to maintain line patency      I&O's Detail    04 Dec 2021 07:01  -  05 Dec 2021 07:00  --------------------------------------------------------  IN:    Dexmedetomidine: 46.8 mL    Fat Emulsion (Plant Based) 20%: 208 mL    Fat Emulsion (Plant Based) 20%: 20.8 mL    TPN (Total Parenteral Nutrition): 1208 mL  Total IN: 1483.6 mL    OUT:    Indwelling Catheter - Urethral (mL): 2070 mL    Nasogastric/Oral tube (mL): 500 mL  Total OUT: 2570 mL    Total NET: -1086.4 mL      05 Dec 2021 07:01  -  06 Dec 2021 06:34  --------------------------------------------------------  IN:    Fat Emulsion (Plant Based) 20%: 41.6 mL    Fat Emulsion (Plant Based) 20%: 187.2 mL    IV PiggyBack: 100 mL    TPN (Total Parenteral Nutrition): 1242 mL  Total IN: 1570.8 mL    OUT:    Dexmedetomidine: 0 mL    Indwelling Catheter - Urethral (mL): 2010 mL    Nasogastric/Oral tube (mL): 250 mL  Total OUT: 2260 mL    Total NET: -689.2 mL          T(C): 36.8 (12-06-21 @ 01:05), Max: 37.4 (12-05-21 @ 10:00)  HR: 101 (12-06-21 @ 06:00) (59 - 124)  BP: --  RR: 37 (12-06-21 @ 06:00) (14 - 45)  SpO2: 98% (12-06-21 @ 06:00) (91% - 100%)    GENERAL: NAD, Resting comfortably in bed, awake, opens eyes spontaneously  HEENT: NCAT, MMM, Normal conjunctiva, PERRL  RESP: Nonlabored breathing, No respiratory distress  CARD: Normal rate, Normal peripheral perfusion  GI: Soft, ND, NT, No guarding, No rebound tenderness  EXTREM: WWP, No edema, No gross deformity of extremities  SKIN: No rashes, no lesions  NEURO: AAOx3, No focal motor or sensory deficits  PSYCH: Affect and characteristics of appearance, verbalizations, and behaviors are appropriate    LABS:                        7.8    10.44 )-----------( 344      ( 06 Dec 2021 05:35 )             26.0     12-06    142  |  107  |  17  ----------------------------<  149<H>  4.4   |  30  |  0.41<L>    Ca    8.0<L>      06 Dec 2021 05:35  Phos  3.2     12-06  Mg     1.8     12-06            RADIOLOGY & ADDITIONAL STUDIES:      Culture - Bronchial (collected 12-01-21 @ 15:58)  Source: .Bronchial bronch sputum  Gram Stain (12-01-21 @ 18:04):    No epithelial cells seen    Few-moderate White blood cells    Few-moderate Gram Negative Rods  Final Report (12-03-21 @ 11:23):    Numerous Stenotrophomonas maltophilia    No routine respiratory deborah Isolated  Organism: Stenotrophomonas maltophilia  Stenotrophomonas maltophilia (12-03-21 @ 11:23)  Organism: Stenotrophomonas maltophilia (12-03-21 @ 11:23)      Method Type:   Organism: Stenotrophomonas maltophilia (12-03-21 @ 11:23)      -  Ceftazidime: R >16      -  Levofloxacin: S <=0.5      -  Trimethoprim/Sulfamethoxazole: S <=0.5/9.5      Method Type: ASHOK    Culture - Sputum (collected 12-01-21 @ 12:56)  Source: .Sputum ETT sputum  Gram Stain (12-01-21 @ 17:57):    No epithelial cells seen    Few White blood cells    Rare Yeast    Few Gram Negative Rods  Final Report (12-03-21 @ 11:16):    Numerous Stenotrophomonas maltophilia    Accompanied by normal respiratory deborah  Organism: Stenotrophomonas maltophilia  Stenotrophomonas maltophilia (12-03-21 @ 11:16)  Organism: Stenotrophomonas maltophilia (12-03-21 @ 11:16)      Method Type: KB  Organism: Stenotrophomonas maltophilia (12-03-21 @ 11:16)      -  Ceftazidime: R >16      -  Levofloxacin: S <=0.5      -  Trimethoprim/Sulfamethoxazole: S <=0.5/9.5      Method Type: ASHOK

## 2021-12-06 NOTE — CONSULT NOTE ADULT - PROBLEM SELECTOR RECOMMENDATION 9
.  PPSV = 40%, requires assistance for most ADLs  -PT Eval when appropriate  -c/w supportive care, OOB, encourage movement

## 2021-12-06 NOTE — CONSULT NOTE ADULT - SUBJECTIVE AND OBJECTIVE BOX
[facility]iatrics and Palliative Care  Fredy Escobar, Palliative Care Attending  Contact Info: Call [phone] (HEAL Line) or message on Microsoft Teams (Fredy Escobar)    HPI:  89yoF bedbound, prior DNR with PMH HTN, HLD, OA, CAD on Asa, HFpEF (65-70%), chronic constipation, recent admission to [facility] for metabolic encephalopathy, acute and chronic resp failure with hypercapnia requiring intubation (9/5-9/15) and no PSH per report who presents from [facility] for constipation and emesis. Per nursing home report to ED, patient has been groaning in pain and pointing to L side, emesis since Monday nonbloody. Patient is responsive to voice and touch, oriented to person and time but not place or full situation. Able to localized pain to abdomen. Unable to detail last BM or flatus. Per Paulette over phone, abdominal pain since Saturday, began emesis on Monday. As far as family knows, normal BMs once every 2d until Saturday but then less frequent. States that patient is typically is very alert and oriented. Was at baseline mental status until last night and called to tell nieces she is excruciating pain followed by decline in mental status, tx to [facility] for further evaluation. In ED, multiple brown, nonbloody liquid BMs and no masses or impacted stool on WAYNE by ED. HCP Paulette and Shannon Bowers (niece) [phone] **Despite MOLST form, family wishes to revoke DNR and continue trial of intubation, they are amenable to surgery and want "everything under the sun" done.  (24 Nov 2021 14:12)    [facility]  SERVICES ID #[id number] (12/6/21 @ 5:12PM)  Patient seen and examined in CCU. Patient is alert and interactive.    PERTINENT PM/SXH:   HTN (hypertension)  HLD (hyperlipidemia)  Osteoarthritis  Hyponatremia  Altered mental status  Urinary tract infection  HF (heart failure)    FAMILY HISTORY:  Non-contributory in first degree relatives    ITEMS NOT CHECKED ARE NOT PRESENT    SOCIAL HISTORY:   Significant other/partner:  []  Children:  []   Substance hx:  []   Tobacco hx:  []   Alcohol hx: []   Home Opioid hx:  [] I-Stop Reference No:  - no active Rx's / see chart note  Living Situation: []Home  []Long term care  []Rehab []Other  Druze/Spiritual practice: ; Role of organized [employer] [ ] important [ ] some [ ] unable to assess  Coping: [ ] well [ ] with difficulty [ ] poor coping [ ] unable to assess  Support system: [ ] strong [ ] adequate [ ] inadequate    ADVANCE DIRECTIVES:    []MOLST  []Living Will  DECISION MAKER(s):  [] Health Care Proxy(s)  [] Surrogate(s)  [] Guardian           Name(s)/Phone Number(s):     BASELINE (I)ADLs (prior to admission):  [place]: []Total  [] Moderate []Dependent    ALLERGIES:  iodine containing compounds (Unknown)  lisinopril (Unknown)    MEDICATIONS  (STANDING):  albuterol/ipratropium for Nebulization 3 milliLiter(s) Nebulizer every 6 hours  chlorhexidine 4% Liquid 1 Application(s) Topical <User Schedule>  dextrose 40% Gel 15 Gram(s) Oral once  dextrose 5%. 1000 milliLiter(s) (50 mL/Hr) IV Continuous <Continuous>  dextrose 5%. 1000 milliLiter(s) (100 mL/Hr) IV Continuous <Continuous>  dextrose 50% Injectable 25 Gram(s) IV Push once  dextrose 50% Injectable 12.5 Gram(s) IV Push once  dextrose 50% Injectable 25 Gram(s) IV Push once  fat emulsion (Plant Based) 20% Infusion 0.28 Gm/kG/Day (20.83 mL/Hr) IV Continuous <Continuous>  glucagon  Injectable 1 milliGRAM(s) IntraMuscular once  heparin   Injectable 5000 Unit(s) SubCutaneous every 8 hours  insulin lispro (ADMELOG) corrective regimen sliding scale   SubCutaneous every 6 hours  levoFLOXacin IVPB      levoFLOXacin IVPB 750 milliGRAM(s) IV Intermittent every 24 hours  lidocaine   4% Patch 1 Patch Transdermal every 24 hours  metoclopramide Injectable 5 milliGRAM(s) IV Push every 6 hours  pantoprazole  Injectable 40 milliGRAM(s) IV Push daily  Parenteral Nutrition - Adult 1 Each (54 mL/Hr) TPN Continuous <Continuous>  Parenteral Nutrition - Adult 1 Each (54 mL/Hr) TPN Continuous <Continuous>    MEDICATIONS  (PRN):  acetaminophen   IVPB .. 1000 milliGRAM(s) IV Intermittent once PRN Moderate Pain (4 - 6)  fentaNYL    Injectable 25 MICROGram(s) IV Push every 2 hours PRN Severe Pain (7 - 10)  ondansetron Injectable 4 milliGRAM(s) IV Push every 6 hours PRN Nausea  sodium chloride 0.9% lock flush 10 milliLiter(s) IV Push every 1 hour PRN Pre/post blood products, medications, blood draw, and to maintain line patency    PRESENT SYMPTOMS: []Unable to obtain due to poor mentation/encephalopathy  Source if other than patient:  []Family   []Team     Pain: [ ] yes [ ] no  QOL Impact -   Location -                    Aggravating Factors -  Quality -  Radiation -  Timing -  Severity (0-10 scale) -   Minimal Acceptable Level (0-10 scale) -    PAIN AD Score:  [email or web address]/[email or web address].pdf (press ctrl +  left click to view)    Dyspnea:                           []Mild  []Moderate []Severe  Anxiety:                             []Mild []Moderate []Severe  Fatigue:                             []Mild []Moderate []Severe  Nausea:                             []Mild []Moderate []Severe  Loss of Appetite:              []Mild []Moderate []Severe  Constipation:                    []Mild []Moderate []Severe    Other Symptoms:  [x]All Other Review Of Systems Negative     Palliative Performance Status Version 2:  %    http://npcrc.org/files/news/[email or web address]_performance_scale_[email or web address].pdf    PHYSICAL EXAM:  GENERAL:  []Alert  []Oriented x   []Lethargic  []Cachexia  []Unarousable  []Verbal  []Non-Verbal  Behavioral:   []Anxiety  []Delirium []Agitation []Cooperative  HEENT:  []Normal   []Dry mouth   []ET Tube/Trach  []Oral lesions  PULMONARY:   []Clear []Tachypnea  []Audible excessive secretions   []Rhonchi        []Right []Left []Bilateral  []Crackles        []Right []Left []Bilateral  []Wheezing     []Right []Left []Bilateral  CARDIOVASCULAR:    []Regular []Irregular []Tachy  []Al []Murmur []Other  GASTROINTESTINAL:  []Soft  []Distended   []+BS  []Non tender []Tender  []PEG []OGT/ NGT  Last BM:   GENITOURINARY:  []Normal [] Incontinent   []Oliguria/Anuria   []Liriano  MUSCULOSKELETAL:   []Normal   []Weakness  []Bed/Wheelchair bound []Edema  NEUROLOGIC:   []No focal deficits  []Cognitive impairment  []Dysphagia []Dysarthria []Paresis []Encephalopathic   SKIN:   []Normal   []Pressure ulcer(s)  []Rash    CRITICAL CARE:  [ ]Shock Present  [ ]Septic [ ]Cardiogenic [ ]Neurologic [ ]Hypovolemic  [ ]Vasopressors [ ]Inotropes   [ ]Respiratory failure present [ ]Mechanical Ventilation [ ]Non-invasive ventilatory support [ ]High-Flow  [ ]Acute  [ ]Chronic [ ]Hypoxic  [ ]Hypercarbic  [ ]Other organ failure    Vital Signs Last 24 Hrs  T(C): 36.4 (06 Dec 2021 17:38), Max: 37.1 (05 Dec 2021 21:13)  T(F): 97.5 (06 Dec 2021 17:38), Max: 98.7 (05 Dec 2021 21:13)  HR: 95 (06 Dec 2021 17:00) (79 - 116)  BP: 150/69 (06 Dec 2021 15:21) (115/61 - 150/69)  BP(mean): 94 (06 Dec 2021 15:21) (82 - 94)  RR: 39 (06 Dec 2021 17:00) (25 - 47)  SpO2: 100% (06 Dec 2021 17:00) (94% - 100%) I&O's Summary    05 Dec 2021 07:01  -  06 Dec 2021 07:00  --------------------------------------------------------  IN: 1645.6 mL / OUT: 2560 mL / NET: -914.4 mL    06 Dec 2021 07:01  -  06 Dec 2021 17:47  --------------------------------------------------------  IN: 835.6 mL / OUT: 2280 mL / NET: -1444.4 mL    LABS:                        7.8    10.44 )-----------( 344      ( 06 Dec 2021 05:35 )             26.0   12-06    142  |  107  |  17  ----------------------------<  149<H>  4.4   |  30  |  0.41<L>    Ca    8.0<L>      06 Dec 2021 05:35  Phos  3.2     12-06  Mg     1.8     12-06    RADIOLOGY & ADDITIONAL STUDIES:  < from: CT Abdomen and Pelvis No Cont (11.24.21 @ 11:25) >  Evaluation of the lower chest demonstrates small left pleural effusion. Bibasilar atelectasis. Cardiomegaly. Enlargement of the main pulmonary artery consistent with pulmonary arterial hypertension. Severe coronary arterial calcification. Moderate calcification of the aortic valve, which correlate with degree of aortic stenosis.  Evaluation of the abdomen demonstrates the unenhanced liver, spleen, pancreas and bilateral adrenal glands are unremarkable. Evaluation of the gastrointestinal tract demonstrates severe dilatation of the stomach and small bowel. There is a periumbilical hernia with decompressed loops located distal to the exiting loop and dilatation of loops proximally to the entering loop. There is extensive stool throughout the large bowel. There are extensive diverticula.  Evaluation of the pelvis demonstrates the uterus is unremarkable. There is a left adnexal cyst measuring 4.5 cm. Evaluation of the pelvis demonstrates the bladder is collapsed around Liriano catheter balloon. Enlarged myomatous uterus. Large volume of stool within the large bowel. Edematous infiltration within the perirectal region. There is severe aortic and iliac vascular calcification. There is degenerative change of the bilateral sacroiliac joints. There is no destructive osseous lesion.    IMPRESSION:  High-grade small bowel obstruction at site of periumbilical hernia.  Left adnexal cyst, which meets size criteria for sonographic correlation, when clinically appropriate    REFERRALS:  [x]Social Work  []Case management []PT/OT []Chaplaincy  []Hospice  []Patient/Family Support    Care Coordination/Goals of Care Document:     PROGRESS NOTE  Date & Time of Note   2021-12-06 09:10  Notes: 89 y/o F baseline aaox3, bedbound, Hx HTN, HLD, OA, CAD on Asa, HF, chronic constipation, admitted with incarcerated umbilical hernia, s/p 11/24 for Exlap Open Umbilical hernia repair. Post-op with respiratory failure w Stenotrophomonas sputum, extubated 12/5, ileus resolved. On O2 NC-improving.  Electronically signed by:  Kerri Sousa  Electronically signed on:  2021-12-06  12:16      PALLIATIVE MEDICINE COORDINATION OF CARE DOCUMENTATION: [x] Inpatient Consult  Non-Face-to-Face prolonged service provided that relates to (face-to-face) care that has or will occur and ongoing patient management, including one or more of the following: - Reviewed documentation from other physicians and other health care professional services - Reviewed medical records and diagnostic / radiology study results - Coordination with patient's support system  ************************************************************************  MEDICATION REVIEW:  - See Medication List Above    ISTOP REFERENCE: [id number]  - no active Rx's  - PRN usage: NO PRN'S  ------------------------------------------------------------------------  COORDINATION OF CARE:  - Palliative Care consulted for: GOC  - Patient (to be) assessed: 12/6/21  - Patient previously seen by Palliative Care service: YES    ADVANCE CARE PLANNING  - Code status: Full Code  - MOLST reviewed in chart: found on Alpha  - HCP/Surrogate:  - GO documents: NONE found on [place]  - HCP/Living will/Other Advanced Directives in Alpha: NONE found on Alpha  ------------------------------------------------------------------------  CARE PROVIDER DOCUMENTATION:  - DUNCAN/JUAN J notes: Remains medically active  - S&S notes: essentially functional olivier-pharyngeal swallow for her age with no overt signs of airway protection deficits. recommend to initiate minced and moist diet with thin liquids. this service will continue to monitor tolerance and determine candidacy for diet upgrade in 1-2 days  - SICU notes: PT/OT ordered  - Surgery notes: removed NGT. secretion management. Physical therapy. Speech and swallow evaluation prior to initiating any diet. Continue TPN until S+S eval    PLAN OF CARE  - Known Admissions in Past Year: 1  - Current Admit Date: 11/24/21  - LOS: 12  - LACE score: 11  - Current Dispo Plan: TO BE DETERMINED  ------------------------------------------------------------------------  - Time Spent/Chart reviewed: 31 Minutes [including time used to gather, review and transfer data]  - Start: 5:40PM  - End: 6:11PM    Prolonged services rendered, as part of this patient's care provided by Palliative Medicine, include: i. chart review for provider and ancillary service documentation, ii. pertinent diagnostics including laboratory and imaging studies, iii. medication review including PRN use, iv. admission history including previous palliative care encounters and GOC notes, v. advance care planning documents including HCP and MOLST forms in Alpha. Part of Palliative Medicine extended evaluation and management also involves coordination of care with our IDT, the primary and consulting teams, and unit CM/SW and Hospice if eligible. Recommendations based on the information gathered and discussed are outlined in the A/P of Palliative notes. [facility] Geriatrics and Palliative Care  Fredy Escobar, Palliative Care Attending  Contact Info: Call [phone] (HEAL Line) or message on Microsoft Teams (Fredy Escobar)    HPI:  89yoF bedbound, prior DNR with PMH HTN, HLD, OA, CAD on Asa, HFpEF (65-70%), chronic constipation, recent admission to [facility] for metabolic encephalopathy, acute and chronic resp failure with hypercapnia requiring intubation (9/5-9/15) and no PSH per report who presents from [facility] for constipation and emesis. Per nursing home report to ED, patient has been groaning in pain and pointing to L side, emesis since Monday nonbloody. Patient is responsive to voice and touch, oriented to person and time but not place or full situation. Able to localized pain to abdomen. Unable to detail last BM or flatus. Per Paulette over phone, abdominal pain since Saturday, began emesis on Monday. As far as family knows, normal BMs once every 2d until Saturday but then less frequent. States that patient is typically is very alert and oriented. Was at baseline mental status until last night and called to tell nieces she is excruciating pain followed by decline in mental status, tx to [facility] for further evaluation. In ED, multiple brown, nonbloody liquid BMs and no masses or impacted stool on WAYNE by ED. HCP Paulette and Shannon Bowers (niece) [phone] **Despite MOLST form, family wishes to revoke DNR and continue trial of intubation, they are amenable to surgery and want "everything under the sun" done.  (24 Nov 2021 14:12)     SERVICES ID #[id number]   Patient seen and examined in CCU. Patient is alert and interactive. Feels well and is happy to be on less O2 support. Was not aware of the details of her hospitalization. Discussed extensively the events of the last few days. Patient very appreciative of the care she is receiving. She is hopeful to continue to get better and leave the hospital. She denies any pain, SOB, nausea, constipation, or depressed mood. Just feels tired.    PERTINENT PM/SXH:   HTN (hypertension)  HLD (hyperlipidemia)  Osteoarthritis  Hyponatremia  Altered mental status  Urinary tract infection  HF (heart failure)    FAMILY HISTORY:  Non-contributory in first degree relatives    ITEMS NOT CHECKED ARE NOT PRESENT    SOCIAL HISTORY:   Significant other/partner:  []  Children:  []   Substance hx:  []   Tobacco hx:  []   Alcohol hx: []   Home Opioid hx:  [] I-Stop Reference No:  - no active Rx's / see chart note  Living Situation: []Home  []Long term care  []Rehab []Other  Restoration/Spiritual practice: ; Role of organized [employer]aeism [ ] important [ ] some [ ] unable to assess  Coping: [ ] well [ ] with difficulty [ ] poor coping [ ] unable to assess  Support system: [ ] strong [ ] adequate [ ] inadequate    ADVANCE DIRECTIVES:    []MOLST  []Living Will  DECISION MAKER(s):  [] Health Care Proxy(s)  [] Surrogate(s)  [] Guardian           Name(s)/Phone Number(s):     BASELINE (I)ADLs (prior to admission):  Louisa: []Total  [] Moderate []Dependent    ALLERGIES:  iodine containing compounds (Unknown)  lisinopril (Unknown)    MEDICATIONS  (STANDING):  albuterol/ipratropium for Nebulization 3 milliLiter(s) Nebulizer every 6 hours  chlorhexidine 4% Liquid 1 Application(s) Topical <User Schedule>  dextrose 40% Gel 15 Gram(s) Oral once  dextrose 5%. 1000 milliLiter(s) (50 mL/Hr) IV Continuous <Continuous>  dextrose 5%. 1000 milliLiter(s) (100 mL/Hr) IV Continuous <Continuous>  dextrose 50% Injectable 25 Gram(s) IV Push once  dextrose 50% Injectable 12.5 Gram(s) IV Push once  dextrose 50% Injectable 25 Gram(s) IV Push once  fat emulsion (Plant Based) 20% Infusion 0.28 Gm/kG/Day (20.83 mL/Hr) IV Continuous <Continuous>  glucagon  Injectable 1 milliGRAM(s) IntraMuscular once  heparin   Injectable 5000 Unit(s) SubCutaneous every 8 hours  insulin lispro (ADMELOG) corrective regimen sliding scale   SubCutaneous every 6 hours  levoFLOXacin IVPB      levoFLOXacin IVPB 750 milliGRAM(s) IV Intermittent every 24 hours  lidocaine   4% Patch 1 Patch Transdermal every 24 hours  metoclopramide Injectable 5 milliGRAM(s) IV Push every 6 hours  pantoprazole  Injectable 40 milliGRAM(s) IV Push daily  Parenteral Nutrition - Adult 1 Each (54 mL/Hr) TPN Continuous <Continuous>  Parenteral Nutrition - Adult 1 Each (54 mL/Hr) TPN Continuous <Continuous>    MEDICATIONS  (PRN):  acetaminophen   IVPB .. 1000 milliGRAM(s) IV Intermittent once PRN Moderate Pain (4 - 6)  fentaNYL    Injectable 25 MICROGram(s) IV Push every 2 hours PRN Severe Pain (7 - 10)  ondansetron Injectable 4 milliGRAM(s) IV Push every 6 hours PRN Nausea  sodium chloride 0.9% lock flush 10 milliLiter(s) IV Push every 1 hour PRN Pre/post blood products, medications, blood draw, and to maintain line patency    PRESENT SYMPTOMS: []Unable to obtain due to poor mentation/encephalopathy  Source if other than patient:  []Family   []Team     Pain: [ ] yes [ ] no  QOL Impact -   Location -                    Aggravating Factors -  Quality -  Radiation -  Timing -  Severity (0-10 scale) -   Minimal Acceptable Level (0-10 scale) -    PAIN AD Score:  [email or web address]/[email or web address] (press ctrl +  left click to view)    Dyspnea:                           []Mild  []Moderate []Severe  Anxiety:                             []Mild []Moderate []Severe  Fatigue:                             []Mild []Moderate []Severe  Nausea:                             []Mild []Moderate []Severe  Loss of Appetite:              []Mild []Moderate []Severe  Constipation:                    []Mild []Moderate []Severe    Other Symptoms:  [x]All Other Review Of Systems Negative     Palliative Performance Status Version 2:  %    [email or web address]    PHYSICAL EXAM:  GENERAL:  []Alert  []Oriented x   []Lethargic  []Cachexia  []Unarousable  []Verbal  []Non-Verbal  Behavioral:   []Anxiety  []Delirium []Agitation []Cooperative  HEENT:  []Normal   []Dry mouth   []ET Tube/Trach  []Oral lesions  PULMONARY:   []Clear []Tachypnea  []Audible excessive secretions   []Rhonchi        []Right []Left []Bilateral  []Crackles        []Right []Left []Bilateral  []Wheezing     []Right []Left []Bilateral  CARDIOVASCULAR:    []Regular []Irregular []Tachy  []Al []Murmur []Other  GASTROINTESTINAL:  []Soft  []Distended   []+BS  []Non tender []Tender  []PEG []OGT/ NGT  Last BM:   GENITOURINARY:  []Normal [] Incontinent   []Oliguria/Anuria   []Liriano  MUSCULOSKELETAL:   []Normal   []Weakness  []Bed/Wheelchair bound []Edema  NEUROLOGIC:   []No focal deficits  []Cognitive impairment  []Dysphagia []Dysarthria []Paresis []Encephalopathic   SKIN:   []Normal   []Pressure ulcer(s)  []Rash    CRITICAL CARE:  [ ]Shock Present  [ ]Septic [ ]Cardiogenic [ ]Neurologic [ ]Hypovolemic  [ ]Vasopressors [ ]Inotropes   [ ]Respiratory failure present [ ]Mechanical Ventilation [ ]Non-invasive ventilatory support [ ]High-Flow  [ ]Acute  [ ]Chronic [ ]Hypoxic  [ ]Hypercarbic  [ ]Other organ failure    Vital Signs Last 24 Hrs  T(C): 36.4 (06 Dec 2021 17:38), Max: 37.1 (05 Dec 2021 21:13)  T(F): 97.5 (06 Dec 2021 17:38), Max: 98.7 (05 Dec 2021 21:13)  HR: 95 (06 Dec 2021 17:00) (79 - 116)  BP: 150/69 (06 Dec 2021 15:21) (115/61 - 150/69)  BP(mean): 94 (06 Dec 2021 15:21) (82 - 94)  RR: 39 (06 Dec 2021 17:00) (25 - 47)  SpO2: 100% (06 Dec 2021 17:00) (94% - 100%) I&O's Summary    05 Dec 2021 07:01  -  06 Dec 2021 07:00  --------------------------------------------------------  IN: 1645.6 mL / OUT: 2560 mL / NET: -914.4 mL    06 Dec 2021 07:01  -  06 Dec 2021 17:47  --------------------------------------------------------  IN: 835.6 mL / OUT: 2280 mL / NET: -1444.4 mL    LABS:                        7.8    10.44 )-----------( 344      ( 06 Dec 2021 05:35 )             26.0   12-06    142  |  107  |  17  ----------------------------<  149<H>  4.4   |  30  |  0.41<L>    Ca    8.0<L>      06 Dec 2021 05:35  Phos  3.2     12-06  Mg     1.8     12-06    RADIOLOGY & ADDITIONAL STUDIES:  < from: CT Abdomen and Pelvis No Cont (11.24.21 @ 11:25) >  Evaluation of the lower chest demonstrates small left pleural effusion. Bibasilar atelectasis. Cardiomegaly. Enlargement of the main pulmonary artery consistent with pulmonary arterial hypertension. Severe coronary arterial calcification. Moderate calcification of the aortic valve, which correlate with degree of aortic stenosis.  Evaluation of the abdomen demonstrates the unenhanced liver, spleen, pancreas and bilateral adrenal glands are unremarkable. Evaluation of the gastrointestinal tract demonstrates severe dilatation of the stomach and small bowel. There is a periumbilical hernia with decompressed loops located distal to the exiting loop and dilatation of loops proximally to the entering loop. There is extensive stool throughout the large bowel. There are extensive diverticula.  Evaluation of the pelvis demonstrates the uterus is unremarkable. There is a left adnexal cyst measuring 4.5 cm. Evaluation of the pelvis demonstrates the bladder is collapsed around Liriano catheter balloon. Enlarged myomatous uterus. Large volume of stool within the large bowel. Edematous infiltration within the perirectal region. There is severe aortic and iliac vascular calcification. There is degenerative change of the bilateral sacroiliac joints. There is no destructive osseous lesion.    IMPRESSION:  High-grade small bowel obstruction at site of periumbilical hernia.  Left adnexal cyst, which meets size criteria for sonographic correlation, when clinically appropriate    REFERRALS:  [x]Social Work  []Case management []PT/OT []Chaplaincy  []Hospice  []Patient/Family Support    Care Coordination/Goals of Care Document:     PROGRESS NOTE  Date & Time of Note   2021-12-06 09:10  Notes: 89 y/o F baseline aaox3, bedbound, Hx HTN, HLD, OA, CAD on Asa, HF, chronic constipation, admitted with incarcerated umbilical hernia, s/p 11/24 for Exlap Open Umbilical hernia repair. Post-op with respiratory failure w Stenotrophomonas sputum, extubated 12/5, ileus resolved. On O2 NC-improving.  Electronically signed by:  Kerri Sousa  Electronically signed on:  2021-12-06  12:16      PALLIATIVE MEDICINE COORDINATION OF CARE DOCUMENTATION: [x] Inpatient Consult  Non-Face-to-Face prolonged service provided that relates to (face-to-face) care that has or will occur and ongoing patient management, including one or more of the following: - Reviewed documentation from other physicians and other health care professional services - Reviewed medical records and diagnostic / radiology study results - Coordination with patient's support system  ************************************************************************  MEDICATION REVIEW:  - See Medication List Above    ISTOP REFERENCE: [id number]  - no active Rx's  - PRN usage: NO PRN'S  ------------------------------------------------------------------------  COORDINATION OF CARE:  - Palliative Care consulted for: GOC  - Patient (to be) assessed: 12/6/21  - Patient previously seen by Palliative Care service: YES    ADVANCE CARE PLANNING  - Code status: Full Code  - MOLST reviewed in chart: found on Alpha  - HCP/Surrogate:  - [facility] documents: NONE found on [place]  - HCP/Living will/Other Advanced Directives in [place]: NONE found on [place]  ------------------------------------------------------------------------  CARE PROVIDER DOCUMENTATION:  - SW/CM notes: Remains medically active  - S&S notes: essentially functional olivier-pharyngeal swallow for her age with no overt signs of airway protection deficits. recommend to initiate minced and moist diet with thin liquids. this service will continue to monitor tolerance and determine candidacy for diet upgrade in 1-2 days  - SICU notes: PT/OT ordered  - Surgery notes: removed NGT. secretion management. Physical therapy. Speech and swallow evaluation prior to initiating any diet. Continue TPN until S+S eval    PLAN OF CARE  - Known Admissions in Past Year: 1  - Current Admit Date: 11/24/21  - LOS: 12  - LACE score: 11  - Current Dispo Plan: TO BE DETERMINED  ------------------------------------------------------------------------  - Time Spent/Chart reviewed: 31 Minutes [including time used to gather, review and transfer data]  - Start: 5:40PM  - End: 6:11PM    Prolonged services rendered, as part of this patient's care provided by Palliative Medicine, include: i. chart review for provider and ancillary service documentation, ii. pertinent diagnostics including laboratory and imaging studies, iii. medication review including PRN use, iv. admission history including previous palliative care encounters and GOC notes, v. advance care planning documents including HCP and MOLST forms in [place]. Part of Palliative Medicine extended evaluation and management also involves coordination of care with our IDT, the primary and consulting teams, and unit CM/SW and Hospice if eligible. Recommendations based on the information gathered and discussed are outlined in the A/P of Palliative notes.

## 2021-12-06 NOTE — BH CONSULTATION LIAISON ASSESSMENT NOTE - RISK ASSESSMENT
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

## 2021-12-06 NOTE — OCCUPATIONAL THERAPY INITIAL EVALUATION ADULT - RANGE OF MOTION EXAMINATION, UPPER EXTREMITY
L shoulder flexion limited to 100 deg., all other joints WFL./Right UE Active ROM was WFL (within functional limits)

## 2021-12-06 NOTE — OCCUPATIONAL THERAPY INITIAL EVALUATION ADULT - ADDITIONAL COMMENTS
As per  note pt is long term resident at [facility] since April 2020.  prior to having COVID patient was able to ambulate with walker/wheelchair and was independent with ADLs.

## 2021-12-06 NOTE — CONSULT NOTE ADULT - PROBLEM SELECTOR RECOMMENDATION 4
.  In addition to the E/M visit, an advance care planning meeting was performed. Start time: 5:20PM; End time: 5:40PM; Total time: 20min. A face to face meeting to discuss advance care planning was held today regarding: SANDRO TEJADA. Primary decision maker: Patient is able to participate in decision making. Discussed advance directives including, but not limited to, healthcare proxy and code status. Decision regarding code status: DNR/DNI; Documentation completed today: [facility] note

## 2021-12-06 NOTE — PROGRESS NOTE ADULT - SUBJECTIVE AND OBJECTIVE BOX
ON: Net negative 700      SUBJECTIVE:   Pt seen and examined by SICU + surgical team   Afebrile + normotensive   some tachypnea on 3LNC   Denied N/V/CP/SOB  +BM    MEDICATIONS  (STANDING):  albuterol/ipratropium for Nebulization 3 milliLiter(s) Nebulizer every 6 hours  chlorhexidine 4% Liquid 1 Application(s) Topical <User Schedule>  dextrose 40% Gel 15 Gram(s) Oral once  dextrose 5%. 1000 milliLiter(s) (50 mL/Hr) IV Continuous <Continuous>  dextrose 5%. 1000 milliLiter(s) (100 mL/Hr) IV Continuous <Continuous>  dextrose 50% Injectable 25 Gram(s) IV Push once  dextrose 50% Injectable 12.5 Gram(s) IV Push once  dextrose 50% Injectable 25 Gram(s) IV Push once  fat emulsion (Plant Based) 20% Infusion 0.28 Gm/kG/Day (20.83 mL/Hr) IV Continuous <Continuous>  glucagon  Injectable 1 milliGRAM(s) IntraMuscular once  heparin   Injectable 5000 Unit(s) SubCutaneous every 8 hours  insulin lispro (ADMELOG) corrective regimen sliding scale   SubCutaneous every 6 hours  levoFLOXacin IVPB      levoFLOXacin IVPB 750 milliGRAM(s) IV Intermittent every 24 hours  lidocaine   4% Patch 1 Patch Transdermal every 24 hours  metoclopramide Injectable 5 milliGRAM(s) IV Push every 6 hours  pantoprazole  Injectable 40 milliGRAM(s) IV Push daily  Parenteral Nutrition - Adult 1 Each (54 mL/Hr) TPN Continuous <Continuous>    MEDICATIONS  (PRN):  acetaminophen   IVPB .. 1000 milliGRAM(s) IV Intermittent once PRN Moderate Pain (4 - 6)  fentaNYL    Injectable 25 MICROGram(s) IV Push every 2 hours PRN Severe Pain (7 - 10)  ondansetron Injectable 4 milliGRAM(s) IV Push every 6 hours PRN Nausea  sodium chloride 0.9% lock flush 10 milliLiter(s) IV Push every 1 hour PRN Pre/post blood products, medications, blood draw, and to maintain line patency      Drips:     ICU Vital Signs Last 24 Hrs  T(C): 36.8 (06 Dec 2021 05:01), Max: 37.4 (05 Dec 2021 10:00)  T(F): 98.3 (06 Dec 2021 05:01), Max: 99.3 (05 Dec 2021 10:00)  HR: 106 (06 Dec 2021 07:00) (59 - 124)  BP: --  BP(mean): --  ABP: 148/54 (06 Dec 2021 07:00) (95/34 - 163/65)  ABP(mean): 96 (06 Dec 2021 07:00) (56 - 107)  RR: 43 (06 Dec 2021 07:00) (14 - 45)  SpO2: 99% (06 Dec 2021 07:00) (91% - 100%)      Physical Exam:  General: NAD  HEENT: NC/AT, EOMI, PERRLA, normal hearing,   Pulmonary: tachypneic on 3L NC however w/o respiratory distress  Cardiovascular: sinus tachy  Abdominal: soft, midline laparotomy incision C/D/I, NGT w bilious output  Extremities: WWP,   Neuro: A/O x3, CNs II-XII grossly intact, normal motor/sensation,       Lines/tubes/drains:  - PIV   - NGT   - Deandra Liriano: (+) UOP monitoring in critically ill pt 	      Vent settings:      I&O's Summary    05 Dec 2021 07:01  -  06 Dec 2021 07:00  --------------------------------------------------------  IN: 1645.6 mL / OUT: 2560 mL / NET: -914.4 mL        LABS:                        7.8    10.44 )-----------( 344      ( 06 Dec 2021 05:35 )             26.0     12-06    142  |  107  |  17  ----------------------------<  149<H>  4.4   |  30  |  0.41<L>    Ca    8.0<L>      06 Dec 2021 05:35  Phos  3.2     12-06  Mg     1.8     12-06          CAPILLARY BLOOD GLUCOSE      POCT Blood Glucose.: 138 mg/dL (06 Dec 2021 06:18)  POCT Blood Glucose.: 133 mg/dL (05 Dec 2021 23:30)  POCT Blood Glucose.: 129 mg/dL (05 Dec 2021 17:37)  POCT Blood Glucose.: 135 mg/dL (05 Dec 2021 11:16)        Cultures:    RADIOLOGY & ADDITIONAL STUDIES:

## 2021-12-06 NOTE — OCCUPATIONAL THERAPY INITIAL EVALUATION ADULT - GENERAL OBSERVATIONS, REHAB EVAL
Pt cleared for OT by IRAM Agee. Pt received semi-baez, NAD, +tele +IV +a-line +bahena +NC 3L/min +PICC.

## 2021-12-06 NOTE — CONSULT NOTE ADULT - CONSULT REASON
SVT
89yoF bedbound, DNR with PMH HTN, HLD, OA, CAD on Asa, HF, chronic constipation, recent admission to [facility] for metabolic encephalopathy, acute hypercapneic resp failure and pulmonary edema requiring intubation (9/5-9/15) CAD (on Aspirin) and no PSH who presents from [facility] for constipation and emesis. Afebrile, HD stable, WBC wnl, BLADIMIR with Cr 77/1.91, Trop 0.03, BNP 2396, lactate 5.8, cdiff neg. CT with high-grade small bowel obstruction at site of periumbilical hernia. NGT placed in ED with brown feculent output, 1700 immediate. Concerning for strangulated umbilical hernia with possible bowel ischemia. Taken to OR on 11/24 for Exlap and Open repair of Umbilical l Hernia. EBL min, IVF: 2.1L, uo: 100.
Complex Medical Decision Making/GOC in the setting of Critical Illness
evaluation for picc placement
mucus plug

## 2021-12-06 NOTE — CONSULT NOTE ADULT - PROBLEM SELECTOR RECOMMENDATION 3
.  Prolonged ventilation post-op  -patient now extubated, states she does not wish to be re-intubated

## 2021-12-06 NOTE — PROGRESS NOTE ADULT - ASSESSMENT
89 y/o F baseline aaox3, bedbound, PMH HTN, HLD, OA, CAD on Asa, HF, chronic constipation, admited with incarcerated umbilical hernia, s/p 11/24 for Exlap Open Umbilical hernia repair. Post-op with respiratory failure w stenotropomonas sputum--now extubated, and course c/b episodes of SVT's (afib vs atrial tach) and ileus.     Neuro: Tylenol and Fentanyl PRN, Lidoderm patch; Home Remeron on hold.  CV: MAP >65, Hx CAD, HTN, HFpEF; pulm htn (TTE 11/29, LVEF 60-65%, mod TR, PASP 72), episodes of SVT, afib vs atrial tach, EP consulted, NTD    Pulm: Extubated on 12/5 to NC, Bipap at bedside; duonebs/  GI/FEN: NPO/NGT LIWS, TPN. Zofran, standing Reglan, PPI, Rectal tube removed on 12/4 2* hard stool.   : Liriano  ID: Stenotropomonas Sputum: Levaquin (12/3--) ///dc: Bactrim (12/2-12/3): Coverage for translocation of bacteria: Ceftriaxone (11/24-11/26, 11/30-12/2).  Endo: ISS  PPx: SCDs, SQH   Lines: [place] left brachial (11/24-), PIVs  Wounds: Midline incision   PT/OT: re-ordered 12/5

## 2021-12-07 LAB
ANION GAP SERPL CALC-SCNC: 8 MMOL/L — SIGNIFICANT CHANGE UP (ref 5–17)
BASE EXCESS BLDA CALC-SCNC: 4.5 MMOL/L — HIGH (ref -2–3)
BASE EXCESS BLDA CALC-SCNC: 8.2 MMOL/L — HIGH (ref -2–3)
BUN SERPL-MCNC: 20 MG/DL — SIGNIFICANT CHANGE UP (ref 7–23)
CALCIUM SERPL-MCNC: 8.5 MG/DL — SIGNIFICANT CHANGE UP (ref 8.4–10.5)
CHLORIDE SERPL-SCNC: 105 MMOL/L — SIGNIFICANT CHANGE UP (ref 96–108)
CO2 BLDA-SCNC: 31 MMOL/L — HIGH (ref 19–24)
CO2 BLDA-SCNC: 34 MMOL/L — HIGH (ref 19–24)
CO2 SERPL-SCNC: 29 MMOL/L — SIGNIFICANT CHANGE UP (ref 22–31)
CREAT SERPL-MCNC: 0.45 MG/DL — LOW (ref 0.5–1.3)
GAS PNL BLDA: SIGNIFICANT CHANGE UP
GAS PNL BLDA: SIGNIFICANT CHANGE UP
GLUCOSE BLDC GLUCOMTR-MCNC: 134 MG/DL — HIGH (ref 70–99)
GLUCOSE BLDC GLUCOMTR-MCNC: 141 MG/DL — HIGH (ref 70–99)
GLUCOSE BLDC GLUCOMTR-MCNC: 143 MG/DL — HIGH (ref 70–99)
GLUCOSE BLDC GLUCOMTR-MCNC: 168 MG/DL — HIGH (ref 70–99)
GLUCOSE SERPL-MCNC: 148 MG/DL — HIGH (ref 70–99)
HCO3 BLDA-SCNC: 30 MMOL/L — HIGH (ref 21–28)
HCO3 BLDA-SCNC: 33 MMOL/L — HIGH (ref 21–28)
HCT VFR BLD CALC: 23.9 % — LOW (ref 34.5–45)
HGB BLD-MCNC: 7.3 G/DL — LOW (ref 11.5–15.5)
MAGNESIUM SERPL-MCNC: 1.9 MG/DL — SIGNIFICANT CHANGE UP (ref 1.6–2.6)
MCHC RBC-ENTMCNC: 28 PG — SIGNIFICANT CHANGE UP (ref 27–34)
MCHC RBC-ENTMCNC: 30.5 GM/DL — LOW (ref 32–36)
MCV RBC AUTO: 91.6 FL — SIGNIFICANT CHANGE UP (ref 80–100)
NRBC # BLD: 0 /100 WBCS — SIGNIFICANT CHANGE UP (ref 0–0)
PCO2 BLDA: 44 MMHG — HIGH (ref 32–35)
PCO2 BLDA: 46 MMHG — HIGH (ref 32–35)
PH BLDA: 7.42 — SIGNIFICANT CHANGE UP (ref 7.35–7.45)
PH BLDA: 7.48 — HIGH (ref 7.35–7.45)
PHOSPHATE SERPL-MCNC: 2.7 MG/DL — SIGNIFICANT CHANGE UP (ref 2.5–4.5)
PLATELET # BLD AUTO: 315 K/UL — SIGNIFICANT CHANGE UP (ref 150–400)
PO2 BLDA: 112 MMHG — HIGH (ref 83–108)
PO2 BLDA: 207 MMHG — HIGH (ref 83–108)
POTASSIUM SERPL-MCNC: 4.5 MMOL/L — SIGNIFICANT CHANGE UP (ref 3.5–5.3)
POTASSIUM SERPL-SCNC: 4.5 MMOL/L — SIGNIFICANT CHANGE UP (ref 3.5–5.3)
RBC # BLD: 2.61 M/UL — LOW (ref 3.8–5.2)
RBC # FLD: 16.8 % — HIGH (ref 10.3–14.5)
SAO2 % BLDA: 98.2 % — HIGH (ref 94–98)
SAO2 % BLDA: 99.5 % — HIGH (ref 94–98)
SARS-COV-2 RNA SPEC QL NAA+PROBE: SIGNIFICANT CHANGE UP
SODIUM SERPL-SCNC: 142 MMOL/L — SIGNIFICANT CHANGE UP (ref 135–145)
TROPONIN T SERPL-MCNC: 0.01 NG/ML — SIGNIFICANT CHANGE UP (ref 0–0.01)
WBC # BLD: 9.22 K/UL — SIGNIFICANT CHANGE UP (ref 3.8–10.5)
WBC # FLD AUTO: 9.22 K/UL — SIGNIFICANT CHANGE UP (ref 3.8–10.5)

## 2021-12-07 PROCEDURE — 99232 SBSQ HOSP IP/OBS MODERATE 35: CPT | Mod: GC

## 2021-12-07 PROCEDURE — 71045 X-RAY EXAM CHEST 1 VIEW: CPT | Mod: 26,76

## 2021-12-07 PROCEDURE — 99291 CRITICAL CARE FIRST HOUR: CPT

## 2021-12-07 PROCEDURE — 99358 PROLONG SERVICE W/O CONTACT: CPT | Mod: NC

## 2021-12-07 RX ORDER — ACETYLCYSTEINE 200 MG/ML
4 VIAL (ML) MISCELLANEOUS EVERY 6 HOURS
Refills: 0 | Status: DISCONTINUED | OUTPATIENT
Start: 2021-12-07 | End: 2021-12-09

## 2021-12-07 RX ORDER — ELECTROLYTE SOLUTION,INJ
1 VIAL (ML) INTRAVENOUS
Refills: 0 | Status: DISCONTINUED | OUTPATIENT
Start: 2021-12-07 | End: 2021-12-07

## 2021-12-07 RX ORDER — SODIUM CHLORIDE 9 MG/ML
5 INJECTION INTRAMUSCULAR; INTRAVENOUS; SUBCUTANEOUS EVERY 6 HOURS
Refills: 0 | Status: COMPLETED | OUTPATIENT
Start: 2021-12-07 | End: 2021-12-09

## 2021-12-07 RX ORDER — FUROSEMIDE 40 MG
40 TABLET ORAL ONCE
Refills: 0 | Status: COMPLETED | OUTPATIENT
Start: 2021-12-07 | End: 2021-12-07

## 2021-12-07 RX ORDER — I.V. FAT EMULSION 20 G/100ML
0.28 EMULSION INTRAVENOUS
Qty: 50 | Refills: 0 | Status: DISCONTINUED | OUTPATIENT
Start: 2021-12-07 | End: 2021-12-07

## 2021-12-07 RX ORDER — MAGNESIUM SULFATE 500 MG/ML
1 VIAL (ML) INJECTION ONCE
Refills: 0 | Status: COMPLETED | OUTPATIENT
Start: 2021-12-07 | End: 2021-12-07

## 2021-12-07 RX ADMIN — Medication 5 MILLIGRAM(S): at 00:05

## 2021-12-07 RX ADMIN — Medication 4 MILLILITER(S): at 19:00

## 2021-12-07 RX ADMIN — FENTANYL CITRATE 25 MICROGRAM(S): 50 INJECTION INTRAVENOUS at 17:06

## 2021-12-07 RX ADMIN — LIDOCAINE 1 PATCH: 4 CREAM TOPICAL at 19:53

## 2021-12-07 RX ADMIN — Medication 2: at 00:05

## 2021-12-07 RX ADMIN — SODIUM CHLORIDE 5 MILLILITER(S): 9 INJECTION INTRAMUSCULAR; INTRAVENOUS; SUBCUTANEOUS at 21:27

## 2021-12-07 RX ADMIN — Medication 40 MILLIGRAM(S): at 05:52

## 2021-12-07 RX ADMIN — Medication 2: at 12:45

## 2021-12-07 RX ADMIN — Medication 3 MILLILITER(S): at 05:22

## 2021-12-07 RX ADMIN — Medication 100 GRAM(S): at 07:47

## 2021-12-07 RX ADMIN — SODIUM CHLORIDE 5 MILLILITER(S): 9 INJECTION INTRAMUSCULAR; INTRAVENOUS; SUBCUTANEOUS at 16:45

## 2021-12-07 RX ADMIN — FENTANYL CITRATE 25 MICROGRAM(S): 50 INJECTION INTRAVENOUS at 18:00

## 2021-12-07 RX ADMIN — Medication 1 EACH: at 19:45

## 2021-12-07 RX ADMIN — Medication 3 MILLILITER(S): at 09:45

## 2021-12-07 RX ADMIN — Medication 5 MILLIGRAM(S): at 18:06

## 2021-12-07 RX ADMIN — I.V. FAT EMULSION 20.83 GM/KG/DAY: 20 EMULSION INTRAVENOUS at 19:45

## 2021-12-07 RX ADMIN — Medication 4 MILLILITER(S): at 12:28

## 2021-12-07 RX ADMIN — HEPARIN SODIUM 5000 UNIT(S): 5000 INJECTION INTRAVENOUS; SUBCUTANEOUS at 05:53

## 2021-12-07 RX ADMIN — CHLORHEXIDINE GLUCONATE 1 APPLICATION(S): 213 SOLUTION TOPICAL at 12:40

## 2021-12-07 RX ADMIN — Medication 3 MILLILITER(S): at 16:46

## 2021-12-07 RX ADMIN — Medication 63.75 MILLIMOLE(S): at 09:46

## 2021-12-07 RX ADMIN — Medication 5 MILLIGRAM(S): at 05:53

## 2021-12-07 RX ADMIN — HEPARIN SODIUM 5000 UNIT(S): 5000 INJECTION INTRAVENOUS; SUBCUTANEOUS at 21:28

## 2021-12-07 RX ADMIN — PANTOPRAZOLE SODIUM 40 MILLIGRAM(S): 20 TABLET, DELAYED RELEASE ORAL at 12:27

## 2021-12-07 RX ADMIN — SODIUM CHLORIDE 5 MILLILITER(S): 9 INJECTION INTRAMUSCULAR; INTRAVENOUS; SUBCUTANEOUS at 12:28

## 2021-12-07 RX ADMIN — LIDOCAINE 1 PATCH: 4 CREAM TOPICAL at 11:08

## 2021-12-07 RX ADMIN — Medication 3 MILLILITER(S): at 21:27

## 2021-12-07 RX ADMIN — Medication 5 MILLIGRAM(S): at 23:36

## 2021-12-07 RX ADMIN — Medication 5 MILLIGRAM(S): at 12:27

## 2021-12-07 RX ADMIN — HEPARIN SODIUM 5000 UNIT(S): 5000 INJECTION INTRAVENOUS; SUBCUTANEOUS at 13:50

## 2021-12-07 RX ADMIN — Medication 4 MILLILITER(S): at 06:56

## 2021-12-07 NOTE — PROGRESS NOTE ADULT - ASSESSMENT
89 y/o F baseline AAOx3, bedbound, PMH HTN, HLD, OA, CAD on ASA, HF, chronic constipation, admitted with incarcerated umbilical hernia, s/p 11/24 for exlap with open Umbilical hernia repair. Post-op with respiratory failure, requiring intubation w/ stenotropomonas sputum, and post operative ileus. Extubated 12/5.    Seems to have mucous plug, she does not clear her secretions well.   Recommend bronch, however patient is refusing  Continue chest PT and mucomyst  Continue TPN for now  Will also discuss with family  Must get OOB/ encourage incentive spirometry  SICU level of care

## 2021-12-07 NOTE — PROGRESS NOTE ADULT - SUBJECTIVE AND OBJECTIVE BOX
ON: tachypneic throughout day - placed on High flow 40%/50 flow. ABG sent @[facility]:  metabolic alk. CXR done - white out of left lung- chest PT ordered. Ordered Mucomyst will get Pulm Cs in am for bronch repeat CXR in am. UO decreased @5am - given lasix 40x1. Made NPO.       SUBJECTIVE:  Pt seen and examined   Afebrile, normotensive, NSR  Tachypneic on HFNC but resting   Denies N/V/CP/SOB    MEDICATIONS  (STANDING):  acetylcysteine 20%  Inhalation 4 milliLiter(s) Inhalation every 6 hours  albuterol/ipratropium for Nebulization 3 milliLiter(s) Nebulizer every 6 hours  chlorhexidine 2% Cloths 1 Application(s) Topical daily  dextrose 40% Gel 15 Gram(s) Oral once  dextrose 5%. 1000 milliLiter(s) (50 mL/Hr) IV Continuous <Continuous>  dextrose 5%. 1000 milliLiter(s) (100 mL/Hr) IV Continuous <Continuous>  dextrose 50% Injectable 25 Gram(s) IV Push once  dextrose 50% Injectable 12.5 Gram(s) IV Push once  dextrose 50% Injectable 25 Gram(s) IV Push once  fat emulsion (Plant Based) 20% Infusion 0.28 Gm/kG/Day (20.83 mL/Hr) IV Continuous <Continuous>  fat emulsion (Plant Based) 20% Infusion 0.28 Gm/kG/Day (20.83 mL/Hr) IV Continuous <Continuous>  glucagon  Injectable 1 milliGRAM(s) IntraMuscular once  heparin   Injectable 5000 Unit(s) SubCutaneous every 8 hours  insulin lispro (ADMELOG) corrective regimen sliding scale   SubCutaneous every 6 hours  levoFLOXacin IVPB      levoFLOXacin IVPB 750 milliGRAM(s) IV Intermittent every 24 hours  lidocaine   4% Patch 1 Patch Transdermal every 24 hours  metoclopramide Injectable 5 milliGRAM(s) IV Push every 6 hours  pantoprazole  Injectable 40 milliGRAM(s) IV Push daily  Parenteral Nutrition - Adult 1 Each (54 mL/Hr) TPN Continuous <Continuous>  Parenteral Nutrition - Adult 1 Each (54 mL/Hr) TPN Continuous <Continuous>  sodium phosphate IVPB 15 milliMole(s) IV Intermittent once    MEDICATIONS  (PRN):  acetaminophen   IVPB .. 1000 milliGRAM(s) IV Intermittent once PRN Moderate Pain (4 - 6)  fentaNYL    Injectable 25 MICROGram(s) IV Push every 2 hours PRN Severe Pain (7 - 10)  ondansetron Injectable 4 milliGRAM(s) IV Push every 6 hours PRN Nausea  sodium chloride 0.9% lock flush 10 milliLiter(s) IV Push every 1 hour PRN Pre/post blood products, medications, blood draw, and to maintain line patency      Drips:     ICU Vital Signs Last 24 Hrs  T(C): 36.5 (07 Dec 2021 07:00), Max: 36.5 (06 Dec 2021 22:16)  T(F): 97.7 (07 Dec 2021 07:00), Max: 97.7 (06 Dec 2021 22:16)  HR: 94 (07 Dec 2021 07:00) (75 - 116)  BP: 150/69 (06 Dec 2021 15:21) (115/61 - 150/69)  BP(mean): 94 (06 Dec 2021 15:21) (82 - 94)  ABP: 119/67 (07 Dec 2021 07:00) (93/56 - 161/69)  ABP(mean): 91 (07 Dec 2021 07:00) (70 - 106)  RR: 42 (07 Dec 2021 07:00) (24 - 47)  SpO2: 99% (07 Dec 2021 07:00) (94% - 100%)      Physical Exam:  General: NAD  HEENT: NC/AT, EOMI, PERRLA, normal hearing,   Pulmonary: tachypneic on HFNC however w/o respiratory distress  Cardiovascular: NSR  Abdominal: soft, ND, midline laparotomy incision C/D/I, w/o R/G   Extremities: WWP,   Neuro: A/O x3, CNs II-XII grossly intact, normal motor/sensation,       Lines/tubes/drains:  - PIV   - Deandra Liriano: (+) UOP monitoring in critically ill pt 	     Vent settings:      I&O's Summary    06 Dec 2021 07:01  -  07 Dec 2021 07:00  --------------------------------------------------------  IN: 1745.6 mL / OUT: 3200 mL / NET: -1454.4 mL    07 Dec 2021 07:01  -  07 Dec 2021 08:24  --------------------------------------------------------  IN: 174.8 mL / OUT: 425 mL / NET: -250.2 mL        LABS:                        7.3    9.22  )-----------( 315      ( 07 Dec 2021 05:58 )             23.9     12-07    142  |  105  |  20  ----------------------------<  148<H>  4.5   |  29  |  0.45<L>    Ca    8.5      07 Dec 2021 05:58  Phos  2.7     12-07  Mg     1.9     12-07          CAPILLARY BLOOD GLUCOSE      POCT Blood Glucose.: 143 mg/dL (07 Dec 2021 06:11)  POCT Blood Glucose.: 161 mg/dL (06 Dec 2021 23:54)  POCT Blood Glucose.: 170 mg/dL (06 Dec 2021 18:04)  POCT Blood Glucose.: 163 mg/dL (06 Dec 2021 11:51)  POCT Blood Glucose.: >600 mg/dL (06 Dec 2021 11:43)        Cultures:    RADIOLOGY & ADDITIONAL STUDIES:

## 2021-12-07 NOTE — PROGRESS NOTE ADULT - SUBJECTIVE AND OBJECTIVE BOX
PULMONARY CONSULT SERVICE FOLLOW-UP NOTE    INTERVAL HPI:  Reviewed chart and overnight events; patient seen and examined at bedside.    MEDICATIONS:  Pulmonary:  acetylcysteine 20%  Inhalation 4 milliLiter(s) Inhalation every 6 hours  albuterol/ipratropium for Nebulization 3 milliLiter(s) Nebulizer every 6 hours  sodium chloride 3%  Inhalation 5 milliLiter(s) Inhalation every 6 hours    Antimicrobials:  levoFLOXacin IVPB      levoFLOXacin IVPB 750 milliGRAM(s) IV Intermittent every 24 hours    Anticoagulants:  heparin   Injectable 5000 Unit(s) SubCutaneous every 8 hours    Allergies  iodine containing compounds (Unknown)  lisinopril (Unknown)    Vital Signs Last 24 Hrs  T(C): 36.5 (07 Dec 2021 07:00), Max: 36.5 (06 Dec 2021 22:16)  T(F): 97.7 (07 Dec 2021 07:00), Max: 97.7 (06 Dec 2021 22:16)  HR: 92 (07 Dec 2021 08:00) (75 - 116)  BP: 150/69 (06 Dec 2021 15:21) (115/61 - 150/69)  BP(mean): 94 (06 Dec 2021 15:21) (82 - 94)  RR: 33 (07 Dec 2021 08:00) (24 - 47)  SpO2: 94% (07 Dec 2021 08:00) (94% - 100%)    12-06 @ 07:01  -  12-07 @ 07:00  --------------------------------------------------------  IN: 1745.6 mL / OUT: 3200 mL / NET: -1454.4 mL    12-07 @ 07:01  -  12-07 @ 09:04  --------------------------------------------------------  IN: 174.8 mL / OUT: 425 mL / NET: -250.2 mL    PHYSICAL EXAM:  Constitutional: WD  HEENT: NC/AT; PERRL, anicteric sclera; MMM  Neck: supple  Cardiovascular: +S1/S2, RRR  Respiratory: CTA B/L; no W/R/R  Gastrointestinal: soft, NT/ND  Extremities: WWP; no edema, clubbing or cyanosis  Vascular: 2+ radial and pedal pulses  Neurological: AAOx3; no focal deficits    LABS:  ABG - ( 07 Dec 2021 01:45 )  pH, Arterial: 7.48  pH, Blood: x     /  pCO2: 44    /  pO2: 207   / HCO3: 33    / Base Excess: 8.2   /  SaO2: 99.5      CBC Full  -  ( 07 Dec 2021 05:58 )  WBC Count : 9.22 K/uL  RBC Count : 2.61 M/uL  Hemoglobin : 7.3 g/dL  Hematocrit : 23.9 %  Platelet Count - Automated : 315 K/uL  Mean Cell Volume : 91.6 fl  Mean Cell Hemoglobin : 28.0 pg  Mean Cell Hemoglobin Concentration : 30.5 gm/dL    12-07    142  |  105  |  20  ----------------------------<  148<H>  4.5   |  29  |  0.45<L>    Ca    8.5      07 Dec 2021 05:58  Phos  2.7     12-07  Mg     1.9     12-07    RADIOLOGY & ADDITIONAL STUDIES: Reviewed.    CXR L sided opacification concerning for mucus plug.

## 2021-12-07 NOTE — PROGRESS NOTE ADULT - ASSESSMENT
89 y/o F baseline aaox3, bedbound, PMH HTN, HLD, OA, CAD on Asa, HF, chronic constipation, admitted with incarcerated umbilical hernia, s/p 11/24 for Exlap Open Umbilical hernia repair. Post-op with respiratory failure w Stenotrophomonas sputum extubed 12/5, ileus resolved, NGT d/c'ed 12/6.     Neuro: Tylenol and Fentanyl PRN, Lidoderm patch; Home Remeron on hold.  CV: MAP >65, Hx CAD, HTN, HFpEF; pulm htn (TTE 11/29, LVEF 60-65%, mod TR, PASP 72),   Pulm: Extubated on 12/5 to NC, High flow NC 40%/50 flow duonebs. Pulm to bronch later today due to complete white out L lung  GI/FEN: NPO, passed S&S, cleared for mince and moist, TPN/lipids. Zofran, standing Reglan, PPI,   : Deandra  ID: Stenotropomonas Sputum: Levaquin (12/3--)  Endo: ISS  PPx: SCDs, SQH   Lines: [place] left brachial (11/24-), PIVs  Wounds: Midline incision   PT/OT: re-ordered 12/5

## 2021-12-07 NOTE — CHART NOTE - NSCHARTNOTESELECT_GEN_ALL_CORE
Event Note
Nutrition Follow Up/Nutrition Services
Nutrition Follow Up/Nutrition Services
Nutrition Services
Palliative Care Coordination
Event Note
Event Note

## 2021-12-07 NOTE — CHART NOTE - NSCHARTNOTEFT_GEN_A_CORE
Admitting Diagnosis:   Patient is a 89y old  Female who presents with a chief complaint of SBO, strangulated umbilical hernia (07 Dec 2021 08:18)      PAST MEDICAL & SURGICAL HISTORY:  HTN (hypertension)    HLD (hyperlipidemia)    Osteoarthritis    Hyponatremia    Altered mental status    Urinary tract infection    HF (heart failure)      Current Nutrition Order: TPN via PICC: 250g Dex, 82g AA, 50g 20% SMOF Lipids to provide in total: 1678 kcal, 82g protein, GIR 2.4mg/kg/min, 1.8g/kg IBW protein.    PO Intake: Good (%) [   ]  Fair (50-75%) [   ] Poor (<25%) [   ] [x] NPO w/ TPN    GI Issues: No noted nausea/vomiting at this time. Last documented bowel movement . NGT remains to LIWS 700 mL output x 24 hrs. No rectal tube output.    Pain: No noted pain at time of RD interview.    Skin Integrity: Sacral stage 2, medial back DTI pressure injuries per chart. Generalized edema 1+ and edema 2+ left knee. Midline abdomen surgical incision per chart. Pola score: 12.    Labs:       142  |  105  |  20  ----------------------------<  148<H>  4.5   |  29  |  0.45<L>    Ca    8.5      07 Dec 2021 05:58  Phos  2.7       Mg     1.9           CAPILLARY BLOOD GLUCOSE      POCT Blood Glucose.: 143 mg/dL (07 Dec 2021 06:11)  POCT Blood Glucose.: 161 mg/dL (06 Dec 2021 23:54)  POCT Blood Glucose.: 170 mg/dL (06 Dec 2021 18:04)  POCT Blood Glucose.: 163 mg/dL (06 Dec 2021 11:51)  POCT Blood Glucose.: >600 mg/dL (06 Dec 2021 11:43)      Medications:  MEDICATIONS  (STANDING):  acetylcysteine 20%  Inhalation 4 milliLiter(s) Inhalation every 6 hours  albuterol/ipratropium for Nebulization 3 milliLiter(s) Nebulizer every 6 hours  chlorhexidine 2% Cloths 1 Application(s) Topical daily  dextrose 40% Gel 15 Gram(s) Oral once  dextrose 5%. 1000 milliLiter(s) (50 mL/Hr) IV Continuous <Continuous>  dextrose 5%. 1000 milliLiter(s) (100 mL/Hr) IV Continuous <Continuous>  dextrose 50% Injectable 25 Gram(s) IV Push once  dextrose 50% Injectable 12.5 Gram(s) IV Push once  dextrose 50% Injectable 25 Gram(s) IV Push once  fat emulsion (Plant Based) 20% Infusion 0.28 Gm/kG/Day (20.83 mL/Hr) IV Continuous <Continuous>  fat emulsion (Plant Based) 20% Infusion 0.28 Gm/kG/Day (20.83 mL/Hr) IV Continuous <Continuous>  glucagon  Injectable 1 milliGRAM(s) IntraMuscular once  heparin   Injectable 5000 Unit(s) SubCutaneous every 8 hours  insulin lispro (ADMELOG) corrective regimen sliding scale   SubCutaneous every 6 hours  levoFLOXacin IVPB      levoFLOXacin IVPB 750 milliGRAM(s) IV Intermittent every 24 hours  lidocaine   4% Patch 1 Patch Transdermal every 24 hours  metoclopramide Injectable 5 milliGRAM(s) IV Push every 6 hours  pantoprazole  Injectable 40 milliGRAM(s) IV Push daily  Parenteral Nutrition - Adult 1 Each (54 mL/Hr) TPN Continuous <Continuous>  Parenteral Nutrition - Adult 1 Each (54 mL/Hr) TPN Continuous <Continuous>  sodium chloride 3%  Inhalation 5 milliLiter(s) Inhalation every 6 hours  sodium phosphate IVPB 15 milliMole(s) IV Intermittent once    MEDICATIONS  (PRN):  acetaminophen   IVPB .. 1000 milliGRAM(s) IV Intermittent once PRN Moderate Pain (4 - 6)  fentaNYL    Injectable 25 MICROGram(s) IV Push every 2 hours PRN Severe Pain (7 - 10)  ondansetron Injectable 4 milliGRAM(s) IV Push every 6 hours PRN Nausea  sodium chloride 0.9% lock flush 10 milliLiter(s) IV Push every 1 hour PRN Pre/post blood products, medications, blood draw, and to maintain line patency    Anthropometrics:  Height: 60.4"    Daily Weight in k.3 (2021 06:00)/ 166 lbs  IBW: 100 lbs +/-10%/ (45kg)  % IBW: 166%  BMI: 31.9    Weight Change:   Weight on admit (): 72.2 kg (158.8 lbs)  Weight : 75.3 kg (166 lbs) +4.3% in 1 week, likely fluid related     Nutrition Focused Physical Exam:  Please refer to initial RD assessment     Estimated energy needs: IBW used for calculations as pt >100% of IBW (166%), adjusted for age, fluids per team  Calories: 25-30 kcal/kg-->6660-2932 kcal/d  Protein: 1.4-1.6 g/d--> 63.4 - 72g/d  Fluid: per team    Subjective: 89 y/o F bedbound, prior DNR with PMH HTN, HLD, OA, CAD on Asa, HF, chronic constipation, recent admission to [facility] for metabolic encephalopathy, acute hypercapnic resp failure and pulmonary edema requiring intubation (-9/15) CAD (on Aspirin) and no PSH who presents from [facility] for constipation and emesis. CT with high-grade small bowel obstruction at site of periumbilical hernia. NGT placed in ED with 1.7 L brown feculent output. Concerning for strangulated umbilical hernia with possible bowel ischemia. Taken to OR on  for Exlap and Open repair of Umbilical hernia repair. Transferred to SICU post-op intubated for hemodynamic monitoring.  Post-op course includes episodes of SVT's (afib vs atrial tach), ileus, failed CPAP trials 2/2 tachypnea. CXR  showing R. lung collapse, effusions. S/p bronch - no mucous plugs. S/p PICC placement for TPN d/t ongoing ileus w/inability to tolerate EN. Post-op with respiratory failure w Stenotrophomonas sputum. Extubated , ileus resolved, NGT d/c'ed . Palliative following. SLP evaluation ; recommending minced and moist w/ thin liquids.     Pt seen at bedside for follow up assessment on NC/HF. Labs reviewed ; electrolytes within normal limits at this time. Fingersticks -; 138-600 mg/dL; ISS ordered. At time of RD interview pt complaining of excess mucus in mouth thus difficulty engaging in RD interview. Observed TPN via PICC infusing at 54 mL/hr. Current TPN meeting 37 kcal/kg, 1.8 g/kg based on IBW. RD to follow up per protocol.    Previous Nutrition Diagnosis: Inadequate energy intake RT NPO status AEB meeting 0% EER at present.    Active [   ]  Resolved [ x  ]    If resolved, new PES: Increased protein needs r/t physiological demand for nutrient AEB advanced age, current clinical course.     Goal: Pt to meet at least 75% of nutritional needs during hospital stay via tolerated route consistently    Recommendations:  1. TPN via PICC: recommend 160g Dex, 68g AA, 50g 20% SMOF Lipids to provide in total: 1316Kcal, 68g protein, GIR 1.48mg/kg/min, 1.5g/kg IBW protein.  >Recommend checking TG weekly.   >Check Mg, Phos, K daily and POC BG Q6hrs. Trend daily weights. Fluids and lytes per MD discretion.   2. As medically feasible, advance diet to DASH/TLC diet (defer consistency to SLP)  >Once pt meeting at least 75% EER via PO diet, d/c TPN; supplement w/ TPN as needed.   >Monitor need for ONS  3. Pain regimen per team   4. RD remains available  5. Monitor/honor GOC at all times    Education: Deferred at this time    Risk Level: High [x ] Moderate [   ] Low [   ] Admitting Diagnosis:   Patient is a 89y old  Female who presents with a chief complaint of SBO, strangulated umbilical hernia (07 Dec 2021 08:18)      PAST MEDICAL & SURGICAL HISTORY:  HTN (hypertension)    HLD (hyperlipidemia)    Osteoarthritis    Hyponatremia    Altered mental status    Urinary tract infection    HF (heart failure)      Current Nutrition Order: TPN via PICC: 250g Dex, 82g AA, 50g 20% SMOF Lipids to provide in total: 1678 kcal, 82g protein, GIR 2.4mg/kg/min, 1.8g/kg IBW protein.    PO Intake: Good (%) [   ]  Fair (50-75%) [   ] Poor (<25%) [   ] [x] NPO w/ TPN    GI Issues: No noted nausea/vomiting at this time. Last documented bowel movement . NGT remains to LIWS 700 mL output x 24 hrs. No rectal tube output.    Pain: No noted pain at time of RD interview.    Skin Integrity: Sacral stage 2, medial back DTI pressure injuries per chart. Generalized edema 1+ and edema 2+ left knee. Midline abdomen surgical incision per chart. Pola score: 12.    Labs:       142  |  105  |  20  ----------------------------<  148<H>  4.5   |  29  |  0.45<L>    Ca    8.5      07 Dec 2021 05:58  Phos  2.7       Mg     1.9           CAPILLARY BLOOD GLUCOSE      POCT Blood Glucose.: 143 mg/dL (07 Dec 2021 06:11)  POCT Blood Glucose.: 161 mg/dL (06 Dec 2021 23:54)  POCT Blood Glucose.: 170 mg/dL (06 Dec 2021 18:04)  POCT Blood Glucose.: 163 mg/dL (06 Dec 2021 11:51)  POCT Blood Glucose.: >600 mg/dL (06 Dec 2021 11:43)      Medications:  MEDICATIONS  (STANDING):  acetylcysteine 20%  Inhalation 4 milliLiter(s) Inhalation every 6 hours  albuterol/ipratropium for Nebulization 3 milliLiter(s) Nebulizer every 6 hours  chlorhexidine 2% Cloths 1 Application(s) Topical daily  dextrose 40% Gel 15 Gram(s) Oral once  dextrose 5%. 1000 milliLiter(s) (50 mL/Hr) IV Continuous <Continuous>  dextrose 5%. 1000 milliLiter(s) (100 mL/Hr) IV Continuous <Continuous>  dextrose 50% Injectable 25 Gram(s) IV Push once  dextrose 50% Injectable 12.5 Gram(s) IV Push once  dextrose 50% Injectable 25 Gram(s) IV Push once  fat emulsion (Plant Based) 20% Infusion 0.28 Gm/kG/Day (20.83 mL/Hr) IV Continuous <Continuous>  fat emulsion (Plant Based) 20% Infusion 0.28 Gm/kG/Day (20.83 mL/Hr) IV Continuous <Continuous>  glucagon  Injectable 1 milliGRAM(s) IntraMuscular once  heparin   Injectable 5000 Unit(s) SubCutaneous every 8 hours  insulin lispro (ADMELOG) corrective regimen sliding scale   SubCutaneous every 6 hours  levoFLOXacin IVPB      levoFLOXacin IVPB 750 milliGRAM(s) IV Intermittent every 24 hours  lidocaine   4% Patch 1 Patch Transdermal every 24 hours  metoclopramide Injectable 5 milliGRAM(s) IV Push every 6 hours  pantoprazole  Injectable 40 milliGRAM(s) IV Push daily  Parenteral Nutrition - Adult 1 Each (54 mL/Hr) TPN Continuous <Continuous>  Parenteral Nutrition - Adult 1 Each (54 mL/Hr) TPN Continuous <Continuous>  sodium chloride 3%  Inhalation 5 milliLiter(s) Inhalation every 6 hours  sodium phosphate IVPB 15 milliMole(s) IV Intermittent once    MEDICATIONS  (PRN):  acetaminophen   IVPB .. 1000 milliGRAM(s) IV Intermittent once PRN Moderate Pain (4 - 6)  fentaNYL    Injectable 25 MICROGram(s) IV Push every 2 hours PRN Severe Pain (7 - 10)  ondansetron Injectable 4 milliGRAM(s) IV Push every 6 hours PRN Nausea  sodium chloride 0.9% lock flush 10 milliLiter(s) IV Push every 1 hour PRN Pre/post blood products, medications, blood draw, and to maintain line patency    Anthropometrics:  Height: 60.4"    Daily Weight in k.3 (2021 06:00)/ 166 lbs  IBW: 100 lbs +/-10%/ (45kg)  % IBW: 166%  BMI: 31.9    Weight Change:   Weight on admit (): 72.2 kg (158.8 lbs)  Weight : 75.3 kg (166 lbs) +4.3% in 1 week, likely fluid related     Nutrition Focused Physical Exam:  Please refer to initial RD assessment     Estimated energy needs: IBW used for calculations as pt >100% of IBW (166%), adjusted for age, fluids per team  Calories: 25-30 kcal/kg-->7483-6355 kcal/d  Protein: 1.4-1.6 g/d--> 63.4 - 72g/d  Fluid: per team    Subjective: 89 y/o F bedbound, prior DNR with PMH HTN, HLD, OA, CAD on Asa, HF, chronic constipation, recent admission to [facility] for metabolic encephalopathy, acute hypercapnic resp failure and pulmonary edema requiring intubation (-9/15) CAD (on Aspirin) and no PSH who presents from [facility] for constipation and emesis. CT with high-grade small bowel obstruction at site of periumbilical hernia. NGT placed in ED with 1.7 L brown feculent output. Concerning for strangulated umbilical hernia with possible bowel ischemia. Taken to OR on  for Exlap and Open repair of Umbilical hernia repair. Transferred to SICU post-op intubated for hemodynamic monitoring.  Post-op course includes episodes of SVT's (afib vs atrial tach), ileus, failed CPAP trials 2/2 tachypnea. CXR  showing R. lung collapse, effusions. S/p bronch - no mucous plugs. S/p PICC placement for TPN d/t ongoing ileus w/inability to tolerate EN. Post-op with respiratory failure w Stenotrophomonas sputum. Extubated , ileus resolved, NGT d/c'ed . Palliative following. SLP evaluation ; recommending minced and moist w/ thin liquids.     Pt seen at bedside for follow up assessment on NC/HF. Labs reviewed ; electrolytes within normal limits at this time. Fingersticks -; 138-600 mg/dL; ISS ordered. At time of RD interview pt complaining of excess mucus in mouth thus difficulty engaging in RD interview. Observed TPN via PICC infusing at 54 mL/hr. Current TPN meeting 37 kcal/kg, 1.8 g/kg based on IBW. RD to follow up per protocol.    Previous Nutrition Diagnosis: Inadequate energy intake RT NPO status AEB meeting 0% EER at present.    Active [   ]  Resolved [ x  ]    If resolved, new PES: Increased protein needs r/t physiological demand for nutrient AEB advanced age, current clinical course.     Goal: Pt to meet at least 75% of nutritional needs during hospital stay via tolerated route consistently    Recommendations:  1. TPN via PICC: recommend 160g Dex, 68g AA, 50g 20% SMOF Lipids to provide in total: 1316Kcal, 68g protein, GIR 1.48mg/kg/min, 1.5g/kg IBW protein.  >Recommend checking TG weekly.   >Check Mg, Phos, K daily and POC BG Q6hrs. Trend daily weights. Fluids and lytes per MD discretion.   2. As medically feasible, advance diet to DASH/TLC diet (defer consistency to SLP)  >Once pt meeting at least 75% EER via PO diet, d/c TPN; supplement w/ TPN as needed.   >Monitor need for ONS  3. Pain regimen per team   4. RD remains available  5. Monitor/honor GOC at all times  *d/w team*  Education: Deferred at this time    Risk Level: High [x ] Moderate [   ] Low [   ]

## 2021-12-07 NOTE — PROGRESS NOTE ADULT - SUBJECTIVE AND OBJECTIVE BOX
DAILY PROGRESS NOTE:  NG tube removed yesterday.   Overnight, tachypneic requiring HFNC, with ABG revealing metabolic alkalosis. CXR revealed white out of left lung. Mucomyst given.     This morning, she appears slightly confused. She is not endorsing any pain. Not appropriately responding whether she is having flatus or not.    MEDICATIONS  (STANDING):  acetylcysteine 20%  Inhalation 4 milliLiter(s) Inhalation every 6 hours  albuterol/ipratropium for Nebulization 3 milliLiter(s) Nebulizer every 6 hours  chlorhexidine 2% Cloths 1 Application(s) Topical daily  dextrose 40% Gel 15 Gram(s) Oral once  dextrose 5%. 1000 milliLiter(s) (50 mL/Hr) IV Continuous <Continuous>  dextrose 5%. 1000 milliLiter(s) (100 mL/Hr) IV Continuous <Continuous>  dextrose 50% Injectable 25 Gram(s) IV Push once  dextrose 50% Injectable 12.5 Gram(s) IV Push once  dextrose 50% Injectable 25 Gram(s) IV Push once  fat emulsion (Plant Based) 20% Infusion 0.28 Gm/kG/Day (20.83 mL/Hr) IV Continuous <Continuous>  fat emulsion (Plant Based) 20% Infusion 0.28 Gm/kG/Day (20.83 mL/Hr) IV Continuous <Continuous>  glucagon  Injectable 1 milliGRAM(s) IntraMuscular once  heparin   Injectable 5000 Unit(s) SubCutaneous every 8 hours  insulin lispro (ADMELOG) corrective regimen sliding scale   SubCutaneous every 6 hours  levoFLOXacin IVPB      levoFLOXacin IVPB 750 milliGRAM(s) IV Intermittent every 24 hours  lidocaine   4% Patch 1 Patch Transdermal every 24 hours  metoclopramide Injectable 5 milliGRAM(s) IV Push every 6 hours  pantoprazole  Injectable 40 milliGRAM(s) IV Push daily  Parenteral Nutrition - Adult 1 Each (54 mL/Hr) TPN Continuous <Continuous>  Parenteral Nutrition - Adult 1 Each (54 mL/Hr) TPN Continuous <Continuous>  sodium chloride 3%  Inhalation 5 milliLiter(s) Inhalation every 6 hours    MEDICATIONS  (PRN):  acetaminophen   IVPB .. 1000 milliGRAM(s) IV Intermittent once PRN Moderate Pain (4 - 6)  fentaNYL    Injectable 25 MICROGram(s) IV Push every 2 hours PRN Severe Pain (7 - 10)  ondansetron Injectable 4 milliGRAM(s) IV Push every 6 hours PRN Nausea  sodium chloride 0.9% lock flush 10 milliLiter(s) IV Push every 1 hour PRN Pre/post blood products, medications, blood draw, and to maintain line patency      Vital Signs Last 24 Hrs  T(C): 36.6 (07 Dec 2021 09:00), Max: 36.6 (07 Dec 2021 09:00)  T(F): 97.8 (07 Dec 2021 09:00), Max: 97.8 (07 Dec 2021 09:00)  HR: 99 (07 Dec 2021 12:00) (75 - 116)  BP: 150/69 (06 Dec 2021 15:21) (115/61 - 150/69)  BP(mean): 94 (06 Dec 2021 15:21) (82 - 94)  RR: 40 (07 Dec 2021 12:00) (24 - 47)  SpO2: 91% (07 Dec 2021 12:00) (87% - 100%)    I&O's Detail    06 Dec 2021 07:01  -  07 Dec 2021 07:00  --------------------------------------------------------  IN:    Fat Emulsion (Plant Based) 20%: 208 mL    Fat Emulsion (Plant Based) 20%: 41.6 mL    Oral Fluid: 200 mL    TPN (Total Parenteral Nutrition): 1296 mL  Total IN: 1745.6 mL    OUT:    Indwelling Catheter - Urethral (mL): 2750 mL    Nasogastric/Oral tube (mL): 450 mL  Total OUT: 3200 mL    Total NET: -1454.4 mL      07 Dec 2021 07:01  -  07 Dec 2021 13:08  --------------------------------------------------------  IN:    Fat Emulsion (Plant Based) 20%: 62.4 mL    IV PiggyBack: 100 mL    TPN (Total Parenteral Nutrition): 270 mL  Total IN: 432.4 mL    OUT:    Indwelling Catheter - Urethral (mL): 950 mL  Total OUT: 950 mL    Total NET: -517.6 mL    Physical Exam:    General: NAD  Pulmonary: does not appear with any labored respirations, no wheezing, no coughing  Cards: sinus rhythm  Abd: soft, nontender, nondistended. staples and belly button c/d/i  Ext: [facility]      LABS:                        7.3    9.22  )-----------( 315      ( 07 Dec 2021 05:58 )             23.9     12-07    142  |  105  |  20  ----------------------------<  148<H>  4.5   |  29  |  0.45<L>    Ca    8.5      07 Dec 2021 05:58  Phos  2.7     12-07  Mg     1.9     12-07

## 2021-12-07 NOTE — CHART NOTE - NSCHARTNOTEFT_GEN_A_CORE
NERIS placed in chart with DNR/DNI. Discussed with Paulette Bowers, emphasized that patient has the ability to make this decision and it should be respected. Her concern was that the DNR label would lead providers to not giving the patient any treatment at all. She agrees that the patient would not want to be kept alive indefinitely on life support and we discussed that DNR designation only has to do with cardiac arrest. Reinforced that "Do Not Resuscitate" does not mean Do Not Treat. She feels her conversation with someone at [facility] led to confusion because they thought the patient would not want to be hospitalized due to her DNR status. Family remains hopeful for the patient's continued recovery but we had a lengthy conversation regarding DNR/DNI status.    Fredy Escobar, Palliative Care Attending  Questions/Concerns: Call [phone] (including Nights/Weekend) or message on [employer] Teams (Fredy Escobar)    5:00-5:31 NERIS placed in chart with DNR/DNI. Discussed with Paulette Bowers, emphasized that patient has the ability to make this decision and it should be respected. Her concern was that the DNR label would lead providers to not giving the patient any treatment at all. She agrees that the patient would not want to be kept alive indefinitely on life support and we discussed that DNR designation only has to do with cardiac arrest. Reinforced that "Do Not Resuscitate" does not mean Do Not Treat. She feels her conversation with someone at [facility] led to confusion because they thought the patient would not want to be hospitalized due to her DNR status. Family remains hopeful for the patient's continued recovery but we had a lengthy conversation regarding DNR/DNI status.    Fredy Escobar, Palliative Care Attending  Questions/Concerns: Call [phone] (including Nights/Weekend) or message on [employer] Teams (Fredy Escobar)          PALLIATIVE MEDICINE COORDINATION OF CARE DOCUMENTATION: [x] Inpatient Consult  Non-Face-to-Face prolonged service provided that relates to (face-to-face) care that has or will occur and ongoing patient management, including one or more of the following: -Documentation review from other physicians and health care professional services -Review of medical records and diagnostic/radiology study results -Coordination with patient's support system  ************************************************************************  MEDICATION REVIEW:  MEDICATIONS  (STANDING):  acetylcysteine 20%  Inhalation 4 milliLiter(s) Inhalation every 6 hours  albuterol/ipratropium for Nebulization 3 milliLiter(s) Nebulizer every 6 hours  chlorhexidine 2% Cloths 1 Application(s) Topical daily  fat emulsion (Plant Based) 20% Infusion 0.28 Gm/kG/Day (20.83 mL/Hr) IV Continuous <Continuous>  fat emulsion (Plant Based) 20% Infusion 0.28 Gm/kG/Day (12.6 mL/Hr) IV Continuous <Continuous>  glucagon  Injectable 1 milliGRAM(s) IntraMuscular once  heparin   Injectable 5000 Unit(s) SubCutaneous every 8 hours  insulin lispro (ADMELOG) corrective regimen sliding scale   SubCutaneous every 6 hours  levoFLOXacin IVPB      levoFLOXacin IVPB 750 milliGRAM(s) IV Intermittent every 24 hours  lidocaine   4% Patch 1 Patch Transdermal every 24 hours  metoclopramide Injectable 5 milliGRAM(s) IV Push every 6 hours  pantoprazole  Injectable 40 milliGRAM(s) IV Push daily  Parenteral Nutrition - Adult 1 Each (54 mL/Hr) TPN Continuous <Continuous>  Parenteral Nutrition - Adult 1 Each (54 mL/Hr) TPN Continuous <Continuous>  sodium chloride 3%  Inhalation 5 milliLiter(s) Inhalation every 6 hours    MEDICATIONS  (PRN):  acetaminophen   IVPB .. 1000 milliGRAM(s) IV Intermittent once PRN Moderate Pain (4 - 6)  fentaNYL    Injectable 25 MICROGram(s) IV Push every 2 hours PRN Severe Pain (7 - 10)  ondansetron Injectable 4 milliGRAM(s) IV Push every 6 hours PRN Nausea  sodium chloride 0.9% lock flush 10 milliLiter(s) IV Push every 1 hour PRN Pre/post blood products, medications, blood draw, and to maintain line patency    ISTOP REFERENCE: [id number]  - no active Rx's  - PRN usage: NO PRN'S  ------------------------------------------------------------------------  COORDINATION OF CARE:  - Palliative Care consulted for: [facility]  - Patient (to be) assessed: 12/6/21  - Patient previously seen by Palliative Care service: YES    ADVANCE CARE PLANNING  - Code status: DNR/DNI  - MOLST reviewed in chart: COMPLETED, found on [facility]  - HCP/Surrogate: Paulette Bowers  - [facility] documents: NONE found on [place]  - HCP/Living will/Other Advanced Directives in Alpha: NONE found on Alpha  ------------------------------------------------------------------------  CARE PROVIDER DOCUMENTATION:  - SW/CM notes: Remains medically active  - SICU notes: no plans for bronchoscopy at this time. will alternate BIPAP q4hr w HFNC   - Pulm notes: Continue with bed assisted chest PT, mobilization and nebulized BD. No role for bronchoscopy as this is recurring problem and patient will need clearance regiment as long term solution  - Surgery notes: Bowels are recovering, however with white out left lung    PLAN OF CARE  - Known admissions in past year: 1  - Current admit date: 11/24/21  - LOS: 14  - LACE score: 13  - Current dispo plan: TO BE DETERMINED    11-24-21 (14d)  ------------------------------------------------------------------------  - Time Spent/Chart reviewed: 31 Minutes [including time used to gather, review and transfer data]  - Start: 5:00PM  - End: 5:31PM    Prolonged services rendered, as part of this patient's care provided by Palliative Medicine, include: i. chart review for provider and ancillary service documentation, ii. pertinent diagnostics including laboratory and imaging studies, iii. medication review including PRN use, iv. admission history including previous palliative care encounters and GOC notes, v. advance care planning documents including HCP and MOLST forms in [facility]. Part of Palliative Medicine extended evaluation and management also involves coordination of care with our IDT, the primary and consulting teams, and unit CM/SW and Hospice if eligible. Recommendations based on the information gathered and discussed are outlined in the A/P of Palliative notes.

## 2021-12-07 NOTE — PROGRESS NOTE ADULT - ASSESSMENT
89F bedbound with PMH HTN, HLD, OA, CAD, HF, chronic constipation, recent admission for metabolic encephalopathy, acute hypercapneic resp failure and pulmonary edema who presented from rehab for constipation and emesis found to have high grade SBO at periumbilical hernia site, s/p 11/24 for exlap and open repair of umbilical hernia repair admitted to SICU in setting of hemodynamic instability and continued mechanical ventilation requirement, now s/p bronch 12/1 for mucus plug and extubated 12/5. Pulmonary following for recurrent mucus plug.    #Hypoxemic respiratory failure  #L sided mucus plug    Patient currently on HFNC 50/50% saturating 88-90%. Review of serial CXRs demonstrating new L sided opacification. Bedside POCUS with no effusion on L, trace effusion on R. Patient has continued mucus plugging and is clear in her wishes to not be reintubated. Bronch is difficult without sedation due to collapsing airways and she will likely require intubation. Will discuss with patient and her family. For now, would continue frequent suctioning and clearance with q4hrs duonebs and hypertonic saline.    Recommend:  - Q4hrs duonebs AND inhaled 3% hypertonic saline  - Q2hr suctioning  - Chest PT if patient can tolerate    Patient discussed with attending Dr. Hastings.     89F bedbound with PMH HTN, HLD, OA, CAD, HF, chronic constipation, recent admission for metabolic encephalopathy, acute hypercapneic resp failure and pulmonary edema who presented from rehab for constipation and emesis found to have high grade SBO at periumbilical hernia site, s/p 11/24 for exlap and open repair of umbilical hernia repair admitted to SICU in setting of hemodynamic instability and continued mechanical ventilation requirement, now s/p bronch 12/1 for mucus plug and extubated 12/5. Pulmonary following for recurrent mucus plug.    #Hypoxemic respiratory failure  #L sided mucus plug    Patient currently on HFNC 50/50% saturating 88-90%. Review of serial CXRs demonstrating new L sided opacification. Bedside POCUS with no effusion on L, trace effusion on R. Patient has continued mucus plugging and is clear in her wishes to not be reintubated. Bronch is difficult without sedation due to collapsing airways and she will likely require intubation. Discussed with patient and her family, do not wish to proceed with bronchoscopy. For now, would continue frequent suctioning and clearance with q4hrs duonebs and hypertonic saline.    Recommend:  - Q4hrs duonebs AND inhaled 3% hypertonic saline  - Q2hr suctioning  - Aggressive chest PT    Patient discussed with attending Dr. Hastings. Please reconsult for new questions.

## 2021-12-07 NOTE — CHART NOTE - NSCHARTNOTEFT_GEN_A_CORE
Pt seen and examined w SICU intensivist, pt w complete whiteout left lung on AM CXR, suspected 2/2 mucus plug. Pulmonary service consulted and recommending urgent bronchoscopy evaluation, SICU team in agreement w recommendation. Risks + benefits, alternatives explained to patient in [place] via  service (ID# [id number]). Pt verbalized understanding of procedure, potential need to remain intubated s/p bronchoscopy, and is currently refusing procedure. Determined by psych assessment to have capacity to make medical decisions, to include refusal of care.     A/P:   no plans for bronchoscopy at this time  will alternate BiPap q4hr w HFNC   primary team + attending surgeon made aware  will update immediate family

## 2021-12-08 DIAGNOSIS — I50.32 CHRONIC DIASTOLIC (CONGESTIVE) HEART FAILURE: ICD-10-CM

## 2021-12-08 DIAGNOSIS — R53.81 OTHER MALAISE: ICD-10-CM

## 2021-12-08 DIAGNOSIS — K43.6 OTHER AND UNSPECIFIED VENTRAL HERNIA WITH OBSTRUCTION, WITHOUT GANGRENE: ICD-10-CM

## 2021-12-08 DIAGNOSIS — Z98.890 OTHER SPECIFIED POSTPROCEDURAL STATES: ICD-10-CM

## 2021-12-08 DIAGNOSIS — I27.20 PULMONARY HYPERTENSION, UNSPECIFIED: ICD-10-CM

## 2021-12-08 DIAGNOSIS — J96.01 ACUTE RESPIRATORY FAILURE WITH HYPOXIA: ICD-10-CM

## 2021-12-08 DIAGNOSIS — I25.10 ATHEROSCLEROTIC HEART DISEASE OF NATIVE CORONARY ARTERY WITHOUT ANGINA PECTORIS: ICD-10-CM

## 2021-12-08 DIAGNOSIS — Z71.89 OTHER SPECIFIED COUNSELING: ICD-10-CM

## 2021-12-08 DIAGNOSIS — T17.500A UNSPECIFIED FOREIGN BODY IN BRONCHUS CAUSING ASPHYXIATION, INITIAL ENCOUNTER: ICD-10-CM

## 2021-12-08 DIAGNOSIS — Z51.5 ENCOUNTER FOR PALLIATIVE CARE: ICD-10-CM

## 2021-12-08 DIAGNOSIS — I10 ESSENTIAL (PRIMARY) HYPERTENSION: ICD-10-CM

## 2021-12-08 DIAGNOSIS — E78.5 HYPERLIPIDEMIA, UNSPECIFIED: ICD-10-CM

## 2021-12-08 DIAGNOSIS — Z29.9 ENCOUNTER FOR PROPHYLACTIC MEASURES, UNSPECIFIED: ICD-10-CM

## 2021-12-08 LAB
ANION GAP SERPL CALC-SCNC: 9 MMOL/L — SIGNIFICANT CHANGE UP (ref 5–17)
BLD GP AB SCN SERPL QL: NEGATIVE — SIGNIFICANT CHANGE UP
BUN SERPL-MCNC: 20 MG/DL — SIGNIFICANT CHANGE UP (ref 7–23)
CALCIUM SERPL-MCNC: 8.5 MG/DL — SIGNIFICANT CHANGE UP (ref 8.4–10.5)
CHLORIDE SERPL-SCNC: 105 MMOL/L — SIGNIFICANT CHANGE UP (ref 96–108)
CO2 SERPL-SCNC: 28 MMOL/L — SIGNIFICANT CHANGE UP (ref 22–31)
CREAT SERPL-MCNC: 0.42 MG/DL — LOW (ref 0.5–1.3)
GLUCOSE BLDC GLUCOMTR-MCNC: 111 MG/DL — HIGH (ref 70–99)
GLUCOSE BLDC GLUCOMTR-MCNC: 146 MG/DL — HIGH (ref 70–99)
GLUCOSE BLDC GLUCOMTR-MCNC: 155 MG/DL — HIGH (ref 70–99)
GLUCOSE BLDC GLUCOMTR-MCNC: 172 MG/DL — HIGH (ref 70–99)
GLUCOSE SERPL-MCNC: 138 MG/DL — HIGH (ref 70–99)
HCT VFR BLD CALC: 24 % — LOW (ref 34.5–45)
HGB BLD-MCNC: 7.3 G/DL — LOW (ref 11.5–15.5)
MAGNESIUM SERPL-MCNC: 2 MG/DL — SIGNIFICANT CHANGE UP (ref 1.6–2.6)
MCHC RBC-ENTMCNC: 28 PG — SIGNIFICANT CHANGE UP (ref 27–34)
MCHC RBC-ENTMCNC: 30.4 GM/DL — LOW (ref 32–36)
MCV RBC AUTO: 92 FL — SIGNIFICANT CHANGE UP (ref 80–100)
NRBC # BLD: 0 /100 WBCS — SIGNIFICANT CHANGE UP (ref 0–0)
PHOSPHATE SERPL-MCNC: 3.2 MG/DL — SIGNIFICANT CHANGE UP (ref 2.5–4.5)
PLATELET # BLD AUTO: 316 K/UL — SIGNIFICANT CHANGE UP (ref 150–400)
POTASSIUM SERPL-MCNC: 4.2 MMOL/L — SIGNIFICANT CHANGE UP (ref 3.5–5.3)
POTASSIUM SERPL-SCNC: 4.2 MMOL/L — SIGNIFICANT CHANGE UP (ref 3.5–5.3)
RBC # BLD: 2.61 M/UL — LOW (ref 3.8–5.2)
RBC # FLD: 16.8 % — HIGH (ref 10.3–14.5)
RH IG SCN BLD-IMP: POSITIVE — SIGNIFICANT CHANGE UP
SODIUM SERPL-SCNC: 142 MMOL/L — SIGNIFICANT CHANGE UP (ref 135–145)
TRIGL SERPL-MCNC: 91 MG/DL — SIGNIFICANT CHANGE UP
WBC # BLD: 7.95 K/UL — SIGNIFICANT CHANGE UP (ref 3.8–10.5)
WBC # FLD AUTO: 7.95 K/UL — SIGNIFICANT CHANGE UP (ref 3.8–10.5)

## 2021-12-08 PROCEDURE — 71045 X-RAY EXAM CHEST 1 VIEW: CPT | Mod: 26

## 2021-12-08 RX ORDER — HYDROMORPHONE HYDROCHLORIDE 2 MG/ML
0.5 INJECTION INTRAMUSCULAR; INTRAVENOUS; SUBCUTANEOUS EVERY 6 HOURS
Refills: 0 | Status: DISCONTINUED | OUTPATIENT
Start: 2021-12-08 | End: 2021-12-09

## 2021-12-08 RX ORDER — PANTOPRAZOLE SODIUM 20 MG/1
40 TABLET, DELAYED RELEASE ORAL DAILY
Refills: 0 | Status: DISCONTINUED | OUTPATIENT
Start: 2021-12-09 | End: 2021-12-09

## 2021-12-08 RX ORDER — METOPROLOL TARTRATE 50 MG
25 TABLET ORAL EVERY 8 HOURS
Refills: 0 | Status: DISCONTINUED | OUTPATIENT
Start: 2021-12-08 | End: 2021-12-09

## 2021-12-08 RX ORDER — I.V. FAT EMULSION 20 G/100ML
0.28 EMULSION INTRAVENOUS
Qty: 20.22 | Refills: 0 | Status: DISCONTINUED | OUTPATIENT
Start: 2021-12-08 | End: 2021-12-08

## 2021-12-08 RX ORDER — HYDROMORPHONE HYDROCHLORIDE 2 MG/ML
1 INJECTION INTRAMUSCULAR; INTRAVENOUS; SUBCUTANEOUS EVERY 6 HOURS
Refills: 0 | Status: DISCONTINUED | OUTPATIENT
Start: 2021-12-08 | End: 2021-12-09

## 2021-12-08 RX ORDER — ELECTROLYTE SOLUTION,INJ
1 VIAL (ML) INTRAVENOUS
Refills: 0 | Status: DISCONTINUED | OUTPATIENT
Start: 2021-12-08 | End: 2021-12-09

## 2021-12-08 RX ORDER — I.V. FAT EMULSION 20 G/100ML
0.69 EMULSION INTRAVENOUS
Qty: 50 | Refills: 0 | Status: DISCONTINUED | OUTPATIENT
Start: 2021-12-08 | End: 2021-12-08

## 2021-12-08 RX ADMIN — Medication 25 MILLIGRAM(S): at 21:28

## 2021-12-08 RX ADMIN — LIDOCAINE 1 PATCH: 4 CREAM TOPICAL at 11:12

## 2021-12-08 RX ADMIN — PANTOPRAZOLE SODIUM 40 MILLIGRAM(S): 20 TABLET, DELAYED RELEASE ORAL at 11:18

## 2021-12-08 RX ADMIN — Medication 4 MILLILITER(S): at 00:35

## 2021-12-08 RX ADMIN — Medication 5 MILLIGRAM(S): at 23:48

## 2021-12-08 RX ADMIN — Medication 4 MILLILITER(S): at 05:17

## 2021-12-08 RX ADMIN — HEPARIN SODIUM 5000 UNIT(S): 5000 INJECTION INTRAVENOUS; SUBCUTANEOUS at 21:27

## 2021-12-08 RX ADMIN — Medication 5 MILLIGRAM(S): at 11:10

## 2021-12-08 RX ADMIN — Medication 1 EACH: at 21:08

## 2021-12-08 RX ADMIN — HEPARIN SODIUM 5000 UNIT(S): 5000 INJECTION INTRAVENOUS; SUBCUTANEOUS at 13:24

## 2021-12-08 RX ADMIN — Medication 3 MILLILITER(S): at 19:51

## 2021-12-08 RX ADMIN — HEPARIN SODIUM 5000 UNIT(S): 5000 INJECTION INTRAVENOUS; SUBCUTANEOUS at 06:07

## 2021-12-08 RX ADMIN — CHLORHEXIDINE GLUCONATE 1 APPLICATION(S): 213 SOLUTION TOPICAL at 11:25

## 2021-12-08 RX ADMIN — Medication 3 MILLILITER(S): at 05:16

## 2021-12-08 RX ADMIN — SODIUM CHLORIDE 4 MILLILITER(S): 9 INJECTION INTRAMUSCULAR; INTRAVENOUS; SUBCUTANEOUS at 05:22

## 2021-12-08 RX ADMIN — Medication 3 MILLILITER(S): at 11:10

## 2021-12-08 RX ADMIN — Medication 5 MILLIGRAM(S): at 06:06

## 2021-12-08 RX ADMIN — Medication 5 MILLIGRAM(S): at 19:51

## 2021-12-08 RX ADMIN — LIDOCAINE 1 PATCH: 4 CREAM TOPICAL at 00:07

## 2021-12-08 RX ADMIN — FENTANYL CITRATE 25 MICROGRAM(S): 50 INJECTION INTRAVENOUS at 11:10

## 2021-12-08 RX ADMIN — Medication 4 MILLILITER(S): at 23:21

## 2021-12-08 RX ADMIN — SODIUM CHLORIDE 5 MILLILITER(S): 9 INJECTION INTRAMUSCULAR; INTRAVENOUS; SUBCUTANEOUS at 11:11

## 2021-12-08 RX ADMIN — Medication 3 MILLILITER(S): at 23:47

## 2021-12-08 RX ADMIN — SODIUM CHLORIDE 5 MILLILITER(S): 9 INJECTION INTRAMUSCULAR; INTRAVENOUS; SUBCUTANEOUS at 21:00

## 2021-12-08 RX ADMIN — Medication 4 MILLILITER(S): at 11:10

## 2021-12-08 RX ADMIN — I.V. FAT EMULSION 20.83 GM/KG/DAY: 20 EMULSION INTRAVENOUS at 21:08

## 2021-12-08 RX ADMIN — Medication 2: at 12:01

## 2021-12-08 RX ADMIN — Medication 400 MILLIGRAM(S): at 11:44

## 2021-12-08 RX ADMIN — LIDOCAINE 1 PATCH: 4 CREAM TOPICAL at 23:00

## 2021-12-08 NOTE — PROGRESS NOTE ADULT - SUBJECTIVE AND OBJECTIVE BOX
ON: Refused BiPAP ON      SUBJECTIVE:  Pt seen and examined by SICU resident     MEDICATIONS  (STANDING):  acetylcysteine 20%  Inhalation 4 milliLiter(s) Inhalation every 6 hours  albuterol/ipratropium for Nebulization 3 milliLiter(s) Nebulizer every 6 hours  chlorhexidine 2% Cloths 1 Application(s) Topical daily  dextrose 40% Gel 15 Gram(s) Oral once  dextrose 5%. 1000 milliLiter(s) (50 mL/Hr) IV Continuous <Continuous>  dextrose 5%. 1000 milliLiter(s) (100 mL/Hr) IV Continuous <Continuous>  dextrose 50% Injectable 25 Gram(s) IV Push once  dextrose 50% Injectable 12.5 Gram(s) IV Push once  dextrose 50% Injectable 25 Gram(s) IV Push once  fat emulsion (Plant Based) 20% Infusion 0.28 Gm/kG/Day (20.83 mL/Hr) IV Continuous <Continuous>  fat emulsion (Plant Based) 20% Infusion 0.28 Gm/kG/Day (12.6 mL/Hr) IV Continuous <Continuous>  glucagon  Injectable 1 milliGRAM(s) IntraMuscular once  heparin   Injectable 5000 Unit(s) SubCutaneous every 8 hours  insulin lispro (ADMELOG) corrective regimen sliding scale   SubCutaneous every 6 hours  levoFLOXacin IVPB      levoFLOXacin IVPB 750 milliGRAM(s) IV Intermittent every 24 hours  lidocaine   4% Patch 1 Patch Transdermal every 24 hours  metoclopramide Injectable 5 milliGRAM(s) IV Push every 6 hours  pantoprazole  Injectable 40 milliGRAM(s) IV Push daily  Parenteral Nutrition - Adult 1 Each (54 mL/Hr) TPN Continuous <Continuous>  Parenteral Nutrition - Adult 1 Each (54 mL/Hr) TPN Continuous <Continuous>  sodium chloride 3%  Inhalation 5 milliLiter(s) Inhalation every 6 hours    MEDICATIONS  (PRN):  acetaminophen   IVPB .. 1000 milliGRAM(s) IV Intermittent once PRN Moderate Pain (4 - 6)  fentaNYL    Injectable 25 MICROGram(s) IV Push every 2 hours PRN Severe Pain (7 - 10)  ondansetron Injectable 4 milliGRAM(s) IV Push every 6 hours PRN Nausea  sodium chloride 0.9% lock flush 10 milliLiter(s) IV Push every 1 hour PRN Pre/post blood products, medications, blood draw, and to maintain line patency      Drips:     ICU Vital Signs Last 24 Hrs  T(C): 36.4 (08 Dec 2021 01:09), Max: 36.9 (07 Dec 2021 21:55)  T(F): 97.6 (08 Dec 2021 01:09), Max: 98.4 (07 Dec 2021 21:55)  HR: 85 (08 Dec 2021 05:00) (73 - 110)  BP: --  BP(mean): --  ABP: 94/57 (08 Dec 2021 05:00) (88/71 - 132/74)  ABP(mean): 74 (08 Dec 2021 05:00) (65 - 99)  RR: 38 (08 Dec 2021 05:00) (33 - 47)  SpO2: 99% (08 Dec 2021 05:00) (87% - 100%)      Physical Exam:  General: NAD  HEENT: NC/AT, EOMI, PERRLA, normal hearing, no oral lesions, neck supple w/o LAD  Pulmonary: Nonlabored breathing, no respiratory distress, CTA-B  Cardiovascular: NSR, no murmurs  Abdominal: soft, NT/ND, +BS, no organomegaly  Extremities: WWP, 5/5 strength x 4, no clubbing/cyanosis/edema  Neuro: A/O x3, CNs II-XII grossly intact, normal motor/sensation, no focal deficits  Pulses: palpable distal pulses    Lines/tubes/drains:  Deandra:	      Juan J settings:      I&O's Summary    06 Dec 2021 07:01  -  07 Dec 2021 07:00  --------------------------------------------------------  IN: 1745.6 mL / OUT: 3200 mL / NET: -1454.4 mL    07 Dec 2021 07:01  -  08 Dec 2021 06:48  --------------------------------------------------------  IN: 1450.4 mL / OUT: 1845 mL / NET: -394.6 mL        LABS:                        7.3    7.95  )-----------( 316      ( 08 Dec 2021 05:53 )             24.0     12-08    142  |  105  |  20  ----------------------------<  138<H>  4.2   |  28  |  0.42<L>    Ca    8.5      08 Dec 2021 05:53  Phos  3.2     12-08  Mg     2.0     12-08          CAPILLARY BLOOD GLUCOSE      POCT Blood Glucose.: 134 mg/dL (07 Dec 2021 23:43)  POCT Blood Glucose.: 141 mg/dL (07 Dec 2021 18:04)  POCT Blood Glucose.: 168 mg/dL (07 Dec 2021 12:25)        Cultures:    RADIOLOGY & ADDITIONAL STUDIES:     ON: Refused BiPAP ON      SUBJECTIVE:  Pt seen and examined by SICU resident   On HFNC, sheila well   Denies N/V/CP/SOB  Endorses back pain     MEDICATIONS  (STANDING):  acetylcysteine 20%  Inhalation 4 milliLiter(s) Inhalation every 6 hours  albuterol/ipratropium for Nebulization 3 milliLiter(s) Nebulizer every 6 hours  chlorhexidine 2% Cloths 1 Application(s) Topical daily  dextrose 40% Gel 15 Gram(s) Oral once  dextrose 5%. 1000 milliLiter(s) (50 mL/Hr) IV Continuous <Continuous>  dextrose 5%. 1000 milliLiter(s) (100 mL/Hr) IV Continuous <Continuous>  dextrose 50% Injectable 25 Gram(s) IV Push once  dextrose 50% Injectable 12.5 Gram(s) IV Push once  dextrose 50% Injectable 25 Gram(s) IV Push once  fat emulsion (Plant Based) 20% Infusion 0.28 Gm/kG/Day (20.83 mL/Hr) IV Continuous <Continuous>  fat emulsion (Plant Based) 20% Infusion 0.28 Gm/kG/Day (12.6 mL/Hr) IV Continuous <Continuous>  glucagon  Injectable 1 milliGRAM(s) IntraMuscular once  heparin   Injectable 5000 Unit(s) SubCutaneous every 8 hours  insulin lispro (ADMELOG) corrective regimen sliding scale   SubCutaneous every 6 hours  levoFLOXacin IVPB      levoFLOXacin IVPB 750 milliGRAM(s) IV Intermittent every 24 hours  lidocaine   4% Patch 1 Patch Transdermal every 24 hours  metoclopramide Injectable 5 milliGRAM(s) IV Push every 6 hours  pantoprazole  Injectable 40 milliGRAM(s) IV Push daily  Parenteral Nutrition - Adult 1 Each (54 mL/Hr) TPN Continuous <Continuous>  Parenteral Nutrition - Adult 1 Each (54 mL/Hr) TPN Continuous <Continuous>  sodium chloride 3%  Inhalation 5 milliLiter(s) Inhalation every 6 hours    MEDICATIONS  (PRN):  acetaminophen   IVPB .. 1000 milliGRAM(s) IV Intermittent once PRN Moderate Pain (4 - 6)  fentaNYL    Injectable 25 MICROGram(s) IV Push every 2 hours PRN Severe Pain (7 - 10)  ondansetron Injectable 4 milliGRAM(s) IV Push every 6 hours PRN Nausea  sodium chloride 0.9% lock flush 10 milliLiter(s) IV Push every 1 hour PRN Pre/post blood products, medications, blood draw, and to maintain line patency      Drips: n/a    ICU Vital Signs Last 24 Hrs  T(C): 36.4 (08 Dec 2021 01:09), Max: 36.9 (07 Dec 2021 21:55)  T(F): 97.6 (08 Dec 2021 01:09), Max: 98.4 (07 Dec 2021 21:55)  HR: 85 (08 Dec 2021 05:00) (73 - 110)  BP: --  BP(mean): --  ABP: 94/57 (08 Dec 2021 05:00) (88/71 - 132/74)  ABP(mean): 74 (08 Dec 2021 05:00) (65 - 99)  RR: 38 (08 Dec 2021 05:00) (33 - 47)  SpO2: 99% (08 Dec 2021 05:00) (87% - 100%)      Physical Exam:  General: NAD  HEENT: NC/AT, EOMI, PERRLA,   Pulmonary: Nonlabored breathing, no respiratory distress on HFNC, absent breath sounds in all L lung fields  Cardiovascular: NSR,   Abdominal: soft, NT/ND, midline incision c/d/i w/o R/G.   Extremities: WWP, 5/5 strength x 4, no clubbing/cyanosis  Neuro: A/O x3, CNs II-XII grossly intact, normal motor/sensation, no focal deficits      Lines/tubes/drains:  - SHIRA   - Deandra Liriano: (+) UOP monitoring in critically ill pt     Vent settings:      I&O's Summary    06 Dec 2021 07:01  -  07 Dec 2021 07:00  --------------------------------------------------------  IN: 1745.6 mL / OUT: 3200 mL / NET: -1454.4 mL    07 Dec 2021 07:01  -  08 Dec 2021 06:48  --------------------------------------------------------  IN: 1450.4 mL / OUT: 1845 mL / NET: -394.6 mL        LABS:                        7.3    7.95  )-----------( 316      ( 08 Dec 2021 05:53 )             24.0     12-08    142  |  105  |  20  ----------------------------<  138<H>  4.2   |  28  |  0.42<L>    Ca    8.5      08 Dec 2021 05:53  Phos  3.2     12-08  Mg     2.0     12-08          CAPILLARY BLOOD GLUCOSE      POCT Blood Glucose.: 134 mg/dL (07 Dec 2021 23:43)  POCT Blood Glucose.: 141 mg/dL (07 Dec 2021 18:04)  POCT Blood Glucose.: 168 mg/dL (07 Dec 2021 12:25)        Cultures:    RADIOLOGY & ADDITIONAL STUDIES:

## 2021-12-08 NOTE — PROGRESS NOTE ADULT - PROBLEM SELECTOR PLAN 2
L lung mucus plugging, CXR shows full L lung white out. Stenotrophomonas Sputum    Plan:  - As above  - F/U pulm recs  - Suction as needed  - Cont levaquin

## 2021-12-08 NOTE — PROGRESS NOTE ADULT - ASSESSMENT
89 y/o F baseline aaox3, bedbound, PMH HTN, HLD, OA, CAD on [place], HF, chronic constipation, admitted with incarcerated umbilical hernia, s/p 11/24 for Exlap Open Umbilical hernia repair. Post-op with respiratory failure w Stenotrophomonas sputum extubated 12/5, ileus resolved, NGT d/c'ed 12/6.     Neuro: Tylenol and Fentanyl PRN, Lidoderm patch; Home Remeron on hold.  CV: MAP >65, Hx CAD, HTN, HFpEF; pulm htn (TTE 11/29, LVEF 60-65%, mod TR, PASP 72),   Pulm: Extubated on 12/5, High flow NC 40%/50 flow duonebs, 3% saline nebs, q2h suctioning. Refusing bronch, will try BiPAP t/o day  GI/FEN: NPO, passed S&S, cleared for mince and moist, TPN/lipids. Zofran, standing Reglan, PPI,   : Deandra  ID: Stenotrophomonas Sputum: Levaquin (12/3--)  Endo: ISS  PPx: SCDs, SQH   Lines: Bracey left brachial (11/24-), PIVs  Wounds: Midline incision   PT/OT: re-ordered 12/5

## 2021-12-08 NOTE — PROGRESS NOTE ADULT - ASSESSMENT
89 y/o F baseline aaox3, bedbound, PMH HTN, HLD, OA, CAD on Asa, HF, chronic constipation, admitted with incarcerated umbilical hernia, s/p 11/24 for Exlap Open Umbilical hernia repair. Post-op with respiratory failure w Stenotrophomonas sputum extubated 12/5, ileus resolved, NGT d/c'ed 12/6.     Neuro: Tylenol and Fentanyl PRN, Lidoderm patch; Home Remeron on hold.  CV: MAP >65, Hx CAD, HTN, HFpEF; pulm htn (TTE 11/29, LVEF 60-65%, mod TR, PASP 72),   Pulm: Extubated on 12/5, High flow NC 40%/50 flow duonebs, 3% saline nebs, q2h suctioning. Refusing bronch, will try BiPAP t/o day  GI/FEN: NPO, passed S&S, cleared for mince and moist, TPN/lipids. Zofran, standing Reglan, PPI,   : Deandra  ID: Stenotrophomonas Sputum: Levaquin (12/3--)  Endo: ISS  PPx: SCDs, SQH   Lines: Hallam left brachial (11/24-), PIVs  Wounds: Midline incision   PT/OT: re-ordered 12/5

## 2021-12-08 NOTE — DISCHARGE NOTE PROVIDER - CARE PROVIDER_API CALL
Manuel Nickerson (MD)  Surgery  [address], [place] [address]  Phone: [phone]  Fax: [phone]  Follow Up Time: 1 week

## 2021-12-08 NOTE — DISCHARGE NOTE PROVIDER - HOSPITAL COURSE
#Discharge: do not delete    89 y/o F baseline aaox3, bedbound, PMH HTN, HLD, OA, CAD on Asa, HF, chronic constipation, admitted with incarcerated umbilical hernia, s/p 11/24 for Exlap Open Umbilical hernia repair. Post-op with respiratory failure w Stenotrophomonas sputum extubated 12/5, ileus resolved, NGT d/c'ed 12/6    Hospital course (by problem):     #Acute respiratory failure with hypoxia  #Mucus plugging of bronchi  #Stenotrophomonas maltophilia (+) sputum  Pt presented with abdominal pain with emesis, found to have an incarcerated umbilical hernia requiring exlap and open hernia repair, c/b AHRF following procedure remaining intubated. Pt is now s/p extubation 12/5. Weaned from HFNC to NC, pt now saturating well on 3LNC. BiPAP recommended for use at night but pt has been refusing. Pt was found to have L mucus plugging with white out on CXR with no improvement. Bronchoscopy recommended but pt refusing.  - Cont O2 as needed   - Q4hrs duonebs AND inhaled 3% hypertonic saline  - Lung PT  - Continue suction as needed to clear secretions  - Cont Levofloxacin 750mg IV q24hrs     #S/P exploratory laparotomy  Pt initially admitted for incarcerated hernia, s/p exlap with open repair. Pt was initially on parenteral feeds but has been discontinued as pt now tolerating PO diet.  - Pt to follow up with     #CAD (coronary artery disease)  #Chronic heart failure with preserved ejection fraction (HFpEF)  #HLD (hyperlipidemia)  #HTN (hypertension)  #Pulmonary HTN  Multiple attempts were made to contact the pt's long-term care facility to confirm a med rec to continue medications as prescribed from home, however, we were unable to successfully reach a representative able to provide medication list. While admitted pt has been managed with:  - Metoprolol tartrate 25mg q8hrs   - Duonebs 3ml nebulizers q6hrs  - Acetylcysteine 20% inhalation q6hrs  - Pantoprazole 40mg qd  Patient will need to be seen by PCP and restarted on any necessary home medications.    Patient was discharged to: Nursing Home    New medications: Levofloxacin 750mg   Changes to old medications: none  Medications that were stopped: none    Items to follow up as outpatient: PCP to restart any ruthann emedications required and for evaluation post discharge; Surgery    Physical exam at the time of discharge:    General: Pt is seated in bed, in no acute distress, Bulgarian/English speaking  HEENT: NC/AT; PERRL, anicteric sclera; MMM  Neck: supple, no masses or palpable nodes/nodules  Cardiovascular: +S1/S2, irregular rate and rhythm, no MGR  Respiratory: no breath sounds on L anterior auscultation, R wheezing B/L; no W/R/R  Gastrointestinal: soft, NT/ND; +BSx4, umbilical incistion is clean, dry and intact, healing well  : bahena catheter in place, draining clear yellow urine  Extremities: WWP; no edema, clubbing or cyanosis  Vascular: 2+ radial, DP/PT pulses B/L  Neurological: AAOx3; no focal deficits  Psychiatric: pleasant mood and affect  Dermatologic: no appreciable wounds or damage to the        #Discharge: do not delete    89 y/o F baseline aaox3, bedbound, PMH HTN, HLD, OA, CAD on Asa, HF, chronic constipation, admitted with incarcerated umbilical hernia, s/p 11/24 for Exlap Open Umbilical hernia repair. Post-op with respiratory failure w Stenotrophomonas sputum extubated 12/5, ileus resolved, NGT d/c'ed 12/6    Hospital course (by problem):     #Acute respiratory failure with hypoxia  #Mucus plugging of bronchi  #Stenotrophomonas maltophilia (+) sputum  Pt presented with abdominal pain with emesis, found to have an incarcerated umbilical hernia requiring exlap and open hernia repair, c/b AHRF following procedure remaining intubated. Pt is now s/p extubation 12/5. Weaned from HFNC to NC, pt now saturating well on 3LNC. BiPAP recommended for use at night but pt has been refusing. Pt was found to have L mucus plugging with white out on CXR with no improvement. Bronchoscopy recommended but pt refusing.  - Cont O2 as needed   - Q4hrs duonebs AND inhaled 3% hypertonic saline  - Lung PT  - Continue suction as needed to clear secretions  - Cont Levofloxacin 750mg IV q24hrs     #S/P exploratory laparotomy  Pt initially admitted for incarcerated hernia, s/p exlap with open repair. Pt was initially on parenteral feeds but has been discontinued as pt now tolerating PO diet.  - Pt to follow up with     #CAD (coronary artery disease)  #Chronic heart failure with preserved ejection fraction (HFpEF)  #HLD (hyperlipidemia)  #HTN (hypertension)  #Pulmonary HTN  Multiple attempts were made to contact the pt's long-term care facility to confirm a med rec to continue medications as prescribed from home, however, we were unable to successfully reach a representative able to provide medication list. While admitted pt has been managed with:  - Metoprolol tartrate 25mg q8hrs   - Duonebs 3ml nebulizers q6hrs  - Acetylcysteine 20% inhalation q6hrs  - Pantoprazole 40mg qd  Patient will need to be seen by PCP and restarted on any necessary home medications.    Patient was discharged to: Nursing Home    New medications: Levofloxacin 750mg   Changes to old medications: none  Medications that were stopped: none    Items to follow up as outpatient: PCP to restart any ruthann emedications required and for evaluation post discharge; Surgery, Dr. Rolly Benjamin    Physical exam at the time of discharge:    General: Pt is seated in bed, in no acute distress, Nepalese/English speaking  HEENT: NC/AT; PERRL, anicteric sclera; MMM  Neck: supple, no masses or palpable nodes/nodules  Cardiovascular: +S1/S2, irregular rate and rhythm, no MGR  Respiratory: no breath sounds on L anterior auscultation, R wheezing B/L; no W/R/R  Gastrointestinal: soft, NT/ND; +BSx4, umbilical incistion is clean, dry and intact, healing well  : bahena catheter in place, draining clear yellow urine  Extremities: WWP; no edema, clubbing or cyanosis  Vascular: 2+ radial, DP/PT pulses B/L  Neurological: AAOx3; no focal deficits  Psychiatric: pleasant mood and affect  Dermatologic: no appreciable wounds or damage to the        #Discharge: do not delete    89 y/o F baseline aaox3, bedbound, PMH HTN, HLD, OA, CAD on Asa, HF, chronic constipation, admitted with incarcerated umbilical hernia, s/p 11/24 for Exlap Open Umbilical hernia repair. Post-op with respiratory failure w Stenotrophomonas sputum extubated 12/5, ileus resolved, NGT d/c'ed 12/6    Hospital course (by problem):     #Acute respiratory failure with hypoxia  #Mucus plugging of bronchi  #Stenotrophomonas maltophilia (+) sputum  Pt presented with abdominal pain with emesis, found to have an incarcerated umbilical hernia requiring exlap and open hernia repair, c/b AHRF following procedure remaining intubated. Pt is now s/p extubation 12/5. Weaned from HFNC to NC, pt now saturating well on 3LNC. BiPAP recommended for use at night but pt has been refusing. Pt was found to have L mucus plugging with white out on CXR with no improvement. Bronchoscopy recommended but pt refusing.  - Cont O2 as needed   - Q4hrs duonebs AND inhaled 3% hypertonic saline  - Lung PT  - Continue suction as needed to clear secretions  - Cont Levofloxacin 750mg IV q24hrs     #S/P exploratory laparotomy  Pt initially admitted for incarcerated hernia, s/p exlap with open repair. Pt was initially on parenteral feeds but has been discontinued as pt now tolerating PO diet.  - Pt to follow up with     #CAD (coronary artery disease)  #Chronic heart failure with preserved ejection fraction (HFpEF)  #HLD (hyperlipidemia)  #HTN (hypertension)  #Pulmonary HTN  Multiple attempts were made to contact the pt's long-term care facility to confirm a med rec to continue medications as prescribed from home, however, we were unable to successfully reach a representative able to provide medication list. While admitted pt has been managed with:  - Metoprolol tartrate 25mg q8hrs   - Duonebs 3ml nebulizers q6hrs  - Acetylcysteine 20% inhalation q6hrs  - Pantoprazole 40mg qd  Please continue patient's home meds as appropriate upon return to facility or have patient follow up with PCP and be restarted on any necessary home medications.    Patient was discharged to: Nursing Home    New medications: Levofloxacin 750mg   Changes to old medications: none  Medications that were stopped: none    Items to follow up as outpatient: PCP to restart any ruthann emedications required and for evaluation post discharge; Surgery, Dr. Rolly Benjamin    Physical exam at the time of discharge:    General: Pt is seated in bed, in no acute distress, Bhutanese/English speaking  HEENT: NC/AT; PERRL, anicteric sclera; MMM  Neck: supple, no masses or palpable nodes/nodules  Cardiovascular: +S1/S2, irregular rate and rhythm, no MGR  Respiratory: no breath sounds on L anterior auscultation, R wheezing B/L; no W/R/R  Gastrointestinal: soft, NT/ND; +BSx4, umbilical incistion is clean, dry and intact, healing well  : bahena catheter in place, draining clear yellow urine  Extremities: WWP; no edema, clubbing or cyanosis  Vascular: 2+ radial, DP/PT pulses B/L  Neurological: AAOx3; no focal deficits  Psychiatric: pleasant mood and affect  Dermatologic: no appreciable wounds or damage to the        #Discharge: do not delete    89 y/o F baseline aaox3, bedbound, PMH HTN, HLD, OA, CAD on Asa, HF, chronic constipation, admitted with incarcerated umbilical hernia, s/p 11/24 for Exlap Open Umbilical hernia repair. Post-op with respiratory failure w Stenotrophomonas sputum extubated 12/5, ileus resolved, NGT d/c'ed 12/6    Hospital course (by problem):     #Acute respiratory failure with hypoxia  #Mucus plugging of bronchi  #Stenotrophomonas maltophilia (+) sputum  Pt presented with abdominal pain with emesis, found to have an incarcerated umbilical hernia requiring exlap and open hernia repair, c/b AHRF following procedure remaining intubated. Pt is now s/p extubation 12/5. Weaned from HFNC to NC, pt now saturating well on 3LNC. BiPAP recommended for use at night but pt has been refusing. Pt was found to have L mucus plugging with white out on CXR with no improvement. Bronchoscopy recommended but pt refusing.  - Cont O2 as needed   - Q4hrs duonebs AND inhaled 3% hypertonic saline  - Lung PT  - Continue suction as needed to clear secretions  - Cont Levofloxacin 750mg IV q24hrs     #S/P exploratory laparotomy  Pt initially admitted for incarcerated hernia, s/p exlap with open repair. Pt was initially on parenteral feeds due to ileus that has since resolved; feeds now discontinued as pt now tolerating PO diet.  - Pt to follow up with Dr. Rolly Benjamin within 1 week of discharge    #CAD (coronary artery disease)  #Chronic heart failure with preserved ejection fraction (HFpEF)  #HLD (hyperlipidemia)  #HTN (hypertension)  #Pulmonary HTN  Multiple attempts were made to contact the pt's long-term care facility to confirm a med rec to continue medications as prescribed from home, however, we were unable to successfully reach a representative able to provide medication list. While admitted pt has been managed with:  - Metoprolol tartrate 25mg q8hrs   - Duonebs 3ml nebulizers q6hrs  - Acetylcysteine 20% inhalation q6hrs  - Pantoprazole 40mg qd  Please continue patient's home meds as appropriate upon return to facility or have patient follow up with PCP and be restarted on any necessary home medications.    Patient was discharged to: Nursing Home    New medications: Levofloxacin 750mg   Changes to old medications: none  Medications that were stopped: none    Items to follow up as outpatient: PCP to restart any ruthann emedications required and for evaluation post discharge; Surgery, Dr. Rolly Benjamin    Physical exam at the time of discharge:    General: Pt is seated in bed, in no acute distress, Czech/English speaking  HEENT: NC/AT; PERRL, anicteric sclera; MMM  Neck: supple, no masses or palpable nodes/nodules  Cardiovascular: +S1/S2, irregular rate and rhythm, no MGR  Respiratory: no breath sounds on L anterior auscultation, R wheezing B/L; no W/R/R  Gastrointestinal: soft, NT/ND; +BSx4, umbilical incistion is clean, dry and intact, healing well  : bahena catheter in place, draining clear yellow urine  Extremities: WWP; no edema, clubbing or cyanosis  Vascular: 2+ radial, DP/PT pulses B/L  Neurological: AAOx3; no focal deficits  Psychiatric: pleasant mood and affect  Dermatologic: no appreciable wounds or damage to the

## 2021-12-08 NOTE — PROGRESS NOTE ADULT - PROBLEM SELECTOR PLAN 1
Pt is s/p extubation 12/5 following ex lap. Weaned from 5LNC, pt now saturating well on 3LNC. BiPAP recommended for use at night but pt has been refusing. L mucus plugging with white out on CXR    Plan:  - Cont 3LNC  - Cont Duonebs q6hrs  - Lung PT

## 2021-12-08 NOTE — PROGRESS NOTE ADULT - SUBJECTIVE AND OBJECTIVE BOX
***TRANSFER NOTE: ACCEPTANCE TO MEDICINE***    SUBJECTIVE / INTERVAL HPI: Patient seen and examined at bedside. Patient denying chest pain, SOB, palpitations, cough. Patient denies fever, chills, HA, Dizziness, change in vision/hearing, N/V, abdominal pain, diarrhea, constipation, hematochezia/melena, dysuria, hematuria, new onset weakness/numbness, LE pain and/or swelling.    Remaining ROS negative     PHYSICAL EXAM:  General: WDWN  HEENT: NC/AT; PERRL, anicteric sclera; MMM  Neck: supple  Cardiovascular: +S1/S2, RRR  Respiratory: CTA B/L; no W/R/R  Gastrointestinal: soft, NT/ND; +BSx4  Extremities: WWP; no edema, clubbing or cyanosis  Vascular: 2+ radial, DP/PT pulses B/L  Neurological: AAOx3; no focal deficits  Psychiatric: pleasant mood and affect  Dermatologic: no appreciable wounds or damage to the skin    VITAL SIGNS:  Vital Signs Last 24 Hrs  T(C): 36.4 (08 Dec 2021 12:00), Max: 36.9 (07 Dec 2021 21:55)  T(F): 97.5 (08 Dec 2021 12:00), Max: 98.4 (07 Dec 2021 21:55)  HR: 87 (08 Dec 2021 13:00) (68 - 116)  BP: 103/52 (08 Dec 2021 13:00) (103/52 - 160/65)  BP(mean): 117 (08 Dec 2021 10:45) (90 - 117)  RR: 36 (08 Dec 2021 13:00) (30 - 47)  SpO2: 95% (08 Dec 2021 13:00) (90% - 100%)    MEDICATIONS:  MEDICATIONS  (STANDING):  acetylcysteine 20%  Inhalation 4 milliLiter(s) Inhalation every 6 hours  albuterol/ipratropium for Nebulization 3 milliLiter(s) Nebulizer every 6 hours  chlorhexidine 2% Cloths 1 Application(s) Topical daily  dextrose 40% Gel 15 Gram(s) Oral once  dextrose 5%. 1000 milliLiter(s) (50 mL/Hr) IV Continuous <Continuous>  dextrose 5%. 1000 milliLiter(s) (100 mL/Hr) IV Continuous <Continuous>  dextrose 50% Injectable 12.5 Gram(s) IV Push once  dextrose 50% Injectable 25 Gram(s) IV Push once  dextrose 50% Injectable 25 Gram(s) IV Push once  fat emulsion (Plant Based) 20% Infusion 0.69 Gm/kG/Day (20.83 mL/Hr) IV Continuous <Continuous>  glucagon  Injectable 1 milliGRAM(s) IntraMuscular once  heparin   Injectable 5000 Unit(s) SubCutaneous every 8 hours  insulin lispro (ADMELOG) corrective regimen sliding scale   SubCutaneous every 6 hours  levoFLOXacin IVPB      levoFLOXacin IVPB 750 milliGRAM(s) IV Intermittent every 24 hours  lidocaine   4% Patch 1 Patch Transdermal every 24 hours  metoclopramide Injectable 5 milliGRAM(s) IV Push every 6 hours  pantoprazole  Injectable 40 milliGRAM(s) IV Push daily  Parenteral Nutrition - Adult 1 Each (54 mL/Hr) TPN Continuous <Continuous>  Parenteral Nutrition - Adult 1 Each (54 mL/Hr) TPN Continuous <Continuous>  sodium chloride 3%  Inhalation 5 milliLiter(s) Inhalation every 6 hours    MEDICATIONS  (PRN):  fentaNYL    Injectable 25 MICROGram(s) IV Push every 2 hours PRN Severe Pain (7 - 10)  ondansetron Injectable 4 milliGRAM(s) IV Push every 6 hours PRN Nausea  sodium chloride 0.9% lock flush 10 milliLiter(s) IV Push every 1 hour PRN Pre/post blood products, medications, blood draw, and to maintain line patency    ALLERGIES:  iodine containing compounds (Unknown)  lisinopril (Unknown)    Intolerances    LABS:                        7.3    7.95  )-----------( 316      ( 08 Dec 2021 05:53 )             24.0     12-08    142  |  105  |  20  ----------------------------<  138<H>  4.2   |  28  |  0.42<L>    Ca    8.5      08 Dec 2021 05:53  Phos  3.2     12-08  Mg     2.0     12-08          CAPILLARY BLOOD GLUCOSE      POCT Blood Glucose.: 172 mg/dL (08 Dec 2021 11:55)      RADIOLOGY & ADDITIONAL TESTS: Reviewed. ***TRANSFER NOTE: ACCEPTANCE TO MEDICINE***    SUBJECTIVE / INTERVAL HPI: Patient seen and examined at bedside. Pt states that she is feeling very tired currently and thirsty. Patient denying chest pain, SOB, palpitations. Patient denies fever, chills, HA, Dizziness, change in vision/hearing, N/V, abdominal pain, diarrhea, constipation, hematochezia/melena, dysuria, hematuria, new onset weakness/numbness, LE pain and/or swelling.    Remaining ROS negative     PHYSICAL EXAM:  General: Pt is seated in bed, in no acute distress, Malay/English speaking  HEENT: NC/AT; PERRL, anicteric sclera; MMM  Neck: supple, no masses or palpable nodes/nodules  Cardiovascular: +S1/S2, irregular rate and rhythm, no MGR  Respiratory: no breath sounds on L anterior auscultation, R wheezing B/L; no W/R/R  Gastrointestinal: soft, NT/ND; +BSx4  Extremities: WWP; no edema, clubbing or cyanosis  Vascular: 2+ radial, DP/PT pulses B/L  Neurological: AAOx3; no focal deficits  Psychiatric: pleasant mood and affect  Dermatologic: no appreciable wounds or damage to the skin    VITAL SIGNS:  Vital Signs Last 24 Hrs  T(C): 36.4 (08 Dec 2021 12:00), Max: 36.9 (07 Dec 2021 21:55)  T(F): 97.5 (08 Dec 2021 12:00), Max: 98.4 (07 Dec 2021 21:55)  HR: 87 (08 Dec 2021 13:00) (68 - 116)  BP: 103/52 (08 Dec 2021 13:00) (103/52 - 160/65)  BP(mean): 117 (08 Dec 2021 10:45) (90 - 117)  RR: 36 (08 Dec 2021 13:00) (30 - 47)  SpO2: 95% (08 Dec 2021 13:00) (90% - 100%)    MEDICATIONS:  MEDICATIONS  (STANDING):  acetylcysteine 20%  Inhalation 4 milliLiter(s) Inhalation every 6 hours  albuterol/ipratropium for Nebulization 3 milliLiter(s) Nebulizer every 6 hours  chlorhexidine 2% Cloths 1 Application(s) Topical daily  dextrose 40% Gel 15 Gram(s) Oral once  dextrose 5%. 1000 milliLiter(s) (50 mL/Hr) IV Continuous <Continuous>  dextrose 5%. 1000 milliLiter(s) (100 mL/Hr) IV Continuous <Continuous>  dextrose 50% Injectable 12.5 Gram(s) IV Push once  dextrose 50% Injectable 25 Gram(s) IV Push once  dextrose 50% Injectable 25 Gram(s) IV Push once  fat emulsion (Plant Based) 20% Infusion 0.69 Gm/kG/Day (20.83 mL/Hr) IV Continuous <Continuous>  glucagon  Injectable 1 milliGRAM(s) IntraMuscular once  heparin   Injectable 5000 Unit(s) SubCutaneous every 8 hours  insulin lispro (ADMELOG) corrective regimen sliding scale   SubCutaneous every 6 hours  levoFLOXacin IVPB      levoFLOXacin IVPB 750 milliGRAM(s) IV Intermittent every 24 hours  lidocaine   4% Patch 1 Patch Transdermal every 24 hours  metoclopramide Injectable 5 milliGRAM(s) IV Push every 6 hours  pantoprazole  Injectable 40 milliGRAM(s) IV Push daily  Parenteral Nutrition - Adult 1 Each (54 mL/Hr) TPN Continuous <Continuous>  Parenteral Nutrition - Adult 1 Each (54 mL/Hr) TPN Continuous <Continuous>  sodium chloride 3%  Inhalation 5 milliLiter(s) Inhalation every 6 hours    MEDICATIONS  (PRN):  fentaNYL    Injectable 25 MICROGram(s) IV Push every 2 hours PRN Severe Pain (7 - 10)  ondansetron Injectable 4 milliGRAM(s) IV Push every 6 hours PRN Nausea  sodium chloride 0.9% lock flush 10 milliLiter(s) IV Push every 1 hour PRN Pre/post blood products, medications, blood draw, and to maintain line patency    ALLERGIES:  iodine containing compounds (Unknown)  lisinopril (Unknown)    Intolerances    LABS:                        7.3    7.95  )-----------( 316      ( 08 Dec 2021 05:53 )             24.0     12-08    142  |  105  |  20  ----------------------------<  138<H>  4.2   |  28  |  0.42<L>    Ca    8.5      08 Dec 2021 05:53  Phos  3.2     12-08  Mg     2.0     12-08          CAPILLARY BLOOD GLUCOSE      POCT Blood Glucose.: 172 mg/dL (08 Dec 2021 11:55)      RADIOLOGY & ADDITIONAL TESTS: Reviewed. ***TRANSFER NOTE: ACCEPTANCE TO MEDICINE***    SUBJECTIVE / INTERVAL HPI: Patient seen and examined at bedside. Pt states that she is feeling very tired currently and thirsty. Patient denying chest pain, SOB, palpitations. Patient denies fever, chills, HA, Dizziness, change in vision/hearing, N/V, abdominal pain, diarrhea, constipation, hematochezia/melena, dysuria, hematuria, new onset weakness/numbness, LE pain and/or swelling.    Remaining ROS negative     PHYSICAL EXAM:  General: Pt is seated in bed, in no acute distress, Pashto/English speaking  HEENT: NC/AT; PERRL, anicteric sclera; MMM  Neck: supple, no masses or palpable nodes/nodules  Cardiovascular: +S1/S2, irregular rate and rhythm, no MGR  Respiratory: no breath sounds on L anterior auscultation, R wheezing B/L; no W/R/R  Gastrointestinal: soft, NT/ND; +BSx4  Extremities: WWP; no edema, clubbing or cyanosis  Vascular: 2+ radial, DP/PT pulses B/L  Neurological: AAOx3; no focal deficits  Psychiatric: pleasant mood and affect  Dermatologic: no appreciable wounds or damage to the skin    VITAL SIGNS:  Vital Signs Last 24 Hrs  T(C): 36.4 (08 Dec 2021 12:00), Max: 36.9 (07 Dec 2021 21:55)  T(F): 97.5 (08 Dec 2021 12:00), Max: 98.4 (07 Dec 2021 21:55)  HR: 87 (08 Dec 2021 13:00) (68 - 116)  BP: 103/52 (08 Dec 2021 13:00) (103/52 - 160/65)  BP(mean): 117 (08 Dec 2021 10:45) (90 - 117)  RR: 36 (08 Dec 2021 13:00) (30 - 47)  SpO2: 95% (08 Dec 2021 13:00) (90% - 100%)    MEDICATIONS:  MEDICATIONS  (STANDING):  acetylcysteine 20%  Inhalation 4 milliLiter(s) Inhalation every 6 hours  albuterol/ipratropium for Nebulization 3 milliLiter(s) Nebulizer every 6 hours  chlorhexidine 2% Cloths 1 Application(s) Topical daily  dextrose 40% Gel 15 Gram(s) Oral once  dextrose 5%. 1000 milliLiter(s) (50 mL/Hr) IV Continuous <Continuous>  dextrose 5%. 1000 milliLiter(s) (100 mL/Hr) IV Continuous <Continuous>  dextrose 50% Injectable 12.5 Gram(s) IV Push once  dextrose 50% Injectable 25 Gram(s) IV Push once  dextrose 50% Injectable 25 Gram(s) IV Push once  fat emulsion (Plant Based) 20% Infusion 0.69 Gm/kG/Day (20.83 mL/Hr) IV Continuous <Continuous>  glucagon  Injectable 1 milliGRAM(s) IntraMuscular once  heparin   Injectable 5000 Unit(s) SubCutaneous every 8 hours  insulin lispro (ADMELOG) corrective regimen sliding scale   SubCutaneous every 6 hours  levoFLOXacin IVPB      levoFLOXacin IVPB 750 milliGRAM(s) IV Intermittent every 24 hours  lidocaine   4% Patch 1 Patch Transdermal every 24 hours  metoclopramide Injectable 5 milliGRAM(s) IV Push every 6 hours  pantoprazole  Injectable 40 milliGRAM(s) IV Push daily  Parenteral Nutrition - Adult 1 Each (54 mL/Hr) TPN Continuous <Continuous>  Parenteral Nutrition - Adult 1 Each (54 mL/Hr) TPN Continuous <Continuous>  sodium chloride 3%  Inhalation 5 milliLiter(s) Inhalation every 6 hours    MEDICATIONS  (PRN):  fentaNYL    Injectable 25 MICROGram(s) IV Push every 2 hours PRN Severe Pain (7 - 10)  ondansetron Injectable 4 milliGRAM(s) IV Push every 6 hours PRN Nausea  sodium chloride 0.9% lock flush 10 milliLiter(s) IV Push every 1 hour PRN Pre/post blood products, medications, blood draw, and to maintain line patency    ALLERGIES:  iodine containing compounds (Unknown)  lisinopril (Unknown)    Intolerances    LABS:                        7.3    7.95  )-----------( 316      ( 08 Dec 2021 05:53 )             24.0     12-08    142  |  105  |  20  ----------------------------<  138<H>  4.2   |  28  |  0.42<L>    Ca    8.5      08 Dec 2021 05:53  Phos  3.2     12-08  Mg     2.0     12-08          CAPILLARY BLOOD GLUCOSE      POCT Blood Glucose.: 172 mg/dL (08 Dec 2021 11:55)      RADIOLOGY & ADDITIONAL TESTS: Reviewed. ***TRANSFER NOTE: ACCEPTANCE TO MEDICINE***    SUBJECTIVE / INTERVAL HPI: Patient seen and examined at bedside. Pt states that she is feeling very tired currently and thirsty. Patient denying chest pain, SOB, palpitations. Patient denies fever, chills, HA, Dizziness, change in vision/hearing, N/V, abdominal pain, diarrhea, constipation, hematochezia/melena, dysuria, hematuria, new onset weakness/numbness, LE pain and/or swelling.    Remaining ROS negative     PHYSICAL EXAM:  General: Pt is seated in bed, in no acute distress, Icelandic/English speaking  HEENT: NC/AT; PERRL, anicteric sclera; MMM  Neck: supple, no masses or palpable nodes/nodules  Cardiovascular: +S1/S2, irregular rate and rhythm, no MGR  Respiratory: no breath sounds on L anterior auscultation, R wheezing B/L; no W/R/R  Gastrointestinal: soft, NT/ND; +BSx4  : bahena catheter in place, draining clear yellow urine  Extremities: WWP; no edema, clubbing or cyanosis  Vascular: 2+ radial, DP/PT pulses B/L  Neurological: AAOx3; no focal deficits  Psychiatric: pleasant mood and affect  Dermatologic: no appreciable wounds or damage to the skin    VITAL SIGNS:  Vital Signs Last 24 Hrs  T(C): 36.4 (08 Dec 2021 12:00), Max: 36.9 (07 Dec 2021 21:55)  T(F): 97.5 (08 Dec 2021 12:00), Max: 98.4 (07 Dec 2021 21:55)  HR: 87 (08 Dec 2021 13:00) (68 - 116)  BP: 103/52 (08 Dec 2021 13:00) (103/52 - 160/65)  BP(mean): 117 (08 Dec 2021 10:45) (90 - 117)  RR: 36 (08 Dec 2021 13:00) (30 - 47)  SpO2: 95% (08 Dec 2021 13:00) (90% - 100%)    MEDICATIONS:  MEDICATIONS  (STANDING):  acetylcysteine 20%  Inhalation 4 milliLiter(s) Inhalation every 6 hours  albuterol/ipratropium for Nebulization 3 milliLiter(s) Nebulizer every 6 hours  chlorhexidine 2% Cloths 1 Application(s) Topical daily  dextrose 40% Gel 15 Gram(s) Oral once  dextrose 5%. 1000 milliLiter(s) (50 mL/Hr) IV Continuous <Continuous>  dextrose 5%. 1000 milliLiter(s) (100 mL/Hr) IV Continuous <Continuous>  dextrose 50% Injectable 12.5 Gram(s) IV Push once  dextrose 50% Injectable 25 Gram(s) IV Push once  dextrose 50% Injectable 25 Gram(s) IV Push once  fat emulsion (Plant Based) 20% Infusion 0.69 Gm/kG/Day (20.83 mL/Hr) IV Continuous <Continuous>  glucagon  Injectable 1 milliGRAM(s) IntraMuscular once  heparin   Injectable 5000 Unit(s) SubCutaneous every 8 hours  insulin lispro (ADMELOG) corrective regimen sliding scale   SubCutaneous every 6 hours  levoFLOXacin IVPB      levoFLOXacin IVPB 750 milliGRAM(s) IV Intermittent every 24 hours  lidocaine   4% Patch 1 Patch Transdermal every 24 hours  metoclopramide Injectable 5 milliGRAM(s) IV Push every 6 hours  pantoprazole  Injectable 40 milliGRAM(s) IV Push daily  Parenteral Nutrition - Adult 1 Each (54 mL/Hr) TPN Continuous <Continuous>  Parenteral Nutrition - Adult 1 Each (54 mL/Hr) TPN Continuous <Continuous>  sodium chloride 3%  Inhalation 5 milliLiter(s) Inhalation every 6 hours    MEDICATIONS  (PRN):  fentaNYL    Injectable 25 MICROGram(s) IV Push every 2 hours PRN Severe Pain (7 - 10)  ondansetron Injectable 4 milliGRAM(s) IV Push every 6 hours PRN Nausea  sodium chloride 0.9% lock flush 10 milliLiter(s) IV Push every 1 hour PRN Pre/post blood products, medications, blood draw, and to maintain line patency    ALLERGIES:  iodine containing compounds (Unknown)  lisinopril (Unknown)    Intolerances    LABS:                        7.3    7.95  )-----------( 316      ( 08 Dec 2021 05:53 )             24.0     12-08    142  |  105  |  20  ----------------------------<  138<H>  4.2   |  28  |  0.42<L>    Ca    8.5      08 Dec 2021 05:53  Phos  3.2     12-08  Mg     2.0     12-08          CAPILLARY BLOOD GLUCOSE      POCT Blood Glucose.: 172 mg/dL (08 Dec 2021 11:55)      RADIOLOGY & ADDITIONAL TESTS: Reviewed.

## 2021-12-08 NOTE — DISCHARGE NOTE PROVIDER - NSDCCPCAREPLAN_GEN_ALL_CORE_FT
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 You were found to have a hernia that was being strangulated by other tissues requiring surgery to free this area and avoid any death to the tissues. A surgery called exploratory laporatomy was performed and you were given an open repair to the hernia. Since your surgery, your incisions have healed well and there are no signs of infection. Please continue to eat a soft foods diet and advance diet as tolerated.  You will need to follow up with surgeon, Dr. Rolly Benjamin within 1 week of discharge.      SECONDARY DISCHARGE DIAGNOSES  Diagnosis: Acute respiratory failure with hypoxia  Assessment and Plan of Treatment: You required intubation to support your breathing following your surgery. The tube helping you to breath was removed on 12/5/2021 while still in ICU. You have since improved and are able to breath on your own with supporting oxygen via nasal cannula.    Diagnosis: Mucus plugging of bronchi  Assessment and Plan of Treatment: You were found to have a mucus plug in in your left lung while in the hospital. You were recommended bronchoscopy to assist in clearing the lung but you refused procedure.    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 You were treated with Levofloxacin 750mg for 7 days, course completed on 12/9/2021.    Diagnosis: Chronic heart failure with preserved ejection fraction (HFpEF)  Assessment and Plan of Treatment: - Heart failure is a condition that occurs when the heart cannot pump blood as well as it should; this leads to inadequate blood flow to vital organs such as the kidneys and congestion (buildup of fluid) in other vital organs such as the lungs   -You have a condition called diastolic congestive heart failure meaning the heart is too stiff. When the heart pumps, it doesn't relax and refill with blood normally.   -Please make sure you follow up with your cardiologist within one week of discharge.    -Please weigh yourself daily: if you have gained more than 2-3 lbs in one day or 5 lbs in one week contact your doctor immediately as you may be retaining water weight   -In addition, restrict your salt intake to less than 2 grams a day   -If you develop worsening shortening of breath, leg swelling, fatigue, chest pain, difficulty sleeping at night due to shortness of breath, contact your cardiologist immediately.  Please follow up with your PCP on discharge from the hospital to continue any medications that you were taking outside of the hospital.

## 2021-12-08 NOTE — DISCHARGE NOTE PROVIDER - NSDCFUADDAPPT_GEN_ALL_CORE_FT
Please call [phone] and schedule a follow-up appointment with Dr. Manuel Nickerson within 1 week of follow up.

## 2021-12-08 NOTE — DISCHARGE NOTE PROVIDER - NSDCMRMEDTOKEN_GEN_ALL_CORE_FT
acetaminophen 325 mg oral tablet: 2 tab(s) orally every 8 hours  amLODIPine 10 mg oral tablet: 1 tab(s) orally once a day  aspirin 81 mg oral tablet, chewable: 1 tab(s) orally once a day  atorvastatin 20 mg oral tablet: 1 tab(s) orally once a day  BENGAY Arthritis topical cream: Apply topically to affected area once a day (at bedtime)  cholecalciferol oral tablet: 50 microgram(s) orally once a day  diclofenac 1% topical gel: Apply topically to affected area once a day (at bedtime)  furosemide 20 mg oral tablet: 1 tab(s) orally every 24 hours  ipratropium-albuterol 0.5 mg-2.5 mg/3 mL inhalation solution: 3 milliliter(s) inhaled every 6 hours  mirtazapine 15 mg oral tablet: 1 tab(s) orally once a day (at bedtime)  Multi Vitamin+:    acetaminophen 325 mg oral tablet: 2 tab(s) orally every 8 hours  acetylcysteine 20% inhalation solution: 4 milliliter(s) inhaled every 6 hours  amLODIPine 10 mg oral tablet: 1 tab(s) orally once a day  aspirin 81 mg oral tablet, chewable: 1 tab(s) orally once a day  atorvastatin 20 mg oral tablet: 1 tab(s) orally once a day  BENGAY Arthritis topical cream: Apply topically to affected area once a day (at bedtime)  bisacodyl 5 mg oral delayed release tablet: 1 tab(s) orally once a day (at bedtime)  cholecalciferol oral tablet: 50 microgram(s) orally once a day  diclofenac 1% topical gel: Apply topically to affected area once a day (at bedtime)  furosemide 20 mg oral tablet: 1 tab(s) orally every 24 hours  ipratropium-albuterol 0.5 mg-2.5 mg/3 mL inhalation solution: 3 milliliter(s) inhaled every 6 hours  metoclopramide 5 mg/mL injectable solution:  injectable   mirtazapine 15 mg oral tablet: 1 tab(s) orally once a day (at bedtime)  Multi Vitamin+:   ondansetron 2 mg/mL injectable solution:  injectable   polyethylene glycol 3350 oral powder for reconstitution: 17 gram(s) orally once a day   acetylcysteine 20% inhalation solution: 4 milliliter(s) inhaled every 6 hours  BENGAY Arthritis topical cream: Apply topically to affected area once a day (at bedtime)  bisacodyl 5 mg oral delayed release tablet: 1 tab(s) orally once a day (at bedtime)  cholecalciferol oral tablet: 50 microgram(s) orally once a day  diclofenac 1% topical gel: Apply topically to affected area once a day (at bedtime)  ipratropium-albuterol 0.5 mg-2.5 mg/3 mL inhalation solution: 3 milliliter(s) inhaled every 6 hours  lidocaine 4% topical film: Apply topically to affected area once a day  metoclopramide 5 mg/mL injectable solution: 5 milligram(s) injectable 3 times a day, As Needed  metoprolol tartrate 25 mg oral tablet: 1 tab(s) orally every 8 hours  Multi Vitamin+:   ondansetron 2 mg/mL injectable solution: 2 milligram(s) injectable every 6 hours, As Needed  pantoprazole 40 mg intravenous injection: 40 milligram(s) intravenous once a day  polyethylene glycol 3350 oral powder for reconstitution: 17 gram(s) orally once a day

## 2021-12-08 NOTE — PROGRESS NOTE ADULT - ASSESSMENT
89 y/o F baseline aaox3, bedbound, PMH HTN, HLD, OA, CAD on Asa, HF, chronic constipation, admitted with incarcerated umbilical hernia, s/p 11/24 for Exlap Open Umbilical hernia repair. Post-op with respiratory failure w Stenotrophomonas sputum extubated 12/5, ileus resolved, NGT d/c'ed 12/6.     Neuro: Tylenol and Fentanyl PRN, Lidoderm patch; Home Remeron on hold.  CV: MAP >65, Hx CAD, HTN, HFpEF; pulm htn (TTE 11/29, LVEF 60-65%, mod TR, PASP 72),   Pulm: Extubated on 12/5, High flow NC 40%/50 flow duonebs, 3% saline nebs, q2h suctioning. Refusing bronch, will try BiPAP t/o day  GI/FEN: NPO, passed S&S, cleared for mince and moist, TPN/lipids. Zofran, standing Reglan, PPI,   : Deandra  ID: Stenotrophomonas Sputum: Levaquin (12/3--)  Endo: ISS  PPx: SCDs, SQH   Lines: [place] left brachial (11/24-), PIVs  Wounds: Midline incision   PT/OT: re-ordered 12/5

## 2021-12-08 NOTE — PROGRESS NOTE ADULT - SUBJECTIVE AND OBJECTIVE BOX
INTERVAL/OVERNIGHT EVENTS: Refused BiPAP overnight    SUBJECTIVE:   Reports no SOB with HFNC, refusing bronch and BiPAP. Pt denies pain, nausea, and vomiting. Denies flatus or BM.    Neurologic Medications  acetaminophen   IVPB .. 1000 milliGRAM(s) IV Intermittent once PRN Moderate Pain (4 - 6)  fentaNYL    Injectable 25 MICROGram(s) IV Push every 2 hours PRN Severe Pain (7 - 10)  metoclopramide Injectable 5 milliGRAM(s) IV Push every 6 hours  ondansetron Injectable 4 milliGRAM(s) IV Push every 6 hours PRN Nausea    Respiratory Medications  acetylcysteine 20%  Inhalation 4 milliLiter(s) Inhalation every 6 hours  albuterol/ipratropium for Nebulization 3 milliLiter(s) Nebulizer every 6 hours  sodium chloride 3%  Inhalation 5 milliLiter(s) Inhalation every 6 hours    Cardiovascular Medications    Gastrointestinal Medications  dextrose 5%. 1000 milliLiter(s) IV Continuous <Continuous>  dextrose 5%. 1000 milliLiter(s) IV Continuous <Continuous>  fat emulsion (Plant Based) 20% Infusion 0.28 Gm/kG/Day IV Continuous <Continuous>  fat emulsion (Plant Based) 20% Infusion 0.28 Gm/kG/Day IV Continuous <Continuous>  pantoprazole  Injectable 40 milliGRAM(s) IV Push daily  Parenteral Nutrition - Adult 1 Each TPN Continuous <Continuous>  Parenteral Nutrition - Adult 1 Each TPN Continuous <Continuous>  sodium chloride 0.9% lock flush 10 milliLiter(s) IV Push every 1 hour PRN Pre/post blood products, medications, blood draw, and to maintain line patency    Genitourinary Medications    Hematologic/Oncologic Medications  heparin   Injectable 5000 Unit(s) SubCutaneous every 8 hours    Antimicrobial/Immunologic Medications  levoFLOXacin IVPB      levoFLOXacin IVPB 750 milliGRAM(s) IV Intermittent every 24 hours    Endocrine/Metabolic Medications  dextrose 40% Gel 15 Gram(s) Oral once  dextrose 50% Injectable 25 Gram(s) IV Push once  dextrose 50% Injectable 12.5 Gram(s) IV Push once  dextrose 50% Injectable 25 Gram(s) IV Push once  glucagon  Injectable 1 milliGRAM(s) IntraMuscular once  insulin lispro (ADMELOG) corrective regimen sliding scale   SubCutaneous every 6 hours    Topical/Other Medications  chlorhexidine 2% Cloths 1 Application(s) Topical daily  lidocaine   4% Patch 1 Patch Transdermal every 24 hours      MEDICATIONS  (PRN):  acetaminophen   IVPB .. 1000 milliGRAM(s) IV Intermittent once PRN Moderate Pain (4 - 6)  fentaNYL    Injectable 25 MICROGram(s) IV Push every 2 hours PRN Severe Pain (7 - 10)  ondansetron Injectable 4 milliGRAM(s) IV Push every 6 hours PRN Nausea  sodium chloride 0.9% lock flush 10 milliLiter(s) IV Push every 1 hour PRN Pre/post blood products, medications, blood draw, and to maintain line patency      I&O's Detail    07 Dec 2021 07:01  -  08 Dec 2021 07:00  --------------------------------------------------------  IN:    Fat Emulsion (Plant Based) 20%: 62.4 mL    Fat Emulsion (Plant Based) 20%: 208 mL    IV PiggyBack: 100 mL    TPN (Total Parenteral Nutrition): 1188 mL  Total IN: 1558.4 mL    OUT:    Indwelling Catheter - Urethral (mL): 1940 mL  Total OUT: 1940 mL    Total NET: -381.6 mL          Vital Signs Last 24 Hrs  T(C): 36.8 (08 Dec 2021 05:00), Max: 36.9 (07 Dec 2021 21:55)  T(F): 98.2 (08 Dec 2021 05:00), Max: 98.4 (07 Dec 2021 21:55)  HR: 83 (08 Dec 2021 07:00) (73 - 110)  BP: --  BP(mean): --  RR: 35 (08 Dec 2021 07:00) (33 - 47)  SpO2: 99% (08 Dec 2021 07:00) (87% - 100%)    GENERAL: NAD, resting comfortably in bed  HEENT: NCAT, MMM, PERRL  C/V: Normal rate, normal peripheral perfusion  PULM: Nonlabored breathing, no respiratory distress, on HFNC  ABD: Soft, ND, NT, midline incision c/d/i no rebound tenderness, no guarding  EXTREM: WWP, no edema, SCDs in place  NEURO: No focal deficits    LABS:                        7.3    7.95  )-----------( 316      ( 08 Dec 2021 05:53 )             24.0     12-08    142  |  105  |  20  ----------------------------<  138<H>  4.2   |  28  |  0.42<L>    Ca    8.5      08 Dec 2021 05:53  Phos  3.2     12-08  Mg     2.0     12-08            RADIOLOGY & ADDITIONAL STUDIES:

## 2021-12-09 VITALS — TEMPERATURE: 98 F | DIASTOLIC BLOOD PRESSURE: 64 MMHG | SYSTOLIC BLOOD PRESSURE: 156 MMHG | HEART RATE: 85 BPM

## 2021-12-09 LAB
ANION GAP SERPL CALC-SCNC: 9 MMOL/L — SIGNIFICANT CHANGE UP (ref 5–17)
BUN SERPL-MCNC: 19 MG/DL — SIGNIFICANT CHANGE UP (ref 7–23)
CALCIUM SERPL-MCNC: 8.6 MG/DL — SIGNIFICANT CHANGE UP (ref 8.4–10.5)
CHLORIDE SERPL-SCNC: 103 MMOL/L — SIGNIFICANT CHANGE UP (ref 96–108)
CO2 SERPL-SCNC: 27 MMOL/L — SIGNIFICANT CHANGE UP (ref 22–31)
CREAT SERPL-MCNC: 0.37 MG/DL — LOW (ref 0.5–1.3)
GLUCOSE BLDC GLUCOMTR-MCNC: 105 MG/DL — HIGH (ref 70–99)
GLUCOSE BLDC GLUCOMTR-MCNC: 118 MG/DL — HIGH (ref 70–99)
GLUCOSE BLDC GLUCOMTR-MCNC: 162 MG/DL — HIGH (ref 70–99)
GLUCOSE BLDC GLUCOMTR-MCNC: 164 MG/DL — HIGH (ref 70–99)
GLUCOSE SERPL-MCNC: 141 MG/DL — HIGH (ref 70–99)
HCT VFR BLD CALC: 25.8 % — LOW (ref 34.5–45)
HGB BLD-MCNC: 7.8 G/DL — LOW (ref 11.5–15.5)
MAGNESIUM SERPL-MCNC: 2 MG/DL — SIGNIFICANT CHANGE UP (ref 1.6–2.6)
MCHC RBC-ENTMCNC: 28.5 PG — SIGNIFICANT CHANGE UP (ref 27–34)
MCHC RBC-ENTMCNC: 30.2 GM/DL — LOW (ref 32–36)
MCV RBC AUTO: 94.2 FL — SIGNIFICANT CHANGE UP (ref 80–100)
NRBC # BLD: 0 /100 WBCS — SIGNIFICANT CHANGE UP (ref 0–0)
PHOSPHATE SERPL-MCNC: 3.1 MG/DL — SIGNIFICANT CHANGE UP (ref 2.5–4.5)
PLATELET # BLD AUTO: 295 K/UL — SIGNIFICANT CHANGE UP (ref 150–400)
POTASSIUM SERPL-MCNC: 4.4 MMOL/L — SIGNIFICANT CHANGE UP (ref 3.5–5.3)
POTASSIUM SERPL-SCNC: 4.4 MMOL/L — SIGNIFICANT CHANGE UP (ref 3.5–5.3)
RBC # BLD: 2.74 M/UL — LOW (ref 3.8–5.2)
RBC # FLD: 16.6 % — HIGH (ref 10.3–14.5)
SODIUM SERPL-SCNC: 139 MMOL/L — SIGNIFICANT CHANGE UP (ref 135–145)
WBC # BLD: 8.37 K/UL — SIGNIFICANT CHANGE UP (ref 3.8–10.5)
WBC # FLD AUTO: 8.37 K/UL — SIGNIFICANT CHANGE UP (ref 3.8–10.5)

## 2021-12-09 PROCEDURE — 99233 SBSQ HOSP IP/OBS HIGH 50: CPT | Mod: GC

## 2021-12-09 PROCEDURE — 71045 X-RAY EXAM CHEST 1 VIEW: CPT | Mod: 26

## 2021-12-09 RX ORDER — ONDANSETRON 8 MG/1
2 TABLET, FILM COATED ORAL
Qty: 0 | Refills: 0 | DISCHARGE
Start: 2021-12-09

## 2021-12-09 RX ORDER — ACETYLCYSTEINE 200 MG/ML
4 VIAL (ML) MISCELLANEOUS
Qty: 0 | Refills: 0 | DISCHARGE
Start: 2021-12-09

## 2021-12-09 RX ORDER — IPRATROPIUM/ALBUTEROL SULFATE 18-103MCG
3 AEROSOL WITH ADAPTER (GRAM) INHALATION
Qty: 0 | Refills: 0 | DISCHARGE
Start: 2021-12-09

## 2021-12-09 RX ORDER — LANOLIN ALCOHOL/MO/W.PET/CERES
3 CREAM (GRAM) TOPICAL AT BEDTIME
Refills: 0 | Status: DISCONTINUED | OUTPATIENT
Start: 2021-12-09 | End: 2021-12-09

## 2021-12-09 RX ORDER — METOCLOPRAMIDE HCL 10 MG
0 TABLET ORAL
Qty: 0 | Refills: 0 | DISCHARGE
Start: 2021-12-09

## 2021-12-09 RX ORDER — LIDOCAINE 4 G/100G
1 CREAM TOPICAL
Qty: 0 | Refills: 0 | DISCHARGE
Start: 2021-12-09

## 2021-12-09 RX ORDER — METOCLOPRAMIDE HCL 10 MG
5 TABLET ORAL
Qty: 0 | Refills: 0 | DISCHARGE
Start: 2021-12-09

## 2021-12-09 RX ORDER — MIRTAZAPINE 45 MG/1
1 TABLET, ORALLY DISINTEGRATING ORAL
Qty: 0 | Refills: 0 | DISCHARGE

## 2021-12-09 RX ORDER — PANTOPRAZOLE SODIUM 20 MG/1
40 TABLET, DELAYED RELEASE ORAL
Qty: 0 | Refills: 0 | DISCHARGE
Start: 2021-12-09

## 2021-12-09 RX ORDER — POLYETHYLENE GLYCOL 3350 17 G/17G
17 POWDER, FOR SOLUTION ORAL
Qty: 0 | Refills: 0 | DISCHARGE
Start: 2021-12-09

## 2021-12-09 RX ORDER — POLYETHYLENE GLYCOL 3350 17 G/17G
17 POWDER, FOR SOLUTION ORAL DAILY
Refills: 0 | Status: DISCONTINUED | OUTPATIENT
Start: 2021-12-09 | End: 2021-12-09

## 2021-12-09 RX ORDER — APIXABAN 2.5 MG/1
1 TABLET, FILM COATED ORAL
Qty: 1 | Refills: 0
Start: 2021-12-09

## 2021-12-09 RX ORDER — ATORVASTATIN CALCIUM 80 MG/1
1 TABLET, FILM COATED ORAL
Qty: 0 | Refills: 0 | DISCHARGE

## 2021-12-09 RX ORDER — ASPIRIN/CALCIUM CARB/MAGNESIUM 324 MG
1 TABLET ORAL
Qty: 0 | Refills: 0 | DISCHARGE

## 2021-12-09 RX ORDER — METOPROLOL TARTRATE 50 MG
1 TABLET ORAL
Qty: 0 | Refills: 0 | DISCHARGE
Start: 2021-12-09

## 2021-12-09 RX ORDER — ONDANSETRON 8 MG/1
0 TABLET, FILM COATED ORAL
Qty: 0 | Refills: 0 | DISCHARGE
Start: 2021-12-09

## 2021-12-09 RX ADMIN — LIDOCAINE 1 PATCH: 4 CREAM TOPICAL at 20:51

## 2021-12-09 RX ADMIN — Medication 2: at 08:05

## 2021-12-09 RX ADMIN — Medication 25 MILLIGRAM(S): at 06:36

## 2021-12-09 RX ADMIN — HEPARIN SODIUM 5000 UNIT(S): 5000 INJECTION INTRAVENOUS; SUBCUTANEOUS at 06:36

## 2021-12-09 RX ADMIN — Medication 5 MILLIGRAM(S): at 06:36

## 2021-12-09 RX ADMIN — HYDROMORPHONE HYDROCHLORIDE 0.5 MILLIGRAM(S): 2 INJECTION INTRAMUSCULAR; INTRAVENOUS; SUBCUTANEOUS at 09:30

## 2021-12-09 RX ADMIN — PANTOPRAZOLE SODIUM 40 MILLIGRAM(S): 20 TABLET, DELAYED RELEASE ORAL at 15:36

## 2021-12-09 RX ADMIN — Medication 25 MILLIGRAM(S): at 15:34

## 2021-12-09 RX ADMIN — Medication 5 MILLIGRAM(S): at 12:09

## 2021-12-09 RX ADMIN — HYDROMORPHONE HYDROCHLORIDE 0.5 MILLIGRAM(S): 2 INJECTION INTRAMUSCULAR; INTRAVENOUS; SUBCUTANEOUS at 09:45

## 2021-12-09 RX ADMIN — Medication 3 MILLILITER(S): at 17:53

## 2021-12-09 RX ADMIN — Medication 4 MILLILITER(S): at 17:52

## 2021-12-09 RX ADMIN — Medication 3 MILLILITER(S): at 06:35

## 2021-12-09 RX ADMIN — LIDOCAINE 1 PATCH: 4 CREAM TOPICAL at 12:07

## 2021-12-09 RX ADMIN — SODIUM CHLORIDE 4 MILLILITER(S): 9 INJECTION INTRAMUSCULAR; INTRAVENOUS; SUBCUTANEOUS at 06:36

## 2021-12-09 RX ADMIN — HEPARIN SODIUM 5000 UNIT(S): 5000 INJECTION INTRAVENOUS; SUBCUTANEOUS at 15:34

## 2021-12-09 RX ADMIN — POLYETHYLENE GLYCOL 3350 17 GRAM(S): 17 POWDER, FOR SOLUTION ORAL at 15:36

## 2021-12-09 NOTE — PROGRESS NOTE ADULT - ASSESSMENT
89 y/o F baseline aaox3, bedbound, PMH HTN, HLD, OA, CAD on [place], HF, chronic constipation, admitted with incarcerated umbilical hernia, s/p 11/24 for Exlap Open Umbilical hernia repair. Post-op with respiratory failure w Stenotrophomonas sputum extubated 12/5, ileus resolved, NGT d/c'ed 12/6.     Neuro: Tylenol and Fentanyl PRN, Lidoderm patch; Home Remeron on hold.  CV: MAP >65, Hx CAD, HTN, HFpEF; pulm htn (TTE 11/29, LVEF 60-65%, mod TR, PASP 72),   Pulm: Extubated on 12/5, High flow NC 40%/50 flow duonebs, 3% saline nebs, q2h suctioning. Refusing bronch, will try BiPAP t/o day  GI/FEN: NPO, passed S&S, cleared for mince and moist, TPN/lipids. Zofran, standing Reglan, PPI,   : Deandra  ID: Stenotrophomonas Sputum: Levaquin (12/3--)  Endo: ISS  PPx: SCDs, SQH   Lines: [place] left brachial (11/24-), PIVs  Wounds: Midline incision   PT/OT: re-ordered 12/5

## 2021-12-09 NOTE — PROGRESS NOTE ADULT - SUBJECTIVE AND OBJECTIVE BOX
STATUS POST:  Laparotomy, exploratory, adult    Open repair of abdominal hernia    POST OPERATIVE DAY #: 15    SUBJECTIVE: Pt seen and examined by chief resident. Pt is doing well, resting comfortably on bed. No abdominal pain. Diet tolerated. -F/-BM. No nausea or vomiting. Reporting lower back pain.     Vital Signs Last 24 Hrs  T(C): 36.7 (09 Dec 2021 06:37), Max: 36.7 (09 Dec 2021 06:37)  T(F): 98.1 (09 Dec 2021 06:37), Max: 98.1 (09 Dec 2021 06:37)  HR: 86 (09 Dec 2021 06:37) (68 - 116)  BP: 147/82 (09 Dec 2021 06:37) (103/52 - 160/65)  BP(mean): 117 (08 Dec 2021 10:45) (90 - 117)  RR: 17 (09 Dec 2021 06:37) (17 - 45)  SpO2: 96% (09 Dec 2021 06:37) (90% - 100%)    I&O's Summary    08 Dec 2021 07:01  -  09 Dec 2021 07:00  --------------------------------------------------------  IN: 324 mL / OUT: 215 mL / NET: 109 mL        Physical Exam:  General Appearance: Appears well, NAD  Pulmonary: Nonlabored breathing, no respiratory distress  Cardiovascular: NSR  Abdomen: Soft, nondisteded, nontender, no rebound or guarding.   Extremities: WWP, SCD's in place     LABS:                        7.3    7.95  )-----------( 316      ( 08 Dec 2021 05:53 )             24.0     12-08    142  |  105  |  20  ----------------------------<  138<H>  4.2   |  28  |  0.42<L>    Ca    8.5      08 Dec 2021 05:53  Phos  3.2     12-08  Mg     2.0     12-08

## 2021-12-09 NOTE — PROGRESS NOTE ADULT - PROBLEM SELECTOR PLAN 1
Pt is s/p extubation 12/5 following ex lap. Weaned from 5LNC, pt now saturating well on 3LNC. BiPAP recommended for use at night but pt has been refusing. L mucus plugging with white out on CXR    Plan:  - Cont 3LNC  - Cont Duonebs q6hrs  - Lung PT Pt is s/p extubation 12/5 following ex lap. Weaned from 5LNC, pt now saturating well on 3LNC. BiPAP recommended for use at night but pt has been refusing. L mucus plugging with white out on CXR    Plan:  - Cont 3LNC and wean as tolerated  - Cont Duonebs q6hrs  - Lung PT

## 2021-12-09 NOTE — PROGRESS NOTE ADULT - ATTENDING COMMENTS
Covering for Dr. Nickerson  Patient seen and examined at bedside in ICU.    Remains intubated.    Abdomen soft, NT.  Incision clean/dry/intact.    -Supportive care as per SICU team  -Wean to extubate as tolerated
POD3 1  Exlap and Open repair of Umbilical l Hernia. This am was agitated, switch propofol to precedex and plan to weant fent. SBT tried this am however patient pulling low tidal volumes. will try SBt later in PM once less sedated. RASS goal -1
Strangulated umbilical hernia with possible bowel ischemia, s/p Exlap and Open repair of Umbilical hernia repair on 11/24.  Still on mechanical ventilation.  Plan for another cpap trial.
Patient seen and examined   With  patient expressed complete understanding and was totally oriented;   She wants to be extubated and does not want to be reintubated if she fails;  She understands that without the tube she could die and is okay with that;  Will contact family and inform them of her wishes;  We will optimize her first prior to removing the tube
Patient seen and examined. Continues with lung issues however eating and drinking without issue. Abdomen is soft and benign. Supportive care for now. Wound is stable.
Patient seen and examined. Marked improvement. Now extubated. Reports thirst and hunger. Does have some trouble managing secretions and requires suctioning often. NGT output has decreased. I removed NGT at bedside today. She is having bowel function.     Recommend continued SICU care support - intense nursing for secretions  Physical therapy  Speech and swallow evaluation prior to initiating any diet  Continue TPN until S+S eval
Patient seen. Bowels are recovering, however with white out left lung. Refused intervention. Supportive care per SICU.
Recurrent problem with airway clearance.  Continue with bed assisted chest PT, mobilization and nebulized BD.  No role for bronchoscopy as this is recurring problem and patient will need clearance regiment as long term solution.  Will sign off, please, call us with further question.
Umbilical hernia s/p ex lap c/b ileus, Afib, Stenotrophomonas VAP. Doing well on pressure support. Extubate.
Neurologically better, obeys commands, moving extremities. Still with abdominal distention and large NG output suggesting continued ileus and this is contributing to her failing CPAP. She is otherwise acceptably nourished so no TPN for now, watch for return of bowel function. She has continued low grade fever. WBC is also slowly rising. Repeat cultures have been sent and if WBC continues to rise will broaden to zosyn empirically.
Strangulated umbilical hernia with possible bowel ischemia, s/p Exlap and Open repair of Umbilical hernia repair on 11/24.  Still on mechanical ventilation.  Plan for another cpap trial.  Starting enteric feeds.
Umbilical hernia s/p ex lap c/b ileus, Afib, Stenotrophomonas VAP. Should be extubated; she is alert and awake and does well on PAP.
Patient s/p laparotomy and primary repair of ventral  hernia for obstruction. Expect significant fluid shifting giving loss from intestine. Recommend aggressive supportive care.
Assessment as above. She is still failing CPAP trials and this is still from abdominal distention. Will start reglan. No change in temp curve on zosyn, would change back to ceftriaxone for normal respiratory deborah, procalcitonin is decreasing.

## 2021-12-09 NOTE — PROGRESS NOTE ADULT - PROBLEM SELECTOR PLAN 3
Pt initially admitted for incarcerated hernia, s/p exlap with open repair    Plan:  - Wean parenteral feeds  - Advance diet as tolerated
Pt initially admitted for incarcerated hernia, s/p exlap with open repair    Plan:  - Wean parenteral feeds  - Advance diet as tolerated

## 2021-12-09 NOTE — DISCHARGE NOTE NURSING/CASE MANAGEMENT/SOCIAL WORK - PATIENT PORTAL LINK FT
You can access the FollowMyHealth Patient Portal offered by [facility] by registering at the following website: [email or web address]. By joining [employer]’s FollowMyHealth portal, you will also be able to view your health information using other applications (apps) compatible with our system.

## 2021-12-09 NOTE — PROGRESS NOTE ADULT - PROBLEM SELECTOR PLAN 2
L lung mucus plugging, CXR shows full L lung white out. Stenotrophomonas Sputum    Plan:  - As above  - F/U pulm recs  - Suction as needed  - Cont levaquin L lung mucus plugging, CXR shows full L lung white out. Sputum is Stenotrophomonas +    Plan:  - As above  - F/U pulm recs  - Suction as needed  - Cont levaquin

## 2021-12-09 NOTE — PROGRESS NOTE ADULT - PROBLEM SELECTOR PROBLEM 1
Incarcerated ventral hernia
Acute respiratory failure with hypoxia
Acute respiratory failure with hypoxia

## 2021-12-09 NOTE — PROGRESS NOTE ADULT - PROBLEM SELECTOR PLAN 9
F: none  E: replete K<4, Mg <2  N: Minced and moist, parenteral nutrition  D: heparin subq    Code: DNR/DNI  Dispo: [facility]
F: none  E: replete K<4, Mg <2  N: Minced and moist, parenteral nutrition  D: heparin subq    Code: DNR/DNI  Dispo: [facility]

## 2021-12-09 NOTE — PROGRESS NOTE ADULT - SUBJECTIVE AND OBJECTIVE BOX
***Note in progress***    OVERNIGHT EVENTS: NAEO    SUBJECTIVE / INTERVAL HPI: Patient seen and examined at bedside. Patient denying chest pain, SOB, palpitations, cough. Patient denies fever, chills, HA, Dizziness, change in vision/hearing, N/V, abdominal pain, diarrhea, constipation, hematochezia/melena, dysuria, hematuria, new onset weakness/numbness, LE pain and/or swelling.    Remaining ROS negative       PHYSICAL EXAM:    General: WDWN  HEENT: NC/AT; PERRL, anicteric sclera; MMM  Neck: supple  Cardiovascular: +S1/S2, RRR  Respiratory: CTA B/L; no W/R/R  Gastrointestinal: soft, NT/ND; +BSx4  Extremities: WWP; no edema, clubbing or cyanosis  Vascular: 2+ radial, DP/PT pulses B/L  Neurological: AAOx3; no focal deficits  Psychiatric: pleasant mood and affect  Dermatologic: no appreciable wounds or damage to the skin    VITAL SIGNS:  Vital Signs Last 24 Hrs  T(C): 36.7 (09 Dec 2021 06:37), Max: 36.7 (09 Dec 2021 06:37)  T(F): 98.1 (09 Dec 2021 06:37), Max: 98.1 (09 Dec 2021 06:37)  HR: 86 (09 Dec 2021 06:37) (84 - 110)  BP: 147/82 (09 Dec 2021 06:37) (103/52 - 160/65)  BP(mean): --  RR: 17 (09 Dec 2021 06:37) (17 - 45)  SpO2: 96% (09 Dec 2021 06:37) (92% - 98%)      MEDICATIONS:  MEDICATIONS  (STANDING):  acetylcysteine 20%  Inhalation 4 milliLiter(s) Inhalation every 6 hours  albuterol/ipratropium for Nebulization 3 milliLiter(s) Nebulizer every 6 hours  chlorhexidine 2% Cloths 1 Application(s) Topical daily  dextrose 40% Gel 15 Gram(s) Oral once  dextrose 5%. 1000 milliLiter(s) (50 mL/Hr) IV Continuous <Continuous>  dextrose 5%. 1000 milliLiter(s) (100 mL/Hr) IV Continuous <Continuous>  dextrose 50% Injectable 12.5 Gram(s) IV Push once  dextrose 50% Injectable 25 Gram(s) IV Push once  dextrose 50% Injectable 25 Gram(s) IV Push once  glucagon  Injectable 1 milliGRAM(s) IntraMuscular once  heparin   Injectable 5000 Unit(s) SubCutaneous every 8 hours  insulin lispro (ADMELOG) corrective regimen sliding scale   SubCutaneous every 6 hours  levoFLOXacin IVPB      levoFLOXacin IVPB 750 milliGRAM(s) IV Intermittent every 24 hours  lidocaine   4% Patch 1 Patch Transdermal every 24 hours  metoclopramide Injectable 5 milliGRAM(s) IV Push every 6 hours  metoprolol tartrate 25 milliGRAM(s) Oral every 8 hours  pantoprazole  Injectable 40 milliGRAM(s) IV Push daily  Parenteral Nutrition - Adult 1 Each (54 mL/Hr) TPN Continuous <Continuous>    MEDICATIONS  (PRN):  HYDROmorphone  Injectable 1 milliGRAM(s) IV Push every 6 hours PRN Severe Pain (7 - 10)  HYDROmorphone  Injectable 0.5 milliGRAM(s) IV Push every 6 hours PRN Moderate Pain (4 - 6)  ondansetron Injectable 4 milliGRAM(s) IV Push every 6 hours PRN Nausea  sodium chloride 0.9% lock flush 10 milliLiter(s) IV Push every 1 hour PRN Pre/post blood products, medications, blood draw, and to maintain line patency      ALLERGIES:  Allergies    iodine containing compounds (Unknown)  lisinopril (Unknown)    Intolerances        LABS:                        7.8    8.37  )-----------( 295      ( 09 Dec 2021 07:06 )             25.8     12-09    139  |  103  |  19  ----------------------------<  141<H>  4.4   |  27  |  0.37<L>    Ca    8.6      09 Dec 2021 07:06  Phos  3.1     12-09  Mg     2.0     12-09          CAPILLARY BLOOD GLUCOSE      POCT Blood Glucose.: 164 mg/dL (09 Dec 2021 07:58)      RADIOLOGY & ADDITIONAL TESTS: Reviewed. OVERNIGHT EVENTS: NAEO    SUBJECTIVE / INTERVAL HPI: Patient seen and examined at bedside. Pt complains of R buttock pain starting this morning and states that she is very tired this morning. She otherwise denies and SOB, chest pain, palpitations, fever and chills.   Remaining ROS negative     PHYSICAL EXAM:  General: Pt is seated in bed, in no acute distress, Swedish/English speaking  HEENT: NC/AT; PERRL, anicteric sclera; MMM  Neck: supple, no masses or palpable nodes/nodules  Cardiovascular: +S1/S2, irregular rate and rhythm, no MGR  Respiratory: no breath sounds on L anterior auscultation, R wheezing B/L; no W/R/R  Gastrointestinal: soft, NT/ND; +BSx4, umbilical incistion is clean, dry and intact, healing well  : bahena catheter in place, draining clear yellow urine  Extremities: WWP; no edema, clubbing or cyanosis  Vascular: 2+ radial, DP/PT pulses B/L  Neurological: AAOx3; no focal deficits  Psychiatric: pleasant mood and affect  Dermatologic: no appreciable wounds or damage to the skin    VITAL SIGNS:  Vital Signs Last 24 Hrs  T(C): 36.7 (09 Dec 2021 06:37), Max: 36.7 (09 Dec 2021 06:37)  T(F): 98.1 (09 Dec 2021 06:37), Max: 98.1 (09 Dec 2021 06:37)  HR: 86 (09 Dec 2021 06:37) (84 - 110)  BP: 147/82 (09 Dec 2021 06:37) (103/52 - 160/65)  BP(mean): --  RR: 17 (09 Dec 2021 06:37) (17 - 45)  SpO2: 96% (09 Dec 2021 06:37) (92% - 98%)    MEDICATIONS:  MEDICATIONS  (STANDING):  acetylcysteine 20%  Inhalation 4 milliLiter(s) Inhalation every 6 hours  albuterol/ipratropium for Nebulization 3 milliLiter(s) Nebulizer every 6 hours  chlorhexidine 2% Cloths 1 Application(s) Topical daily  dextrose 40% Gel 15 Gram(s) Oral once  dextrose 5%. 1000 milliLiter(s) (50 mL/Hr) IV Continuous <Continuous>  dextrose 5%. 1000 milliLiter(s) (100 mL/Hr) IV Continuous <Continuous>  dextrose 50% Injectable 12.5 Gram(s) IV Push once  dextrose 50% Injectable 25 Gram(s) IV Push once  dextrose 50% Injectable 25 Gram(s) IV Push once  glucagon  Injectable 1 milliGRAM(s) IntraMuscular once  heparin   Injectable 5000 Unit(s) SubCutaneous every 8 hours  insulin lispro (ADMELOG) corrective regimen sliding scale   SubCutaneous every 6 hours  levoFLOXacin IVPB      levoFLOXacin IVPB 750 milliGRAM(s) IV Intermittent every 24 hours  lidocaine   4% Patch 1 Patch Transdermal every 24 hours  metoclopramide Injectable 5 milliGRAM(s) IV Push every 6 hours  metoprolol tartrate 25 milliGRAM(s) Oral every 8 hours  pantoprazole  Injectable 40 milliGRAM(s) IV Push daily  Parenteral Nutrition - Adult 1 Each (54 mL/Hr) TPN Continuous <Continuous>    MEDICATIONS  (PRN):  HYDROmorphone  Injectable 1 milliGRAM(s) IV Push every 6 hours PRN Severe Pain (7 - 10)  HYDROmorphone  Injectable 0.5 milliGRAM(s) IV Push every 6 hours PRN Moderate Pain (4 - 6)  ondansetron Injectable 4 milliGRAM(s) IV Push every 6 hours PRN Nausea  sodium chloride 0.9% lock flush 10 milliLiter(s) IV Push every 1 hour PRN Pre/post blood products, medications, blood draw, and to maintain line patency    ALLERGIES:  Allergies    iodine containing compounds (Unknown)  lisinopril (Unknown)    Intolerances    LABS:                        7.8    8.37  )-----------( 295      ( 09 Dec 2021 07:06 )             25.8     12-09    139  |  103  |  19  ----------------------------<  141<H>  4.4   |  27  |  0.37<L>    Ca    8.6      09 Dec 2021 07:06  Phos  3.1     12-09  Mg     2.0     12-09          CAPILLARY BLOOD GLUCOSE      POCT Blood Glucose.: 164 mg/dL (09 Dec 2021 07:58)      RADIOLOGY & ADDITIONAL TESTS: Reviewed.

## 2021-12-09 NOTE — PROGRESS NOTE ADULT - TIME BILLING
Patient seen and examined;  Discontinue TPN  Encourage PO  Chest PT;  No bronch since would require intubation;

## 2021-12-09 NOTE — PROGRESS NOTE ADULT - ATTENDING SUPERVISION STATEMENT
Resident
ACP
ACP
Fellow
Fellow
Resident
Resident
Resident/Fellow
ACP
ACP/Fellow
ACP

## 2021-12-09 NOTE — PROGRESS NOTE ADULT - SUBJECTIVE AND OBJECTIVE BOX
INTERVAL HPI/OVERNIGHT EVENTS:  Interim reviewed; Awake and alert; Sats good; Chest continues to show complete collapse of left lung;  Still on TPN  and would discontinue at his point and encourage PO  Remains on levofloxacin          MEDICATIONS  (STANDING):  acetylcysteine 20%  Inhalation 4 milliLiter(s) Inhalation every 6 hours  albuterol/ipratropium for Nebulization 3 milliLiter(s) Nebulizer every 6 hours  chlorhexidine 2% Cloths 1 Application(s) Topical daily  dextrose 40% Gel 15 Gram(s) Oral once  dextrose 5%. 1000 milliLiter(s) (50 mL/Hr) IV Continuous <Continuous>  dextrose 5%. 1000 milliLiter(s) (100 mL/Hr) IV Continuous <Continuous>  dextrose 50% Injectable 12.5 Gram(s) IV Push once  dextrose 50% Injectable 25 Gram(s) IV Push once  dextrose 50% Injectable 25 Gram(s) IV Push once  fat emulsion (Plant Based) 20% Infusion 0.69 Gm/kG/Day (20.83 mL/Hr) IV Continuous <Continuous>  glucagon  Injectable 1 milliGRAM(s) IntraMuscular once  heparin   Injectable 5000 Unit(s) SubCutaneous every 8 hours  insulin lispro (ADMELOG) corrective regimen sliding scale   SubCutaneous every 6 hours  levoFLOXacin IVPB      levoFLOXacin IVPB 750 milliGRAM(s) IV Intermittent every 24 hours  lidocaine   4% Patch 1 Patch Transdermal every 24 hours  metoclopramide Injectable 5 milliGRAM(s) IV Push every 6 hours  metoprolol tartrate 25 milliGRAM(s) Oral every 8 hours  pantoprazole  Injectable 40 milliGRAM(s) IV Push daily  Parenteral Nutrition - Adult 1 Each (54 mL/Hr) TPN Continuous <Continuous>    MEDICATIONS  (PRN):  HYDROmorphone  Injectable 1 milliGRAM(s) IV Push every 6 hours PRN Severe Pain (7 - 10)  HYDROmorphone  Injectable 0.5 milliGRAM(s) IV Push every 6 hours PRN Moderate Pain (4 - 6)  ondansetron Injectable 4 milliGRAM(s) IV Push every 6 hours PRN Nausea  sodium chloride 0.9% lock flush 10 milliLiter(s) IV Push every 1 hour PRN Pre/post blood products, medications, blood draw, and to maintain line patency      Allergies    iodine containing compounds (Unknown)  lisinopril (Unknown)    Intolerances        Vital Signs Last 24 Hrs  T(C): 36.7 (09 Dec 2021 06:37), Max: 36.7 (09 Dec 2021 06:37)  T(F): 98.1 (09 Dec 2021 06:37), Max: 98.1 (09 Dec 2021 06:37)  HR: 86 (09 Dec 2021 06:37) (84 - 116)  BP: 147/82 (09 Dec 2021 06:37) (103/52 - 160/65)  BP(mean): 117 (08 Dec 2021 10:45) (117 - 117)  RR: 17 (09 Dec 2021 06:37) (17 - 45)  SpO2: 96% (09 Dec 2021 06:37) (90% - 98%)          Constitutional: Awake     Eyes: JOSE EDUARDO    ENMT: Negative    Neck: Supple    Back:  no tenderness     Respiratory:  no breath sounds on left     Cardiovascular: S1 S2    Gastrointestinal: soft     Genitourinary:    Extremities:  no edema    Vascular:    Neurological:    Skin:    Lymph Nodes:            12-08 @ 07:01  -  12-09 @ 07:00  --------------------------------------------------------  IN: 324 mL / OUT: 1290 mL / NET: -966 mL      LABS:                        7.8    8.37  )-----------( 295      ( 09 Dec 2021 07:06 )             25.8     12-09    139  |  103  |  19  ----------------------------<  141<H>  4.4   |  27  |  0.37<L>    Ca    8.6      09 Dec 2021 07:06  Phos  3.1     12-09  Mg     2.0     12-09            RADIOLOGY & ADDITIONAL TESTS:

## 2021-12-09 NOTE — PROGRESS NOTE ADULT - PROBLEM SELECTOR PROBLEM 5
HLD (hyperlipidemia)
Clear
Chronic heart failure with preserved ejection fraction (HFpEF)
Chronic heart failure with preserved ejection fraction (HFpEF)

## 2021-12-09 NOTE — PROGRESS NOTE ADULT - PROBLEM SELECTOR PROBLEM 4
CAD (coronary artery disease)
Chronic heart failure with preserved ejection fraction (HFpEF)
CAD (coronary artery disease)

## 2021-12-09 NOTE — PROGRESS NOTE ADULT - REASON FOR ADMISSION
SBO, strangulated umbilical hernia

## 2021-12-09 NOTE — DISCHARGE NOTE NURSING/CASE MANAGEMENT/SOCIAL WORK - NSDCPEFALRISK_GEN_ALL_CORE
For information on Fall & Injury Prevention, visit: [email or web address] OR  [email or web address] OR  https://www.cdc.gov/steadi/patient.html

## 2021-12-09 NOTE — PROGRESS NOTE ADULT - ASSESSMENT
89 y/o F baseline aaox3, bedbound, PMH HTN, HLD, OA, CAD on Asa, HF, chronic constipation, admitted with incarcerated umbilical hernia, s/p 11/24 for Exlap Open Umbilical hernia repair. Post-op with respiratory failure w Stenotrophomonas sputum extubated 12/5, ileus resolved, NGT d/c'ed 12/6.     Continue soft diet  Pain/nausea control as needed  Would rec aggressive bowel regimen, rec miralax with dulcolax  monitor for BMs  rest of care as per primary team

## 2021-12-09 NOTE — PROGRESS NOTE ADULT - PROVIDER SPECIALTY LIST ADULT
SICU
SICU
Surgery
Internal Medicine
SICU
Surgery
Pulmonology
SICU
Surgery
Internal Medicine
SICU
Surgery
Internal Medicine

## 2021-12-09 NOTE — DISCHARGE NOTE NURSING/CASE MANAGEMENT/SOCIAL WORK - NSDCFUADDAPPT_GEN_ALL_CORE_FT
Please call [phone] and schedule a follow-up appointment with Dr. Manuel Nickerson within 1 week of follow up.

## 2021-12-18 DIAGNOSIS — Z66 DO NOT RESUSCITATE: ICD-10-CM

## 2021-12-18 DIAGNOSIS — K56.7 ILEUS, UNSPECIFIED: ICD-10-CM

## 2021-12-18 DIAGNOSIS — J96.01 ACUTE RESPIRATORY FAILURE WITH HYPOXIA: ICD-10-CM

## 2021-12-18 DIAGNOSIS — I25.10 ATHEROSCLEROTIC HEART DISEASE OF NATIVE CORONARY ARTERY WITHOUT ANGINA PECTORIS: ICD-10-CM

## 2021-12-18 DIAGNOSIS — Z51.5 ENCOUNTER FOR PALLIATIVE CARE: ICD-10-CM

## 2021-12-18 DIAGNOSIS — G92.9 UNSPECIFIED TOXIC ENCEPHALOPATHY: ICD-10-CM

## 2021-12-18 DIAGNOSIS — N17.9 ACUTE KIDNEY FAILURE, UNSPECIFIED: ICD-10-CM

## 2021-12-18 DIAGNOSIS — K56.609 UNSPECIFIED INTESTINAL OBSTRUCTION, UNSPECIFIED AS TO PARTIAL VERSUS COMPLETE OBSTRUCTION: ICD-10-CM

## 2021-12-18 DIAGNOSIS — I47.1 SUPRAVENTRICULAR TACHYCARDIA: ICD-10-CM

## 2021-12-18 DIAGNOSIS — I50.32 CHRONIC DIASTOLIC (CONGESTIVE) HEART FAILURE: ICD-10-CM

## 2021-12-18 DIAGNOSIS — E78.5 HYPERLIPIDEMIA, UNSPECIFIED: ICD-10-CM

## 2021-12-18 DIAGNOSIS — A41.9 SEPSIS, UNSPECIFIED ORGANISM: ICD-10-CM

## 2021-12-18 DIAGNOSIS — I27.20 PULMONARY HYPERTENSION, UNSPECIFIED: ICD-10-CM

## 2021-12-18 DIAGNOSIS — K42.0 UMBILICAL HERNIA WITH OBSTRUCTION, WITHOUT GANGRENE: ICD-10-CM

## 2021-12-18 DIAGNOSIS — I11.0 HYPERTENSIVE HEART DISEASE WITH HEART FAILURE: ICD-10-CM

## 2021-12-30 LAB — SURGICAL PATHOLOGY STUDY: SIGNIFICANT CHANGE UP

## 2021-12-30 PROCEDURE — 51702 INSERT TEMP BLADDER CATH: CPT

## 2021-12-30 PROCEDURE — 74018 RADEX ABDOMEN 1 VIEW: CPT

## 2021-12-30 PROCEDURE — 87086 URINE CULTURE/COLONY COUNT: CPT

## 2021-12-30 PROCEDURE — 86850 RBC ANTIBODY SCREEN: CPT

## 2021-12-30 PROCEDURE — 82330 ASSAY OF CALCIUM: CPT

## 2021-12-30 PROCEDURE — 87184 SC STD DISK METHOD PER PLATE: CPT

## 2021-12-30 PROCEDURE — 85730 THROMBOPLASTIN TIME PARTIAL: CPT

## 2021-12-30 PROCEDURE — 94660 CPAP INITIATION&MGMT: CPT

## 2021-12-30 PROCEDURE — 94640 AIRWAY INHALATION TREATMENT: CPT

## 2021-12-30 PROCEDURE — 83036 HEMOGLOBIN GLYCOSYLATED A1C: CPT

## 2021-12-30 PROCEDURE — U0003: CPT

## 2021-12-30 PROCEDURE — 83605 ASSAY OF LACTIC ACID: CPT

## 2021-12-30 PROCEDURE — 86901 BLOOD TYPING SEROLOGIC RH(D): CPT

## 2021-12-30 PROCEDURE — 84300 ASSAY OF URINE SODIUM: CPT

## 2021-12-30 PROCEDURE — 36430 TRANSFUSION BLD/BLD COMPNT: CPT

## 2021-12-30 PROCEDURE — 83935 ASSAY OF URINE OSMOLALITY: CPT

## 2021-12-30 PROCEDURE — 71045 X-RAY EXAM CHEST 1 VIEW: CPT

## 2021-12-30 PROCEDURE — 74176 CT ABD & PELVIS W/O CONTRAST: CPT | Mod: MD

## 2021-12-30 PROCEDURE — 84295 ASSAY OF SERUM SODIUM: CPT

## 2021-12-30 PROCEDURE — 87324 CLOSTRIDIUM AG IA: CPT

## 2021-12-30 PROCEDURE — 86769 SARS-COV-2 COVID-19 ANTIBODY: CPT

## 2021-12-30 PROCEDURE — 85025 COMPLETE CBC W/AUTO DIFF WBC: CPT

## 2021-12-30 PROCEDURE — U0005: CPT

## 2021-12-30 PROCEDURE — 84443 ASSAY THYROID STIM HORMONE: CPT

## 2021-12-30 PROCEDURE — 84478 ASSAY OF TRIGLYCERIDES: CPT

## 2021-12-30 PROCEDURE — 97116 GAIT TRAINING THERAPY: CPT

## 2021-12-30 PROCEDURE — 96374 THER/PROPH/DIAG INJ IV PUSH: CPT

## 2021-12-30 PROCEDURE — P9016: CPT

## 2021-12-30 PROCEDURE — 92610 EVALUATE SWALLOWING FUNCTION: CPT

## 2021-12-30 PROCEDURE — 99291 CRITICAL CARE FIRST HOUR: CPT | Mod: 25

## 2021-12-30 PROCEDURE — 87045 FECES CULTURE AEROBIC BACT: CPT

## 2021-12-30 PROCEDURE — 82803 BLOOD GASES ANY COMBINATION: CPT

## 2021-12-30 PROCEDURE — P9045: CPT

## 2021-12-30 PROCEDURE — 94003 VENT MGMT INPAT SUBQ DAY: CPT

## 2021-12-30 PROCEDURE — 82553 CREATINE MB FRACTION: CPT

## 2021-12-30 PROCEDURE — 86900 BLOOD TYPING SEROLOGIC ABO: CPT

## 2021-12-30 PROCEDURE — 96375 TX/PRO/DX INJ NEW DRUG ADDON: CPT | Mod: XU

## 2021-12-30 PROCEDURE — 82248 BILIRUBIN DIRECT: CPT

## 2021-12-30 PROCEDURE — 87449 NOS EACH ORGANISM AG IA: CPT

## 2021-12-30 PROCEDURE — 87637 SARSCOV2&INF A&B&RSV AMP PRB: CPT

## 2021-12-30 PROCEDURE — 80076 HEPATIC FUNCTION PANEL: CPT

## 2021-12-30 PROCEDURE — 86923 COMPATIBILITY TEST ELECTRIC: CPT

## 2021-12-30 PROCEDURE — 80053 COMPREHEN METABOLIC PANEL: CPT

## 2021-12-30 PROCEDURE — 85027 COMPLETE CBC AUTOMATED: CPT

## 2021-12-30 PROCEDURE — 36415 COLL VENOUS BLD VENIPUNCTURE: CPT

## 2021-12-30 PROCEDURE — 80048 BASIC METABOLIC PNL TOTAL CA: CPT

## 2021-12-30 PROCEDURE — 84100 ASSAY OF PHOSPHORUS: CPT

## 2021-12-30 PROCEDURE — 36573 INSJ PICC RS&I 5 YR+: CPT

## 2021-12-30 PROCEDURE — 84484 ASSAY OF TROPONIN QUANT: CPT

## 2021-12-30 PROCEDURE — 88302 TISSUE EXAM BY PATHOLOGIST: CPT

## 2021-12-30 PROCEDURE — 82550 ASSAY OF CK (CPK): CPT

## 2021-12-30 PROCEDURE — 84145 PROCALCITONIN (PCT): CPT

## 2021-12-30 PROCEDURE — 82962 GLUCOSE BLOOD TEST: CPT

## 2021-12-30 PROCEDURE — 85018 HEMOGLOBIN: CPT

## 2021-12-30 PROCEDURE — 85610 PROTHROMBIN TIME: CPT

## 2021-12-30 PROCEDURE — 84550 ASSAY OF BLOOD/URIC ACID: CPT

## 2021-12-30 PROCEDURE — 97162 PT EVAL MOD COMPLEX 30 MIN: CPT

## 2021-12-30 PROCEDURE — 87070 CULTURE OTHR SPECIMN AEROBIC: CPT

## 2021-12-30 PROCEDURE — 81001 URINALYSIS AUTO W/SCOPE: CPT

## 2021-12-30 PROCEDURE — P9047: CPT

## 2021-12-30 PROCEDURE — 84132 ASSAY OF SERUM POTASSIUM: CPT

## 2021-12-30 PROCEDURE — 87046 STOOL CULTR AEROBIC BACT EA: CPT

## 2021-12-30 PROCEDURE — 97110 THERAPEUTIC EXERCISES: CPT

## 2021-12-30 PROCEDURE — 82040 ASSAY OF SERUM ALBUMIN: CPT

## 2021-12-30 PROCEDURE — 97530 THERAPEUTIC ACTIVITIES: CPT

## 2021-12-30 PROCEDURE — 87040 BLOOD CULTURE FOR BACTERIA: CPT

## 2021-12-30 PROCEDURE — 97535 SELF CARE MNGMENT TRAINING: CPT

## 2021-12-30 PROCEDURE — 82570 ASSAY OF URINE CREATININE: CPT

## 2021-12-30 PROCEDURE — 93005 ELECTROCARDIOGRAM TRACING: CPT

## 2021-12-30 PROCEDURE — 83880 ASSAY OF NATRIURETIC PEPTIDE: CPT

## 2021-12-30 PROCEDURE — 93308 TTE F-UP OR LMTD: CPT

## 2021-12-30 PROCEDURE — 97161 PT EVAL LOW COMPLEX 20 MIN: CPT

## 2021-12-30 PROCEDURE — 87186 SC STD MICRODIL/AGAR DIL: CPT

## 2021-12-30 PROCEDURE — 94002 VENT MGMT INPAT INIT DAY: CPT

## 2021-12-30 PROCEDURE — 83735 ASSAY OF MAGNESIUM: CPT

## 2022-02-16 NOTE — OCCUPATIONAL THERAPY INITIAL EVALUATION ADULT - PHYSICAL ASSIST/NONPHYSICAL ASSIST: STAND/SIT, REHAB EVAL
normal appearance , without tenderness upon palpation , no deformities , trachea midline verbal cues/nonverbal cues (demo/gestures)/2 person assist

## 2022-09-21 NOTE — PROGRESS NOTE ADULT - PROBLEM SELECTOR PLAN 2
Physical Therapy Rehab Treatment Note  [facility]/Department[facility]  [facility]       NAME: Ayah Moseley  : 1983 (45 y.o.)  MRN: [id number]  CODE STATUS: Full Code    Date of Service: 2022     Restrictions:  Restrictions/Precautions: Seizure, Fall Risk    SUBJECTIVE:   Subjective: I might stay due to pain    Pain  Pain: 9/10 abdominal pain pre session. Patient medicated prior to session    OBJECTIVE:      Outcomes Measures:  Gutierrez Balance Score: 52  Dynamic Gait Total Score: 22     Sit to Stand  Assistance Level: Independent  Skilled Clinical Factors: Good safety  Stand to Sit  Assistance Level: Independent  Bed To/From Chair  Technique: Stand step  Assistance Level: Independent    Ambulation  Surface: Carpet  Device:  (No device)  Distance: Distance not the focus of this session good safety with all gait  Activity: Within Unit  Assistance Level: Independent  Gait Deviations: Decreased arm swing bilateral;Unsteady gait; Wide base of support    ASSESSMENT/PROGRESS TOWARDS GOALS:   Assessment  Assessment: Patient is meeting GUTIERREZ balance goal and completed DGI scoring 22/24. Patient shows good safety with gait and transfers  Activity Tolerance: Patient tolerated treatment well  Discharge Recommendations: Continue to assess pending progress    Goals:  Long Term Goals  Long term goal 1: indep and effecient bed mobility  Long term goal 2: indep and effecient bed, chair and car transfers  Long term goal 3: indep gait with no device 50 feet in familiar environment and supervision for 150 feet in open or unpredictable environments  Long term goal 4: indep flight of stairs with one rail  Long term goal 5: Gutierrez to be completed at 50/56 level    PLAN OF CARE/Safety:   Safety Devices  Type of Devices: All fall risk precautions in place;Call light within reach; Left in bed;Nurse notified      Therapy Time:   Individual   Time In 1405   Time Out 1430   Minutes 25     Missed 5 minutes due to patient with OT    Minutes: 25  Gait training: 10  Neuro re education: [address], [place], 09/21/22 at 3:48 PM 2/2 to hyponatremia in the setting of fluid overload and PNA. Per niece, patient has been acting "weird" 1 week prior to hospital admission, and has baseline dementia. Now AAOx1  CTH negative for acute pathology   PT recs WANDA    - c/w home Mirtazepine 15mg nightly  - Monitor for pain control, s/p 1g ofirmev on 9/9/21. 2/2 to hyponatremia in the setting of fluid overload and PNA. Per niece, patient has been acting "weird" 1 week prior to hospital admission, and has baseline dementia. Now AAOx2  CTH negative for acute pathology     - c/w home Mirtazepine 15mg nightly  - Monitor mental status

## 2022-12-29 NOTE — OCCUPATIONAL THERAPY INITIAL EVALUATION ADULT - PERTINENT HX OF CURRENT PROBLEM, REHAB EVAL
89 y/o F baseline AAOx3, bedbound, PMH HTN, HLD, OA, CAD on ASA, HF, chronic constipation, admitted with incarcerated umbilical hernia, s/p 11/24 for exlap with open Umbilical hernia repair. Post-op with respiratory failure, requiring intubation w/ stenotropomonas sputum, and post operative ileus. s/p Laparotomy, exploratory, Open repair of abdominal hernia 11/24/21. Mart-1 - Negative Histology Text: MART-1 staining demonstrates a normal density and pattern of melanocytes along the dermal-epidermal junction. The surgical margins are negative for tumor cells.

## 2023-09-01 NOTE — ED ADULT NURSE NOTE - ADDITIONAL COMPLAINTS
ASSESSMENT/PLAN:    36-year-old gentleman status post pilonidal cystectomy on 7/10/2023.  The superior portion of his incision has healed well.  He has an area of recurrence at the inferior edge near the anus.  I applied silver nitrate to this and cleared all hair from the tract.  There is granulation tissue within the bed of the open portion of the wound.  Follow-up in 4 weeks.      CC:     Chief Complaint   Patient presents with    Wound Check        HPI:    He noted some bleeding from his buttock whenever he was wiping following a bowel movement.    SOCIAL HISTORY:   Social Determinants of Health     Tobacco Use: Low Risk     Smoking Tobacco Use: Never    Smokeless Tobacco Use: Never    Passive Exposure: Not on file   Alcohol Use: Not on file   Financial Resource Strain: Not on file   Food Insecurity: Not on file   Transportation Needs: Not on file   Physical Activity: Not on file   Stress: Not on file   Social Connections: Not on file   Intimate Partner Violence: Not on file   Depression: Not on file   Housing Stability: Not on file        FAMILY HISTORY:    Family History   Problem Relation Age of Onset    Hypertension Mother     Heart disease Father     Heart attack Father     GI problems Brother     Malig Hyperthermia Neg Hx         OTHER SURGERY:  Past Surgical History:   Procedure Laterality Date    PILONIDAL CYSTECTOMY N/A 7/10/2023    Procedure: PILONIDAL CYSTECTOMY;  Surgeon: Reji Nunez MD;  Location: [facility];  Service: General;  Laterality: N/A;    WISDOM TOOTH EXTRACTION Left 11/20/2020        PAST MEDICAL HISTORY:    Past Medical History:   Diagnosis Date    Abnormal ECG 2021    History of palpitations     Pilonidal cyst         MEDICATIONS:   No current outpatient medications on file prior to visit.     No current facility-administered medications on file prior to visit.        ALLERGIES:   Allergies   Allergen Reactions    Penicillins Rash     Historical reaction         PHYSICAL EXAM:  Pt presents to the ED for nausea and vomiting. Per guardian pt, had 3x emesis. Pt was seen today in the ED for similar symptoms.      Skin: Well-healed upper portion of incision from pilonidal cystectomy, open 1 cm area at the inferior portion of incision with recurrent pilonidal cyst/sinus tract with granulation tissue    Reji Nunez M.D.  General and Endoscopic Surgery  [facility]    [address], [address]  [place], [address]  P: [phone]  F: [phone]      Additional Complaints

## 2024-11-05 NOTE — BH CONSULTATION LIAISON ASSESSMENT NOTE - NSBHREFERRALSOURCE_PSY_ALL_CORE
Discharge Summary  Patient ID:  Niru Chaves  [id number]  64 y.o. 1960 female  Crystal Paulino MD        Admit date: 11/3/2024    Discharge date and time:  11/5/2024  1:05 PM      Activity level: Baseline as able/tolerated  Diet: Regular diet as tolerated  Labs: None  Disposition: Home  Condition on Discharge: Stable  DME: None    Admit Diagnoses:   Patient Active Problem List   Diagnosis    Shock circulatory    Atherosclerosis of native coronary artery of native heart without angina pectoris    Chronic obstructive pulmonary disease (HCC)    Hypertension    CHF (congestive heart failure) (HCC)    Hyperlipidemia    Chronic pain    DJD (degenerative joint disease)    Hx of degenerative disc disease    History of MI (myocardial infarction)    Gastroparesis    Anxiety    History of CVA (cerebrovascular accident)    Pulmonary embolism without acute cor pulmonale (HCC)    Deep venous thrombosis (HCC)    Chest pain    Shortness of breath    Smoker    Syncope and collapse    Pneumonia of right lower lobe due to infectious organism       Discharge Diagnoses: Active Problems:    Syncope and collapse    Pneumonia of right lower lobe due to infectious organism  Resolved Problems:    * No resolved hospital problems. *      Consults:  None    Procedures: None    Hospital Course: 64-year-old female with a past medical history of DVT and PE admitted in September 2024 for such, coronary artery disease, CHF, COPD who presented to the emergency room after passing out today.  She walked into her bedroom and just passed out suddenly.  She had no prodromal symptoms.  She also passed out 2 days ago when she hit the right side of her upper lip and has a hematoma there.  She has been taking all of her medications as prescribed.  She denies recent illnesses.  She has not been short of breath or had any coughing or wheezing.  Not sure why she passed out.  No one in her household is ill.  She has no vomiting or diarrhea.  Her appetite 
Inpatient Medical/Surgical team...

## 2025-03-12 NOTE — DIETITIAN INITIAL EVALUATION ADULT. - OTHER INFO
89 yo Vincentian speaking F with PMH bedbound, DNR, HTN, HLD, and OA who p/f NH with AMS x1d. Had been on CTX x1d outpt for PNA. Started becoming hypoxic and less verbal, so was sent to ER. On ICU eval, pt somnolent and unarousable.  Found to be hyponatremic and hypercarbic, intubated and sedated. Admitted to MICU for further observation and management of Acute hypercarbic respiratory failure, ?CHF, ?PNA, ?COPD, BL pleural effusions, UTI, Acute toxic metabolic encephalopathy. Pt extubated on 9/7. Remains in CCU, under MICU, possible plan to transfer out. Pt remains with NGT with place with feeds running at goal rate, 41ml/hr (ordered for jevity 1.5 via NGT, 41ml/hr x24hrs provides ~984ml, 1476kcal, 63gm prot, 748ml water; 1LPS/day for additional 100kcal/15gm). RN reports tolerating well at this time. Denies Diarrhea, +BM 9/6. SLP eval pending, RN unsure if pt to pass d/t reported secretions. No pain. Pola 13. No pressure ulcers. 1+ Dependant edema.   Please see below for nutritions recommendations. Recs made with team.
normal performance

## 2025-07-03 NOTE — PROGRESS NOTE ADULT - PROBLEM SELECTOR PLAN 4
7/3/2025      Gracia Cherry  [address] [place] [id number]      Dear Gracia Cherry,                                                                                                       Faby Madrigal MD wrote a referral for you to see a Behavioral Health provider. Our attempt to reach you by phone has been unsuccessful.     Please call the [facility] Scheduling Patient Line [phone]  at your earliest convenience. Let the  know that a behavioral health referral has been ordered and you are calling to discuss the referral.    We look forward to assisting you with your health care needs.    Sincerely,    [facility] Scheduling Patient Line [phone]        multifactorial, d/t hyponatremia, UTI, and hypercapnia (all POA), in setting of likely mild dementia; mental status has improved, cont. mgmt as above; frequent re-orientation; NPO for now, SLP following; Palliative Care consulted

## 2025-07-23 NOTE — PROGRESS NOTE ADULT - PROBLEM SELECTOR PLAN 4
Group Topic: BH Process Group     Date: 7/22/2025  Start Time: 2000  End Time: 2035  Facilitators: Skip Keys    Focus: Patients talked about the goals they had for the day.  Number in attendance: 11    Method: Group  Attendance: Present  Participation: Active  Patient Response: Appropriate feedback and Demonstrated insight  Mood: Normal  Affect: Type: Euthymic (normal mood)   Range: Full (normal)   Congruency: Congruent   Stability: Stable  Behavior/Socialization: Appropriate to group and Cooperative  Thought Process: Demonstrated insight  Task Performance: Follows directions  Patient Evaluation: Independent - full participation       C/w home atorvastatin 20.